# Patient Record
Sex: FEMALE | Race: WHITE | NOT HISPANIC OR LATINO | Employment: OTHER | URBAN - METROPOLITAN AREA
[De-identification: names, ages, dates, MRNs, and addresses within clinical notes are randomized per-mention and may not be internally consistent; named-entity substitution may affect disease eponyms.]

---

## 2017-03-08 ENCOUNTER — TRANSCRIBE ORDERS (OUTPATIENT)
Dept: ADMINISTRATIVE | Facility: HOSPITAL | Age: 62
End: 2017-03-08

## 2017-03-08 DIAGNOSIS — N95.0 POSTMENOPAUSAL BLEEDING: Primary | ICD-10-CM

## 2017-04-03 ENCOUNTER — HOSPITAL ENCOUNTER (OUTPATIENT)
Dept: RADIOLOGY | Age: 62
Discharge: HOME | End: 2017-04-03
Payer: OTHER GOVERNMENT

## 2017-04-03 DIAGNOSIS — N95.0 POSTMENOPAUSAL BLEEDING: ICD-10-CM

## 2017-04-03 PROCEDURE — 76856 US EXAM PELVIC COMPLETE: CPT

## 2017-04-03 PROCEDURE — 76830 TRANSVAGINAL US NON-OB: CPT

## 2017-04-07 ENCOUNTER — ALLSCRIPTS OFFICE VISIT (OUTPATIENT)
Dept: OTHER | Facility: OTHER | Age: 62
End: 2017-04-07

## 2017-06-06 ENCOUNTER — TRANSCRIBE ORDERS (OUTPATIENT)
Dept: ADMINISTRATIVE | Facility: HOSPITAL | Age: 62
End: 2017-06-06

## 2017-06-06 DIAGNOSIS — Z12.31 ENCOUNTER FOR SCREENING MAMMOGRAM FOR MALIGNANT NEOPLASM OF BREAST: Primary | ICD-10-CM

## 2017-06-29 ENCOUNTER — HOSPITAL ENCOUNTER (OUTPATIENT)
Dept: RADIOLOGY | Age: 62
Discharge: HOME | End: 2017-06-29
Payer: OTHER GOVERNMENT

## 2017-06-29 DIAGNOSIS — Z12.31 ENCOUNTER FOR SCREENING MAMMOGRAM FOR MALIGNANT NEOPLASM OF BREAST: ICD-10-CM

## 2017-06-29 PROCEDURE — G0202 SCR MAMMO BI INCL CAD: HCPCS

## 2017-08-07 ENCOUNTER — ALLSCRIPTS OFFICE VISIT (OUTPATIENT)
Dept: OTHER | Facility: OTHER | Age: 62
End: 2017-08-07

## 2017-08-07 DIAGNOSIS — Z11.59 ENCOUNTER FOR SCREENING FOR OTHER VIRAL DISEASES: ICD-10-CM

## 2017-08-07 DIAGNOSIS — E78.00 PURE HYPERCHOLESTEROLEMIA: ICD-10-CM

## 2017-08-07 DIAGNOSIS — R73.01 IMPAIRED FASTING GLUCOSE: ICD-10-CM

## 2017-08-31 ENCOUNTER — GENERIC CONVERSION - ENCOUNTER (OUTPATIENT)
Dept: INTERNAL MEDICINE CLINIC | Facility: CLINIC | Age: 62
End: 2017-08-31

## 2017-09-09 ENCOUNTER — LAB CONVERSION - ENCOUNTER (OUTPATIENT)
Dept: OTHER | Facility: OTHER | Age: 62
End: 2017-09-09

## 2017-09-09 LAB
A/G RATIO (HISTORICAL): 1.7 (CALC) (ref 1–2.5)
ALBUMIN SERPL BCP-MCNC: 4 G/DL (ref 3.6–5.1)
ALP SERPL-CCNC: 87 U/L (ref 33–130)
ALT SERPL W P-5'-P-CCNC: 13 U/L (ref 6–29)
AST SERPL W P-5'-P-CCNC: 15 U/L (ref 10–35)
BASOPHILS # BLD AUTO: 0.7 %
BASOPHILS # BLD AUTO: 30 CELLS/UL (ref 0–200)
BILIRUB SERPL-MCNC: 0.5 MG/DL (ref 0.2–1.2)
BUN SERPL-MCNC: 16 MG/DL (ref 7–25)
BUN/CREA RATIO (HISTORICAL): ABNORMAL (CALC) (ref 6–22)
CALCIUM SERPL-MCNC: 9.4 MG/DL (ref 8.6–10.4)
CHLORIDE SERPL-SCNC: 107 MMOL/L (ref 98–110)
CHOLEST SERPL-MCNC: 234 MG/DL
CHOLEST/HDLC SERPL: 5.2 (CALC)
CO2 SERPL-SCNC: 27 MMOL/L (ref 20–31)
CREAT SERPL-MCNC: 0.95 MG/DL (ref 0.5–0.99)
DEPRECATED RDW RBC AUTO: 13.9 % (ref 11–15)
EGFR AFRICAN AMERICAN (HISTORICAL): 73 ML/MIN/1.73M2
EGFR-AMERICAN CALC (HISTORICAL): 63 ML/MIN/1.73M2
EOSINOPHIL # BLD AUTO: 151 CELLS/UL (ref 15–500)
EOSINOPHIL # BLD AUTO: 3.5 %
GAMMA GLOBULIN (HISTORICAL): 2.4 G/DL (CALC) (ref 1.9–3.7)
GLUCOSE (HISTORICAL): 102 MG/DL (ref 65–99)
HBA1C MFR BLD HPLC: 5.5 % OF TOTAL HGB
HCT VFR BLD AUTO: 38.5 % (ref 35–45)
HDLC SERPL-MCNC: 45 MG/DL
HEPATITIS C ANTIBODY (HISTORICAL): NORMAL
HGB BLD-MCNC: 12.4 G/DL (ref 11.7–15.5)
LDL CHOLESTEROL (HISTORICAL): 164 MG/DL (CALC)
LYMPHOCYTES # BLD AUTO: 1140 CELLS/UL (ref 850–3900)
LYMPHOCYTES # BLD AUTO: 26.5 %
MCH RBC QN AUTO: 27.7 PG (ref 27–33)
MCHC RBC AUTO-ENTMCNC: 32.2 G/DL (ref 32–36)
MCV RBC AUTO: 85.9 FL (ref 80–100)
MONOCYTES # BLD AUTO: 447 CELLS/UL (ref 200–950)
MONOCYTES (HISTORICAL): 10.4 %
NEUTROPHILS # BLD AUTO: 2533 CELLS/UL (ref 1500–7800)
NEUTROPHILS # BLD AUTO: 58.9 %
NON-HDL-CHOL (CHOL-HDL) (HISTORICAL): 189 MG/DL (CALC)
PLATELET # BLD AUTO: 299 THOUSAND/UL (ref 140–400)
PMV BLD AUTO: 10.5 FL (ref 7.5–12.5)
POTASSIUM SERPL-SCNC: 3.7 MMOL/L (ref 3.5–5.3)
RBC # BLD AUTO: 4.48 MILLION/UL (ref 3.8–5.1)
SIGNAL TO CUT-OFF (HISTORICAL): 0.01
SODIUM SERPL-SCNC: 142 MMOL/L (ref 135–146)
TOTAL PROTEIN (HISTORICAL): 6.4 G/DL (ref 6.1–8.1)
TRIGL SERPL-MCNC: 130 MG/DL
TSH SERPL DL<=0.05 MIU/L-ACNC: 3.62 MIU/L (ref 0.4–4.5)
WBC # BLD AUTO: 4.3 THOUSAND/UL (ref 3.8–10.8)

## 2017-09-11 ENCOUNTER — GENERIC CONVERSION - ENCOUNTER (OUTPATIENT)
Dept: OTHER | Facility: OTHER | Age: 62
End: 2017-09-11

## 2017-10-17 ENCOUNTER — TRANSCRIBE ORDERS (OUTPATIENT)
Dept: SLEEP CENTER | Facility: CLINIC | Age: 62
End: 2017-10-17

## 2017-10-17 ENCOUNTER — HOSPITAL ENCOUNTER (OUTPATIENT)
Dept: SLEEP CENTER | Facility: CLINIC | Age: 62
Discharge: HOME | End: 2017-10-17
Payer: OTHER GOVERNMENT

## 2017-10-17 DIAGNOSIS — G47.33 OSA (OBSTRUCTIVE SLEEP APNEA): ICD-10-CM

## 2017-10-17 DIAGNOSIS — G47.33 OSA (OBSTRUCTIVE SLEEP APNEA): Primary | ICD-10-CM

## 2017-10-17 NOTE — PROGRESS NOTES
Progress Note - Jagruti Hanley Genaro 1620 CQO:[birth year] MRN: [id number]      Reason for Visit:  57 y  o female here for annual follow-up    Assessment:  Doing well on current therapy of BiPAP 15/9 cm for severe obstructive sleep apnea  She does complain of sleepiness, which has gotten worse in association with a 10 lb weight gain  Plan:  I will have her placed in BPAP Auto mode to see if a higher pressure is required  Follow up: One year    History of Present Illness:  History of MIHAELA on PAP therapy  Fully compliant and deriving benefit  Historical Information    Past Medical History: No past medical history on file  Past Surgical History: No past surgical history on file  Social History - see chart  History   Alcohol use: Not on file     History   Drug Use Not on file     History   Smoking Status    Not on file   Smokeless Tobacco    Not on file     Family History: No family history on file  Medications/Allergies:    No current outpatient prescriptions on file  Objective    Vital Signs:   See Chart    [place] Sleepiness Scale:  5    Physical Exam:    General: Alert, appropriate, cooperative, overweight    Head: NC/AT, mild retrognathia    Skin: Warm, dry    Neuro: No motor abnormalities, cranial nerves appear intact    Extremity: No clubbing, cyanosis    PAP setting:  BiPAP 15/9 cm  DME Provider: [employer] [facility]    Counseling / Coordination of Care  Total clinic time spent today 15 minutes  Greater than 50% of total time was spent with the patient and / or family counseling and / or coordination of care  A description of the counseling / coordination of care: Discussed equipment and response to treatment  SABINO Quesada    Board Certified Sleep Specialist

## 2017-12-04 ENCOUNTER — ALLSCRIPTS OFFICE VISIT (OUTPATIENT)
Dept: OTHER | Facility: OTHER | Age: 62
End: 2017-12-04

## 2017-12-05 ENCOUNTER — TRANSCRIBE ORDERS (OUTPATIENT)
Dept: ADMINISTRATIVE | Facility: HOSPITAL | Age: 62
End: 2017-12-05

## 2017-12-05 DIAGNOSIS — R06.81 APNEA: Primary | ICD-10-CM

## 2017-12-05 NOTE — PROGRESS NOTES
Assessment    1  Benign essential hypertension (401 1) (I10)   2  Pure hypercholesterolemia (272 0) (E78 00)   3  Impaired fasting glucose (790 21) (R73 01)   4  Trigger finger of right hand, unspecified finger (727 03) (M65 30)    Plan  Benign essential hypertension    · From  Metoprolol Succinate ER 50 MG Oral Tablet Extended Release 24 Hour Take 1 tabletdaily To Metoprolol Succinate  MG Oral Tablet Extended Release 24 Hour TAKE 1 TABLETDAILY  Trigger finger of right hand, unspecified finger    · 1 - Arturo Khalil MD  (Orthopedic Surgery) Co-Management  *  Status: Active  Requested for:71Pte4895  Care Summary provided  : Yes    Discussion/Summary  Discussion Summary:   Hypertension: Still running high  I recommend a follow-up from 50 mg daily to 100 mg daily  Continue to watch diet, see HPI for details about this issue  finger of the right hand patient referred to Ortho for this to see if injection needed or surgery  Chief Complaint  Chief Complaint Chronic Condition [facility]: Patient is here today for follow up of chronic conditions described in HPI  History of Present Illness  HPI: Hypercholesterolemia: Reviewed patient's 10 year risk for cardiovascular disease estimation which calculates to about 10 9%  Patient currently off statin due to feeling achy on a statin, she watches her diet for cholesterol  Patient tolerating blood pressure meds  Patient not currently having problems with this      Review of Systems  Complete-Female:  Constitutional: no fever,-- no chills-- and-- not feeling tired  Cardiovascular: the heart rate was not slow,-- no chest pain,-- the heart rate was not fast,-- no palpitations-- and-- no lower extremity edema  Respiratory: no shortness of breath,-- no cough,-- no wheezing-- and-- no shortness of breath during exertion  Gastrointestinal: no constipation-- and-- no diarrhea  Genitourinary: no dysuria  Psychiatric: no anxiety-- and-- no depression        Active Problems  1  Benign essential hypertension (401 1) (I10)   2  Encounter for routine pelvic examination (V72 31) (Z01 419)   3  Impaired fasting glucose (790 21) (R73 01)   4  Need for hepatitis C screening test (V73 89) (Z11 59)   5  Need for prophylactic vaccination and inoculation against influenza (V04 81) (Z23)   6  Pure hypercholesterolemia (272 0) (E78 00)   7  Swelling of limb (729 81) (M79 89)   8  Vertigo (780 4) (R42)    Social History     · Former smoker (E93 10) (H00 268)   · Social alcohol use (Z78 9)  Social History Reviewed: The social history was reviewed and updated today  Current Meds   1  HydroCHLOROthiazide 25 MG Oral Tablet; Take 1 tablet daily; Therapy: 64DFI6036 to (Last Rx:90Ovb2667)  Requested for: 12PIT9205 Ordered   2  Meclizine HCl - 25 MG Oral Tablet; TAKE 1 TABLET 3 times daily PRN; Therapy: 01FLY3502 to (Evaluate:14Jun2017)  Requested for: 33Pgx7142; Last Rx:89Sxx7954 Ordered   3  Metoprolol Succinate ER 50 MG Oral Tablet Extended Release 24 Hour; Take 1 tablet daily; Therapy: 31KJI9506 to (Last Rx:25Xyn6578)  Requested for: 51CJN3377 Ordered  Medication List Reviewed: The medication list was reviewed and updated today  Allergies  1  No Known Drug Allergies    Vitals  Vital Signs    Recorded: 68PNB2250 01:28PM   Temperature 98 3 F, Oral   Heart Rate 72   Respiration 16   Systolic 259, Sitting   Diastolic 89, Sitting   Height 5 ft 6 in   Weight 250 lb    BMI Calculated 40 35   BSA Calculated 2 2   O2 Saturation 97       Physical Exam   Constitutional  General appearance: No acute distress, well appearing and well nourished  Head and Face  Head and face: Normal    Eyes  Conjunctiva and lids: No swelling, erythema or discharge  Ears, Nose, Mouth, and Throat  External inspection of ears and nose: Normal    Neck  Neck: Supple, symmetric, trachea midline, no masses  Thyroid: Normal, no thyromegaly     Pulmonary  Respiratory effort: No increased work of breathing or signs of respiratory distress  Auscultation of lungs: Clear to auscultation  Cardiovascular  Auscultation of heart: Normal rate and rhythm, normal S1 and S2, no murmurs  Carotid pulses: 2+ bilaterally  Examination of extremities for edema and/or varicosities: Normal    Lymphatic  Palpation of lymph nodes in neck: No lymphadenopathy  Neurologic  Cortical function: Normal mental status  Psychiatric  Judgment and insight: Normal    Orientation to person, place, and time: Normal    Recent and remote memory: Intact  Mood and affect: Normal        Results/Data  PHQ-2 Adult Depression Screening 87FHL7484 01:28PM User, [facility]     Test Name Result Flag Reference   PHQ-2 Adult Depression Score 0       Over the last two weeks, how often have you been bothered by any of the following problems? Little interest or pleasure in doing things: Not at all - 0 Feeling down, depressed, or hopeless: Not at all - 0   PHQ-2 Adult Depression Screening Negative           Health Management  Encounter for routine pelvic examination   PELVIC EXAM; every 2 years;  Deferred until 18Nuy0942: ; Temporary Deferral    Signatures   Electronically signed by : SABINO Henley ; Dec  4 2017  1:46PM EST                       (Author)

## 2017-12-06 ENCOUNTER — HOSPITAL ENCOUNTER (OUTPATIENT)
Dept: SLEEP CENTER | Facility: CLINIC | Age: 62
Discharge: HOME | End: 2017-12-06
Payer: OTHER GOVERNMENT

## 2017-12-06 ENCOUNTER — TRANSCRIBE ORDERS (OUTPATIENT)
Dept: SLEEP CENTER | Facility: CLINIC | Age: 62
End: 2017-12-06

## 2017-12-06 DIAGNOSIS — R06.81 APNEA: ICD-10-CM

## 2017-12-06 DIAGNOSIS — G47.33 OSA (OBSTRUCTIVE SLEEP APNEA): Primary | ICD-10-CM

## 2017-12-06 NOTE — PROGRESS NOTES
Progress Note - Jagruti Lam 1620 CLW:[birth year] MRN: [id number]      Reason for Visit:    57 y  o female who continues to feel that her the BPAP Auto pressure is insufficient    Assessment:  She is currently on a maximum IPAP of 25 cm and a maximum EPAP of 15 cm  I will increase the EPAP to 21 cm and reduce the pressure support to 4  I will obtain a repeat titration study to better determine what is inadequate pressure for her  Her compliance data suggests that her inspiratory pressure goes no higher than 20 cm  Plan:  Repeat titration study    Follow up: After testing    History of Present Illness:  History of obstructive sleep apnea positive airway pressure therapy  The patient has felt that the pressure is insufficient  After starting BPAP Auto, she had 1 night of excellent sleep with improved daytime hypersomnolence, which could not be matched on subsequent nights  Historical Information    Past Medical History: No past medical history on file  Past Surgical History: No past surgical history on file  Social History - see chart  History   Alcohol use: Not on file     History   Drug Use Not on file     History   Smoking Status    Not on file   Smokeless Tobacco    Not on file     Family History: No family history on file  Medications/Allergies:    No current outpatient prescriptions on file  Objective    Vital Signs:   See Chart    Physical Exam:    General: Alert, appropriate, cooperative, overweight    Head: NC/AT    Skin: Warm, dry    Neuro: No motor abnormalities, cranial nerves appear intact    Psych: Normal affect      Counseling / Coordination of Care  Total clinic time spent today 15 minutes  Greater than 50% of total time was spent with the patient and / or family counseling and / or coordination of care  A description of the counseling / coordination of care: treatment was discussed    SABINO Cohen    Board Certified Sleep Specialist

## 2017-12-28 ENCOUNTER — HOSPITAL ENCOUNTER (OUTPATIENT)
Dept: SLEEP CENTER | Facility: CLINIC | Age: 62
Discharge: HOME | End: 2017-12-29
Payer: OTHER GOVERNMENT

## 2017-12-28 DIAGNOSIS — G47.33 OSA (OBSTRUCTIVE SLEEP APNEA): ICD-10-CM

## 2017-12-28 PROCEDURE — 95811 POLYSOM 6/>YRS CPAP 4/> PARM: CPT

## 2018-01-10 NOTE — RESULT NOTES
Message   labs excellent     Verified Results  (1) COMPREHENSIVE METABOLIC PANEL 07RBR0489 32:83MB Calderón Filler     Test Name Result Flag Reference   GLUCOSE 104 mg/dL H 65-99   Fasting reference interval   UREA NITROGEN (BUN) 18 mg/dL  7-25   CREATININE 0 88 mg/dL  0 50-0 99   For patients >52years of age, the reference limit  for Creatinine is approximately 13% higher for people  identified as -American  eGFR NON-AFR  AMERICAN 70 mL/min/1 73m2  > OR = 60   eGFR AFRICAN AMERICAN 81 mL/min/1 73m2  > OR = 60   BUN/CREATININE RATIO   4-03   NOT APPLICABLE (calc)   ALT 15 U/L  6-29   ALBUMIN 4 0 g/dL  3 6-5 1   GLOBULIN 2 5 g/dL (calc)  1 9-3 7   ALBUMIN/GLOBULIN RATIO 1 6 (calc)  1 0-2 5   BILIRUBIN, TOTAL 0 4 mg/dL  0 2-1 2   ALKALINE PHOSPHATASE 101 U/L     AST 16 U/L  10-35   SODIUM 141 mmol/L  135-146   POTASSIUM 3 9 mmol/L  3 5-5 3   CHLORIDE 105 mmol/L     CARBON DIOXIDE 27 mmol/L  20-31   CALCIUM 9 4 mg/dL  8 6-10 4   PROTEIN, TOTAL 6 5 g/dL  6 1-8 1     (1) LIPID PANEL, FASTING [id number] 06:35AM Calderón Filler     Test Name Result Flag Reference   CHOLESTEROL, TOTAL 168 mg/dL  125-200   HDL CHOLESTEROL 48 mg/dL  > OR = 46   TRIGLICERIDES 392 mg/dL  <150   LDL-CHOLESTEROL 95 mg/dL (calc)  <130   Desirable range <100 mg/dL for patients with CHD or  diabetes and <70 mg/dL for diabetic patients with  known heart disease  CHOL/HDLC RATIO 3 5 (calc)  < OR = 5 0   NON HDL CHOLESTEROL 120 mg/dL (calc)     Target for non-HDL cholesterol is 30 mg/dL higher than   LDL cholesterol target  (1) CK (CPK) 59PCP9317 06:35AM Calderón Filler     Test Name Result Flag Reference   CREATINE KINASE, TOTAL 87 U/L       (1) CBC/PLT/DIFF [id number] 06:35AM [employer]   REPORT COMMENT:  FASTING:YES  COLLECTION KIT GIVEN TO PATIENT  PATIENT ADVISED TO RETURN       Test Name Result Flag Reference   WHITE BLOOD CELL COUNT 4 9 Thousand/uL  3 8-10 8   RED BLOOD CELL COUNT 4 72 Million/uL  3 80-5 10 HEMOGLOBIN 13 1 g/dL  11 7-15 5   HEMATOCRIT 40 0 %  35 0-45 0   MCV 84 6 fL  80 0-100 0   MCH 27 7 pg  27 0-33 0   EOSINOPHILS 4 2 %     BASOPHILS 0 5 %     ABSOLUTE MONOCYTES 421 cells/uL  200-950   ABSOLUTE EOSINOPHILS 206 cells/uL     ABSOLUTE BASOPHILS 25 cells/uL  0-200   NEUTROPHILS 66 9 %     LYMPHOCYTES 19 8 %     MONOCYTES 8 6 %     MCHC 32 7 g/dL  32 0-36 0   RDW 14 8 %  11 0-15 0   PLATELET COUNT 908 Thousand/uL  140-400   MPV 8 8 fL  7 5-11 5   ABSOLUTE NEUTROPHILS 3278 cells/uL  0043-9590   ABSOLUTE LYMPHOCYTES 970 cells/uL  850-3900

## 2018-01-11 NOTE — RESULT NOTES
Discussion/Summary   labs all good except for LDL chol went up to 164 from 95 since she is off the statin  Since pt had aches on the statin, ok to stay off, and watch diet for cholesterol     Verified Results  (1) COMPREHENSIVE METABOLIC PANEL [id number] 24:53HA Hanna Kahn     Test Name Result Flag Reference   GLUCOSE 102 mg/dL H 65-99   Fasting reference interval     For someone without known diabetes, a glucose value  between 100 and 125 mg/dL is consistent with  prediabetes and should be confirmed with a  follow-up test    UREA NITROGEN (BUN) 16 mg/dL  7-25   CREATININE 0 95 mg/dL  0 50-0 99   For patients >52years of age, the reference limit  for Creatinine is approximately 13% higher for people  identified as -American  eGFR NON-AFR   AMERICAN 63 mL/min/1 73m2  > OR = 60   eGFR AFRICAN AMERICAN 73 mL/min/1 73m2  > OR = 60   BUN/CREATININE RATIO   7-14   NOT APPLICABLE (calc)   SODIUM 142 mmol/L  135-146   POTASSIUM 3 7 mmol/L  3 5-5 3   CHLORIDE 107 mmol/L     CARBON DIOXIDE 27 mmol/L  20-31   CALCIUM 9 4 mg/dL  8 6-10 4   PROTEIN, TOTAL 6 4 g/dL  6 1-8 1   ALBUMIN 4 0 g/dL  3 6-5 1   GLOBULIN 2 4 g/dL (calc)  1 9-3 7   ALBUMIN/GLOBULIN RATIO 1 7 (calc)  1 0-2 5   BILIRUBIN, TOTAL 0 5 mg/dL  0 2-1 2   ALKALINE PHOSPHATASE 87 U/L     AST 15 U/L  10-35   ALT 13 U/L  6-29     (1) CBC/PLT/DIFF [id number] 08:26AM Hanna Kahn     Test Name Result Flag Reference   WHITE BLOOD CELL COUNT 4 3 Thousand/uL  3 8-10 8   RED BLOOD CELL COUNT 4 48 Million/uL  3 80-5 10   HEMOGLOBIN 12 4 g/dL  11 7-15 5   HEMATOCRIT 38 5 %  35 0-45 0   MCV 85 9 fL  80 0-100 0   MCH 27 7 pg  27 0-33 0   MCHC 32 2 g/dL  32 0-36 0   RDW 13 9 %  11 0-15 0   PLATELET COUNT 775 Thousand/uL  140-400   ABSOLUTE NEUTROPHILS 2533 cells/uL  5071-9347   ABSOLUTE LYMPHOCYTES 1140 cells/uL  850-3900   ABSOLUTE MONOCYTES 447 cells/uL  200-950   ABSOLUTE EOSINOPHILS 151 cells/uL     ABSOLUTE BASOPHILS 30 cells/uL  0-200 NEUTROPHILS 58 9 %     LYMPHOCYTES 26 5 %     MONOCYTES 10 4 %     EOSINOPHILS 3 5 %     BASOPHILS 0 7 %     MPV 10 5 fL  7 5-12 5     (1) LIPID PANEL, FASTING 08Sep2017 08:26AM Erik Casillas     Test Name Result Flag Reference   CHOLESTEROL, TOTAL 234 mg/dL H <200   HDL CHOLESTEROL 45 mg/dL L >10   TRIGLICERIDES 740 mg/dL  <150   LDL-CHOLESTEROL 164 mg/dL (calc) H    Reference range: <100     Desirable range <100 mg/dL for patients with CHD or  diabetes and <70 mg/dL for diabetic patients with  known heart disease  The [facility] calculation is a validated novel   method that provides better accuracy than the   Friedewald equation in the estimation of LDL-C,   particularly when TG levels are 150-400 mg/dL and   LDL-C levels are lower than 70 mg/dL  Reference:  Ana Hull et al  Comparison of a Novel   Method vs the Friedewald Equation for Estimating   Low-Density Lipoprotein Cholesterol Levels From the   Standard Lipid Profile  JULIA  6669;113(03): 5638-4773  For additional information, please refer to  http://[place]/faq/BZH141  (This link is being provided for informational/  educational purposes only )   CHOL/HDLC RATIO 5 2 (calc) H <5 0   NON HDL CHOLESTEROL 189 mg/dL (calc) H <130   For patients with diabetes plus 1 major ASCVD risk   factor, treating to a non-HDL-C goal of <100 mg/dL   (LDL-C of <70 mg/dL) is considered a therapeutic   option       (Q) HEPATITIS C ANTIBODY 08Sep2017 08:26AM Erik [employer]     Test Name Result Flag Reference   HEPATITIS C ANTIBODY NON-REACTIVE  NON-REACTIVE   SIGNAL TO CUT-OFF 0 01  <1 00     (Q) TSH, 3RD GENERATION 08Sep2017 08:26AM [email or web address]     Test Name Result Flag Reference   TSH 3 62 mIU/L  0 40-4 50     (Q) HEMOGLOBIN A1c 08Sep2017 08:26AM [email or web address]   REPORT COMMENT:  FASTING:YES     Test Name Result Flag Reference   HEMOGLOBIN A1c 5 5 % of total Hgb  <5 7   For the purpose of screening for the presence of  diabetes: <5 7%       Consistent with the absence of diabetes  5 7-6 4%    Consistent with increased risk for diabetes              (prediabetes)  > or =6 5%  Consistent with diabetes     This assay result is consistent with a decreased risk  of diabetes  Currently, no consensus exists regarding use of  hemoglobin A1c for diagnosis of diabetes in children  According to American Diabetes Association (ADA)  guidelines, hemoglobin A1c <7 0% represents optimal  control in non-pregnant diabetic patients  Different  metrics may apply to specific patient populations  Standards of Medical Care in Diabetes(ADA)

## 2018-01-13 VITALS
BODY MASS INDEX: 39.7 KG/M2 | WEIGHT: 247 LBS | HEIGHT: 66 IN | TEMPERATURE: 98.4 F | RESPIRATION RATE: 16 BRPM | DIASTOLIC BLOOD PRESSURE: 90 MMHG | HEART RATE: 85 BPM | SYSTOLIC BLOOD PRESSURE: 150 MMHG | OXYGEN SATURATION: 97 %

## 2018-01-13 VITALS
BODY MASS INDEX: 39.05 KG/M2 | HEART RATE: 76 BPM | WEIGHT: 243 LBS | SYSTOLIC BLOOD PRESSURE: 124 MMHG | DIASTOLIC BLOOD PRESSURE: 64 MMHG | OXYGEN SATURATION: 95 % | TEMPERATURE: 98.2 F | HEIGHT: 66 IN

## 2018-01-13 NOTE — RESULT NOTES
Message   stool test normal     Verified Results  (Q) FECAL GLOBIN BY IMMUNOCHEMISTRY [id number] 06:41AM Junior Bronson   REPORT COMMENT:  SPLIT 11/10/2016 FROM [id number]  2ND SLIDE COLLECTED 11/8/16  FASTING:YES     Test Name Result Flag Reference   FECAL GLOBIN BY$IMMUNOCHEMISTRY      FECAL GLOBIN BY IMMUNOCHEMISTRY         MICRO NUMBER:      [id number]    TEST STATUS:       FINAL    SPECIMEN SOURCE:   INSURE (TM) FOBT TEST CARD    SPECIMEN QUALITY:  ADEQUATE    RESULT:            Not Detected

## 2018-01-16 NOTE — RESULT NOTES
Message   Let pt know no blood clot in legs  Verified Results  VAS LOWER LIMB VENOUS DUPLEX STUDY, COMPLETE BILATERAL [id number] 02:49PM Aubreekenny Auner Order Number: [id number]    - Patient Instructions: To schedule this appointment, please contact Central Scheduling at [phone]   Order Number: [id number]    - Patient Instructions: To schedule this appointment, please contact Central Scheduling at [phone]  Test Name Result Flag Reference   VAS LOWER LIMB VENOUS DUPLEX STUDY, COMPLETE BILATERAL (Report)     [facility] REPORT   CLINICAL:   Indications: Other specified soft tissue disorders [M79 89]  Patient presents with   bilateral ankle swelling x 3 weeks  She reports a stretchy feeling in her left   ankle which gets worse when she first stands up  Denies history of DVT or PE  Risk Factors:   Patient denies any current cardiovascular risk factors  CONCLUSION:   Impression:   RIGHT LOWER LIMB:   No evidence of acute or chronic deep vein thrombosis  No evidence of superficial thrombophlebitis noted  Doppler evaluation shows a normal response to augmentation maneuvers  Popliteal, posterior tibial and anterior tibial arterial Doppler waveforms are   triphasic   LEFT LOWER LIMB:   No evidence of acute or chronic deep vein thrombosis  No evidence of superficial thrombophlebitis noted  Doppler evaluation shows a normal response to augmentation maneuvers     Popliteal, posterior tibial and anterior tibial arterial Doppler waveforms are   triphasic      SIGNATURE:   Electronically Signed by: John Bull on 2016-08-24 07:10:55 PM

## 2018-01-23 VITALS
RESPIRATION RATE: 16 BRPM | SYSTOLIC BLOOD PRESSURE: 162 MMHG | WEIGHT: 250 LBS | BODY MASS INDEX: 40.18 KG/M2 | TEMPERATURE: 98.3 F | HEIGHT: 66 IN | HEART RATE: 72 BPM | DIASTOLIC BLOOD PRESSURE: 89 MMHG | OXYGEN SATURATION: 97 %

## 2018-02-08 ENCOUNTER — TELEPHONE (OUTPATIENT)
Dept: OBGYN CLINIC | Facility: HOSPITAL | Age: 63
End: 2018-02-08

## 2018-02-19 RX ORDER — ATORVASTATIN CALCIUM 10 MG/1
1 TABLET, FILM COATED ORAL
COMMUNITY
End: 2018-04-02 | Stop reason: SINTOL

## 2018-02-19 RX ORDER — MECLIZINE HYDROCHLORIDE 25 MG/1
1 TABLET ORAL 3 TIMES DAILY
COMMUNITY
Start: 2017-04-07 | End: 2019-03-18

## 2018-02-19 RX ORDER — HYDROCHLOROTHIAZIDE 25 MG/1
1 TABLET ORAL DAILY
COMMUNITY
Start: 2016-10-10 | End: 2019-01-01 | Stop reason: SDUPTHER

## 2018-02-19 RX ORDER — METOPROLOL SUCCINATE 100 MG/1
1 TABLET, EXTENDED RELEASE ORAL DAILY
COMMUNITY
Start: 2016-10-19 | End: 2018-11-27 | Stop reason: SDUPTHER

## 2018-02-20 ENCOUNTER — OFFICE VISIT (OUTPATIENT)
Dept: OBGYN CLINIC | Facility: HOSPITAL | Age: 63
End: 2018-02-20
Payer: OTHER GOVERNMENT

## 2018-02-20 VITALS
SYSTOLIC BLOOD PRESSURE: 161 MMHG | DIASTOLIC BLOOD PRESSURE: 79 MMHG | BODY MASS INDEX: 39.37 KG/M2 | HEART RATE: 75 BPM | WEIGHT: 245 LBS | HEIGHT: 66 IN

## 2018-02-20 DIAGNOSIS — M79.641 RIGHT HAND PAIN: Primary | ICD-10-CM

## 2018-02-20 PROCEDURE — 99203 OFFICE O/P NEW LOW 30 MIN: CPT | Performed by: ORTHOPAEDIC SURGERY

## 2018-02-20 NOTE — PROGRESS NOTES
ASSESSMENT/PLAN:    Diagnoses and all orders for this visit:    Right hand pain  -     XR hand 3+ vw right; Future    Other orders  -     atorvastatin (LIPITOR) 10 mg tablet; Take 1 tablet by mouth  -     hydrochlorothiazide (HYDRODIURIL) 25 mg tablet; Take 1 tablet by mouth daily  -     meclizine (ANTIVERT) 25 mg tablet; Take 1 tablet by mouth 3 (three) times a day  -     metoprolol succinate (TOPROL-XL) 100 mg 24 hr tablet; Take 1 tablet by mouth daily        Assessment:   Trigger Finger  right  long finger    Plan:   NSAIDs, Tylenol and activity modification    Follow Up:  6 weeks    To Do Next Visit:       General Discussions:     Trigger FInger: The anatomy and physiology of trigger finger was discussed with the patient today in the office  Edema and increased contact pressure within the flexor tendons at the A1 pulley can cause pain, crepitation, and limitation of function  Treatment options include resting MP blocking splints to decrease edema, oral anti-inflammatory medications, home or formal therapy exercises, up to 2 steroid injections within the tendon sheath, or surgical release  While majority of patients do respond to conservative treatment, up to 20% may require surgical release  Operative Discussions:         _____________________________________________________  CHIEF COMPLAINT:  No chief complaint on file  SUBJECTIVE:  Jv Ku is a 57y o  year old female who presents with Catching and Locking to the right long finger  Radiation: None  Previous Treatments: None  Associated symptoms: Catching and Locking    PAST MEDICAL HISTORY:  No past medical history on file  PAST SURGICAL HISTORY:  No past surgical history on file  FAMILY HISTORY:  No family history on file      SOCIAL HISTORY:  Social History   Substance Use Topics    Smoking status: Not on file    Smokeless tobacco: Not on file    Alcohol use Not on file       MEDICATIONS:    Current Outpatient Prescriptions:   hydrochlorothiazide (HYDRODIURIL) 25 mg tablet, Take 1 tablet by mouth daily, Disp: , Rfl:     meclizine (ANTIVERT) 25 mg tablet, Take 1 tablet by mouth 3 (three) times a day, Disp: , Rfl:     metoprolol succinate (TOPROL-XL) 100 mg 24 hr tablet, Take 1 tablet by mouth daily, Disp: , Rfl:     atorvastatin (LIPITOR) 10 mg tablet, Take 1 tablet by mouth, Disp: , Rfl:     ALLERGIES:  Allergies not on file    REVIEW OF SYSTEMS:  Pertinent items are noted in HPI  A comprehensive review of systems was negative      LABS:  HgA1c:   Lab Results   Component Value Date    HGBA1C 5 5 09/08/2017     BMP:   Lab Results   Component Value Date    GLUCOSE 93 03/02/2015    CALCIUM 9 2 03/02/2015     03/02/2015    K 3 8 03/02/2015    CO2 33 03/02/2015     03/02/2015    BUN 19 03/02/2015    CREATININE 1 11 03/02/2015         _____________________________________________________  PHYSICAL EXAMINATION:  General: well developed and well nourished, alert, oriented times 3 and appears comfortable  Psychiatric: Normal  HEENT: Trachea Midline, No torticollis  Cardiovascular: No discernable arrhythmia  Pulmonary: No wheezing or stridor  Skin: No masses, erthema, lacerations, fluctation, ulcerations, No Masses  Neurovascular: Sensation Intact to the Median, Ulnar, Radial Nerve, Motor Intact to the Median, Ulnar, Radial Nerve and Pulses Intact    MUSCULOSKELETAL EXAMINATION:  RIGHT SIDE:  Finger:  No tenderness, No Triggering  long finger and No evidence of Nodules  long finger  Radial, median and ulnar nerve intact  No palpable masses in palm  Gliding normally in A1 pulley in long finger  _____________________________________________________  STUDIES REVIEWED:  No Studies to review      PROCEDURES PERFORMED:  Procedures  No Procedures performed today

## 2018-02-22 ENCOUNTER — OFFICE VISIT (OUTPATIENT)
Dept: SLEEP CENTER | Facility: CLINIC | Age: 63
End: 2018-02-22
Payer: OTHER GOVERNMENT

## 2018-02-22 VITALS
HEART RATE: 76 BPM | HEIGHT: 66 IN | WEIGHT: 244.2 LBS | DIASTOLIC BLOOD PRESSURE: 82 MMHG | BODY MASS INDEX: 39.25 KG/M2 | SYSTOLIC BLOOD PRESSURE: 142 MMHG

## 2018-02-22 DIAGNOSIS — G47.33 OSA (OBSTRUCTIVE SLEEP APNEA): ICD-10-CM

## 2018-02-22 PROCEDURE — 99214 OFFICE O/P EST MOD 30 MIN: CPT | Performed by: INTERNAL MEDICINE

## 2018-03-08 DIAGNOSIS — G47.33 OSA (OBSTRUCTIVE SLEEP APNEA): Primary | ICD-10-CM

## 2018-03-26 ENCOUNTER — TRANSCRIBE ORDERS (OUTPATIENT)
Dept: SLEEP CENTER | Facility: CLINIC | Age: 63
End: 2018-03-26

## 2018-03-26 DIAGNOSIS — Z11.59 ENCOUNTER FOR SCREENING FOR OTHER VIRAL DISEASES: ICD-10-CM

## 2018-04-02 ENCOUNTER — OFFICE VISIT (OUTPATIENT)
Dept: INTERNAL MEDICINE CLINIC | Facility: CLINIC | Age: 63
End: 2018-04-02
Payer: OTHER GOVERNMENT

## 2018-04-02 VITALS
TEMPERATURE: 98 F | WEIGHT: 230.8 LBS | SYSTOLIC BLOOD PRESSURE: 140 MMHG | HEART RATE: 58 BPM | DIASTOLIC BLOOD PRESSURE: 78 MMHG | HEIGHT: 65 IN | BODY MASS INDEX: 38.45 KG/M2 | OXYGEN SATURATION: 97 %

## 2018-04-02 DIAGNOSIS — E78.00 PURE HYPERCHOLESTEROLEMIA: ICD-10-CM

## 2018-04-02 DIAGNOSIS — R73.01 IMPAIRED FASTING GLUCOSE: Primary | ICD-10-CM

## 2018-04-02 DIAGNOSIS — I10 BENIGN ESSENTIAL HYPERTENSION: ICD-10-CM

## 2018-04-02 PROCEDURE — 99214 OFFICE O/P EST MOD 30 MIN: CPT | Performed by: INTERNAL MEDICINE

## 2018-04-02 NOTE — PROGRESS NOTES
Assessment/Plan:    Impaired fasting glucose   Continue to watch diet, and will continue to monitor this    Benign essential hypertension   Borderline, continue medications  Pure hypercholesterolemia    Stay off statin due to a aches from statin in the past, continue to watch diet, exercise  Diagnoses and all orders for this visit:    Impaired fasting glucose    Benign essential hypertension    Pure hypercholesterolemia          Subjective:      Patient ID: Chrissy Noriega is a 57 y o  female  Hypercholesterolemia:  Patient stop statin due to muscle aches  She is currently watching her diet and is pursuing a weight loss program and she exercises  Hypertension: Patient tolerating blood pressure meds, she reports compliance, no cardiopulmonary complaints  Obesity:  Patient is participating in a weight loss program              The following portions of the patient's history were reviewed and updated as appropriate: allergies, current medications, past family history, past medical history, past social history, past surgical history and problem list     Review of Systems   Constitutional: Negative for chills, fatigue and fever  HENT: Negative for congestion, nosebleeds, postnasal drip, sore throat and trouble swallowing  Eyes: Negative for pain  Respiratory: Negative for cough, chest tightness, shortness of breath and wheezing  Cardiovascular: Negative for chest pain, palpitations and leg swelling  Gastrointestinal: Negative for abdominal pain, constipation, diarrhea, nausea and vomiting  Endocrine: Negative for polydipsia and polyuria  Genitourinary: Negative for dysuria, flank pain and hematuria  Musculoskeletal: Negative for arthralgias  Skin: Negative for rash  Neurological: Negative for dizziness, tremors and headaches  Hematological: Does not bruise/bleed easily  Psychiatric/Behavioral: Negative for confusion and dysphoric mood  The patient is not nervous/anxious  Objective:      /78   Pulse 58   Temp 98 °F (36 7 °C) (Oral)   Ht 5' 5 11" (1 654 m)   Wt 105 kg (230 lb 12 8 oz)   SpO2 97%   BMI 38 28 kg/m²          Physical Exam   Constitutional: She is oriented to person, place, and time  She appears well-developed and well-nourished  No distress  HENT:   Head: Normocephalic and atraumatic  Right Ear: External ear normal    Left Ear: External ear normal    Eyes: Conjunctivae are normal  No scleral icterus  Neck: Normal range of motion  Neck supple  No tracheal deviation present  No thyromegaly present  Cardiovascular: Normal rate, regular rhythm and normal heart sounds  No murmur heard  Pulmonary/Chest: Effort normal and breath sounds normal  No respiratory distress  She has no wheezes  She has no rales  Abdominal: Soft  Bowel sounds are normal  There is no tenderness  There is no rebound and no guarding  Musculoskeletal: She exhibits no edema  Lymphadenopathy:     She has no cervical adenopathy  Neurological: She is alert and oriented to person, place, and time  Psychiatric: She has a normal mood and affect  Her behavior is normal  Judgment and thought content normal    Vitals reviewed

## 2018-08-02 ENCOUNTER — OFFICE VISIT (OUTPATIENT)
Dept: INTERNAL MEDICINE CLINIC | Facility: CLINIC | Age: 63
End: 2018-08-02
Payer: MEDICARE

## 2018-08-02 VITALS
SYSTOLIC BLOOD PRESSURE: 170 MMHG | OXYGEN SATURATION: 96 % | HEART RATE: 55 BPM | BODY MASS INDEX: 35.87 KG/M2 | WEIGHT: 223.2 LBS | TEMPERATURE: 98 F | HEIGHT: 66 IN | DIASTOLIC BLOOD PRESSURE: 90 MMHG

## 2018-08-02 DIAGNOSIS — I10 BENIGN ESSENTIAL HYPERTENSION: ICD-10-CM

## 2018-08-02 DIAGNOSIS — Z23 NEED FOR SHINGLES VACCINE: ICD-10-CM

## 2018-08-02 DIAGNOSIS — R29.890 HEIGHT LOSS: ICD-10-CM

## 2018-08-02 DIAGNOSIS — Z78.0 POSTMENOPAUSAL: ICD-10-CM

## 2018-08-02 DIAGNOSIS — R73.01 IMPAIRED FASTING GLUCOSE: ICD-10-CM

## 2018-08-02 DIAGNOSIS — Z23 NEED FOR VACCINATION WITH 13-POLYVALENT PNEUMOCOCCAL CONJUGATE VACCINE: ICD-10-CM

## 2018-08-02 DIAGNOSIS — Z13.820 SCREENING FOR OSTEOPOROSIS: ICD-10-CM

## 2018-08-02 DIAGNOSIS — E78.00 PURE HYPERCHOLESTEROLEMIA: ICD-10-CM

## 2018-08-02 DIAGNOSIS — Z00.00 WELCOME TO MEDICARE PREVENTIVE VISIT: Primary | ICD-10-CM

## 2018-08-02 PROCEDURE — G0403 EKG FOR INITIAL PREVENT EXAM: HCPCS | Performed by: INTERNAL MEDICINE

## 2018-08-02 PROCEDURE — G0402 INITIAL PREVENTIVE EXAM: HCPCS | Performed by: INTERNAL MEDICINE

## 2018-08-02 PROCEDURE — 99214 OFFICE O/P EST MOD 30 MIN: CPT | Performed by: INTERNAL MEDICINE

## 2018-08-02 PROCEDURE — G0009 ADMIN PNEUMOCOCCAL VACCINE: HCPCS

## 2018-08-02 PROCEDURE — 90670 PCV13 VACCINE IM: CPT

## 2018-08-02 RX ORDER — LOSARTAN POTASSIUM 50 MG/1
50 TABLET ORAL DAILY
Qty: 90 TABLET | Refills: 3 | Status: SHIPPED | OUTPATIENT
Start: 2018-08-02 | End: 2018-11-28 | Stop reason: SDUPTHER

## 2018-08-02 NOTE — ASSESSMENT & PLAN NOTE
Stay active, continue to watch diet    Patient off statin due to aches that she had when she was tried on a statin the past

## 2018-08-02 NOTE — PATIENT INSTRUCTIONS
Problem List Items Addressed This Visit     Benign essential hypertension       With blood pressure being elevated, I recommend adding medication, losartan 50 mg daily, and recheck in a few months along with labs         Relevant Medications    losartan (COZAAR) 50 mg tablet    Other Relevant Orders    CBC and differential    Comprehensive metabolic panel    Lipid Panel with Direct LDL reflex    TSH, 3rd generation with Free T4 reflex    Impaired fasting glucose     Continue to watch diet for this  Relevant Orders    Comprehensive metabolic panel    Hemoglobin A1C    Pure hypercholesterolemia       Stay active, continue to watch diet    Patient off statin due to aches that she had when she was tried on a statin the past         Relevant Orders    CBC and differential    Lipid Panel with Direct LDL reflex    TSH, 3rd generation with Free T4 reflex    Welcome to Medicare preventive visit - Primary     Discussed preventive health, cancer screening, immunizations, and safety issues         Relevant Orders    POCT ECG (Completed)      Other Visit Diagnoses     Need for shingles vaccine        Relevant Medications    Zoster Vac Recomb Adjuvanted ([address]) 50 MCG SUSR    Need for vaccination with 13-polyvalent pneumococcal conjugate vaccine        Relevant Orders    PNEUMOCOCCAL CONJUGATE VACCINE 13-VALENT GREATER THAN 6 MONTHS    Screening for osteoporosis        Relevant Orders    DXA bone density spine hip and pelvis    Height loss        Relevant Orders    DXA bone density spine hip and pelvis    Postmenopausal        Relevant Orders    DXA bone density spine hip and pelvis

## 2018-08-02 NOTE — ASSESSMENT & PLAN NOTE
With blood pressure being elevated, I recommend adding medication, losartan 50 mg daily, and recheck in a few months along with labs

## 2018-08-02 NOTE — PROGRESS NOTES
Assessment/Plan:    Impaired fasting glucose  Continue to watch diet for this  Pure hypercholesterolemia    Stay active, continue to watch diet  Patient off statin due to aches that she had when she was tried on a statin the past    Benign essential hypertension    With blood pressure being elevated, I recommend adding medication, losartan 50 mg daily, and recheck in a few months along with labs    Welcome to Medicare preventive visit  Discussed preventive health, cancer screening, immunizations, and safety issues       Diagnoses and all orders for this visit:    Welcome to Medicare preventive visit  -     POCT ECG    Pure hypercholesterolemia  -     CBC and differential; Future  -     Lipid Panel with Direct LDL reflex; Future  -     TSH, 3rd generation with Free T4 reflex; Future    Impaired fasting glucose  -     Comprehensive metabolic panel; Future  -     Hemoglobin A1C; Future    Benign essential hypertension  -     CBC and differential; Future  -     Comprehensive metabolic panel; Future  -     Lipid Panel with Direct LDL reflex; Future  -     TSH, 3rd generation with Free T4 reflex; Future  -     losartan (COZAAR) 50 mg tablet; Take 1 tablet (50 mg total) by mouth daily for 30 days    Need for shingles vaccine  -     Zoster Vac Recomb Adjuvanted (SHINGRIX) 50 MCG SUSR; Inject 50 mcg into a muscle once for 1 dose Repeat one dose in 2 to 6 months    Need for vaccination with 13-polyvalent pneumococcal conjugate vaccine  -     PNEUMOCOCCAL CONJUGATE VACCINE 13-VALENT GREATER THAN 6 MONTHS    Screening for osteoporosis  -     DXA bone density spine hip and pelvis; Future    Height loss  -     DXA bone density spine hip and pelvis; Future    Postmenopausal  -     DXA bone density spine hip and pelvis; Future          Subjective:      Patient ID: Mark Melgar is a 70 y o  female  Hypertension:  Patient reports compliance with blood pressure meds, no cardiopulmonary complaints          The following portions of the patient's history were reviewed and updated as appropriate: allergies, current medications, past family history, past medical history, past social history, past surgical history and problem list     Review of Systems   Constitutional: Negative for chills, fatigue and fever  HENT: Negative for congestion, nosebleeds, postnasal drip, sore throat and trouble swallowing  Eyes: Negative for pain  Respiratory: Negative for cough, chest tightness, shortness of breath and wheezing  Cardiovascular: Negative for chest pain, palpitations and leg swelling  Gastrointestinal: Negative for abdominal pain, constipation, diarrhea, nausea and vomiting  Endocrine: Negative for polydipsia and polyuria  Genitourinary: Negative for dysuria, flank pain and hematuria  Musculoskeletal: Negative for arthralgias  Skin: Negative for rash  Neurological: Negative for dizziness, tremors and headaches  Hematological: Does not bruise/bleed easily  Psychiatric/Behavioral: Negative for confusion and dysphoric mood  The patient is not nervous/anxious  Objective:      /90   Pulse 55   Temp 98 °F (36 7 °C)   Ht 5' 5 7" (1 669 m)   Wt 101 kg (223 lb 3 2 oz)   SpO2 96%   BMI 36 36 kg/m²          Physical Exam   Constitutional: She is oriented to person, place, and time  She appears well-developed and well-nourished  No distress  HENT:   Head: Normocephalic and atraumatic  Right Ear: External ear normal    Left Ear: External ear normal    Eyes: Conjunctivae are normal  No scleral icterus  Vision is 20/30 in the right eye and 20/30 in left eye, both uncorrected   Neck: Normal range of motion  Neck supple  No tracheal deviation present  No thyromegaly present  Cardiovascular: Normal rate, regular rhythm and normal heart sounds  No murmur heard  Pulmonary/Chest: Effort normal and breath sounds normal  No respiratory distress  She has no wheezes  She has no rales  Abdominal: Soft  Bowel sounds are normal  There is no tenderness  There is no rebound and no guarding  Musculoskeletal: She exhibits no edema  Lymphadenopathy:     She has no cervical adenopathy  Neurological: She is alert and oriented to person, place, and time  Psychiatric: She has a normal mood and affect  Her behavior is normal  Judgment and thought content normal    Vitals reviewed  Assessment and Plan:    Problem List Items Addressed This Visit     Benign essential hypertension       With blood pressure being elevated, I recommend adding medication, losartan 50 mg daily, and recheck in a few months along with labs         Relevant Medications    losartan (COZAAR) 50 mg tablet    Other Relevant Orders    CBC and differential    Comprehensive metabolic panel    Lipid Panel with Direct LDL reflex    TSH, 3rd generation with Free T4 reflex    Impaired fasting glucose     Continue to watch diet for this  Relevant Orders    Comprehensive metabolic panel    Hemoglobin A1C    Pure hypercholesterolemia       Stay active, continue to watch diet    Patient off statin due to aches that she had when she was tried on a statin the past         Relevant Orders    CBC and differential    Lipid Panel with Direct LDL reflex    TSH, 3rd generation with Free T4 reflex    Welcome to Medicare preventive visit - Primary     Discussed preventive health, cancer screening, immunizations, and safety issues         Relevant Orders    POCT ECG (Completed)      Other Visit Diagnoses     Need for shingles vaccine        Relevant Medications    Zoster Vac Recomb Adjuvanted ([address]) 50 MCG SUSR    Need for vaccination with 13-polyvalent pneumococcal conjugate vaccine        Relevant Orders    PNEUMOCOCCAL CONJUGATE VACCINE 13-VALENT GREATER THAN 6 MONTHS    Screening for osteoporosis        Relevant Orders    DXA bone density spine hip and pelvis    Height loss        Relevant Orders    DXA bone density spine hip and pelvis Postmenopausal        Relevant Orders    DXA bone density spine hip and pelvis        Health Maintenance Due   Topic Date Due    HIV SCREENING  1955    CRC Screening: Colonoscopy  1955    DTaP,Tdap,and Td Vaccines (1 - Tdap) 06/24/1974    PNEUMOCOCCAL POLYSACCHARIDE VACCINE AGE 72 AND OVER  06/24/2018         HPI:  Luz Marina Calles is a 70 y o  female here for her IPPE(Welcome to Medicare Visit )    Patient Active Problem List   Diagnosis    Benign essential hypertension    Impaired fasting glucose    Pure hypercholesterolemia    Welcome to Medicare preventive visit     No past medical history on file  No past surgical history on file  Family History   Problem Relation Age of Onset    Coronary artery disease Father      History   Smoking Status    Never Smoker   Smokeless Tobacco    Never Used     Comment: per allscripts - 'former smoker'     History   Alcohol Use No     Comment: per allscripts - 'social alcohol use'      History   Drug Use No       Current Outpatient Prescriptions   Medication Sig Dispense Refill    hydrochlorothiazide (HYDRODIURIL) 25 mg tablet Take 1 tablet by mouth daily      meclizine (ANTIVERT) 25 mg tablet Take 1 tablet by mouth 3 (three) times a day      metoprolol succinate (TOPROL-XL) 100 mg 24 hr tablet Take 1 tablet by mouth daily      losartan (COZAAR) 50 mg tablet Take 1 tablet (50 mg total) by mouth daily for 30 days 90 tablet 3    Zoster Vac Recomb Adjuvanted (SHINGRIX) 50 MCG SUSR Inject 50 mcg into a muscle once for 1 dose Repeat one dose in 2 to 6 months 1 each 0     No current facility-administered medications for this visit        Allergies   Allergen Reactions    Atorvastatin Other (See Comments)     Muscle ache    Diclofenac      Immunization History   Administered Date(s) Administered    Influenza 10/31/2017    Influenza Quadrivalent Preservative Free 3 years and older IM 12/12/2016    Influenza Quadrivalent, 6-35 Months IM 09/29/2015    Influenza TIV (IM) 09/30/2013       Patient Care Team:  Maritza Ceballos MD as PCP - General      Medicare Screening Tests and Risk Assessments:  AWV Clinical     ISAR:   Previous hospitalizations?:  No       Once in a Lifetime Medicare Screening:       Medicare Screening Tests and Risk Assessment:   AAA Risk Assessment    Osteoporosis Risk Assessment    HIV Risk Assessment        Drug and Alcohol Use:   Tobacco use    Cigarettes:  former smoker    Quit date:  8/2/03   Tobacco use duration    Tobacco Cessation Readiness    Alcohol use    Alcohol use:  occasional use    Concern about alcohol use:  No    Alcohol Treatment Readiness   Illicit Drug Use        Diet & Exercise:   Diet   What is your diet?:  Regular   How many servings a day of the following:   Exercise    Do you currently exercise?:  yes    Frequency:  daily    Type of exercise:  walking       Cognitive Impairment Screening:   Cognitive Impairment Screening    Do you have difficulty learning or retaining new information?:  No Do you have difficulty handling new tasks?:  No   Do you have difficulty with reasoning?:  No Do you have difficulty with spatial ability and orientation?:  No   Do you have difficulty with language?:  No Do you have difficulty with behavior?:  No       Functional Ability/Level of Safety:   Hearing     Bilateral:  slightly decreased   Hearing Impairment Assessment    Current Activities    Status:  unlimited ADL's, unlimited driving, unlimited IADL's, unlimited social activities   Help needed with the folllowing:    ADL    Fall Risk   Have you fallen in the last 12 months?:  No    Injury History       Home Safety:   Do you feel unsteady when walking?:  No    Home Safety Risk Factors    Poor household lighting:  No       Advanced Directives:   Advanced Directives    Living Will:  Yes Durable POA for healthcare:   Yes   Advanced directive:  Yes    Patient's End of Life Decisions        Urinary Incontinence:   Do you have urinary incontinence?:  No        Glaucoma:            Provider Screening     Preventative Screening/Counseling:   Cardiovascular Screening/Counseling:   (Labs Q5 years, EKG optional one-time)   General:  Risks and Benefits Discussed, Screening Current           Diabetes Screening/Counseling:   (2 tests/year if Pre-Diabetes or 1 test/year if no Diabetes)   General:  Risks and Benefits Discussed, Screening Current           Colorectal Cancer Screening/Counseling:   (FOBT Q1 yr; Flex Sig Q4 yrs or Q10 yrs after Screening Colonoscopy; Screening Colonoscpy Q2 yrs High Risk or Q10 yrs Low Risk; Barium Enema Q2 yrs High Risk or Q4 yrs Low Risk)   General:  Risks and Benefits Discussed, Screening Current           Prostate Cancer Screening/Counseling:   (Annual)          Breast Cancer Screening/Counseling:   (Baseline Age 28 - 43; Annual Age 36+)   General:  Risks and Benefits Discussed, Screening Current          Cervical Cancer Screening/Counseling:   (Annual for High Risk or Childbearing Age with Abnormal Pap in Last 3 yrs; Every 2 all others)         Osteoporosis Screening/Counseling:   (Every 2 Yrs if at risk or more if medically necessary)   General:  Risks and Benefits Discussed           AAA Screening/Counseling:   (Once per Lifetime with risk factors)          Glaucoma Screening/Counseling:   (Annual)         HIV Screening/Counseling:   (Voluntary; Once annually for high risk OR 3 times for Pregnancy at diagnosis of IUP; 3rd trimester; and at Labor         Hepatitis C Screening:             Immunizations:        Other Preventative Couseling (Non-Medicare Wellness Visit Required):       Referrals (Non-Medicare Wellness Visit Required):       Medical Equipment/Suppliers:

## 2018-08-13 ENCOUNTER — HOSPITAL ENCOUNTER (OUTPATIENT)
Dept: RADIOLOGY | Age: 63
Discharge: HOME | End: 2018-08-13
Payer: MEDICARE

## 2018-08-13 DIAGNOSIS — R29.890 HEIGHT LOSS: ICD-10-CM

## 2018-08-13 DIAGNOSIS — Z13.820 SCREENING FOR OSTEOPOROSIS: ICD-10-CM

## 2018-08-13 DIAGNOSIS — Z78.0 POSTMENOPAUSAL: ICD-10-CM

## 2018-08-13 PROCEDURE — 77080 DXA BONE DENSITY AXIAL: CPT

## 2018-11-09 LAB
ALBUMIN SERPL-MCNC: 4 G/DL (ref 3.6–5.1)
ALBUMIN/GLOB SERPL: 1.4 (CALC) (ref 1–2.5)
ALP SERPL-CCNC: 90 U/L (ref 33–130)
ALT SERPL-CCNC: 13 U/L (ref 6–29)
AST SERPL-CCNC: 15 U/L (ref 10–35)
BASOPHILS # BLD AUTO: 31 CELLS/UL (ref 0–200)
BASOPHILS NFR BLD AUTO: 0.7 %
BILIRUB SERPL-MCNC: 0.4 MG/DL (ref 0.2–1.2)
BUN SERPL-MCNC: 19 MG/DL (ref 7–25)
BUN/CREAT SERPL: NORMAL (CALC) (ref 6–22)
CALCIUM SERPL-MCNC: 9.6 MG/DL (ref 8.6–10.4)
CHLORIDE SERPL-SCNC: 103 MMOL/L (ref 98–110)
CHOLEST SERPL-MCNC: 237 MG/DL
CHOLEST/HDLC SERPL: 5.2 (CALC)
CO2 SERPL-SCNC: 30 MMOL/L (ref 20–32)
CREAT SERPL-MCNC: 0.91 MG/DL (ref 0.5–0.99)
EOSINOPHIL # BLD AUTO: 220 CELLS/UL (ref 15–500)
EOSINOPHIL NFR BLD AUTO: 5 %
ERYTHROCYTE [DISTWIDTH] IN BLOOD BY AUTOMATED COUNT: 13.3 % (ref 11–15)
GLOBULIN SER CALC-MCNC: 2.8 G/DL (CALC) (ref 1.9–3.7)
GLUCOSE SERPL-MCNC: 90 MG/DL (ref 65–99)
HBA1C MFR BLD: 5.6 % OF TOTAL HGB
HCT VFR BLD AUTO: 38.7 % (ref 35–45)
HDLC SERPL-MCNC: 46 MG/DL
HGB BLD-MCNC: 12.9 G/DL (ref 11.7–15.5)
LDLC SERPL CALC-MCNC: 158 MG/DL (CALC)
LYMPHOCYTES # BLD AUTO: 1395 CELLS/UL (ref 850–3900)
LYMPHOCYTES NFR BLD AUTO: 31.7 %
MCH RBC QN AUTO: 28 PG (ref 27–33)
MCHC RBC AUTO-ENTMCNC: 33.3 G/DL (ref 32–36)
MCV RBC AUTO: 83.9 FL (ref 80–100)
MONOCYTES # BLD AUTO: 383 CELLS/UL (ref 200–950)
MONOCYTES NFR BLD AUTO: 8.7 %
NEUTROPHILS # BLD AUTO: 2372 CELLS/UL (ref 1500–7800)
NEUTROPHILS NFR BLD AUTO: 53.9 %
NONHDLC SERPL-MCNC: 191 MG/DL (CALC)
PLATELET # BLD AUTO: 296 THOUSAND/UL (ref 140–400)
PMV BLD REES-ECKER: 10.3 FL (ref 7.5–12.5)
POTASSIUM SERPL-SCNC: 3.6 MMOL/L (ref 3.5–5.3)
PROT SERPL-MCNC: 6.8 G/DL (ref 6.1–8.1)
RBC # BLD AUTO: 4.61 MILLION/UL (ref 3.8–5.1)
SL AMB EGFR AFRICAN AMERICAN: 77 ML/MIN/1.73M2
SL AMB EGFR NON AFRICAN AMERICAN: 66 ML/MIN/1.73M2
SODIUM SERPL-SCNC: 140 MMOL/L (ref 135–146)
T4 FREE SERPL-MCNC: 1.2 NG/DL (ref 0.8–1.8)
TRIGL SERPL-MCNC: 179 MG/DL
TSH SERPL-ACNC: 4.64 MIU/L (ref 0.4–4.5)
WBC # BLD AUTO: 4.4 THOUSAND/UL (ref 3.8–10.8)

## 2018-11-16 ENCOUNTER — TELEPHONE (OUTPATIENT)
Dept: SLEEP CENTER | Facility: CLINIC | Age: 63
End: 2018-11-16

## 2018-11-16 DIAGNOSIS — G47.33 OSA (OBSTRUCTIVE SLEEP APNEA): Primary | ICD-10-CM

## 2018-11-26 RX ORDER — A/SINGAPORE/GP1908/2015 IVR-180 (H1N1) (AN A/MICHIGAN/45/2015 (H1N1)PDM09-LIKE VIRUS), A/HONG KONG/4801/2014, NYMC X-263B (H3N2) (AN A/HONG KONG/4801/2014-LIKE VIRUS), AND B/BRISBANE/60/2008, WILD TYPE (A B/BRISBANE/60/2008-LIKE VIRUS) 15; 15; 15 UG/.5ML; UG/.5ML; UG/.5ML
INJECTION, SUSPENSION INTRAMUSCULAR
Refills: 0 | COMMUNITY
Start: 2018-10-17 | End: 2019-03-18

## 2018-11-26 RX ORDER — LOSARTAN POTASSIUM 50 MG/1
TABLET ORAL
COMMUNITY
Start: 2018-10-13 | End: 2018-11-28 | Stop reason: SDUPTHER

## 2018-11-27 DIAGNOSIS — I10 ESSENTIAL HYPERTENSION, BENIGN: Primary | ICD-10-CM

## 2018-11-27 RX ORDER — METOPROLOL SUCCINATE 100 MG/1
100 TABLET, EXTENDED RELEASE ORAL DAILY
Qty: 90 TABLET | Refills: 3 | Status: SHIPPED | OUTPATIENT
Start: 2018-11-27 | End: 2019-11-22 | Stop reason: SDUPTHER

## 2018-11-28 ENCOUNTER — OFFICE VISIT (OUTPATIENT)
Dept: INTERNAL MEDICINE CLINIC | Facility: CLINIC | Age: 63
End: 2018-11-28
Payer: MEDICARE

## 2018-11-28 VITALS
HEIGHT: 66 IN | BODY MASS INDEX: 38.57 KG/M2 | HEART RATE: 65 BPM | WEIGHT: 240 LBS | DIASTOLIC BLOOD PRESSURE: 80 MMHG | OXYGEN SATURATION: 95 % | TEMPERATURE: 98.1 F | SYSTOLIC BLOOD PRESSURE: 149 MMHG

## 2018-11-28 DIAGNOSIS — R73.01 IMPAIRED FASTING GLUCOSE: ICD-10-CM

## 2018-11-28 DIAGNOSIS — E03.8 SUBCLINICAL HYPOTHYROIDISM: ICD-10-CM

## 2018-11-28 DIAGNOSIS — I10 BENIGN ESSENTIAL HYPERTENSION: Primary | ICD-10-CM

## 2018-11-28 PROCEDURE — 99214 OFFICE O/P EST MOD 30 MIN: CPT | Performed by: INTERNAL MEDICINE

## 2018-11-28 RX ORDER — DIPHENHYDRAMINE HCL 25 MG
25 CAPSULE ORAL AS NEEDED
COMMUNITY
Start: 2009-09-08

## 2018-11-28 RX ORDER — LOSARTAN POTASSIUM 100 MG/1
100 TABLET ORAL DAILY
Qty: 90 TABLET | Refills: 3 | Status: SHIPPED | OUTPATIENT
Start: 2018-11-28 | End: 2019-03-14

## 2018-11-28 NOTE — PROGRESS NOTES
Assessment/Plan:    Benign essential hypertension  I recommend increasing losartan from 50 mg daily to 100 mg daily  Impaired fasting glucose  Recent fasting glucose and hemoglobin A1c were both normal, continue with healthy diet and exercise    Subclinical hypothyroidism  TSH just slightly elevated, it was normal a year ago, I recommend continued monitoring of this  Diagnoses and all orders for this visit:    Benign essential hypertension  -     losartan (COZAAR) 100 MG tablet; Take 1 tablet (100 mg total) by mouth daily for 30 days    Impaired fasting glucose    Subclinical hypothyroidism    Other orders  -     Zoster Vac Recomb Adjuvanted (SHINGRIX) 50 MCG/0 5ML SUSR; inject 0 5 milliliter intramuscularly  -     Discontinue: losartan (COZAAR) 50 mg tablet;   -     FLUAD 0 5 ML AURELIANO; inject 0 5 milliliter intramuscularly  -     diphenhydrAMINE (BENADRYL) 25 mg capsule; Take 25 mg by mouth daily as needed          Subjective:      Patient ID: Mart Brasher is a 70 y o  female  Hypertension:  Patient reports compliance with blood pressure meds, on home checks her blood pressures been just slightly elevated  Subclinical hypothyroidism:  No fatigue, no constipation, no cold intolerance, no depression  Impaired fasting glucose:  Patient watches her diet and stays active        The following portions of the patient's history were reviewed and updated as appropriate: allergies, current medications, past family history, past medical history, past social history, past surgical history and problem list     Review of Systems   Constitutional: Negative for chills, fatigue and fever  HENT: Negative for congestion, nosebleeds, postnasal drip, sore throat and trouble swallowing  Eyes: Negative for pain  Respiratory: Negative for cough, chest tightness, shortness of breath and wheezing  Cardiovascular: Negative for chest pain, palpitations and leg swelling     Gastrointestinal: Negative for abdominal pain, constipation, diarrhea, nausea and vomiting  Endocrine: Negative for cold intolerance, polydipsia and polyuria  Genitourinary: Negative for dysuria, flank pain and hematuria  Musculoskeletal: Negative for arthralgias  Skin: Negative for rash  Neurological: Negative for dizziness, tremors and headaches  Hematological: Does not bruise/bleed easily  Psychiatric/Behavioral: Negative for confusion and dysphoric mood  The patient is not nervous/anxious  Objective:      /80   Pulse 65   Temp 98 1 °F (36 7 °C)   Ht 5' 5 7" (1 669 m)   Wt 109 kg (240 lb)   SpO2 95%   BMI 39 09 kg/m²          Physical Exam   Constitutional: She is oriented to person, place, and time  She appears well-developed and well-nourished  No distress  HENT:   Head: Normocephalic and atraumatic  Right Ear: External ear normal    Left Ear: External ear normal    Eyes: Conjunctivae are normal  No scleral icterus  Neck: Normal range of motion  Neck supple  No tracheal deviation present  No thyromegaly present  Cardiovascular: Normal rate, regular rhythm and normal heart sounds  No murmur heard  Pulmonary/Chest: Effort normal and breath sounds normal  No respiratory distress  She has no wheezes  She has no rales  Abdominal: Soft  Bowel sounds are normal  There is no tenderness  There is no rebound and no guarding  Musculoskeletal: She exhibits no edema  Lymphadenopathy:     She has no cervical adenopathy  Neurological: She is alert and oriented to person, place, and time  Psychiatric: She has a normal mood and affect  Her behavior is normal  Judgment and thought content normal    Vitals reviewed

## 2018-11-28 NOTE — PATIENT INSTRUCTIONS
Problem List Items Addressed This Visit        Endocrine    Impaired fasting glucose     Recent fasting glucose and hemoglobin A1c were both normal, continue with healthy diet and exercise         Subclinical hypothyroidism     TSH just slightly elevated, it was normal a year ago, I recommend continued monitoring of this  Cardiovascular and Mediastinum    Benign essential hypertension - Primary     I recommend increasing losartan from 50 mg daily to 100 mg daily           Relevant Medications    losartan (COZAAR) 100 MG tablet

## 2019-01-01 DIAGNOSIS — I10 BENIGN ESSENTIAL HYPERTENSION: Primary | ICD-10-CM

## 2019-01-02 RX ORDER — HYDROCHLOROTHIAZIDE 25 MG/1
TABLET ORAL
Qty: 90 TABLET | Refills: 3 | Status: SHIPPED | OUTPATIENT
Start: 2019-01-02 | End: 2019-12-27 | Stop reason: SDUPTHER

## 2019-02-19 ENCOUNTER — OFFICE VISIT (OUTPATIENT)
Dept: SLEEP CENTER | Facility: CLINIC | Age: 64
End: 2019-02-19
Payer: MEDICARE

## 2019-02-19 VITALS
DIASTOLIC BLOOD PRESSURE: 82 MMHG | HEIGHT: 65 IN | SYSTOLIC BLOOD PRESSURE: 120 MMHG | HEART RATE: 72 BPM | WEIGHT: 249 LBS | BODY MASS INDEX: 41.48 KG/M2

## 2019-02-19 DIAGNOSIS — G47.33 OSA (OBSTRUCTIVE SLEEP APNEA): Primary | ICD-10-CM

## 2019-02-19 PROCEDURE — 99213 OFFICE O/P EST LOW 20 MIN: CPT | Performed by: INTERNAL MEDICINE

## 2019-02-19 NOTE — PROGRESS NOTES
Progress Note - Jagruti Lam 1620 AEJ:[id number] MRN: [id number]      Reason for Visit:  70 y  o female here for annual follow-up    Assessment:  Doing well on current therapy of CPAP 16 cm for severe MIHAELA (AHI = 42)  Plan:  Continue same    Follow up: One year    History of Present Illness:  History of MIHAELA on PAP therapy  Fully compliant and deriving benefit  Review of Systems      Genitourinary hot flashes at night   Cardiology none   Gastrointestinal none   Neurology none   Constitutional none   Integumentary none   Psychiatry none   Musculoskeletal muscle aches   Pulmonary shortness of breath with activity and difficulty breathing when lying flat    ENT none   Endocrine none   Hematological none           I have reviewed and updated the review of systems as necessary      Historical Information    Past Medical History: History reviewed  No pertinent past medical history  Past Surgical History: History reviewed  No pertinent surgical history      Social History:   Social History     Socioeconomic History    Marital status: /Civil Union     Spouse name: None    Number of children: None    Years of education: None    Highest education level: None   Occupational History    None   Social Needs    Financial resource strain: None    Food insecurity:     Worry: None     Inability: None    Transportation needs:     Medical: None     Non-medical: None   Tobacco Use    Smoking status: Never Smoker    Smokeless tobacco: Never Used    Tobacco comment: per allscripts - 'former smoker'   Substance and Sexual Activity    Alcohol use: No     Comment: per allscripts - 'social alcohol use'    Drug use: No    Sexual activity: None   Lifestyle    Physical activity:     Days per week: None     Minutes per session: None    Stress: None   Relationships    Social connections:     Talks on phone: None     Gets together: None     Attends Bahai service: None     Active member of club or organization: None     Attends meetings of clubs or organizations: None     Relationship status: None    Intimate partner violence:     Fear of current or ex partner: None     Emotionally abused: None     Physically abused: None     Forced sexual activity: None   Other Topics Concern    None   Social History Narrative    None       Family History:   Family History   Problem Relation Age of Onset    Coronary artery disease Father        Medications/Allergies:      Current Outpatient Medications:     diphenhydrAMINE (BENADRYL) 25 mg capsule, Take 25 mg by mouth daily as needed, Disp: , Rfl:     FLUAD 0 5 ML AURELIANO, inject 0 5 milliliter intramuscularly, Disp: , Rfl: 0    hydrochlorothiazide (HYDRODIURIL) 25 mg tablet, TAKE 1 TABLET DAILY, Disp: 90 tablet, Rfl: 3    losartan (COZAAR) 100 MG tablet, Take 1 tablet (100 mg total) by mouth daily for 30 days, Disp: 90 tablet, Rfl: 3    meclizine (ANTIVERT) 25 mg tablet, Take 1 tablet by mouth 3 (three) times a day, Disp: , Rfl:     metoprolol succinate (TOPROL-XL) 100 mg 24 hr tablet, Take 1 tablet (100 mg total) by mouth daily, Disp: 90 tablet, Rfl: 3    Zoster Vac Recomb Adjuvanted (SHINGRIX) 50 MCG/0 5ML SUSR, inject 0 5 milliliter intramuscularly, Disp: , Rfl: 0          Objective      Vital Signs:   Vitals:    02/19/19 1300   BP: 120/82   Pulse: 72     [place] Sleepiness Scale: Total score: 5        Physical Exam:    General: Alert, appropriate, cooperative, overweight    Head: NC/AT    Skin: Warm, dry    Neuro: No motor abnormalities, cranial nerves appear intact    Extremity: No clubbing, cyanosis      DME Provider: [facility]        Counseling / Coordination of Care   I have spent 15 minutes with Patient  today in which greater than 50% of this time was spent in counseling/coordination of care regarding Risks and benefits of tx options                Board Certified Sleep Specialist

## 2019-03-13 ENCOUNTER — TELEPHONE (OUTPATIENT)
Dept: INTERNAL MEDICINE CLINIC | Facility: CLINIC | Age: 64
End: 2019-03-13

## 2019-03-13 NOTE — TELEPHONE ENCOUNTER
Patient got a letter that her losartan has been recalled  She is a pt of Dr Colunga Cargo  Can you send over something else? Pt uses [facility]

## 2019-03-14 DIAGNOSIS — I10 BENIGN ESSENTIAL HYPERTENSION: ICD-10-CM

## 2019-03-14 RX ORDER — TELMISARTAN 80 MG/1
80 TABLET ORAL DAILY
Qty: 90 TABLET | Refills: 0 | Status: SHIPPED | OUTPATIENT
Start: 2019-03-14 | End: 2019-03-18

## 2019-03-18 ENCOUNTER — OFFICE VISIT (OUTPATIENT)
Dept: OBGYN CLINIC | Facility: CLINIC | Age: 64
End: 2019-03-18
Payer: MEDICARE

## 2019-03-18 VITALS
SYSTOLIC BLOOD PRESSURE: 149 MMHG | DIASTOLIC BLOOD PRESSURE: 84 MMHG | HEIGHT: 65 IN | WEIGHT: 249 LBS | BODY MASS INDEX: 41.48 KG/M2

## 2019-03-18 DIAGNOSIS — Z12.31 ENCOUNTER FOR SCREENING MAMMOGRAM FOR MALIGNANT NEOPLASM OF BREAST: ICD-10-CM

## 2019-03-18 DIAGNOSIS — Z01.419 WELL WOMAN EXAM WITH ROUTINE GYNECOLOGICAL EXAM: Primary | ICD-10-CM

## 2019-03-18 DIAGNOSIS — Z12.4 SCREENING FOR MALIGNANT NEOPLASM OF CERVIX: ICD-10-CM

## 2019-03-18 PROCEDURE — G0101 CA SCREEN;PELVIC/BREAST EXAM: HCPCS | Performed by: OBSTETRICS & GYNECOLOGY

## 2019-03-18 PROCEDURE — 87624 HPV HI-RISK TYP POOLED RSLT: CPT | Performed by: OBSTETRICS & GYNECOLOGY

## 2019-03-18 PROCEDURE — G0145 SCR C/V CYTO,THINLAYER,RESCR: HCPCS | Performed by: OBSTETRICS & GYNECOLOGY

## 2019-03-18 NOTE — PROGRESS NOTES
ASSESSMENT & PLAN: Conner Loya is a 70 y o  [id number] with normal gynecologic exam     1   Routine well woman exam done today  2  Pap and HPV:  The patient's last pap was unknown  Pap and cotesting was done today  Current ASCCP Guidelines reviewed  If negative, no more necessary  3  Mammogram ordered  4  Colonoscopy performed in 2017  Repeat in   5  The following were reviewed in today's visit: breast self exam, mammography screening ordered, menopause, osteoporosis, adequate intake of calcium and vitamin D, exercise and healthy diet  6  Postmenopausal since age 58  8  TSH, lipids and hemoglobin A1C managed by PCP  8  Up to date on pneumococcal, influenza, and zoster vaccinations  Two children vaginally, both around 7lb8    CC: Annual Gynecologic Examination    HPI: Conner Loya is a 70 y o  [id number] who presents for annual gynecologic examination  She has the following concerns:  none    Health Maintenance:    She exercises 3 days per week  She wears her seatbelt routinely  She does not perform regular monthly self breast exams  She feels safe at home  Patients does not follow a particular diet  Her last pap was unkown    Last mammogram:   Last colonoscopy:     Past Medical History:   Diagnosis Date    Hyperlipidemia     Hypertension        Past Surgical History:   Procedure Laterality Date    KNEE SURGERY         Past OB/Gyn History:  OB History        2    Para   2    Term   2            AB        Living   2       SAB        TAB        Ectopic        Multiple        Live Births   2                No LMP recorded  History of sexually transmitted infection No  History of abnormal pap smears  No     Patient is not currently sexually active  heterosexual Birth control: none      Family History   Problem Relation Age of Onset    Coronary artery disease Father     Throat cancer Brother        Social History:  Social History     Socioeconomic History  Marital status: /Civil Union     Spouse name: Not on file    Number of children: Not on file    Years of education: Not on file    Highest education level: Not on file   Occupational History    Not on file   Social Needs    Financial resource strain: Not on file    Food insecurity:     Worry: Not on file     Inability: Not on file    Transportation needs:     Medical: Not on file     Non-medical: Not on file   Tobacco Use    Smoking status: Former Smoker    Smokeless tobacco: Never Used    Tobacco comment: quit 25 yrs ago   Substance and Sexual Activity    Alcohol use: Yes     Comment: social    Drug use: No    Sexual activity: Not Currently     Partners: Male   Lifestyle    Physical activity:     Days per week: Not on file     Minutes per session: Not on file    Stress: Not on file   Relationships    Social connections:     Talks on phone: Not on file     Gets together: Not on file     Attends Orthodox service: Not on file     Active member of club or organization: Not on file     Attends meetings of clubs or organizations: Not on file     Relationship status: Not on file    Intimate partner violence:     Fear of current or ex partner: Not on file     Emotionally abused: Not on file     Physically abused: Not on file     Forced sexual activity: Not on file   Other Topics Concern    Not on file   Social History Narrative    Not on file       Allergies   Allergen Reactions    Diclofenac Swelling    Atorvastatin Other (See Comments)     Muscle ache       Current Outpatient Medications:     diphenhydrAMINE (BENADRYL) 25 mg capsule, Take 25 mg by mouth daily as needed, Disp: , Rfl:     hydrochlorothiazide (HYDRODIURIL) 25 mg tablet, TAKE 1 TABLET DAILY, Disp: 90 tablet, Rfl: 3    metoprolol succinate (TOPROL-XL) 100 mg 24 hr tablet, Take 1 tablet (100 mg total) by mouth daily, Disp: 90 tablet, Rfl: 3    Zoster Vac Recomb Adjuvanted (SHINGRIX) 50 MCG/0 5ML SUSR, inject 0 5 milliliter intramuscularly, Disp: , Rfl: 0    Review of Systems:  A complete review of systems was performed and was negative, except as listed  none    Physical Exam:  /84   Ht 5' 5" (1 651 m)   Wt 113 kg (249 lb)   BMI 41 44 kg/m²    GEN: The patient was alert and oriented x3, pleasant well-appearing female in no acute distress  HEENT:  Unremarkable, no anterior or posterior lymphadenopathy, no thyromegaly  CV:  RRR, no murmurs  RESP:  Clear to auscultation bilaterally  BREAST:  Symmetric breasts with no palpable breast masses or obvious breast lesions  She has no retractions or nipple discharge  She has no axillary abnormalities or palpable masses  Self breast exam is taught  ABD:  Soft, nontender, nondistended, normoactive bowel sounds,   EXT:  WWP, nontender, no edema  BACK:  No CVA tenderness, no tenderness to palpation along spine  PELVIC:  Normal appearing external female genitalia, normal vaginal epithelium, No discharge  Cervix present   Bimanual: absent CMT,  normal uterus, non-tender  No palpable adnexal masses

## 2019-03-20 ENCOUNTER — HOSPITAL ENCOUNTER (OUTPATIENT)
Dept: RADIOLOGY | Age: 64
Discharge: HOME | End: 2019-03-20
Payer: MEDICARE

## 2019-03-20 VITALS — HEIGHT: 65 IN | WEIGHT: 248 LBS | BODY MASS INDEX: 41.32 KG/M2

## 2019-03-20 DIAGNOSIS — Z12.31 ENCOUNTER FOR SCREENING MAMMOGRAM FOR MALIGNANT NEOPLASM OF BREAST: ICD-10-CM

## 2019-03-20 PROCEDURE — 77067 SCR MAMMO BI INCL CAD: CPT

## 2019-03-21 LAB
HPV HR 12 DNA CVX QL NAA+PROBE: NEGATIVE
HPV16 DNA CVX QL NAA+PROBE: NEGATIVE
HPV18 DNA CVX QL NAA+PROBE: NEGATIVE

## 2019-03-25 LAB
LAB AP GYN PRIMARY INTERPRETATION: NORMAL
Lab: NORMAL

## 2019-04-03 ENCOUNTER — OFFICE VISIT (OUTPATIENT)
Dept: INTERNAL MEDICINE CLINIC | Facility: CLINIC | Age: 64
End: 2019-04-03
Payer: MEDICARE

## 2019-04-03 VITALS
BODY MASS INDEX: 41.92 KG/M2 | HEART RATE: 65 BPM | TEMPERATURE: 98.3 F | HEIGHT: 65 IN | DIASTOLIC BLOOD PRESSURE: 87 MMHG | OXYGEN SATURATION: 95 % | WEIGHT: 251.6 LBS | SYSTOLIC BLOOD PRESSURE: 142 MMHG

## 2019-04-03 DIAGNOSIS — E78.00 PURE HYPERCHOLESTEROLEMIA: ICD-10-CM

## 2019-04-03 DIAGNOSIS — I10 BENIGN ESSENTIAL HYPERTENSION: Primary | ICD-10-CM

## 2019-04-03 DIAGNOSIS — R73.01 IMPAIRED FASTING GLUCOSE: ICD-10-CM

## 2019-04-03 DIAGNOSIS — E03.8 SUBCLINICAL HYPOTHYROIDISM: ICD-10-CM

## 2019-04-03 PROCEDURE — 99214 OFFICE O/P EST MOD 30 MIN: CPT | Performed by: INTERNAL MEDICINE

## 2019-04-03 RX ORDER — LOSARTAN POTASSIUM 100 MG/1
100 TABLET ORAL DAILY
Qty: 903 TABLET | Refills: 3
Start: 2019-04-03 | End: 2019-08-05 | Stop reason: SDUPTHER

## 2019-08-05 ENCOUNTER — OFFICE VISIT (OUTPATIENT)
Dept: INTERNAL MEDICINE CLINIC | Facility: CLINIC | Age: 64
End: 2019-08-05
Payer: MEDICARE

## 2019-08-05 VITALS
OXYGEN SATURATION: 95 % | WEIGHT: 255.2 LBS | DIASTOLIC BLOOD PRESSURE: 94 MMHG | BODY MASS INDEX: 42.52 KG/M2 | SYSTOLIC BLOOD PRESSURE: 171 MMHG | HEIGHT: 65 IN | TEMPERATURE: 98.1 F | HEART RATE: 71 BPM

## 2019-08-05 DIAGNOSIS — Z23 NEED FOR 23-POLYVALENT PNEUMOCOCCAL POLYSACCHARIDE VACCINE: ICD-10-CM

## 2019-08-05 DIAGNOSIS — Z00.00 MEDICARE ANNUAL WELLNESS VISIT, SUBSEQUENT: ICD-10-CM

## 2019-08-05 DIAGNOSIS — R73.01 IMPAIRED FASTING GLUCOSE: ICD-10-CM

## 2019-08-05 DIAGNOSIS — E03.8 SUBCLINICAL HYPOTHYROIDISM: ICD-10-CM

## 2019-08-05 DIAGNOSIS — E78.00 PURE HYPERCHOLESTEROLEMIA: ICD-10-CM

## 2019-08-05 DIAGNOSIS — I10 BENIGN ESSENTIAL HYPERTENSION: Primary | ICD-10-CM

## 2019-08-05 PROCEDURE — G0009 ADMIN PNEUMOCOCCAL VACCINE: HCPCS

## 2019-08-05 PROCEDURE — 99214 OFFICE O/P EST MOD 30 MIN: CPT | Performed by: INTERNAL MEDICINE

## 2019-08-05 PROCEDURE — G0439 PPPS, SUBSEQ VISIT: HCPCS | Performed by: INTERNAL MEDICINE

## 2019-08-05 PROCEDURE — 90732 PPSV23 VACC 2 YRS+ SUBQ/IM: CPT

## 2019-08-05 RX ORDER — LOSARTAN POTASSIUM 100 MG/1
100 TABLET ORAL DAILY
Qty: 90 TABLET | Refills: 3 | Status: SHIPPED | OUTPATIENT
Start: 2019-08-05 | End: 2020-07-12

## 2019-08-05 RX ORDER — TELMISARTAN 80 MG/1
TABLET ORAL
COMMUNITY
Start: 2019-07-17 | End: 2019-08-05 | Stop reason: ALTCHOICE

## 2019-08-05 NOTE — ASSESSMENT & PLAN NOTE
Last hemoglobin A1c and fasting glucose were excellent, continue with healthy diet and exercise and will recheck before next visit

## 2019-08-05 NOTE — ASSESSMENT & PLAN NOTE
Discussed preventative health, cancer screening, immunizations, and safety issues    Patient is due for Pneumovax 23 today, I recommend getting Adacel at the pharmacy

## 2019-08-05 NOTE — PROGRESS NOTES
Assessment/Plan:    Medicare annual wellness visit, subsequent  Discussed preventative health, cancer screening, immunizations, and safety issues  Patient is due for Pneumovax 23 today, I recommend getting Adacel at the pharmacy    Benign essential hypertension  Elevated, will restart losartan 100 mg daily, continue metoprolol and hydrochlorothiazide along with healthy diet and exercise    Impaired fasting glucose  Last hemoglobin A1c and fasting glucose were excellent, continue with healthy diet and exercise and will recheck before next visit    Pure hypercholesterolemia  Continue with healthy diet and exercise, patient to start Co Q10    Subclinical hypothyroidism  Patient not on medications, will recheck before next visit       Diagnoses and all orders for this visit:    Benign essential hypertension  -     losartan (COZAAR) 100 MG tablet; Take 1 tablet (100 mg total) by mouth daily    Medicare annual wellness visit, subsequent    Need for 23-polyvalent pneumococcal polysaccharide vaccine  -     PNEUMOCOCCAL POLYSACCHARIDE VACCINE 23-VALENT =>3YO SQ IM    Impaired fasting glucose    Pure hypercholesterolemia    Other orders  -     Cancel: Ambulatory referral to Colorectal Surgery; Future  -     Discontinue: telmisartan (MICARDIS) 80 MG tablet  -     Calcium Carb-Cholecalciferol (CALCIUM + D3 PO); Take by mouth          Subjective:      Patient ID: Brisa Cancer is a 75 y o  female  Hypertension:  Patient was switched from losartan to Micardis, reports her blood pressures been labile since that time  Subclinical hypothyroidism:  No fatigue, no constipation, no cold intolerance, no depression  Impaired fasting glucose:  Patient watches her diet for this      Hypercholesterolemia:  Patient watches her diet for this, and is going to start Co Q10          The following portions of the patient's history were reviewed and updated as appropriate: allergies, current medications, past family history, past medical history, past social history, past surgical history and problem list     Review of Systems   Constitutional: Negative for chills, fatigue and fever  HENT: Negative for congestion, nosebleeds, postnasal drip, sore throat and trouble swallowing  Eyes: Negative for pain  Respiratory: Negative for cough, chest tightness, shortness of breath and wheezing  Cardiovascular: Negative for chest pain, palpitations and leg swelling  Gastrointestinal: Negative for abdominal pain, constipation, diarrhea, nausea and vomiting  Endocrine: Negative for polydipsia and polyuria  Genitourinary: Negative for dysuria, flank pain and hematuria  Musculoskeletal: Negative for arthralgias  Skin: Negative for rash  Neurological: Negative for dizziness, tremors and headaches  Hematological: Does not bruise/bleed easily  Psychiatric/Behavioral: Negative for confusion and dysphoric mood  The patient is not nervous/anxious  Objective:      BP (!) 171/94   Pulse 71   Temp 98 1 °F (36 7 °C)   Ht 5' 5" (1 651 m)   Wt 116 kg (255 lb 3 2 oz)   SpO2 95%   BMI 42 47 kg/m²          Physical Exam   Constitutional: She is oriented to person, place, and time  She appears well-developed and well-nourished  No distress  HENT:   Head: Normocephalic and atraumatic  Right Ear: External ear normal    Left Ear: External ear normal    Eyes: Conjunctivae are normal  No scleral icterus  Neck: Normal range of motion  Neck supple  No tracheal deviation present  No thyromegaly present  Cardiovascular: Normal rate, regular rhythm and normal heart sounds  No murmur heard  Pulmonary/Chest: Effort normal and breath sounds normal  No respiratory distress  She has no wheezes  She has no rales  Abdominal: Soft  Bowel sounds are normal  There is no tenderness  There is no rebound and no guarding  Musculoskeletal: She exhibits no edema  Lymphadenopathy:     She has no cervical adenopathy     Neurological: She is alert and oriented to person, place, and time  Psychiatric: She has a normal mood and affect  Her behavior is normal  Judgment and thought content normal    Vitals reviewed  BMI Counseling: Body mass index is 42 47 kg/m²  Discussed the patient's BMI with her  The BMI is above average  BMI counseling and education was provided to the patient  Nutrition recommendations include reducing portion sizes, decreasing overall calorie intake, 3-5 servings of fruits/vegetables daily, reducing fast food intake, consuming healthier snacks, decreasing soda and/or juice intake, moderation in carbohydrate intake and increasing intake of lean protein  Exercise recommendations include exercising 3-5 times per week

## 2019-08-05 NOTE — ASSESSMENT & PLAN NOTE
Elevated, will restart losartan 100 mg daily, continue metoprolol and hydrochlorothiazide along with healthy diet and exercise

## 2019-08-05 NOTE — PROGRESS NOTES
Assessment and Plan:     Problem List Items Addressed This Visit        Other    Medicare annual wellness visit, subsequent - Primary     Discussed preventative health, cancer screening, immunizations, and safety issues    Patient is due for Pneumovax 23 today, I recommend getting Adacel at the pharmacy           Other Visit Diagnoses     Need for 23-polyvalent pneumococcal polysaccharide vaccine        Relevant Orders    PNEUMOCOCCAL POLYSACCHARIDE VACCINE 23-VALENT =>3YO SQ IM         History of Present Illness:     Patient presents for Medicare Annual Wellness visit    Patient Care Team:  Maritza Ceballos MD as PCP - General     Problem List:     Patient Active Problem List   Diagnosis    Benign essential hypertension    Impaired fasting glucose    Pure hypercholesterolemia    Medicare annual wellness visit, subsequent    Subclinical hypothyroidism      Past Medical and Surgical History:     Past Medical History:   Diagnosis Date    Hyperlipidemia     Hypertension      Past Surgical History:   Procedure Laterality Date    KNEE SURGERY        Family History:     Family History   Problem Relation Age of Onset    Coronary artery disease Father     Throat cancer Brother     Endometrial cancer Maternal Aunt 48      Social History:     Social History     Tobacco Use   Smoking Status Former Smoker   Smokeless Tobacco Never Used   Tobacco Comment    quit 25 yrs ago     Social History     Substance and Sexual Activity   Alcohol Use Yes    Comment: social     Social History     Substance and Sexual Activity   Drug Use No      Medications and Allergies:     Current Outpatient Medications   Medication Sig Dispense Refill    Calcium Carb-Cholecalciferol (CALCIUM + D3 PO) Take by mouth      diphenhydrAMINE (BENADRYL) 25 mg capsule Take 25 mg by mouth daily as needed      hydrochlorothiazide (HYDRODIURIL) 25 mg tablet TAKE 1 TABLET DAILY 90 tablet 3    losartan (COZAAR) 100 MG tablet Take 1 tablet (100 mg total) by mouth daily 903 tablet 3    metoprolol succinate (TOPROL-XL) 100 mg 24 hr tablet Take 1 tablet (100 mg total) by mouth daily 90 tablet 3    telmisartan (MICARDIS) 80 MG tablet        No current facility-administered medications for this visit  Allergies   Allergen Reactions    Diclofenac Swelling    Atorvastatin Other (See Comments)     Muscle ache      Immunizations:     Immunization History   Administered Date(s) Administered    INFLUENZA 09/30/2013, 09/29/2015, 12/12/2016, 12/14/2016, 12/14/2016, 10/31/2017, 10/17/2018    Influenza Quadrivalent Preservative Free 3 years and older IM 12/12/2016    Influenza Quadrivalent, 6-35 Months IM 09/29/2015    Influenza TIV (IM) 09/30/2013    Pneumococcal Conjugate 13-Valent 08/02/2018    Zoster 07/22/2015    Zoster Vaccine Recombinant 08/14/2018, 10/17/2018    influenza, trivalent, adjuvanted 10/17/2018      Medicare Screening Tests and Risk Assessments:     Demetrius Sarabia is here for her Subsequent Wellness visit  Health Risk Assessment:  Patient rates overall health as good  Patient feels that their physical health rating is Same  Eyesight was rated as Same  Hearing was rated as Same  Patient feels that their emotional and mental health rating is Same  Pain experienced by patient in the last 7 days has been None  Patient states that she has experienced no weight loss or gain in last 6 months  Emotional/Mental Health:  Patient has not been feeling nervous/anxious  PHQ-9 Depression Screening:    Frequency of the following problems over the past two weeks:      1  Little interest or pleasure in doing things: 0 - not at all      2  Feeling down, depressed, or hopeless: 0 - not at all  PHQ-2 Score: 0          Broken Bones/Falls: Fall Risk Assessment:    In the past year, patient has experienced: No history of falling in past year          Bladder/Bowel:  Patient has not leaked urine accidently in the last six months    Patient reports no loss of bowel control  Immunizations:  Patient has had a flu vaccination within the last year  Patient has received a pneumonia shot  Patient has received a shingles shot  Patient has not received tetanus/diphtheria shot  Home Safety:  Patient has trouble with stairs inside or outside of their home  Patient currently reports that there are no safety hazards present in home, working smoke alarms, working carbon monoxide detectors  Preventative Screenings:   Breast cancer screening performed, 3/20/2019  colon cancer screen completed, cholesterol screen completed, 11/8/2018  glaucoma eye exam completed, (Additional Comments: Last colonoscopy if Dr Dusty Guerrero around 2017)    Nutrition:  Current diet: Regular with servings of the following:    Medications:  Patient is currently taking over-the-counter supplements  List of OTC medications includes: vitamin  Patient is able to manage medications  Lifestyle Choices:  Patient reports no tobacco use  Patient has smoked or used tobacco in the past   Patient has stopped her tobacco use  Tobacco use quit date: 2003  Patient reports alcohol use  Alcohol use per week: 1 per month  Patient drives a vehicle  Patient wears seat belt  Current level of exercise of physical activity described by patient as: cardio and weights  Activities of Daily Living:  Can get out of bed by his or her self, able to dress self, able to make own meals, able to do own shopping, able to bathe self, can do own laundry/housekeeping, can manage own money, pay bills and track expenses    Previous Hospitalizations:  No hospitalization or ED visit in past 12 months        Advanced Directives:  Patient has decided on a power of   Patient has spoken to designated power of   Patient has completed advanced directive          Preventative Screening/Counseling:      Cardiovascular:      General: Risks and Benefits Discussed and Screening Current Diabetes:      General: Risks and Benefits Discussed and Screening Current          Colorectal Cancer:      General: Risks and Benefits Discussed and Screening Current          Breast Cancer:      General: Risks and Benefits Discussed and Screening Current          Cervical Cancer:      General: Risks and Benefits Discussed          Osteoporosis:      General: Risks and Benefits Discussed and Screening Current      Comments: Last DEXA August 13, 2008        AAA:      General: Risks and Benefits Discussed and Screening Current          Glaucoma:      General: Risks and Benefits Discussed          HIV:      General: Risks and Benefits Discussed          Hepatitis C:      General: Risks and Benefits Discussed and Screening Current      Additional Comments: Negative 2017    Advanced Directives:   Patient has living will for healthcare, has durable POA for healthcare, patient has an advanced directive       Immunizations:      Influenza: Risks & Benefits Discussed, Influenza UTD This Year and Influenza Recommended Annually      Pneumococcal: Risks & Benefits Discussed and Pneumococcal Due Today      Shingrix: Risks & Benefits Discussed and Shingrix Vaccine UTD      Zostavax: Risks & Benefits Discussed and Zostavax Vaccine UTD      TDAP: Risks & Benefits Discussed and Vaccine Status Unknown      Other Preventative Counseling (Non-Medicare):  Car/seat belt/driving safety reviewed, Skin self-exam and Sunscreen use

## 2019-08-05 NOTE — PATIENT INSTRUCTIONS
Problem List Items Addressed This Visit        Endocrine    Impaired fasting glucose     Last hemoglobin A1c and fasting glucose were excellent, continue with healthy diet and exercise and will recheck before next visit            Cardiovascular and Mediastinum    Benign essential hypertension - Primary     Elevated, will restart losartan 100 mg daily, continue metoprolol and hydrochlorothiazide along with healthy diet and exercise         Relevant Medications    losartan (COZAAR) 100 MG tablet       Other    Pure hypercholesterolemia     Continue with healthy diet and exercise, patient to start Co Q10         Medicare annual wellness visit, subsequent     Discussed preventative health, cancer screening, immunizations, and safety issues    Patient is due for Pneumovax 23 today, I recommend getting Adacel at the pharmacy           Other Visit Diagnoses     Need for 23-polyvalent pneumococcal polysaccharide vaccine        Relevant Orders    PNEUMOCOCCAL POLYSACCHARIDE VACCINE 23-VALENT =>1YO SQ IM

## 2019-10-23 LAB — HBA1C MFR BLD HPLC: 6 %

## 2019-11-06 ENCOUNTER — OFFICE VISIT (OUTPATIENT)
Dept: INTERNAL MEDICINE CLINIC | Facility: CLINIC | Age: 64
End: 2019-11-06
Payer: MEDICARE

## 2019-11-06 VITALS
DIASTOLIC BLOOD PRESSURE: 84 MMHG | HEIGHT: 65 IN | HEART RATE: 64 BPM | BODY MASS INDEX: 42.88 KG/M2 | OXYGEN SATURATION: 94 % | SYSTOLIC BLOOD PRESSURE: 162 MMHG | TEMPERATURE: 97.8 F | WEIGHT: 257.4 LBS

## 2019-11-06 DIAGNOSIS — E03.8 SUBCLINICAL HYPOTHYROIDISM: ICD-10-CM

## 2019-11-06 DIAGNOSIS — E78.00 PURE HYPERCHOLESTEROLEMIA: ICD-10-CM

## 2019-11-06 DIAGNOSIS — I10 BENIGN ESSENTIAL HYPERTENSION: Primary | ICD-10-CM

## 2019-11-06 DIAGNOSIS — R73.01 IMPAIRED FASTING GLUCOSE: ICD-10-CM

## 2019-11-06 PROCEDURE — 99214 OFFICE O/P EST MOD 30 MIN: CPT | Performed by: INTERNAL MEDICINE

## 2019-11-06 RX ORDER — AMLODIPINE BESYLATE 2.5 MG/1
2.5 TABLET ORAL DAILY
Qty: 90 TABLET | Refills: 3 | Status: SHIPPED | OUTPATIENT
Start: 2019-11-06 | End: 2020-02-06 | Stop reason: SDUPTHER

## 2019-11-06 NOTE — PROGRESS NOTES
Assessment/Plan:    Benign essential hypertension  With elevated blood pressures, I recommend adding amlodipine low-dose 2 5 mg daily, patient agreed to this plan, will recheck in a few months    Subclinical hypothyroidism  Will recheck thyroid function test, patient not on medication    Pure hypercholesterolemia  Continue with healthy diet and exercise, will recheck lipid panel    Impaired fasting glucose  Continue with healthy diet and exercise       Diagnoses and all orders for this visit:    Benign essential hypertension  -     amLODIPine (NORVASC) 2 5 mg tablet; Take 1 tablet (2 5 mg total) by mouth daily    Subclinical hypothyroidism    Pure hypercholesterolemia    Impaired fasting glucose    Other orders  -     Cancel: influenza vaccine, [id number], high-dose, PF 0 5 mL (FLUZONE HIGH-DOSE)  -     co-enzyme Q-10 30 MG capsule; Take 100 mg by mouth 3 (three) times a day          Subjective:      Patient ID: Tammy Thacker is a 75 y o  female  HTN:  Patient started losartan 100 mg daily at last visit, she checks her blood pressure at home and get some in the good range, and occasionally elevated readings  Subclinical hypothyroidism:  Patient does admit to some fatigue, no constipation, no cold intolerance, no depression  Impaired fasting glucose:  Patient watches her diet for this, last A1c was good at 6 0          The following portions of the patient's history were reviewed and updated as appropriate: allergies, current medications, past family history, past medical history, past social history, past surgical history and problem list     Review of Systems   Constitutional: Negative for chills, fatigue and fever  HENT: Negative for congestion, nosebleeds, postnasal drip, sore throat and trouble swallowing  Eyes: Negative for pain  Respiratory: Negative for cough, chest tightness, shortness of breath and wheezing  Cardiovascular: Negative for chest pain, palpitations and leg swelling  Gastrointestinal: Negative for abdominal pain, constipation, diarrhea, nausea and vomiting  Endocrine: Negative for polydipsia and polyuria  Genitourinary: Negative for dysuria, flank pain and hematuria  Musculoskeletal: Negative for arthralgias  Skin: Negative for rash  Neurological: Negative for dizziness, tremors and headaches  Hematological: Does not bruise/bleed easily  Psychiatric/Behavioral: Negative for confusion and dysphoric mood  The patient is not nervous/anxious  Objective:      /84   Pulse 64   Temp 97 8 °F (36 6 °C)   Ht 5' 5" (1 651 m)   Wt 117 kg (257 lb 6 4 oz)   SpO2 94%   BMI 42 83 kg/m²          Physical Exam   Constitutional: She is oriented to person, place, and time  She appears well-developed and well-nourished  No distress  HENT:   Head: Normocephalic and atraumatic  Right Ear: External ear normal    Left Ear: External ear normal    Eyes: Conjunctivae are normal  No scleral icterus  Neck: Normal range of motion  Neck supple  No tracheal deviation present  No thyromegaly present  Cardiovascular: Normal rate, regular rhythm and normal heart sounds  No murmur heard  Pulmonary/Chest: Effort normal and breath sounds normal  No respiratory distress  She has no wheezes  She has no rales  Abdominal: Soft  Bowel sounds are normal  There is no tenderness  There is no rebound, no guarding and no CVA tenderness  Musculoskeletal: She exhibits no edema  Lymphadenopathy:     She has no cervical adenopathy  Neurological: She is alert and oriented to person, place, and time  Psychiatric: She has a normal mood and affect  Her behavior is normal  Judgment and thought content normal    Vitals reviewed

## 2019-11-06 NOTE — ASSESSMENT & PLAN NOTE
With elevated blood pressures, I recommend adding amlodipine low-dose 2 5 mg daily, patient agreed to this plan, will recheck in a few months

## 2019-11-06 NOTE — PATIENT INSTRUCTIONS
Problem List Items Addressed This Visit        Endocrine    Impaired fasting glucose     Continue with healthy diet and exercise         Subclinical hypothyroidism     Will recheck thyroid function test, patient not on medication            Cardiovascular and Mediastinum    Benign essential hypertension - Primary     With elevated blood pressures, I recommend adding amlodipine low-dose 2 5 mg daily, patient agreed to this plan, will recheck in a few months         Relevant Medications    amLODIPine (NORVASC) 2 5 mg tablet       Other    Pure hypercholesterolemia     Continue with healthy diet and exercise, will recheck lipid panel

## 2019-11-22 DIAGNOSIS — I10 ESSENTIAL HYPERTENSION, BENIGN: ICD-10-CM

## 2019-11-22 RX ORDER — METOPROLOL SUCCINATE 100 MG
TABLET, EXTENDED RELEASE 24 HR ORAL
Qty: 90 TABLET | Refills: 4 | Status: SHIPPED | OUTPATIENT
Start: 2019-11-22 | End: 2021-03-15

## 2019-12-27 DIAGNOSIS — I10 BENIGN ESSENTIAL HYPERTENSION: ICD-10-CM

## 2019-12-27 RX ORDER — HYDROCHLOROTHIAZIDE 25 MG/1
TABLET ORAL
Qty: 90 TABLET | Refills: 4 | Status: SHIPPED | OUTPATIENT
Start: 2019-12-27 | End: 2021-03-22

## 2020-02-06 ENCOUNTER — OFFICE VISIT (OUTPATIENT)
Dept: INTERNAL MEDICINE CLINIC | Facility: CLINIC | Age: 65
End: 2020-02-06
Payer: MEDICARE

## 2020-02-06 VITALS
BODY MASS INDEX: 42.72 KG/M2 | TEMPERATURE: 98.1 F | OXYGEN SATURATION: 94 % | WEIGHT: 256.4 LBS | HEIGHT: 65 IN | SYSTOLIC BLOOD PRESSURE: 154 MMHG | HEART RATE: 65 BPM | DIASTOLIC BLOOD PRESSURE: 90 MMHG

## 2020-02-06 DIAGNOSIS — M72.2 PLANTAR FASCIITIS: ICD-10-CM

## 2020-02-06 DIAGNOSIS — E03.9 ACQUIRED HYPOTHYROIDISM: ICD-10-CM

## 2020-02-06 DIAGNOSIS — R73.01 IMPAIRED FASTING GLUCOSE: ICD-10-CM

## 2020-02-06 DIAGNOSIS — I10 BENIGN ESSENTIAL HYPERTENSION: Primary | ICD-10-CM

## 2020-02-06 PROCEDURE — 99214 OFFICE O/P EST MOD 30 MIN: CPT | Performed by: INTERNAL MEDICINE

## 2020-02-06 PROCEDURE — 1160F RVW MEDS BY RX/DR IN RCRD: CPT | Performed by: INTERNAL MEDICINE

## 2020-02-06 PROCEDURE — 4040F PNEUMOC VAC/ADMIN/RCVD: CPT | Performed by: INTERNAL MEDICINE

## 2020-02-06 PROCEDURE — 3077F SYST BP >= 140 MM HG: CPT | Performed by: INTERNAL MEDICINE

## 2020-02-06 PROCEDURE — 1036F TOBACCO NON-USER: CPT | Performed by: INTERNAL MEDICINE

## 2020-02-06 PROCEDURE — 3080F DIAST BP >= 90 MM HG: CPT | Performed by: INTERNAL MEDICINE

## 2020-02-06 PROCEDURE — 3008F BODY MASS INDEX DOCD: CPT | Performed by: INTERNAL MEDICINE

## 2020-02-06 RX ORDER — AMLODIPINE BESYLATE 5 MG/1
5 TABLET ORAL DAILY
Qty: 90 TABLET | Refills: 3 | Status: SHIPPED | OUTPATIENT
Start: 2020-02-06 | End: 2020-02-10 | Stop reason: SDUPTHER

## 2020-02-06 RX ORDER — LEVOTHYROXINE SODIUM 0.03 MG/1
25 TABLET ORAL DAILY
Qty: 30 TABLET | Refills: 5 | Status: SHIPPED | OUTPATIENT
Start: 2020-02-06 | End: 2020-02-10 | Stop reason: SDUPTHER

## 2020-02-06 NOTE — PROGRESS NOTES
Assessment/Plan:    Plantar fasciitis  Reviewed specific stretching exercises with patient, and discussed using a splint the puts her in a dorsiflexed position at night as tolerated    Benign essential hypertension  Little elevated, better at home, since she is on such a tiny dose of amlodipine, I recommend increasing to 5 mg daily to see how she tolerates it  If she were to develop lightheadedness, she could obviously cut back to 2 5 mg    Impaired fasting glucose  Continue with healthy diet and exercise, last hemoglobin A1c good at 6 0    Acquired hypothyroidism  Since patient does admit to some fatigue and cold intolerance, will try low-dose of levothyroxine 25 mcg daily, titrate up based on symptoms and lab monitoring       Diagnoses and all orders for this visit:    Benign essential hypertension  -     amLODIPine (NORVASC) 5 mg tablet; Take 1 tablet (5 mg total) by mouth daily    Plantar fasciitis    Impaired fasting glucose    Acquired hypothyroidism  -     levothyroxine 25 mcg tablet; Take 1 tablet (25 mcg total) by mouth daily  -     TSH, 3rd generation with Free T4 reflex; Future        BMI Counseling: Body mass index is 42 67 kg/m²  The BMI is above normal  Nutrition recommendations include encouraging healthy choices of fruits and vegetables and moderation in carbohydrate intake  Exercise recommendations include exercising 3-5 times per week  Subjective:      Patient ID: Adrienne Hill is a 75 y o  female  HTN:  Patient reports compliance with amlodipine 2 5 mg daily, no lightheadedness, no ankle swelling, no constipation  Patient reports her blood pressure at home ranges 112 to 130 over 60s  Subclinical hypothyroidism:  Patient admits to some mild fatigue, some cold intolerance, no constipation or depression  Impaired fasting glucose:  Patient's last hemoglobin A1c was good at 6 0      Arthritis:  Patient using hemp oil on her knee which is helping    Patient has been having pain under anterior heel pad of left foot for about 3 months      The following portions of the patient's history were reviewed and updated as appropriate: allergies, current medications, past family history, past medical history, past social history, past surgical history and problem list     Review of Systems   Constitutional: Positive for fatigue (mild)  Negative for chills and fever  HENT: Negative for congestion, nosebleeds, postnasal drip, sore throat and trouble swallowing  Eyes: Negative for pain  Respiratory: Negative for cough, chest tightness, shortness of breath and wheezing  Cardiovascular: Negative for chest pain, palpitations and leg swelling  Gastrointestinal: Negative for abdominal pain, constipation, diarrhea, nausea and vomiting  Endocrine: Positive for cold intolerance  Negative for polydipsia and polyuria  Genitourinary: Negative for dysuria, flank pain and hematuria  Musculoskeletal: Positive for arthralgias  Skin: Negative for rash  Neurological: Negative for dizziness, tremors and headaches  Hematological: Does not bruise/bleed easily  Psychiatric/Behavioral: Negative for confusion and dysphoric mood  The patient is not nervous/anxious  Objective:      /90   Pulse 65   Temp 98 1 °F (36 7 °C)   Ht 5' 5" (1 651 m)   Wt 116 kg (256 lb 6 4 oz)   SpO2 94%   BMI 42 67 kg/m²          Physical Exam   Constitutional: She is oriented to person, place, and time  She appears well-developed and well-nourished  No distress  HENT:   Head: Normocephalic and atraumatic  Right Ear: External ear normal    Left Ear: External ear normal    Eyes: Conjunctivae are normal  No scleral icterus  Neck: Normal range of motion  Neck supple  No tracheal deviation present  No thyromegaly present  Cardiovascular: Normal rate, regular rhythm and normal heart sounds  No murmur heard  Pulmonary/Chest: Effort normal and breath sounds normal  No respiratory distress   She has no wheezes  She has no rales  Abdominal: Soft  Bowel sounds are normal  There is no tenderness  There is no rebound and no guarding  Musculoskeletal: She exhibits no edema  Lymphadenopathy:     She has no cervical adenopathy  Neurological: She is alert and oriented to person, place, and time  Psychiatric: She has a normal mood and affect  Her behavior is normal  Judgment and thought content normal    Vitals reviewed

## 2020-02-06 NOTE — PATIENT INSTRUCTIONS
Problem List Items Addressed This Visit        Endocrine    Impaired fasting glucose     Continue with healthy diet and exercise, last hemoglobin A1c good at 6 0         Acquired hypothyroidism     Since patient does admit to some fatigue and cold intolerance, will try low-dose of levothyroxine 25 mcg daily, titrate up based on symptoms and lab monitoring         Relevant Medications    levothyroxine 25 mcg tablet    Other Relevant Orders    TSH, 3rd generation with Free T4 reflex       Cardiovascular and Mediastinum    Benign essential hypertension - Primary     Little elevated, better at home, since she is on such a tiny dose of amlodipine, I recommend increasing to 5 mg daily to see how she tolerates it    If she were to develop lightheadedness, she could obviously cut back to 2 5 mg         Relevant Medications    amLODIPine (NORVASC) 5 mg tablet       Musculoskeletal and Integument    Plantar fasciitis     Reviewed specific stretching exercises with patient, and discussed using a splint the puts her in a dorsiflexed position at night as tolerated

## 2020-02-06 NOTE — ASSESSMENT & PLAN NOTE
Reviewed specific stretching exercises with patient, and discussed using a splint the puts her in a dorsiflexed position at night as tolerated

## 2020-02-06 NOTE — ASSESSMENT & PLAN NOTE
Since patient does admit to some fatigue and cold intolerance, will try low-dose of levothyroxine 25 mcg daily, titrate up based on symptoms and lab monitoring

## 2020-02-06 NOTE — ASSESSMENT & PLAN NOTE
Little elevated, better at home, since she is on such a tiny dose of amlodipine, I recommend increasing to 5 mg daily to see how she tolerates it    If she were to develop lightheadedness, she could obviously cut back to 2 5 mg

## 2020-02-10 DIAGNOSIS — E03.9 ACQUIRED HYPOTHYROIDISM: ICD-10-CM

## 2020-02-10 DIAGNOSIS — I10 BENIGN ESSENTIAL HYPERTENSION: ICD-10-CM

## 2020-02-10 RX ORDER — LEVOTHYROXINE SODIUM 0.03 MG/1
25 TABLET ORAL DAILY
Qty: 90 TABLET | Refills: 3 | Status: SHIPPED | OUTPATIENT
Start: 2020-02-10 | End: 2020-05-06 | Stop reason: SDUPTHER

## 2020-02-10 RX ORDER — AMLODIPINE BESYLATE 5 MG/1
5 TABLET ORAL DAILY
Qty: 90 TABLET | Refills: 3 | Status: SHIPPED | OUTPATIENT
Start: 2020-02-10 | End: 2021-03-22

## 2020-02-18 ENCOUNTER — OFFICE VISIT (OUTPATIENT)
Dept: SLEEP CENTER | Facility: CLINIC | Age: 65
End: 2020-02-18
Payer: MEDICARE

## 2020-02-18 VITALS
BODY MASS INDEX: 43.45 KG/M2 | HEIGHT: 65 IN | SYSTOLIC BLOOD PRESSURE: 126 MMHG | DIASTOLIC BLOOD PRESSURE: 64 MMHG | HEART RATE: 64 BPM | WEIGHT: 260.8 LBS

## 2020-02-18 DIAGNOSIS — G47.33 OSA (OBSTRUCTIVE SLEEP APNEA): Primary | ICD-10-CM

## 2020-02-18 PROCEDURE — 1036F TOBACCO NON-USER: CPT | Performed by: INTERNAL MEDICINE

## 2020-02-18 PROCEDURE — 3008F BODY MASS INDEX DOCD: CPT | Performed by: INTERNAL MEDICINE

## 2020-02-18 PROCEDURE — 99213 OFFICE O/P EST LOW 20 MIN: CPT | Performed by: INTERNAL MEDICINE

## 2020-02-18 PROCEDURE — 3074F SYST BP LT 130 MM HG: CPT | Performed by: INTERNAL MEDICINE

## 2020-02-18 PROCEDURE — 1160F RVW MEDS BY RX/DR IN RCRD: CPT | Performed by: INTERNAL MEDICINE

## 2020-02-18 PROCEDURE — 3078F DIAST BP <80 MM HG: CPT | Performed by: INTERNAL MEDICINE

## 2020-02-18 PROCEDURE — 4040F PNEUMOC VAC/ADMIN/RCVD: CPT | Performed by: INTERNAL MEDICINE

## 2020-02-18 NOTE — PROGRESS NOTES
Progress Note - Jagruti Lam 1620 [id number] MRN: [id number]      Reason for Visit:  75 y  o female here for annual follow-up    Assessment:  Doing well on current therapy of CPAP 16 cm for previously diagnosed obstructive sleep apnea  Plan:  Continue same  The patient needs a different mask, one that will not slip    Follow up: One year    History of Present Illness:  History of MIHAELA on PAP therapy  Fully compliant and deriving benefit      Review of Systems      Genitourinary none   Cardiology ankle/leg swelling   Gastrointestinal none   Neurology none   Constitutional weight change   Integumentary none   Psychiatry none   Musculoskeletal joint pain and muscle aches   Pulmonary shortness of breath with activity and difficulty breathing when lying flat    ENT none   Endocrine none   Hematological none         I have reviewed and updated the review of systems as necessary      Historical Information    Past Medical History:   Past Medical History:   Diagnosis Date    Hyperlipidemia     Hypertension          Past Surgical History:   Past Surgical History:   Procedure Laterality Date    KNEE SURGERY         Social History:   Social History     Socioeconomic History    Marital status: /Civil Union     Spouse name: None    Number of children: None    Years of education: None    Highest education level: None   Occupational History    None   Social Needs    Financial resource strain: None    Food insecurity:     Worry: None     Inability: None    Transportation needs:     Medical: None     Non-medical: None   Tobacco Use    Smoking status: Former Smoker    Smokeless tobacco: Never Used    Tobacco comment: quit 25 yrs ago   Substance and Sexual Activity    Alcohol use: Yes     Comment: social    Drug use: No    Sexual activity: Not Currently     Partners: Male   Lifestyle    Physical activity:     Days per week: None     Minutes per session: None    Stress: None   Relationships  Social connections:     Talks on phone: None     Gets together: None     Attends Restorationist service: None     Active member of club or organization: None     Attends meetings of clubs or organizations: None     Relationship status: None    Intimate partner violence:     Fear of current or ex partner: None     Emotionally abused: None     Physically abused: None     Forced sexual activity: None   Other Topics Concern    None   Social History Narrative    None       Family History:   Family History   Problem Relation Age of Onset    Coronary artery disease Father     Throat cancer Brother     Endometrial cancer Maternal Aunt 50       Medications/Allergies:      Current Outpatient Medications:     amLODIPine (NORVASC) 5 mg tablet, Take 1 tablet (5 mg total) by mouth daily, Disp: 90 tablet, Rfl: 3    Calcium Carb-Cholecalciferol (CALCIUM + D3 PO), Take by mouth, Disp: , Rfl:     co-enzyme Q-10 30 MG capsule, Take 100 mg by mouth 3 (three) times a day, Disp: , Rfl:     diphenhydrAMINE (BENADRYL) 25 mg capsule, Take 25 mg by mouth daily as needed, Disp: , Rfl:     hydrochlorothiazide (HYDRODIURIL) 25 mg tablet, TAKE 1 TABLET DAILY, Disp: 90 tablet, Rfl: 4    levothyroxine 25 mcg tablet, Take 1 tablet (25 mcg total) by mouth daily, Disp: 90 tablet, Rfl: 3    losartan (COZAAR) 100 MG tablet, Take 1 tablet (100 mg total) by mouth daily, Disp: 90 tablet, Rfl: 3    TOPROL  MG 24 hr tablet, TAKE 1 TABLET DAILY, Disp: 90 tablet, Rfl: 4          Objective      Vital Signs:   Vitals:    02/18/20 1300   BP: 126/64   Pulse: 64     Parachute Sleepiness Scale: Total score: 3        Physical Exam:    General: Alert, appropriate, cooperative, overweight    Head: NC/AT    Skin: Warm, dry    Neuro: No motor abnormalities, cranial nerves appear intact    Extremity: No clubbing, cyanosis      DME Provider:   [employer]        Counseling / Coordination of Care   I have spent 10 minutes with the patient today in which greater than 50% of this time was spent in counseling/coordination of care regarding: equipment and compliance  Board Certified Sleep Specialist    Portions of the record may have been created with voice recognition software  Occasional wrong word or "sound a like" substitutions may have occurred due to the inherent limitations of voice recognition software  Read the chart carefully and recognize, using context, where substitutions have occurred

## 2020-04-30 LAB
T4 FREE SERPL-MCNC: 1.2 NG/DL (ref 0.8–1.8)
TSH SERPL-ACNC: 4.72 MIU/L (ref 0.4–4.5)

## 2020-05-06 ENCOUNTER — OFFICE VISIT (OUTPATIENT)
Dept: INTERNAL MEDICINE CLINIC | Facility: CLINIC | Age: 65
End: 2020-05-06
Payer: MEDICARE

## 2020-05-06 VITALS
DIASTOLIC BLOOD PRESSURE: 80 MMHG | SYSTOLIC BLOOD PRESSURE: 140 MMHG | HEART RATE: 67 BPM | TEMPERATURE: 98.3 F | OXYGEN SATURATION: 98 % | HEIGHT: 65 IN | BODY MASS INDEX: 42.47 KG/M2 | WEIGHT: 254.9 LBS

## 2020-05-06 DIAGNOSIS — Z23 NEED FOR TUBERCULOSIS VACCINATION: ICD-10-CM

## 2020-05-06 DIAGNOSIS — E78.00 PURE HYPERCHOLESTEROLEMIA: ICD-10-CM

## 2020-05-06 DIAGNOSIS — R73.01 IMPAIRED FASTING GLUCOSE: ICD-10-CM

## 2020-05-06 DIAGNOSIS — E03.9 ACQUIRED HYPOTHYROIDISM: Primary | ICD-10-CM

## 2020-05-06 DIAGNOSIS — I10 BENIGN ESSENTIAL HYPERTENSION: ICD-10-CM

## 2020-05-06 DIAGNOSIS — Z12.31 SCREENING MAMMOGRAM, ENCOUNTER FOR: ICD-10-CM

## 2020-05-06 DIAGNOSIS — M72.2 PLANTAR FASCIITIS: ICD-10-CM

## 2020-05-06 PROCEDURE — 1036F TOBACCO NON-USER: CPT | Performed by: INTERNAL MEDICINE

## 2020-05-06 PROCEDURE — 3008F BODY MASS INDEX DOCD: CPT | Performed by: INTERNAL MEDICINE

## 2020-05-06 PROCEDURE — 3077F SYST BP >= 140 MM HG: CPT | Performed by: INTERNAL MEDICINE

## 2020-05-06 PROCEDURE — 4040F PNEUMOC VAC/ADMIN/RCVD: CPT | Performed by: INTERNAL MEDICINE

## 2020-05-06 PROCEDURE — 3079F DIAST BP 80-89 MM HG: CPT | Performed by: INTERNAL MEDICINE

## 2020-05-06 PROCEDURE — 99214 OFFICE O/P EST MOD 30 MIN: CPT | Performed by: INTERNAL MEDICINE

## 2020-05-06 PROCEDURE — 1160F RVW MEDS BY RX/DR IN RCRD: CPT | Performed by: INTERNAL MEDICINE

## 2020-05-06 RX ORDER — LEVOTHYROXINE SODIUM 0.05 MG/1
50 TABLET ORAL DAILY
Qty: 90 TABLET | Refills: 3 | Status: SHIPPED | OUTPATIENT
Start: 2020-05-06 | End: 2021-04-13

## 2020-05-12 ENCOUNTER — HOSPITAL ENCOUNTER (OUTPATIENT)
Dept: RADIOLOGY | Age: 65
Discharge: HOME | End: 2020-05-12
Payer: MEDICARE

## 2020-05-12 VITALS — WEIGHT: 254 LBS | BODY MASS INDEX: 42.32 KG/M2 | HEIGHT: 65 IN

## 2020-05-12 DIAGNOSIS — Z12.31 SCREENING MAMMOGRAM, ENCOUNTER FOR: ICD-10-CM

## 2020-05-12 PROCEDURE — 77063 BREAST TOMOSYNTHESIS BI: CPT

## 2020-05-12 PROCEDURE — 77067 SCR MAMMO BI INCL CAD: CPT

## 2020-05-13 ENCOUNTER — HOSPITAL ENCOUNTER (OUTPATIENT)
Dept: ULTRASOUND IMAGING | Facility: CLINIC | Age: 65
Discharge: HOME | End: 2020-05-13
Payer: MEDICARE

## 2020-05-13 ENCOUNTER — HOSPITAL ENCOUNTER (OUTPATIENT)
Dept: MAMMOGRAPHY | Facility: CLINIC | Age: 65
Discharge: HOME | End: 2020-05-13
Payer: MEDICARE

## 2020-05-13 VITALS — HEIGHT: 65 IN | WEIGHT: 254 LBS | BODY MASS INDEX: 42.32 KG/M2

## 2020-05-13 DIAGNOSIS — R92.8 ABNORMAL MAMMOGRAM: ICD-10-CM

## 2020-05-13 PROCEDURE — 77065 DX MAMMO INCL CAD UNI: CPT

## 2020-05-13 PROCEDURE — G0279 TOMOSYNTHESIS, MAMMO: HCPCS

## 2020-05-13 PROCEDURE — 76642 ULTRASOUND BREAST LIMITED: CPT

## 2020-07-12 DIAGNOSIS — I10 BENIGN ESSENTIAL HYPERTENSION: ICD-10-CM

## 2020-07-12 RX ORDER — LOSARTAN POTASSIUM 100 MG/1
TABLET ORAL
Qty: 90 TABLET | Refills: 3 | Status: SHIPPED | OUTPATIENT
Start: 2020-07-12 | End: 2020-07-17 | Stop reason: SDUPTHER

## 2020-07-17 ENCOUNTER — TELEPHONE (OUTPATIENT)
Dept: INTERNAL MEDICINE CLINIC | Facility: CLINIC | Age: 65
End: 2020-07-17

## 2020-07-17 DIAGNOSIS — I10 BENIGN ESSENTIAL HYPERTENSION: ICD-10-CM

## 2020-07-17 RX ORDER — LOSARTAN POTASSIUM 100 MG/1
100 TABLET ORAL DAILY
Qty: 90 TABLET | Refills: 3 | Status: SHIPPED | OUTPATIENT
Start: 2020-07-17 | End: 2020-08-14 | Stop reason: SDUPTHER

## 2020-07-17 NOTE — TELEPHONE ENCOUNTER
Patient said she got a letter from [phone] that her Losartan is out of stock  She said she would like a 30 day supply of Losartan 100mg sent to [facility] on [address]  Thank you

## 2020-07-31 LAB
ALBUMIN SERPL-MCNC: 3.7 G/DL (ref 3.6–5.1)
ALBUMIN/GLOB SERPL: 1.5 (CALC) (ref 1–2.5)
ALP SERPL-CCNC: 85 U/L (ref 37–153)
ALT SERPL-CCNC: 13 U/L (ref 6–29)
AST SERPL-CCNC: 14 U/L (ref 10–35)
BASOPHILS # BLD AUTO: 28 CELLS/UL (ref 0–200)
BASOPHILS NFR BLD AUTO: 0.6 %
BILIRUB SERPL-MCNC: 0.4 MG/DL (ref 0.2–1.2)
BUN SERPL-MCNC: 17 MG/DL (ref 7–25)
BUN/CREAT SERPL: ABNORMAL (CALC) (ref 6–22)
CALCIUM SERPL-MCNC: 8.9 MG/DL (ref 8.6–10.4)
CHLORIDE SERPL-SCNC: 105 MMOL/L (ref 98–110)
CHOLEST SERPL-MCNC: 230 MG/DL
CHOLEST/HDLC SERPL: 5.9 (CALC)
CO2 SERPL-SCNC: 26 MMOL/L (ref 20–32)
CREAT SERPL-MCNC: 0.83 MG/DL (ref 0.5–0.99)
EOSINOPHIL # BLD AUTO: 179 CELLS/UL (ref 15–500)
EOSINOPHIL NFR BLD AUTO: 3.9 %
ERYTHROCYTE [DISTWIDTH] IN BLOOD BY AUTOMATED COUNT: 14.1 % (ref 11–15)
GLOBULIN SER CALC-MCNC: 2.4 G/DL (CALC) (ref 1.9–3.7)
GLUCOSE SERPL-MCNC: 106 MG/DL (ref 65–99)
HBA1C MFR BLD: 5.8 % OF TOTAL HGB
HCT VFR BLD AUTO: 37.5 % (ref 35–45)
HDLC SERPL-MCNC: 39 MG/DL
HGB BLD-MCNC: 12.1 G/DL (ref 11.7–15.5)
LDLC SERPL CALC-MCNC: 161 MG/DL (CALC)
LYMPHOCYTES # BLD AUTO: 1122 CELLS/UL (ref 850–3900)
LYMPHOCYTES NFR BLD AUTO: 24.4 %
MCH RBC QN AUTO: 27.6 PG (ref 27–33)
MCHC RBC AUTO-ENTMCNC: 32.3 G/DL (ref 32–36)
MCV RBC AUTO: 85.6 FL (ref 80–100)
MONOCYTES # BLD AUTO: 455 CELLS/UL (ref 200–950)
MONOCYTES NFR BLD AUTO: 9.9 %
NEUTROPHILS # BLD AUTO: 2815 CELLS/UL (ref 1500–7800)
NEUTROPHILS NFR BLD AUTO: 61.2 %
NONHDLC SERPL-MCNC: 191 MG/DL (CALC)
PLATELET # BLD AUTO: 273 THOUSAND/UL (ref 140–400)
PMV BLD REES-ECKER: 10.5 FL (ref 7.5–12.5)
POTASSIUM SERPL-SCNC: 3.4 MMOL/L (ref 3.5–5.3)
PROT SERPL-MCNC: 6.1 G/DL (ref 6.1–8.1)
RBC # BLD AUTO: 4.38 MILLION/UL (ref 3.8–5.1)
SL AMB EGFR AFRICAN AMERICAN: 85 ML/MIN/1.73M2
SL AMB EGFR NON AFRICAN AMERICAN: 73 ML/MIN/1.73M2
SODIUM SERPL-SCNC: 140 MMOL/L (ref 135–146)
TRIGL SERPL-MCNC: 152 MG/DL
TSH SERPL-ACNC: 1.77 MIU/L (ref 0.4–4.5)
WBC # BLD AUTO: 4.6 THOUSAND/UL (ref 3.8–10.8)

## 2020-08-06 ENCOUNTER — TELEMEDICINE (OUTPATIENT)
Dept: INTERNAL MEDICINE CLINIC | Facility: CLINIC | Age: 65
End: 2020-08-06
Payer: MEDICARE

## 2020-08-06 VITALS — SYSTOLIC BLOOD PRESSURE: 112 MMHG | DIASTOLIC BLOOD PRESSURE: 67 MMHG | HEART RATE: 62 BPM

## 2020-08-06 DIAGNOSIS — I10 BENIGN ESSENTIAL HYPERTENSION: Primary | ICD-10-CM

## 2020-08-06 DIAGNOSIS — E78.00 PURE HYPERCHOLESTEROLEMIA: ICD-10-CM

## 2020-08-06 DIAGNOSIS — M72.2 PLANTAR FASCIITIS: ICD-10-CM

## 2020-08-06 DIAGNOSIS — Z00.00 MEDICARE ANNUAL WELLNESS VISIT, SUBSEQUENT: ICD-10-CM

## 2020-08-06 DIAGNOSIS — E03.9 ACQUIRED HYPOTHYROIDISM: ICD-10-CM

## 2020-08-06 DIAGNOSIS — R73.01 IMPAIRED FASTING GLUCOSE: ICD-10-CM

## 2020-08-06 PROCEDURE — 3074F SYST BP LT 130 MM HG: CPT | Performed by: INTERNAL MEDICINE

## 2020-08-06 PROCEDURE — 1125F AMNT PAIN NOTED PAIN PRSNT: CPT | Performed by: INTERNAL MEDICINE

## 2020-08-06 PROCEDURE — 3078F DIAST BP <80 MM HG: CPT | Performed by: INTERNAL MEDICINE

## 2020-08-06 PROCEDURE — 4040F PNEUMOC VAC/ADMIN/RCVD: CPT | Performed by: INTERNAL MEDICINE

## 2020-08-06 PROCEDURE — G0438 PPPS, INITIAL VISIT: HCPCS | Performed by: INTERNAL MEDICINE

## 2020-08-06 PROCEDURE — 1160F RVW MEDS BY RX/DR IN RCRD: CPT | Performed by: INTERNAL MEDICINE

## 2020-08-06 PROCEDURE — 1170F FXNL STATUS ASSESSED: CPT | Performed by: INTERNAL MEDICINE

## 2020-08-06 PROCEDURE — 99214 OFFICE O/P EST MOD 30 MIN: CPT | Performed by: INTERNAL MEDICINE

## 2020-08-06 PROCEDURE — 1036F TOBACCO NON-USER: CPT | Performed by: INTERNAL MEDICINE

## 2020-08-06 PROCEDURE — 1123F ACP DISCUSS/DSCN MKR DOCD: CPT | Performed by: INTERNAL MEDICINE

## 2020-08-06 NOTE — PATIENT INSTRUCTIONS
Problem List Items Addressed This Visit        Endocrine    Impaired fasting glucose     Fasting glucose slightly elevated, but A1c is good, continue with healthy diet and exercise         Acquired hypothyroidism     Thyroid function tests normal, continue current dose of levothyroxine            Cardiovascular and Mediastinum    Benign essential hypertension - Primary     Well controlled, continue meds along with healthy diet and exercise            Musculoskeletal and Integument    Plantar fasciitis     This has improved, continue with stretching exercises            Other    Pure hypercholesterolemia     Continue Co Q10 along with healthy diet and exercise         Medicare annual wellness visit, subsequent     Discussed preventative health, cancer screening, immunizations, safety issues  Medicare Preventive Visit Patient Instructions  Thank you for completing your Welcome to Medicare Visit or Medicare Annual Wellness Visit today  Your next wellness visit will be due in one year (8/6/2021)  The screening/preventive services that you may require over the next 5-10 years are detailed below  Some tests may not apply to you based off risk factors and/or age  Screening tests ordered at today's visit but not completed yet may show as past due  Also, please note that scanned in results may not display below  Preventive Screenings:  Service Recommendations Previous Testing/Comments   Colorectal Cancer Screening  * Colonoscopy    * Fecal Occult Blood Test (FOBT)/Fecal Immunochemical Test (FIT)  * Fecal DNA/Cologuard Test  * Flexible Sigmoidoscopy Age: 54-65 years old   Colonoscopy: every 10 years (may be performed more frequently if at higher risk)  OR  FOBT/FIT: every 1 year  OR  Cologuard: every 3 years  OR  Sigmoidoscopy: every 5 years  Screening may be recommended earlier than age 48 if at higher risk for colorectal cancer   Also, an individualized decision between you and your healthcare provider will decide whether screening between the ages of 74-80 would be appropriate  Colonoscopy: 11/03/2015  FOBT/FIT: Not on file  Cologuard: Not on file  Sigmoidoscopy: Not on file         Breast Cancer Screening Age: 36 years old  Frequency: every 1-2 years  Not required if history of left and right mastectomy Mammogram: 05/13/2020       Cervical Cancer Screening Between the ages of 21-29, pap smear recommended once every 3 years  Between the ages of 33-67, can perform pap smear with HPV co-testing every 5 years  Recommendations may differ for women with a history of total hysterectomy, cervical cancer, or abnormal pap smears in past  Pap Smear: 03/18/2019       Hepatitis C Screening Once for adults born between 1945 and 1965  More frequently in patients at high risk for Hepatitis C Hep C Antibody: 09/08/2017       Diabetes Screening 1-2 times per year if you're at risk for diabetes or have pre-diabetes Fasting glucose: No results in last 5 years   A1C: 5 8 % of total Hgb       Cholesterol Screening Once every 5 years if you don't have a lipid disorder  May order more often based on risk factors  Lipid panel: 07/30/2020         Other Preventive Screenings Covered by Medicare:  1  Abdominal Aortic Aneurysm (AAA) Screening: covered once if your at risk  You're considered to be at risk if you have a family history of AAA  2  Lung Cancer Screening: covers low dose CT scan once per year if you meet all of the following conditions: (1) Age 50-69; (2) No signs or symptoms of lung cancer; (3) Current smoker or have quit smoking within the last 15 years; (4) You have a tobacco smoking history of at least 30 pack years (packs per day multiplied by number of years you smoked); (5) You get a written order from a healthcare provider    3  Glaucoma Screening: covered annually if you're considered high risk: (1) You have diabetes OR (2) Family history of glaucoma OR (3)  aged 48 and older OR (3)  American aged 72 and older  4  Osteoporosis Screening: covered every 2 years if you meet one of the following conditions: (1) You're estrogen deficient and at risk for osteoporosis based off medical history and other findings; (2) Have a vertebral abnormality; (3) On glucocorticoid therapy for more than 3 months; (4) Have primary hyperparathyroidism; (5) On osteoporosis medications and need to assess response to drug therapy  · Last bone density test (DXA Scan): 08/13/2018  5  HIV Screening: covered annually if you're between the age of 12-76  Also covered annually if you are younger than 13 and older than 72 with risk factors for HIV infection  For pregnant patients, it is covered up to 3 times per pregnancy  Immunizations:  Immunization Recommendations   Influenza Vaccine Annual influenza vaccination during flu season is recommended for all persons aged >= 6 months who do not have contraindications   Pneumococcal Vaccine (Prevnar and Pneumovax)  * Prevnar = PCV13  * Pneumovax = PPSV23   Adults 25-60 years old: 1-3 doses may be recommended based on certain risk factors  Adults 72 years old: Prevnar (PCV13) vaccine recommended followed by Pneumovax (PPSV23) vaccine  If already received PPSV23 since turning 65, then PCV13 recommended at least one year after PPSV23 dose  Hepatitis B Vaccine 3 dose series if at intermediate or high risk (ex: diabetes, end stage renal disease, liver disease)   Tetanus (Td) Vaccine - COST NOT COVERED BY MEDICARE PART B Following completion of primary series, a booster dose should be given every 10 years to maintain immunity against tetanus  Td may also be given as tetanus wound prophylaxis  Tdap Vaccine - COST NOT COVERED BY MEDICARE PART B Recommended at least once for all adults  For pregnant patients, recommended with each pregnancy     Shingles Vaccine (Shingrix) - COST NOT COVERED BY MEDICARE PART B  2 shot series recommended in those aged 48 and above     Health Maintenance Due: Topic Date Due    MAMMOGRAM  05/13/2021    Hepatitis C Screening  Completed     Immunizations Due:      Topic Date Due    Influenza Vaccine  07/01/2020     Advance Directives   What are advance directives? Advance directives are legal documents that state your wishes and plans for medical care  These plans are made ahead of time in case you lose your ability to make decisions for yourself  Advance directives can apply to any medical decision, such as the treatments you want, and if you want to donate organs  What are the types of advance directives? There are many types of advance directives, and each state has rules about how to use them  You may choose a combination of any of the following:  · Living will: This is a written record of the treatment you want  You can also choose which treatments you do not want, which to limit, and which to stop at a certain time  This includes surgery, medicine, IV fluid, and tube feedings  · Durable power of  for healthcare [facility]): This is a written record that states who you want to make healthcare choices for you when you are unable to make them for yourself  This person, called a proxy, is usually a family member or a friend  You may choose more than 1 proxy  · Do not resuscitate (DNR) order:  A DNR order is used in case your heart stops beating or you stop breathing  It is a request not to have certain forms of treatment, such as CPR  A DNR order may be included in other types of advance directives  · Medical directive: This covers the care that you want if you are in a coma, near death, or unable to make decisions for yourself  You can list the treatments you want for each condition  Treatment may include pain medicine, surgery, blood transfusions, dialysis, IV or tube feedings, and a ventilator (breathing machine)  · Values history: This document has questions about your views, beliefs, and how you feel and think about life   This information can help others choose the care that you would choose  Why are advance directives important? An advance directive helps you control your care  Although spoken wishes may be used, it is better to have your wishes written down  Spoken wishes can be misunderstood, or not followed  Treatments may be given even if you do not want them  An advance directive may make it easier for your family to make difficult choices about your care  Weight Management   Why it is important to manage your weight:  Being overweight increases your risk of health conditions such as heart disease, high blood pressure, type 2 diabetes, and certain types of cancer  It can also increase your risk for osteoarthritis, sleep apnea, and other respiratory problems  Aim for a slow, steady weight loss  Even a small amount of weight loss can lower your risk of health problems  How to lose weight safely:  A safe and healthy way to lose weight is to eat fewer calories and get regular exercise  You can lose up about 1 pound a week by decreasing the number of calories you eat by 500 calories each day  Healthy meal plan for weight management:  A healthy meal plan includes a variety of foods, contains fewer calories, and helps you stay healthy  A healthy meal plan includes the following:  · Eat whole-grain foods more often  A healthy meal plan should contain fiber  Fiber is the part of grains, fruits, and vegetables that is not broken down by your body  Whole-grain foods are healthy and provide extra fiber in your diet  Some examples of whole-grain foods are whole-wheat breads and pastas, oatmeal, brown rice, and bulgur  · Eat a variety of vegetables every day  Include dark, leafy greens such as spinach, kale, jazlyn greens, and mustard greens  Eat yellow and orange vegetables such as carrots, sweet potatoes, and winter squash  · Eat a variety of fruits every day  Choose fresh or canned fruit (canned in its own juice or light syrup) instead of juice  Fruit juice has very little or no fiber  · Eat low-fat dairy foods  Drink fat-free (skim) milk or 1% milk  Eat fat-free yogurt and low-fat cottage cheese  Try low-fat cheeses such as mozzarella and other reduced-fat cheeses  · Choose meat and other protein foods that are low in fat  Choose beans or other legumes such as split peas or lentils  Choose fish, skinless poultry (chicken or turkey), or lean cuts of red meat (beef or pork)  Before you cook meat or poultry, cut off any visible fat  · Use less fat and oil  Try baking foods instead of frying them  Add less fat, such as margarine, sour cream, regular salad dressing and mayonnaise to foods  Eat fewer high-fat foods  Some examples of high-fat foods include french fries, doughnuts, ice cream, and cakes  · Eat fewer sweets  Limit foods and drinks that are high in sugar  This includes candy, cookies, regular soda, and sweetened drinks  Exercise:  Exercise at least 30 minutes per day on most days of the week  Some examples of exercise include walking, biking, dancing, and swimming  You can also fit in more physical activity by taking the stairs instead of the elevator or parking farther away from stores  Ask your healthcare provider about the best exercise plan for you  © Copyright [employer] 2018 Information is for End User's use only and may not be sold, redistributed or otherwise used for commercial purposes   All illustrations and images included in CareNotes® are the copyrighted property of [employer]  or [address] [place] [address]

## 2020-08-06 NOTE — PROGRESS NOTES
Assessment/Plan:    Medicare annual wellness visit, subsequent  Discussed preventative health, cancer screening, immunizations, safety issues  Plantar fasciitis  This has improved, continue with stretching exercises    Impaired fasting glucose  Fasting glucose slightly elevated, but A1c is good, continue with healthy diet and exercise    Acquired hypothyroidism  Thyroid function tests normal, continue current dose of levothyroxine    Benign essential hypertension  Well controlled, continue meds along with healthy diet and exercise    Pure hypercholesterolemia  Continue Co Q10 along with healthy diet and exercise       Diagnoses and all orders for this visit:    Benign essential hypertension    Medicare annual wellness visit, subsequent    Plantar fasciitis    Impaired fasting glucose    Acquired hypothyroidism    Pure hypercholesterolemia          Subjective:      Patient ID: Chantelle Bateman is a 77 y o  female  Hypertension:  Patient reports compliance with med, no lightheadedness  Hypothyroidism:  Patient reports compliance with levothyroxine, no fatigue, no constipation, no cold intolerance  Impaired fasting glucose:  Patient watches her diet for this  The following portions of the patient's history were reviewed and updated as appropriate: allergies, current medications, past family history, past medical history, past social history, past surgical history and problem list     Review of Systems   Constitutional: Negative for chills, fatigue and fever  HENT: Negative for congestion, nosebleeds, postnasal drip, sore throat and trouble swallowing  Eyes: Negative for pain  Respiratory: Negative for cough, chest tightness, shortness of breath and wheezing  Cardiovascular: Negative for chest pain, palpitations and leg swelling  Gastrointestinal: Negative for abdominal pain, constipation, diarrhea, nausea and vomiting  Endocrine: Negative for cold intolerance, polydipsia and polyuria  Genitourinary: Negative for dysuria, flank pain and hematuria  Musculoskeletal: Negative for arthralgias  Skin: Negative for rash  Neurological: Negative for dizziness, tremors and headaches  Hematological: Does not bruise/bleed easily  Psychiatric/Behavioral: Negative for confusion and dysphoric mood  The patient is not nervous/anxious  Objective:      /67   Pulse 62          Physical Exam   Constitutional: She is oriented to person, place, and time  She appears well-developed  No distress  HENT:   Head: Normocephalic and atraumatic  Right Ear: External ear normal    Left Ear: External ear normal    Nose: Nose normal    Eyes: Pupils are equal, round, and reactive to light  Conjunctivae are normal  Right eye exhibits no discharge  Left eye exhibits no discharge  No scleral icterus  Neck: Normal range of motion  Neck supple  No tracheal deviation present  Pulmonary/Chest: Effort normal  No respiratory distress  Abdominal: Soft  There is no abdominal tenderness  Musculoskeletal: Normal range of motion  Right lower leg: No edema  Left lower leg: No edema  Neurological: She is alert and oriented to person, place, and time  No cranial nerve deficit  Coordination normal    Skin: She is not diaphoretic  No erythema  No pallor     Psychiatric: Her behavior is normal  Judgment and thought content normal

## 2020-08-06 NOTE — PROGRESS NOTES
Virtual AWV Consent    Reason for visit is     Encounter provider Hieu Watkins MD    Provider located at [address] [place] [id number]      Recent Visits  No visits were found meeting these conditions  Showing recent visits within past 7 days and meeting all other requirements     Today's Visits  Date Type Provider Dept   08/06/20 Telemedicine Roland Edward Juan today's visits and meeting all other requirements     Future Appointments  No visits were found meeting these conditions  Showing future appointments within next 150 days and meeting all other requirements        After connecting through [employer], the patient was identified by name and date of birth  Geraldine Blas was informed that this is a telemedicine visit and that the visit is being conducted through [employer] and patient was informed that this is a secure, HIPAA-compliant platform  She agrees to proceed  My office door was closed  No one else was in the room  She acknowledged consent and understanding of privacy and security of the video platform  The patient has agreed to participate and understands they can discontinue the visit at any time    Patient is aware this is a billable service  Assessment and Plan:     Problem List Items Addressed This Visit        Other    Medicare annual wellness visit, subsequent - Primary     Discussed preventative health, cancer screening, immunizations, safety issues  Preventive health issues were discussed with patient, and age appropriate screening tests were ordered as noted in patient's After Visit Summary  Personalized health advice and appropriate referrals for health education or preventive services given if needed, as noted in patient's After Visit Summary       History of Present Illness:     Patient presents for Medicare Annual Wellness visit    Patient Care Team:  Hieu Watkins MD as PCP - General     Problem List:     Patient Active Problem List   Diagnosis    Benign essential hypertension    Impaired fasting glucose    Pure hypercholesterolemia    Medicare annual wellness visit, subsequent    Acquired hypothyroidism    Plantar fasciitis      Past Medical and Surgical History:     Past Medical History:   Diagnosis Date    Hyperlipidemia     Hypertension      Past Surgical History:   Procedure Laterality Date    KNEE SURGERY        Family History:     Family History   Problem Relation Age of Onset    Coronary artery disease Father     Throat cancer Brother 61    Endometrial cancer Maternal Aunt 48    No Known Problems Mother     No Known Problems Daughter     No Known Problems Maternal Grandmother     No Known Problems Maternal Grandfather     No Known Problems Paternal Grandmother     No Known Problems Paternal Grandfather     No Known Problems Son     Lung cancer Maternal Aunt 61    No Known Problems Maternal Aunt     No Known Problems Paternal Aunt     Bone cancer Maternal Uncle 71    Coronary artery disease Maternal Uncle       Social History:        Social History     Socioeconomic History    Marital status: /Civil Union     Spouse name: Not on file    Number of children: Not on file    Years of education: Not on file    Highest education level: Not on file   Occupational History    Not on file   Social Needs    Financial resource strain: Not on file    Food insecurity     Worry: Not on file     Inability: Not on file    Transportation needs     Medical: Not on file     Non-medical: Not on file   Tobacco Use    Smoking status: Former Smoker    Smokeless tobacco: Never Used    Tobacco comment: quit 25 yrs ago   Substance and Sexual Activity    Alcohol use: Yes     Comment: social    Drug use: No    Sexual activity: Not Currently     Partners: Male   Lifestyle    Physical activity     Days per week: Not on file     Minutes per session: Not on file    Stress: Not on file   Relationships    Social connections     Talks on phone: Not on file     Gets together: Not on file     Attends Islam service: Not on file     Active member of club or organization: Not on file     Attends meetings of clubs or organizations: Not on file     Relationship status: Not on file    Intimate partner violence     Fear of current or ex partner: Not on file     Emotionally abused: Not on file     Physically abused: Not on file     Forced sexual activity: Not on file   Other Topics Concern    Not on file   Social History Narrative    Not on file      Medications and Allergies:     Current Outpatient Medications   Medication Sig Dispense Refill    amLODIPine (NORVASC) 5 mg tablet Take 1 tablet (5 mg total) by mouth daily 90 tablet 3    Calcium Carb-Cholecalciferol (CALCIUM + D3 PO) Take by mouth      co-enzyme Q-10 30 MG capsule Take 100 mg by mouth 3 (three) times a day      diphenhydrAMINE (BENADRYL) 25 mg capsule Take 25 mg by mouth daily as needed      hydrochlorothiazide (HYDRODIURIL) 25 mg tablet TAKE 1 TABLET DAILY 90 tablet 4    levothyroxine 50 mcg tablet Take 1 tablet (50 mcg total) by mouth daily 90 tablet 3    losartan (COZAAR) 100 MG tablet Take 1 tablet (100 mg total) by mouth daily 90 tablet 3    TOPROL  MG 24 hr tablet TAKE 1 TABLET DAILY 90 tablet 4     No current facility-administered medications for this visit        Allergies   Allergen Reactions    Diclofenac Swelling    Atorvastatin Other (See Comments)     Muscle ache      Immunizations:     Immunization History   Administered Date(s) Administered    INFLUENZA 09/30/2013, 09/29/2015, 12/12/2016, 12/14/2016, 12/14/2016, 10/31/2017, 10/17/2018, 10/21/2019    Influenza Quadrivalent Preservative Free 3 years and older IM 12/12/2016    Influenza Quadrivalent, 6-35 Months IM 09/29/2015    Influenza Split High Dose Preservative Free IM 10/21/2019    Influenza TIV (IM) 09/30/2013    Pneumococcal Conjugate 13-Valent 08/02/2018    Pneumococcal Polysaccharide PPV23 08/05/2019    Tdap 10/21/2019    Zoster 07/22/2015    Zoster Vaccine Recombinant 08/14/2018, 10/17/2018    influenza, trivalent, adjuvanted 10/17/2018      Health Maintenance:         Topic Date Due    MAMMOGRAM  05/13/2021    Hepatitis C Screening  Completed         Topic Date Due    Influenza Vaccine  07/01/2020      Medicare Health Risk Assessment:     There were no vitals taken for this visit  Adelene Hammans is here for her Subsequent Wellness visit  Health Risk Assessment:   Patient rates overall health as good  Patient feels that their physical health rating is same  Eyesight was rated as same  Hearing was rated as same  Patient feels that their emotional and mental health rating is same  Pain experienced in the last 7 days has been none  Patient states that she has experienced no weight loss or gain in last 6 months  Depression Screening:   PHQ-2 Score: 0      Fall Risk Screening: In the past year, patient has experienced: no history of falling in past year      Urinary Incontinence Screening:   Patient has not leaked urine accidently in the last six months  Home Safety:  Patient does not have trouble with stairs inside or outside of their home  Patient has working smoke alarms Home safety hazards include: none  Nutrition:   Current diet is Regular  Medications:   Patient is currently taking over-the-counter supplements  OTC medications include: see medication list  Patient is able to manage medications  Activities of Daily Living (ADLs)/Instrumental Activities of Daily Living (IADLs):   Walk and transfer into and out of bed and chair?: Yes  Dress and groom yourself?: Yes    Bathe or shower yourself?: Yes    Feed yourself?  Yes  Do your laundry/housekeeping?: Yes  Manage your money, pay your bills and track your expenses?: Yes  Make your own meals?: Yes    Do your own shopping?: Yes    Previous Hospitalizations:   Any hospitalizations or ED visits within the last 12 months?: No      Advance Care Planning:   Living will: Yes    Durable POA for healthcare: Yes    Advanced directive: Yes      Cognitive Screening:   Provider or family/friend/caregiver concerned regarding cognition?: No    PREVENTIVE SCREENINGS      Cardiovascular Screening:    General: Screening Not Indicated, History Lipid Disorder and Screening Current      Diabetes Screening:     General: Screening Current      Colorectal Cancer Screening:     General: Screening Current      Breast Cancer Screening:     General: Screening Current      Cervical Cancer Screening:    General: Screening Not Indicated      Osteoporosis Screening:    General: Screening Current      Abdominal Aortic Aneurysm (AAA) Screening:        General: Screening Not Indicated      Lung Cancer Screening:     General: Screening Not Indicated      Hepatitis C Screening:    General: Screening Current    Other Counseling Topics:   Car/seat belt/driving safety, skin self-exam and sunscreen         Tracie Vasques MD

## 2020-08-14 DIAGNOSIS — I10 BENIGN ESSENTIAL HYPERTENSION: ICD-10-CM

## 2020-08-14 RX ORDER — LOSARTAN POTASSIUM 100 MG/1
100 TABLET ORAL DAILY
Qty: 90 TABLET | Refills: 3 | Status: SHIPPED | OUTPATIENT
Start: 2020-08-14 | End: 2021-08-26

## 2020-10-29 DIAGNOSIS — G47.33 OSA (OBSTRUCTIVE SLEEP APNEA): Primary | ICD-10-CM

## 2020-11-12 ENCOUNTER — OFFICE VISIT (OUTPATIENT)
Dept: INTERNAL MEDICINE CLINIC | Facility: CLINIC | Age: 65
End: 2020-11-12
Payer: MEDICARE

## 2020-11-12 VITALS
TEMPERATURE: 95.6 F | DIASTOLIC BLOOD PRESSURE: 68 MMHG | SYSTOLIC BLOOD PRESSURE: 136 MMHG | HEIGHT: 65 IN | OXYGEN SATURATION: 98 % | WEIGHT: 234.6 LBS | BODY MASS INDEX: 39.09 KG/M2

## 2020-11-12 DIAGNOSIS — I10 BENIGN ESSENTIAL HYPERTENSION: Primary | ICD-10-CM

## 2020-11-12 DIAGNOSIS — E03.9 ACQUIRED HYPOTHYROIDISM: ICD-10-CM

## 2020-11-12 DIAGNOSIS — R73.01 IMPAIRED FASTING GLUCOSE: ICD-10-CM

## 2020-11-12 DIAGNOSIS — E78.00 PURE HYPERCHOLESTEROLEMIA: ICD-10-CM

## 2020-11-12 DIAGNOSIS — K63.5 POLYP OF COLON, UNSPECIFIED PART OF COLON, UNSPECIFIED TYPE: ICD-10-CM

## 2020-11-12 PROCEDURE — 99214 OFFICE O/P EST MOD 30 MIN: CPT | Performed by: INTERNAL MEDICINE

## 2020-12-21 ENCOUNTER — TELEPHONE (OUTPATIENT)
Dept: INTERNAL MEDICINE CLINIC | Facility: CLINIC | Age: 65
End: 2020-12-21

## 2020-12-21 DIAGNOSIS — Z20.822 EXPOSURE TO COVID-19 VIRUS: ICD-10-CM

## 2020-12-21 DIAGNOSIS — Z20.822 EXPOSURE TO COVID-19 VIRUS: Primary | ICD-10-CM

## 2020-12-21 PROCEDURE — U0003 INFECTIOUS AGENT DETECTION BY NUCLEIC ACID (DNA OR RNA); SEVERE ACUTE RESPIRATORY SYNDROME CORONAVIRUS 2 (SARS-COV-2) (CORONAVIRUS DISEASE [COVID-19]), AMPLIFIED PROBE TECHNIQUE, MAKING USE OF HIGH THROUGHPUT TECHNOLOGIES AS DESCRIBED BY CMS-2020-01-R: HCPCS | Performed by: INTERNAL MEDICINE

## 2020-12-22 LAB — SARS-COV-2 RNA SPEC QL NAA+PROBE: NOT DETECTED

## 2021-02-17 ENCOUNTER — OFFICE VISIT (OUTPATIENT)
Dept: INTERNAL MEDICINE CLINIC | Facility: CLINIC | Age: 66
End: 2021-02-17
Payer: MEDICARE

## 2021-02-17 VITALS
SYSTOLIC BLOOD PRESSURE: 142 MMHG | HEART RATE: 78 BPM | BODY MASS INDEX: 39.49 KG/M2 | DIASTOLIC BLOOD PRESSURE: 72 MMHG | OXYGEN SATURATION: 98 % | HEIGHT: 65 IN | WEIGHT: 237 LBS | TEMPERATURE: 97 F

## 2021-02-17 DIAGNOSIS — I10 BENIGN ESSENTIAL HYPERTENSION: Primary | ICD-10-CM

## 2021-02-17 DIAGNOSIS — E78.00 PURE HYPERCHOLESTEROLEMIA: ICD-10-CM

## 2021-02-17 DIAGNOSIS — E03.9 ACQUIRED HYPOTHYROIDISM: ICD-10-CM

## 2021-02-17 DIAGNOSIS — R73.01 IMPAIRED FASTING GLUCOSE: ICD-10-CM

## 2021-02-17 PROCEDURE — 99214 OFFICE O/P EST MOD 30 MIN: CPT | Performed by: INTERNAL MEDICINE

## 2021-02-17 NOTE — PATIENT INSTRUCTIONS
Problem List Items Addressed This Visit        Endocrine    Impaired fasting glucose     Continue with healthy diet and exercise         Acquired hypothyroidism     Last thyroid function tests normal, continue levothyroxine along monitoring            Cardiovascular and Mediastinum    Benign essential hypertension - Primary     A little today, but better at home, continue current meds with healthy diet and exercise            Other    Pure hypercholesterolemia     Continue with exercise and healthy diet

## 2021-02-17 NOTE — PROGRESS NOTES
Assessment/Plan:    Impaired fasting glucose  Continue with healthy diet and exercise    Acquired hypothyroidism  Last thyroid function tests normal, continue levothyroxine along monitoring    Benign essential hypertension  A little today, but better at home, continue current meds with healthy diet and exercise    Pure hypercholesterolemia  Continue with exercise and healthy diet  Diagnoses and all orders for this visit:    Benign essential hypertension    Impaired fasting glucose    Acquired hypothyroidism    Pure hypercholesterolemia    Other orders  -     Cancel: Ambulatory referral to Gastroenterology; Future        BMI Counseling: Body mass index is 39 44 kg/m²  The BMI is above normal  Nutrition recommendations include encouraging healthy choices of fruits and vegetables and moderation in carbohydrate intake  Exercise recommendations include exercising 3-5 times per week  Subjective:      Patient ID: Khushi Moreno is a 77 y o  female  Hypertension:  Blood pressure at home has been ranging 130/70 range, patient reports compliance with meds, no lightheadedness    Impaired fasting glucose:  Patient watches her diet for this  Hypothyroidism:  Patient reports compliance with medication, no fatigue, no constipation, no cold intolerance    Hypercholesterolemia:  Patient watches her      The following portions of the patient's history were reviewed and updated as appropriate: allergies, current medications, past family history, past medical history, past social history, past surgical history and problem list     Review of Systems   Constitutional: Negative for chills, fatigue and fever  HENT: Negative for congestion, nosebleeds, postnasal drip, sore throat and trouble swallowing  Eyes: Negative for pain  Respiratory: Negative for cough, chest tightness, shortness of breath and wheezing  Cardiovascular: Negative for chest pain, palpitations and leg swelling     Gastrointestinal: Negative for abdominal pain, constipation, diarrhea, nausea and vomiting  Endocrine: Negative for polydipsia and polyuria  Genitourinary: Negative for dysuria, flank pain and hematuria  Musculoskeletal: Negative for arthralgias and myalgias  Skin: Negative for rash  Neurological: Negative for dizziness, tremors, light-headedness and headaches  Hematological: Does not bruise/bleed easily  Psychiatric/Behavioral: Negative for confusion and dysphoric mood  The patient is not nervous/anxious  Objective:      /72   Pulse 78   Temp (!) 97 °F (36 1 °C) (Temporal)   Ht 5' 5" (1 651 m)   Wt 108 kg (237 lb)   SpO2 98%   BMI 39 44 kg/m²          Physical Exam  Vitals signs reviewed  Constitutional:       General: She is not in acute distress  Appearance: Normal appearance  She is well-developed  HENT:      Head: Normocephalic and atraumatic  Right Ear: External ear normal       Left Ear: External ear normal    Eyes:      General: No scleral icterus  Conjunctiva/sclera: Conjunctivae normal    Neck:      Musculoskeletal: Normal range of motion and neck supple  Thyroid: No thyromegaly  Trachea: No tracheal deviation  Cardiovascular:      Rate and Rhythm: Normal rate and regular rhythm  Heart sounds: Normal heart sounds  No murmur  Pulmonary:      Effort: Pulmonary effort is normal  No respiratory distress  Breath sounds: Normal breath sounds  No wheezing or rales  Abdominal:      General: Bowel sounds are normal       Palpations: Abdomen is soft  Tenderness: There is no abdominal tenderness  There is no guarding or rebound  Musculoskeletal:      Right lower leg: No edema  Left lower leg: No edema  Lymphadenopathy:      Cervical: No cervical adenopathy  Neurological:      General: No focal deficit present  Mental Status: She is alert and oriented to person, place, and time     Psychiatric:         Mood and Affect: Mood normal  Behavior: Behavior normal          Thought Content:  Thought content normal          Judgment: Judgment normal

## 2021-03-02 PROCEDURE — 88305 TISSUE EXAM BY PATHOLOGIST: CPT | Performed by: PATHOLOGY

## 2021-03-03 ENCOUNTER — LAB REQUISITION (OUTPATIENT)
Dept: LAB | Facility: HOSPITAL | Age: 66
End: 2021-03-03
Payer: MEDICARE

## 2021-03-03 DIAGNOSIS — K57.30 DIVERTICULOSIS OF LARGE INTESTINE WITHOUT PERFORATION OR ABSCESS WITHOUT BLEEDING: ICD-10-CM

## 2021-03-03 DIAGNOSIS — K63.5 POLYP OF COLON: ICD-10-CM

## 2021-03-03 DIAGNOSIS — Z86.010 PERSONAL HISTORY OF COLONIC POLYPS: ICD-10-CM

## 2021-03-10 DIAGNOSIS — Z23 ENCOUNTER FOR IMMUNIZATION: ICD-10-CM

## 2021-03-15 DIAGNOSIS — I10 ESSENTIAL HYPERTENSION, BENIGN: ICD-10-CM

## 2021-03-15 RX ORDER — METOPROLOL SUCCINATE 100 MG/1
TABLET, EXTENDED RELEASE ORAL
Qty: 90 TABLET | Refills: 3 | Status: SHIPPED | OUTPATIENT
Start: 2021-03-15 | End: 2022-02-01

## 2021-03-21 DIAGNOSIS — I10 BENIGN ESSENTIAL HYPERTENSION: ICD-10-CM

## 2021-03-22 ENCOUNTER — IMMUNIZATIONS (OUTPATIENT)
Dept: FAMILY MEDICINE CLINIC | Facility: HOSPITAL | Age: 66
End: 2021-03-22

## 2021-03-22 DIAGNOSIS — Z23 ENCOUNTER FOR IMMUNIZATION: Primary | ICD-10-CM

## 2021-03-22 PROCEDURE — 0001A SARS-COV-2 / COVID-19 MRNA VACCINE (PFIZER-BIONTECH) 30 MCG: CPT

## 2021-03-22 PROCEDURE — 91300 SARS-COV-2 / COVID-19 MRNA VACCINE (PFIZER-BIONTECH) 30 MCG: CPT

## 2021-03-22 RX ORDER — HYDROCHLOROTHIAZIDE 25 MG/1
TABLET ORAL
Qty: 90 TABLET | Refills: 3 | Status: SHIPPED | OUTPATIENT
Start: 2021-03-22 | End: 2022-05-04

## 2021-03-22 RX ORDER — AMLODIPINE BESYLATE 5 MG/1
TABLET ORAL
Qty: 90 TABLET | Refills: 3 | Status: SHIPPED | OUTPATIENT
Start: 2021-03-22 | End: 2022-02-24

## 2021-04-01 ENCOUNTER — OFFICE VISIT (OUTPATIENT)
Dept: SLEEP CENTER | Facility: CLINIC | Age: 66
End: 2021-04-01
Payer: MEDICARE

## 2021-04-01 VITALS
BODY MASS INDEX: 40.59 KG/M2 | WEIGHT: 243.6 LBS | HEIGHT: 65 IN | SYSTOLIC BLOOD PRESSURE: 126 MMHG | DIASTOLIC BLOOD PRESSURE: 74 MMHG

## 2021-04-01 DIAGNOSIS — G47.33 OSA (OBSTRUCTIVE SLEEP APNEA): Primary | ICD-10-CM

## 2021-04-01 PROCEDURE — 99213 OFFICE O/P EST LOW 20 MIN: CPT | Performed by: INTERNAL MEDICINE

## 2021-04-01 NOTE — PROGRESS NOTES
Progress Note - Jagruti Lam [address] [id number] MRN: [id number]      Reason for Visit:  77 y  o female here for PAP compliance check    Assessment:  Doing well with new PAP device  Sleep quality is improved and the patient feels less drowsy  Compliance data show utilization for greater than or equal to 70% of nights, for greater than or equal to 4 hours per night  Plan:  Adequate compliance and successful treatment    Follow up: One year    History of Present Illness:  History of MIHAELA on PAP therapy  Meets adequate compliance        Review of Systems      Genitourinary hot flashes at night   Cardiology ankle/leg swelling   Gastrointestinal none   Neurology none   Constitutional none   Integumentary none   Psychiatry none   Musculoskeletal muscle aches   Pulmonary difficulty breathing when lying flat    ENT throat clearing   Endocrine none   Hematological none           I have reviewed and updated the review of systems as necessary    Historical Information    Past Medical History:   Diagnosis Date    Hyperlipidemia     Hypertension          Past Surgical History:   Procedure Laterality Date    KNEE SURGERY           Social History     Socioeconomic History    Marital status: /Civil Union     Spouse name: None    Number of children: None    Years of education: None    Highest education level: None   Occupational History    None   Social Needs    Financial resource strain: None    Food insecurity     Worry: None     Inability: None    Transportation needs     Medical: None     Non-medical: None   Tobacco Use    Smoking status: Former Smoker    Smokeless tobacco: Never Used    Tobacco comment: quit 25 yrs ago   Substance and Sexual Activity    Alcohol use: Yes     Comment: social    Drug use: No    Sexual activity: Not Currently     Partners: Male   Lifestyle    Physical activity     Days per week: None     Minutes per session: None    Stress: None   Relationships    Social connections     Talks on phone: None     Gets together: None     Attends Zoroastrian service: None     Active member of club or organization: None     Attends meetings of clubs or organizations: None     Relationship status: None    Intimate partner violence     Fear of current or ex partner: None     Emotionally abused: None     Physically abused: None     Forced sexual activity: None   Other Topics Concern    None   Social History Narrative    None         Family History   Problem Relation Age of Onset    Coronary artery disease Father     Throat cancer Brother 61    Endometrial cancer Maternal Aunt 48    No Known Problems Mother     No Known Problems Daughter     No Known Problems Maternal Grandmother     No Known Problems Maternal Grandfather     No Known Problems Paternal Grandmother     No Known Problems Paternal Grandfather     No Known Problems Son     Lung cancer Maternal Aunt 61    No Known Problems Maternal Aunt     No Known Problems Paternal Aunt     Bone cancer Maternal Uncle 71    Coronary artery disease Maternal Uncle        Medications/Allergies:      Current Outpatient Medications:     amLODIPine (NORVASC) 5 mg tablet, TAKE 1 TABLET DAILY, Disp: 90 tablet, Rfl: 3    Calcium Carb-Cholecalciferol (CALCIUM + D3 PO), Take by mouth, Disp: , Rfl:     co-enzyme Q-10 30 MG capsule, Take 100 mg by mouth 2 (two) times a day , Disp: , Rfl:     diphenhydrAMINE (BENADRYL) 25 mg capsule, Take 25 mg by mouth as needed , Disp: , Rfl:     hydrochlorothiazide (HYDRODIURIL) 25 mg tablet, TAKE 1 TABLET DAILY, Disp: 90 tablet, Rfl: 3    levothyroxine 50 mcg tablet, Take 1 tablet (50 mcg total) by mouth daily, Disp: 90 tablet, Rfl: 3    losartan (COZAAR) 100 MG tablet, Take 1 tablet (100 mg total) by mouth daily, Disp: 90 tablet, Rfl: 3    metoprolol succinate (TOPROL-XL) 100 mg 24 hr tablet, TAKE 1 TABLET DAILY, Disp: 90 tablet, Rfl: 3    Multiple Vitamins-Minerals (PRESERVISION AREDS 2+MULTI VIT PO), , Disp: , Rfl:       Objective    Vital Signs:   Vitals:    21 1143   BP: 126/74     [place] Sleepiness Scale: Total score: 3        Physical Exam:    General: Alert, appropriate, cooperative, overweight    Head: NC/AT    Skin: Warm, dry    Neuro: No motor abnormalities, cranial nerves appear intact    Extremity: No clubbing, cyanosis    PAP settin cm  DME Provider: [facility]  Test results:  AHI = 42 0 in     Counseling / Coordination of Care  Total clinic time spent today 10 minutes  A description of the counseling / coordination of care: Maintain compliance  Discussed equipment  SABINO Gamez    Board Certified Sleep Specialist

## 2021-04-08 NOTE — TELEPHONE ENCOUNTER
Did not sign correctly  Could you give me the necessary information and I will write the order?
Per [place] task complete
Please sign Encounter 
PCP- follow up in 1 week. Bring these papers with you to that appointment so your PCP can request records from your hospital stay.

## 2021-04-13 DIAGNOSIS — E03.9 ACQUIRED HYPOTHYROIDISM: ICD-10-CM

## 2021-04-13 RX ORDER — LEVOTHYROXINE SODIUM 0.05 MG/1
TABLET ORAL
Qty: 90 TABLET | Refills: 3 | Status: SHIPPED | OUTPATIENT
Start: 2021-04-13 | End: 2022-06-03

## 2021-04-15 ENCOUNTER — IMMUNIZATIONS (OUTPATIENT)
Dept: FAMILY MEDICINE CLINIC | Facility: HOSPITAL | Age: 66
End: 2021-04-15

## 2021-04-15 DIAGNOSIS — Z23 ENCOUNTER FOR IMMUNIZATION: Primary | ICD-10-CM

## 2021-04-15 PROCEDURE — 91300 SARS-COV-2 / COVID-19 MRNA VACCINE (PFIZER-BIONTECH) 30 MCG: CPT

## 2021-04-15 PROCEDURE — 0002A SARS-COV-2 / COVID-19 MRNA VACCINE (PFIZER-BIONTECH) 30 MCG: CPT

## 2021-05-10 ENCOUNTER — OFFICE VISIT (OUTPATIENT)
Dept: INTERNAL MEDICINE CLINIC | Facility: CLINIC | Age: 66
End: 2021-05-10
Payer: MEDICARE

## 2021-05-10 VITALS
WEIGHT: 244 LBS | DIASTOLIC BLOOD PRESSURE: 70 MMHG | BODY MASS INDEX: 39.21 KG/M2 | SYSTOLIC BLOOD PRESSURE: 160 MMHG | TEMPERATURE: 97.3 F | OXYGEN SATURATION: 98 % | HEART RATE: 71 BPM | HEIGHT: 66 IN

## 2021-05-10 DIAGNOSIS — I10 BENIGN ESSENTIAL HYPERTENSION: ICD-10-CM

## 2021-05-10 DIAGNOSIS — E55.9 VITAMIN D DEFICIENCY: ICD-10-CM

## 2021-05-10 DIAGNOSIS — Z12.31 SCREENING MAMMOGRAM, ENCOUNTER FOR: ICD-10-CM

## 2021-05-10 DIAGNOSIS — E78.00 PURE HYPERCHOLESTEROLEMIA: ICD-10-CM

## 2021-05-10 DIAGNOSIS — E03.9 ACQUIRED HYPOTHYROIDISM: Primary | ICD-10-CM

## 2021-05-10 DIAGNOSIS — R73.01 IMPAIRED FASTING GLUCOSE: ICD-10-CM

## 2021-05-10 PROCEDURE — 99214 OFFICE O/P EST MOD 30 MIN: CPT | Performed by: INTERNAL MEDICINE

## 2021-05-10 NOTE — ASSESSMENT & PLAN NOTE
Patient getting better blood pressures at home than in the office, continue current meds along with healthy diet and exercise

## 2021-05-10 NOTE — PATIENT INSTRUCTIONS
Problem List Items Addressed This Visit        Endocrine    Impaired fasting glucose      Continue with healthy diet and exercise, will check A1c with next labs         Relevant Orders    Hemoglobin A1C    Acquired hypothyroidism - Primary      Continue levothyroxine 50 mcg daily, and will recheck over the summer         Relevant Orders    TSH, 3rd generation with Free T4 reflex       Cardiovascular and Mediastinum    Benign essential hypertension      Patient getting better blood pressures at home than in the office, continue current meds along with healthy diet and exercise            Other    Pure hypercholesterolemia      Continue exercise, healthy diet, and Co Q10         Relevant Orders    CBC and differential    Comprehensive metabolic panel    Lipid Panel with Direct LDL reflex      Other Visit Diagnoses     Screening mammogram, encounter for        Relevant Orders    Mammo screening bilateral w 3d & cad    Vitamin D deficiency        Relevant Orders    Vitamin D 25 hydroxy

## 2021-05-10 NOTE — PROGRESS NOTES
Assessment/Plan:    Pure hypercholesterolemia   Continue exercise, healthy diet, and Co Q10    Acquired hypothyroidism   Continue levothyroxine 50 mcg daily, and will recheck over the summer    Impaired fasting glucose   Continue with healthy diet and exercise, will check A1c with next labs    Benign essential hypertension   Patient getting better blood pressures at home than in the office, continue current meds along with healthy diet and exercise       Diagnoses and all orders for this visit:    Acquired hypothyroidism  -     TSH, 3rd generation with Free T4 reflex; Future    Pure hypercholesterolemia  -     CBC and differential; Future  -     Comprehensive metabolic panel; Future  -     Lipid Panel with Direct LDL reflex; Future    Impaired fasting glucose  -     Hemoglobin A1C; Future    Benign essential hypertension    Screening mammogram, encounter for  -     Mammo screening bilateral w 3d & cad; Future    Vitamin D deficiency  -     Vitamin D 25 hydroxy; Future          Subjective:      Patient ID: Josie Jerez is a 77 y o  female  Hypertension: Patient reports compliance with meds, no lightheadedness, patient reports getting blood pressures that are okay at home     hypothyroidism:  Patient reports compliance with levothyroxine, no fatigue, no constipation, no cold intolerance     hypercholesterolemia:  Patient on Co Q10 and watches her diet      The following portions of the patient's history were reviewed and updated as appropriate: allergies, current medications, past family history, past medical history, past social history, past surgical history and problem list     Review of Systems   Constitutional: Negative for chills, fatigue and fever  HENT: Negative for congestion, nosebleeds, postnasal drip, sore throat and trouble swallowing  Eyes: Negative for pain  Respiratory: Negative for cough, chest tightness, shortness of breath and wheezing      Cardiovascular: Negative for chest pain, palpitations and leg swelling  Gastrointestinal: Negative for abdominal pain, constipation, diarrhea, nausea and vomiting  Endocrine: Negative for polydipsia and polyuria  Genitourinary: Negative for dysuria, flank pain and hematuria  Musculoskeletal: Negative for arthralgias  Skin: Negative for rash  Neurological: Negative for dizziness, tremors, light-headedness and headaches  Hematological: Does not bruise/bleed easily  Psychiatric/Behavioral: Negative for confusion and dysphoric mood  The patient is not nervous/anxious  Objective:      /70   Pulse 71   Temp (!) 97 3 °F (36 3 °C) (Temporal)   Ht 5' 6" (1 676 m)   Wt 111 kg (244 lb)   SpO2 98%   BMI 39 38 kg/m²          Physical Exam  Vitals signs reviewed  Constitutional:       General: She is not in acute distress  Appearance: Normal appearance  She is well-developed  HENT:      Head: Normocephalic and atraumatic  Right Ear: External ear normal       Left Ear: External ear normal    Eyes:      General: No scleral icterus  Conjunctiva/sclera: Conjunctivae normal    Neck:      Musculoskeletal: Normal range of motion and neck supple  Thyroid: No thyromegaly  Trachea: No tracheal deviation  Cardiovascular:      Rate and Rhythm: Normal rate and regular rhythm  Heart sounds: Normal heart sounds  No murmur  Pulmonary:      Effort: Pulmonary effort is normal  No respiratory distress  Breath sounds: Normal breath sounds  No wheezing or rales  Abdominal:      General: Bowel sounds are normal       Palpations: Abdomen is soft  Tenderness: There is no abdominal tenderness  There is no guarding or rebound  Musculoskeletal:      Right lower leg: No edema  Left lower leg: No edema  Lymphadenopathy:      Cervical: No cervical adenopathy  Neurological:      General: No focal deficit present  Mental Status: She is alert and oriented to person, place, and time     Psychiatric: Mood and Affect: Mood normal          Behavior: Behavior normal          Thought Content:  Thought content normal          Judgment: Judgment normal

## 2021-08-02 ENCOUNTER — HOSPITAL ENCOUNTER (OUTPATIENT)
Dept: RADIOLOGY | Age: 66
Discharge: HOME | End: 2021-08-02
Payer: MEDICARE

## 2021-08-02 VITALS — HEIGHT: 66 IN | WEIGHT: 252 LBS | BODY MASS INDEX: 40.5 KG/M2

## 2021-08-02 DIAGNOSIS — Z12.31 SCREENING MAMMOGRAM, ENCOUNTER FOR: ICD-10-CM

## 2021-08-02 PROCEDURE — 77063 BREAST TOMOSYNTHESIS BI: CPT

## 2021-08-02 PROCEDURE — 77067 SCR MAMMO BI INCL CAD: CPT

## 2021-08-04 LAB
25(OH)D3 SERPL-MCNC: 42 NG/ML (ref 30–100)
ALBUMIN SERPL-MCNC: 4 G/DL (ref 3.6–5.1)
ALBUMIN/GLOB SERPL: 1.7 (CALC) (ref 1–2.5)
ALP SERPL-CCNC: 97 U/L (ref 37–153)
ALT SERPL-CCNC: 13 U/L (ref 6–29)
AST SERPL-CCNC: 15 U/L (ref 10–35)
BASOPHILS # BLD AUTO: 51 CELLS/UL (ref 0–200)
BASOPHILS NFR BLD AUTO: 0.9 %
BILIRUB SERPL-MCNC: 0.5 MG/DL (ref 0.2–1.2)
BUN SERPL-MCNC: 21 MG/DL (ref 7–25)
BUN/CREAT SERPL: ABNORMAL (CALC) (ref 6–22)
CALCIUM SERPL-MCNC: 9.4 MG/DL (ref 8.6–10.4)
CHLORIDE SERPL-SCNC: 101 MMOL/L (ref 98–110)
CHOLEST SERPL-MCNC: 261 MG/DL
CHOLEST/HDLC SERPL: 5.7 (CALC)
CO2 SERPL-SCNC: 31 MMOL/L (ref 20–32)
CREAT SERPL-MCNC: 0.95 MG/DL (ref 0.5–0.99)
EOSINOPHIL # BLD AUTO: 200 CELLS/UL (ref 15–500)
EOSINOPHIL NFR BLD AUTO: 3.5 %
ERYTHROCYTE [DISTWIDTH] IN BLOOD BY AUTOMATED COUNT: 13.5 % (ref 11–15)
GLOBULIN SER CALC-MCNC: 2.4 G/DL (CALC) (ref 1.9–3.7)
GLUCOSE SERPL-MCNC: 102 MG/DL (ref 65–99)
HBA1C MFR BLD: 5.5 % OF TOTAL HGB
HCT VFR BLD AUTO: 37.6 % (ref 35–45)
HDLC SERPL-MCNC: 46 MG/DL
HGB BLD-MCNC: 12.6 G/DL (ref 11.7–15.5)
LDLC SERPL CALC-MCNC: 179 MG/DL (CALC)
LYMPHOCYTES # BLD AUTO: 1368 CELLS/UL (ref 850–3900)
LYMPHOCYTES NFR BLD AUTO: 24 %
MCH RBC QN AUTO: 28.2 PG (ref 27–33)
MCHC RBC AUTO-ENTMCNC: 33.5 G/DL (ref 32–36)
MCV RBC AUTO: 84.1 FL (ref 80–100)
MONOCYTES # BLD AUTO: 542 CELLS/UL (ref 200–950)
MONOCYTES NFR BLD AUTO: 9.5 %
NEUTROPHILS # BLD AUTO: 3540 CELLS/UL (ref 1500–7800)
NEUTROPHILS NFR BLD AUTO: 62.1 %
NONHDLC SERPL-MCNC: 215 MG/DL (CALC)
PLATELET # BLD AUTO: 275 THOUSAND/UL (ref 140–400)
PMV BLD REES-ECKER: 10.4 FL (ref 7.5–12.5)
POTASSIUM SERPL-SCNC: 3.7 MMOL/L (ref 3.5–5.3)
PROT SERPL-MCNC: 6.4 G/DL (ref 6.1–8.1)
RBC # BLD AUTO: 4.47 MILLION/UL (ref 3.8–5.1)
SL AMB EGFR AFRICAN AMERICAN: 71 ML/MIN/1.73M2
SL AMB EGFR NON AFRICAN AMERICAN: 62 ML/MIN/1.73M2
SODIUM SERPL-SCNC: 139 MMOL/L (ref 135–146)
TRIGL SERPL-MCNC: 204 MG/DL
TSH SERPL-ACNC: 2.5 MIU/L (ref 0.4–4.5)
WBC # BLD AUTO: 5.7 THOUSAND/UL (ref 3.8–10.8)

## 2021-08-20 PROBLEM — E66.01 MORBID OBESITY (HCC): Status: ACTIVE | Noted: 2021-08-20

## 2021-08-25 ENCOUNTER — OFFICE VISIT (OUTPATIENT)
Dept: INTERNAL MEDICINE CLINIC | Facility: CLINIC | Age: 66
End: 2021-08-25
Payer: MEDICARE

## 2021-08-25 VITALS
HEART RATE: 66 BPM | WEIGHT: 260.8 LBS | TEMPERATURE: 97.9 F | HEIGHT: 66 IN | OXYGEN SATURATION: 99 % | DIASTOLIC BLOOD PRESSURE: 86 MMHG | SYSTOLIC BLOOD PRESSURE: 146 MMHG | BODY MASS INDEX: 41.91 KG/M2

## 2021-08-25 DIAGNOSIS — E78.00 PURE HYPERCHOLESTEROLEMIA: ICD-10-CM

## 2021-08-25 DIAGNOSIS — E03.9 ACQUIRED HYPOTHYROIDISM: ICD-10-CM

## 2021-08-25 DIAGNOSIS — I10 BENIGN ESSENTIAL HYPERTENSION: Primary | ICD-10-CM

## 2021-08-25 DIAGNOSIS — R73.01 IMPAIRED FASTING GLUCOSE: ICD-10-CM

## 2021-08-25 DIAGNOSIS — E66.01 MORBID OBESITY (HCC): ICD-10-CM

## 2021-08-25 DIAGNOSIS — Z00.00 MEDICARE ANNUAL WELLNESS VISIT, SUBSEQUENT: ICD-10-CM

## 2021-08-25 PROCEDURE — 1123F ACP DISCUSS/DSCN MKR DOCD: CPT | Performed by: INTERNAL MEDICINE

## 2021-08-25 PROCEDURE — 99214 OFFICE O/P EST MOD 30 MIN: CPT | Performed by: INTERNAL MEDICINE

## 2021-08-25 PROCEDURE — G0439 PPPS, SUBSEQ VISIT: HCPCS | Performed by: INTERNAL MEDICINE

## 2021-08-25 NOTE — PROGRESS NOTES
Assessment/Plan:    Medicare annual wellness visit, subsequent  Discussed preventative health, cancer screening, immunizations, and safety issues  Patient is up-to-date with everything    Benign essential hypertension  Continue meds along with healthy diet and exercise  Blood pressure is better at home    Acquired hypothyroidism   Recent thyroid function tests normal, continue current dose of levothyroxine along with monitoring    Impaired fasting glucose   Recent glucose and A1c good, continue with healthy diet and exercise    Pure hypercholesterolemia   Continue with healthy diet and exercise, patient also on coenzyme Q10, and she is going to start over-the-counter citrus bergamot    Morbid obesity ([facility] [address] )  Continue with healthy diet and exercise       Diagnoses and all orders for this visit:    Benign essential hypertension    Medicare annual wellness visit, subsequent    Acquired hypothyroidism    Pure hypercholesterolemia    Morbid obesity ([facility] [address] )    Impaired fasting glucose          Subjective:      Patient ID: Crissy Varela is a 74 y o  female  Hypertension: Patient reports compliance with medications, no lightheadedness, blood pressure at home is generally 130 over 60s     impaired fasting glucose:  Patient watches her diet for this, recent blood work was great  Hypercholesterolemia:  Patient is going to trying over-the-counter medication citrus bergamot     hypothyroidism: Patient reports compliance with medication, no fatigue, no constipation, no cold intolerance      The following portions of the patient's history were reviewed and updated as appropriate: allergies, current medications, past family history, past medical history, past social history, past surgical history and problem list     Review of Systems   Constitutional: Negative for chills, fatigue and fever  HENT: Negative for congestion, nosebleeds, postnasal drip, sore throat and trouble swallowing  Eyes: Negative for pain  Respiratory: Negative for cough, chest tightness, shortness of breath and wheezing  Cardiovascular: Negative for chest pain, palpitations and leg swelling  Gastrointestinal: Negative for abdominal pain, constipation, diarrhea, nausea and vomiting  Endocrine: Negative for cold intolerance, polydipsia and polyuria  Genitourinary: Negative for dysuria, flank pain and hematuria  Musculoskeletal: Positive for back pain  Negative for arthralgias  Skin: Negative for rash  Neurological: Negative for dizziness, tremors, light-headedness and headaches  Hematological: Does not bruise/bleed easily  Psychiatric/Behavioral: Negative for confusion and dysphoric mood  The patient is not nervous/anxious  Objective:      /86   Pulse 66   Temp 97 9 °F (36 6 °C) (Temporal)   Ht 5' 6" (1 676 m)   Wt 118 kg (260 lb 12 8 oz)   SpO2 99%   BMI 42 09 kg/m²          Physical Exam  Vitals reviewed  Constitutional:       General: She is not in acute distress  Appearance: Normal appearance  She is well-developed  HENT:      Head: Normocephalic and atraumatic  Right Ear: External ear normal       Left Ear: External ear normal       Nose: Nose normal    Eyes:      General: No scleral icterus  Conjunctiva/sclera: Conjunctivae normal    Neck:      Thyroid: No thyromegaly  Trachea: No tracheal deviation  Cardiovascular:      Rate and Rhythm: Normal rate and regular rhythm  Heart sounds: Normal heart sounds  No murmur heard  Pulmonary:      Effort: Pulmonary effort is normal  No respiratory distress  Breath sounds: Normal breath sounds  No wheezing or rales  Abdominal:      General: Bowel sounds are normal       Palpations: Abdomen is soft  Tenderness: There is no abdominal tenderness  There is no guarding or rebound  Musculoskeletal:      Cervical back: Normal range of motion and neck supple  Right lower leg: Edema (trace) present        Left lower leg: Edema (trace) present  Lymphadenopathy:      Cervical: No cervical adenopathy  Skin:     Coloration: Skin is not jaundiced or pale  Neurological:      General: No focal deficit present  Mental Status: She is alert and oriented to person, place, and time  Psychiatric:         Mood and Affect: Mood normal          Behavior: Behavior normal          Thought Content:  Thought content normal          Judgment: Judgment normal

## 2021-08-25 NOTE — ASSESSMENT & PLAN NOTE
Continue with healthy diet and exercise, patient also on coenzyme Q10, and she is going to start over-the-counter citrus bergamot

## 2021-08-25 NOTE — PROGRESS NOTES
Assessment and Plan:     Problem List Items Addressed This Visit        Other    Medicare annual wellness visit, subsequent - Primary     Discussed preventative health, cancer screening, immunizations, and safety issues  Patient is up-to-date with everything                Preventive health issues were discussed with patient, and age appropriate screening tests were ordered as noted in patient's After Visit Summary  Personalized health advice and appropriate referrals for health education or preventive services given if needed, as noted in patient's After Visit Summary       History of Present Illness:     Patient presents for Medicare Annual Wellness visit    Patient Care Team:  Seema Mitchell MD as PCP - General     Problem List:     Patient Active Problem List   Diagnosis    Benign essential hypertension    Impaired fasting glucose    Pure hypercholesterolemia    Medicare annual wellness visit, subsequent    Acquired hypothyroidism    Plantar fasciitis    Morbid obesity ([address] )      Past Medical and Surgical History:     Past Medical History:   Diagnosis Date    Hyperlipidemia     Hypertension      Past Surgical History:   Procedure Laterality Date    KNEE SURGERY        Family History:     Family History   Problem Relation Age of Onset    Coronary artery disease Father     Throat cancer Brother 61    Endometrial cancer Maternal Aunt 48    No Known Problems Mother     No Known Problems Daughter     No Known Problems Maternal Grandmother     No Known Problems Maternal Grandfather     No Known Problems Paternal Grandmother     No Known Problems Paternal Grandfather     No Known Problems Son     Lung cancer Maternal Aunt 61    No Known Problems Maternal Aunt     No Known Problems Paternal Aunt     Bone cancer Maternal Uncle 71    Coronary artery disease Maternal Uncle       Social History:     Social History     Socioeconomic History    Marital status: /Civil Union     Spouse name: None    Number of children: None    Years of education: None    Highest education level: None   Occupational History    None   Tobacco Use    Smoking status: Former Smoker    Smokeless tobacco: Never Used    Tobacco comment: quit 25 yrs ago   Substance and Sexual Activity    Alcohol use: Yes     Comment: social    Drug use: No    Sexual activity: Not Currently     Partners: Male   Other Topics Concern    None   Social History Narrative    None     Social Determinants of Health     Financial Resource Strain:     Difficulty of Paying Living Expenses:    Food Insecurity:     Worried About Running Out of Food in the Last Year:     Ran Out of Food in the Last Year:    Transportation Needs:     Lack of Transportation (Medical):      Lack of Transportation (Non-Medical):    Physical Activity:     Days of Exercise per Week:     Minutes of Exercise per Session:    Stress:     Feeling of Stress :    Social Connections:     Frequency of Communication with Friends and Family:     Frequency of Social Gatherings with Friends and Family:     Attends [employer] Services:     Active Member of Clubs or Organizations:     Attends Club or Organization Meetings:     Marital Status:    Intimate Partner Violence:     Fear of Current or Ex-Partner:     Emotionally Abused:     Physically Abused:     Sexually Abused:       Medications and Allergies:     Current Outpatient Medications   Medication Sig Dispense Refill    amLODIPine (NORVASC) 5 mg tablet TAKE 1 TABLET DAILY 90 tablet 3    Calcium Carb-Cholecalciferol (CALCIUM + D3 PO) Take by mouth      co-enzyme Q-10 30 MG capsule Take 100 mg by mouth 2 (two) times a day       diphenhydrAMINE (BENADRYL) 25 mg capsule Take 25 mg by mouth as needed       hydrochlorothiazide (HYDRODIURIL) 25 mg tablet TAKE 1 TABLET DAILY 90 tablet 3    levothyroxine 50 mcg tablet TAKE 1 TABLET DAILY 90 tablet 3    losartan (COZAAR) 100 MG tablet Take 1 tablet (100 mg total) by mouth daily 90 tablet 3    metoprolol succinate (TOPROL-XL) 100 mg 24 hr tablet TAKE 1 TABLET DAILY 90 tablet 3    Multiple Vitamins-Minerals (PRESERVISION AREDS 2+MULTI VIT PO)        No current facility-administered medications for this visit  Allergies   Allergen Reactions    Diclofenac Swelling    Atorvastatin Other (See Comments)     Muscle ache      Immunizations:     Immunization History   Administered Date(s) Administered    INFLUENZA 09/30/2013, 09/29/2015, 12/12/2016, 12/14/2016, 12/14/2016, 10/31/2017, 10/17/2018, 10/21/2019, 09/30/2020    Influenza Quadrivalent Preservative Free 3 years and older IM 12/12/2016    Influenza Quadrivalent, 6-35 Months IM 09/29/2015    Influenza Split High Dose Preservative Free IM 10/21/2019    Influenza, seasonal, injectable 09/30/2013    Pneumococcal Conjugate 13-Valent 08/02/2018    Pneumococcal Polysaccharide PPV23 08/05/2019    SARS-CoV-2 / COVID-19 mRNA IM (Pfizer-BioNTech) 03/22/2021, 04/15/2021    Tdap 10/21/2019    Zoster 07/22/2015    Zoster Vaccine Recombinant 08/14/2018, 10/17/2018    influenza, trivalent, adjuvanted 10/17/2018, 09/30/2020      Health Maintenance:         Topic Date Due    Breast Cancer Screening: Mammogram  08/02/2022    Colorectal Cancer Screening  03/02/2026    Hepatitis C Screening  Completed         Topic Date Due    Influenza Vaccine (1) 09/01/2021      Medicare Health Risk Assessment:     /90   Pulse 66   Temp 97 9 °F (36 6 °C) (Temporal)   Ht 5' 6" (1 676 m)   Wt 118 kg (260 lb 12 8 oz)   SpO2 99%   BMI 42 09 kg/m²      Seble Trinh is here for her Subsequent Wellness visit  Health Risk Assessment:   Patient rates overall health as good  Patient feels that their physical health rating is same  Patient is satisfied with their life  Eyesight was rated as same  Hearing was rated as same  Patient feels that their emotional and mental health rating is same  Patients states they are never, rarely angry  Patient states they are sometimes unusually tired/fatigued  Pain experienced in the last 7 days has been some  Patient's pain rating has been 3/10  Patient states that she has experienced no weight loss or gain in last 6 months  Fall Risk Screening: In the past year, patient has experienced: no history of falling in past year      Urinary Incontinence Screening:   Patient has not leaked urine accidently in the last six months  Home Safety:  Patient has trouble with stairs inside or outside of their home  Patient has working smoke alarms and has working carbon monoxide detector  Home safety hazards include: not having non-slip bath and/or shower mats and loose rugs on the floor  Nutrition:   Current diet is Regular  Medications:   Patient is currently taking over-the-counter supplements  OTC medications include: see medication list  Patient is able to manage medications  Activities of Daily Living (ADLs)/Instrumental Activities of Daily Living (IADLs):   Walk and transfer into and out of bed and chair?: Yes  Dress and groom yourself?: Yes    Bathe or shower yourself?: Yes    Feed yourself? Yes  Do your laundry/housekeeping?: Yes  Manage your money, pay your bills and track your expenses?: Yes  Make your own meals?: Yes    Do your own shopping?: Yes    Previous Hospitalizations:   Any hospitalizations or ED visits within the last 12 months?: No      Advance Care Planning:   Living will: Yes    Durable POA for healthcare:  Yes    Advanced directive: Yes      Cognitive Screening:   Provider or family/friend/caregiver concerned regarding cognition?: No    PREVENTIVE SCREENINGS      Cardiovascular Screening:    General: Screening Not Indicated, History Lipid Disorder, Risks and Benefits Discussed and Screening Current      Diabetes Screening:     General: Screening Current and Risks and Benefits Discussed      Colorectal Cancer Screening:     General: Screening Current      Breast Cancer Screening: General: Screening Current and Risks and Benefits Discussed      Cervical Cancer Screening:    General: Screening Not Indicated      Abdominal Aortic Aneurysm (AAA) Screening:        General: Screening Not Indicated      Lung Cancer Screening:     General: Screening Not Indicated      Hepatitis C Screening:    General: Screening Current    Screening, Brief Intervention, and Referral to Treatment (SBIRT)    Screening    Typical number of drinks in a week: 0    AUDIT-C Screenin) How often did you have a drink containing alcohol in the past year? monthly or less  2) How many drinks did you have on a typical day when you were drinking in the past year? 1 to 2  3) How often did you have 6 or more drinks on one occasion in the past year? never    AUDIT-C Score: 1  Interpretation: Score 0-2 (female): Negative screen for alcohol misuse    Single Item Drug Screening:  How often have you used an illegal drug (including marijuana) or a prescription medication for non-medical reasons in the past year? never    Single Item Drug Screen Score: 0  Interpretation: Negative screen for possible drug use disorder    Brief Intervention  Alcohol & drug use screenings were reviewed  No concerns regarding substance use disorder identified  Other Counseling Topics:   Car/seat belt/driving safety, skin self-exam and sunscreen         Sunil Lala MD

## 2021-08-25 NOTE — PATIENT INSTRUCTIONS
Problem List Items Addressed This Visit        Endocrine    Impaired fasting glucose      Recent glucose and A1c good, continue with healthy diet and exercise         Acquired hypothyroidism      Recent thyroid function tests normal, continue current dose of levothyroxine along with monitoring            Cardiovascular and Mediastinum    Benign essential hypertension - Primary     Continue meds along with healthy diet and exercise  Blood pressure is better at home            Other    Pure hypercholesterolemia      Continue with healthy diet and exercise, patient also on coenzyme Q10, and she is going to start over-the-counter citrus bergamot         Medicare annual wellness visit, subsequent     Discussed preventative health, cancer screening, immunizations, and safety issues  Patient is up-to-date with everything         Morbid obesity ([address] )     Continue with healthy diet and exercise               Medicare Preventive Visit Patient Instructions  Thank you for completing your Welcome to Medicare Visit or Medicare Annual Wellness Visit today  Your next wellness visit will be due in one year (8/26/2022)  The screening/preventive services that you may require over the next 5-10 years are detailed below  Some tests may not apply to you based off risk factors and/or age  Screening tests ordered at today's visit but not completed yet may show as past due  Also, please note that scanned in results may not display below    Preventive Screenings:  Service Recommendations Previous Testing/Comments   Colorectal Cancer Screening  * Colonoscopy    * Fecal Occult Blood Test (FOBT)/Fecal Immunochemical Test (FIT)  * Fecal DNA/Cologuard Test  * Flexible Sigmoidoscopy Age: 54-65 years old   Colonoscopy: every 10 years (may be performed more frequently if at higher risk)  OR  FOBT/FIT: every 1 year  OR  Cologuard: every 3 years  OR  Sigmoidoscopy: every 5 years  Screening may be recommended earlier than age 48 if at higher risk for colorectal cancer  Also, an individualized decision between you and your healthcare provider will decide whether screening between the ages of 74-80 would be appropriate  Colonoscopy: 03/02/2021  FOBT/FIT: Not on file  Cologuard: Not on file  Sigmoidoscopy: Not on file    Screening Current     Breast Cancer Screening Age: 36 years old  Frequency: every 1-2 years  Not required if history of left and right mastectomy Mammogram: 08/02/2021    Screening Current   Cervical Cancer Screening Between the ages of 21-29, pap smear recommended once every 3 years  Between the ages of 33-67, can perform pap smear with HPV co-testing every 5 years  Recommendations may differ for women with a history of total hysterectomy, cervical cancer, or abnormal pap smears in past  Pap Smear: 03/18/2019    Screening Not Indicated   Hepatitis C Screening Once for adults born between 1945 and 1965  More frequently in patients at high risk for Hepatitis C Hep C Antibody: 09/08/2017    Screening Current   Diabetes Screening 1-2 times per year if you're at risk for diabetes or have pre-diabetes Fasting glucose: No results in last 5 years   A1C: 5 5 % of total Hgb    Screening Current   Cholesterol Screening Once every 5 years if you don't have a lipid disorder  May order more often based on risk factors  Lipid panel: 08/04/2021    Screening Not Indicated  History Lipid Disorder     Other Preventive Screenings Covered by Medicare:  1  Abdominal Aortic Aneurysm (AAA) Screening: covered once if your at risk  You're considered to be at risk if you have a family history of AAA    2  Lung Cancer Screening: covers low dose CT scan once per year if you meet all of the following conditions: (1) Age 50-69; (2) No signs or symptoms of lung cancer; (3) Current smoker or have quit smoking within the last 15 years; (4) You have a tobacco smoking history of at least 30 pack years (packs per day multiplied by number of years you smoked); (5) You get a written order from a healthcare provider  3  Glaucoma Screening: covered annually if you're considered high risk: (1) You have diabetes OR (2) Family history of glaucoma OR (3)  aged 48 and older OR (3)  American aged 72 and older  3  Osteoporosis Screening: covered every 2 years if you meet one of the following conditions: (1) You're estrogen deficient and at risk for osteoporosis based off medical history and other findings; (2) Have a vertebral abnormality; (3) On glucocorticoid therapy for more than 3 months; (4) Have primary hyperparathyroidism; (5) On osteoporosis medications and need to assess response to drug therapy  · Last bone density test (DXA Scan): 08/13/2018  5  HIV Screening: covered annually if you're between the age of 12-76  Also covered annually if you are younger than 13 and older than 72 with risk factors for HIV infection  For pregnant patients, it is covered up to 3 times per pregnancy  Immunizations:  Immunization Recommendations   Influenza Vaccine Annual influenza vaccination during flu season is recommended for all persons aged >= 6 months who do not have contraindications   Pneumococcal Vaccine (Prevnar and Pneumovax)  * Prevnar = PCV13  * Pneumovax = PPSV23   Adults 25-60 years old: 1-3 doses may be recommended based on certain risk factors  Adults 72 years old: Prevnar (PCV13) vaccine recommended followed by Pneumovax (PPSV23) vaccine  If already received PPSV23 since turning 65, then PCV13 recommended at least one year after PPSV23 dose  Hepatitis B Vaccine 3 dose series if at intermediate or high risk (ex: diabetes, end stage renal disease, liver disease)   Tetanus (Td) Vaccine - COST NOT COVERED BY MEDICARE PART B Following completion of primary series, a booster dose should be given every 10 years to maintain immunity against tetanus  Td may also be given as tetanus wound prophylaxis     Tdap Vaccine - COST NOT COVERED BY MEDICARE PART B Recommended at least once for all adults  For pregnant patients, recommended with each pregnancy  Shingles Vaccine (Shingrix) - COST NOT COVERED BY MEDICARE PART B  2 shot series recommended in those aged 48 and above     Health Maintenance Due:      Topic Date Due    Breast Cancer Screening: Mammogram  08/02/2022    Colorectal Cancer Screening  03/02/2026    Hepatitis C Screening  Completed     Immunizations Due:      Topic Date Due    Influenza Vaccine (1) 09/01/2021     Advance Directives   What are advance directives? Advance directives are legal documents that state your wishes and plans for medical care  These plans are made ahead of time in case you lose your ability to make decisions for yourself  Advance directives can apply to any medical decision, such as the treatments you want, and if you want to donate organs  What are the types of advance directives? There are many types of advance directives, and each state has rules about how to use them  You may choose a combination of any of the following:  · Living will: This is a written record of the treatment you want  You can also choose which treatments you do not want, which to limit, and which to stop at a certain time  This includes surgery, medicine, IV fluid, and tube feedings  · Durable power of  for healthcare [facility]): This is a written record that states who you want to make healthcare choices for you when you are unable to make them for yourself  This person, called a proxy, is usually a family member or a friend  You may choose more than 1 proxy  · Do not resuscitate (DNR) order:  A DNR order is used in case your heart stops beating or you stop breathing  It is a request not to have certain forms of treatment, such as CPR  A DNR order may be included in other types of advance directives  · Medical directive: This covers the care that you want if you are in a coma, near death, or unable to make decisions for yourself   You can list the treatments you want for each condition  Treatment may include pain medicine, surgery, blood transfusions, dialysis, IV or tube feedings, and a ventilator (breathing machine)  · Values history: This document has questions about your views, beliefs, and how you feel and think about life  This information can help others choose the care that you would choose  Why are advance directives important? An advance directive helps you control your care  Although spoken wishes may be used, it is better to have your wishes written down  Spoken wishes can be misunderstood, or not followed  Treatments may be given even if you do not want them  An advance directive may make it easier for your family to make difficult choices about your care  Weight Management   Why it is important to manage your weight:  Being overweight increases your risk of health conditions such as heart disease, high blood pressure, type 2 diabetes, and certain types of cancer  It can also increase your risk for osteoarthritis, sleep apnea, and other respiratory problems  Aim for a slow, steady weight loss  Even a small amount of weight loss can lower your risk of health problems  How to lose weight safely:  A safe and healthy way to lose weight is to eat fewer calories and get regular exercise  You can lose up about 1 pound a week by decreasing the number of calories you eat by 500 calories each day  Healthy meal plan for weight management:  A healthy meal plan includes a variety of foods, contains fewer calories, and helps you stay healthy  A healthy meal plan includes the following:  · Eat whole-grain foods more often  A healthy meal plan should contain fiber  Fiber is the part of grains, fruits, and vegetables that is not broken down by your body  Whole-grain foods are healthy and provide extra fiber in your diet  Some examples of whole-grain foods are whole-wheat breads and pastas, oatmeal, brown rice, and bulgur    · Eat a variety of vegetables every day  Include dark, leafy greens such as spinach, kale, jazlyn greens, and mustard greens  Eat yellow and orange vegetables such as carrots, sweet potatoes, and winter squash  · Eat a variety of fruits every day  Choose fresh or canned fruit (canned in its own juice or light syrup) instead of juice  Fruit juice has very little or no fiber  · Eat low-fat dairy foods  Drink fat-free (skim) milk or 1% milk  Eat fat-free yogurt and low-fat cottage cheese  Try low-fat cheeses such as mozzarella and other reduced-fat cheeses  · Choose meat and other protein foods that are low in fat  Choose beans or other legumes such as split peas or lentils  Choose fish, skinless poultry (chicken or turkey), or lean cuts of red meat (beef or pork)  Before you cook meat or poultry, cut off any visible fat  · Use less fat and oil  Try baking foods instead of frying them  Add less fat, such as margarine, sour cream, regular salad dressing and mayonnaise to foods  Eat fewer high-fat foods  Some examples of high-fat foods include french fries, doughnuts, ice cream, and cakes  · Eat fewer sweets  Limit foods and drinks that are high in sugar  This includes candy, cookies, regular soda, and sweetened drinks  Exercise:  Exercise at least 30 minutes per day on most days of the week  Some examples of exercise include walking, biking, dancing, and swimming  You can also fit in more physical activity by taking the stairs instead of the elevator or parking farther away from stores  Ask your healthcare provider about the best exercise plan for you  © Copyright [employer] 2018 Information is for End User's use only and may not be sold, redistributed or otherwise used for commercial purposes   All illustrations and images included in CareNotes® are the copyrighted property of A D A M , [employer]  or [address] [place] [address] [employer]

## 2021-08-25 NOTE — ASSESSMENT & PLAN NOTE
Discussed preventative health, cancer screening, immunizations, and safety issues    Patient is up-to-date with everything

## 2021-08-26 DIAGNOSIS — I10 BENIGN ESSENTIAL HYPERTENSION: ICD-10-CM

## 2021-08-26 RX ORDER — LOSARTAN POTASSIUM 100 MG/1
TABLET ORAL
Qty: 90 TABLET | Refills: 3 | Status: SHIPPED | OUTPATIENT
Start: 2021-08-26

## 2021-12-02 ENCOUNTER — OFFICE VISIT (OUTPATIENT)
Dept: INTERNAL MEDICINE CLINIC | Facility: CLINIC | Age: 66
End: 2021-12-02
Payer: MEDICARE

## 2021-12-02 VITALS
HEIGHT: 67 IN | DIASTOLIC BLOOD PRESSURE: 72 MMHG | BODY MASS INDEX: 41.75 KG/M2 | HEART RATE: 62 BPM | OXYGEN SATURATION: 95 % | RESPIRATION RATE: 14 BRPM | WEIGHT: 266 LBS | SYSTOLIC BLOOD PRESSURE: 136 MMHG

## 2021-12-02 DIAGNOSIS — R73.01 IMPAIRED FASTING GLUCOSE: ICD-10-CM

## 2021-12-02 DIAGNOSIS — I10 BENIGN ESSENTIAL HYPERTENSION: Primary | ICD-10-CM

## 2021-12-02 DIAGNOSIS — E78.00 PURE HYPERCHOLESTEROLEMIA: ICD-10-CM

## 2021-12-02 DIAGNOSIS — E66.01 MORBID OBESITY (HCC): ICD-10-CM

## 2021-12-02 DIAGNOSIS — E03.9 ACQUIRED HYPOTHYROIDISM: ICD-10-CM

## 2021-12-02 PROCEDURE — 99214 OFFICE O/P EST MOD 30 MIN: CPT | Performed by: INTERNAL MEDICINE

## 2022-01-28 ENCOUNTER — OFFICE VISIT (OUTPATIENT)
Dept: SLEEP CENTER | Facility: CLINIC | Age: 67
End: 2022-01-28
Payer: MEDICARE

## 2022-01-28 VITALS
OXYGEN SATURATION: 93 % | SYSTOLIC BLOOD PRESSURE: 130 MMHG | HEART RATE: 63 BPM | BODY MASS INDEX: 42.75 KG/M2 | WEIGHT: 272.4 LBS | DIASTOLIC BLOOD PRESSURE: 78 MMHG | HEIGHT: 67 IN

## 2022-01-28 DIAGNOSIS — G47.33 OSA (OBSTRUCTIVE SLEEP APNEA): Primary | ICD-10-CM

## 2022-01-28 PROCEDURE — 99213 OFFICE O/P EST LOW 20 MIN: CPT | Performed by: INTERNAL MEDICINE

## 2022-01-28 NOTE — PROGRESS NOTES
Progress Note - Jagruti Lam [address] ANDRESSA:0/[id number] MRN: [id number]        Reason for Visit:  74 y  o female here for annual follow-up    Assessment:  Doing well on current therapy of CPAP 16 cm for previously diagnosed severe obstructive sleep apnea (AHI = 42 0 in 2005)  The patient is interested in obtaining a mandibular advancement device  She discuss this with her dentist, Dr Lynsey Carter  Plan:  I have written a prescription for a mandibular advancement device    Follow up: One year    History of Present Illness:  History of MIHAELA on PAP therapy  Fully compliant and deriving benefit      Review of Systems      Genitourinary hot flashes at night   Cardiology ankle/leg swelling   Gastrointestinal none   Neurology awaken with headache and need to move extremities   Constitutional fatigue   Integumentary none   Psychiatry none   Musculoskeletal muscle aches and leg cramps   Pulmonary shortness of breath with activity, snoring and difficulty breathing when lying flat    ENT none   Endocrine none   Hematological none         I have reviewed and updated the review of systems as necessary      Historical Information    Past Medical History:   Past Medical History:   Diagnosis Date    Hyperlipidemia     Hypertension     Plantar fasciitis          Past Surgical History:   Past Surgical History:   Procedure Laterality Date    KNEE SURGERY         Social History:   Social History     Socioeconomic History    Marital status: /Civil Union     Spouse name: None    Number of children: None    Years of education: None    Highest education level: None   Occupational History    None   Tobacco Use    Smoking status: Former Smoker    Smokeless tobacco: Never Used    Tobacco comment: quit 25 yrs ago   Substance and Sexual Activity    Alcohol use: Yes     Comment: social    Drug use: No    Sexual activity: Not Currently     Partners: Male   Other Topics Concern    None   Social History Narrative    None Social Determinants of Health     Financial Resource Strain: Not on file   Food Insecurity: Not on file   Transportation Needs: Not on file   Physical Activity: Not on file   Stress: Not on file   Social Connections: Not on file   Intimate Partner Violence: Not on file   Housing Stability: Not on file       Family History:   Family History   Problem Relation Age of Onset    Coronary artery disease Father     Throat cancer Brother 61    Endometrial cancer Maternal Aunt 48    No Known Problems Mother     No Known Problems Daughter     No Known Problems Maternal Grandmother     No Known Problems Maternal Grandfather     No Known Problems Paternal Grandmother     No Known Problems Paternal Grandfather     No Known Problems Son     Lung cancer Maternal Aunt 61    No Known Problems Maternal Aunt     No Known Problems Paternal Aunt     Bone cancer Maternal Uncle 71    Coronary artery disease Maternal Uncle        Medications/Allergies:      Current Outpatient Medications:     amLODIPine (NORVASC) 5 mg tablet, TAKE 1 TABLET DAILY, Disp: 90 tablet, Rfl: 3    Calcium Carb-Cholecalciferol (CALCIUM + D3 PO), Take by mouth, Disp: , Rfl:     co-enzyme Q-10 30 MG capsule, Take 100 mg by mouth 2 (two) times a day , Disp: , Rfl:     COVID-19 mRNA vaccine 30 mcg/0 3 mL SUSP, , Disp: , Rfl:     diphenhydrAMINE (BENADRYL) 25 mg capsule, Take 25 mg by mouth as needed , Disp: , Rfl:     hydrochlorothiazide (HYDRODIURIL) 25 mg tablet, TAKE 1 TABLET DAILY, Disp: 90 tablet, Rfl: 3    levothyroxine 50 mcg tablet, TAKE 1 TABLET DAILY, Disp: 90 tablet, Rfl: 3    losartan (COZAAR) 100 MG tablet, TAKE 1 TABLET DAILY, Disp: 90 tablet, Rfl: 3    metoprolol succinate (TOPROL-XL) 100 mg 24 hr tablet, TAKE 1 TABLET DAILY, Disp: 90 tablet, Rfl: 3    Multiple Vitamins-Minerals (PRESERVISION AREDS 2+MULTI VIT PO), , Disp: , Rfl:     meloxicam (MOBIC) 15 mg tablet, Take 15 mg by mouth daily (Patient not taking: Reported on 12/2/2021 ), Disp: , Rfl:           Objective      Vital Signs:   Vitals:    01/28/22 0800   BP: 130/78   Pulse: 63   SpO2: 93%     [place] Sleepiness Scale: Total score: 4        Physical Exam:    General: Alert, appropriate, cooperative, overweight    Head: NC/AT    Skin: Warm, dry    Neuro: No motor abnormalities, cranial nerves appear intact    Extremity: No clubbing, cyanosis      DME Provider: [employer] [employer]        Counseling / Coordination of Care   I have spent 15 minutes with the patient today in which greater than 50% of this time was spent in counseling/coordination of care regarding: equipment and compliance  Board Certified Sleep Specialist    Portions of the record may have been created with voice recognition software  Occasional wrong word or "sound a like" substitutions may have occurred due to the inherent limitations of voice recognition software  Read the chart carefully and recognize, using context, where substitutions have occurred

## 2022-01-30 ENCOUNTER — HOSPITAL ENCOUNTER (OUTPATIENT)
Dept: SLEEP CENTER | Facility: CLINIC | Age: 67
Discharge: HOME | End: 2022-01-30
Payer: MEDICARE

## 2022-01-30 DIAGNOSIS — G47.33 OSA (OBSTRUCTIVE SLEEP APNEA): ICD-10-CM

## 2022-01-30 PROCEDURE — G0399 HOME SLEEP TEST/TYPE 3 PORTA: HCPCS

## 2022-01-31 DIAGNOSIS — I10 ESSENTIAL HYPERTENSION, BENIGN: ICD-10-CM

## 2022-01-31 NOTE — PROGRESS NOTES
Home Sleep Study Documentation    Pre-Sleep Home Study:    Set-up and instructions performed by: Yenni Huggins    Technician performed demonstration for Patient: yes    Return demonstration performed by Patient: yes    Written instructions provided to Patient: yes    Patient signed consent form: yes        Post-Sleep Home Study:    Additional comments by Patient:     Home Sleep Study Failed:no:    Failure reason: N/A    Reported or Detected: N/A    Scored by: Hailey Price RRT, RPSGT

## 2022-02-01 ENCOUNTER — TELEPHONE (OUTPATIENT)
Dept: SLEEP CENTER | Facility: CLINIC | Age: 67
End: 2022-02-01

## 2022-02-01 RX ORDER — METOPROLOL SUCCINATE 100 MG/1
TABLET, EXTENDED RELEASE ORAL
Qty: 90 TABLET | Refills: 3 | Status: SHIPPED | OUTPATIENT
Start: 2022-02-01

## 2022-02-02 ENCOUNTER — TELEPHONE (OUTPATIENT)
Dept: SLEEP CENTER | Facility: CLINIC | Age: 67
End: 2022-02-02

## 2022-02-02 NOTE — TELEPHONE ENCOUNTER
----- Message from Fani Edwards MD sent at 2/1/2022  8:24 PM EST -----   Follow-up with the patient's dentist, Dr Monserrat Nash

## 2022-02-02 NOTE — TELEPHONE ENCOUNTER
Called patient and advised sleep study resulted and shows moderate MIHAELA  Per Dr Samra Enriquez she is to follow up with dentist Dr Va Chanel  Patient verbalized understanding

## 2022-02-24 ENCOUNTER — RA CDI HCC (OUTPATIENT)
Dept: OTHER | Facility: HOSPITAL | Age: 67
End: 2022-02-24

## 2022-02-24 DIAGNOSIS — I10 BENIGN ESSENTIAL HYPERTENSION: ICD-10-CM

## 2022-02-24 RX ORDER — AMLODIPINE BESYLATE 5 MG/1
TABLET ORAL
Qty: 90 TABLET | Refills: 3 | Status: SHIPPED | OUTPATIENT
Start: 2022-02-24 | End: 2022-03-03 | Stop reason: SDUPTHER

## 2022-02-24 NOTE — PROGRESS NOTES
[facility]  coding opportunities       Chart reviewed, no opportunity found: CHART REVIEWED, NO OPPORTUNITY FOUND                        Patients insurance company: [employer]

## 2022-03-03 ENCOUNTER — OFFICE VISIT (OUTPATIENT)
Dept: INTERNAL MEDICINE CLINIC | Facility: CLINIC | Age: 67
End: 2022-03-03
Payer: MEDICARE

## 2022-03-03 VITALS
WEIGHT: 271.2 LBS | HEIGHT: 66 IN | HEART RATE: 63 BPM | SYSTOLIC BLOOD PRESSURE: 146 MMHG | BODY MASS INDEX: 43.58 KG/M2 | DIASTOLIC BLOOD PRESSURE: 84 MMHG | OXYGEN SATURATION: 98 %

## 2022-03-03 DIAGNOSIS — E78.00 PURE HYPERCHOLESTEROLEMIA: ICD-10-CM

## 2022-03-03 DIAGNOSIS — R73.01 IMPAIRED FASTING GLUCOSE: ICD-10-CM

## 2022-03-03 DIAGNOSIS — I10 BENIGN ESSENTIAL HYPERTENSION: Primary | ICD-10-CM

## 2022-03-03 DIAGNOSIS — E66.01 MORBID OBESITY (HCC): ICD-10-CM

## 2022-03-03 DIAGNOSIS — E03.9 ACQUIRED HYPOTHYROIDISM: ICD-10-CM

## 2022-03-03 PROCEDURE — 99214 OFFICE O/P EST MOD 30 MIN: CPT | Performed by: INTERNAL MEDICINE

## 2022-03-03 RX ORDER — AMLODIPINE BESYLATE 10 MG/1
10 TABLET ORAL DAILY
Qty: 90 TABLET | Refills: 3 | Status: SHIPPED | OUTPATIENT
Start: 2022-03-03

## 2022-03-03 NOTE — PATIENT INSTRUCTIONS
Problem List Items Addressed This Visit        Endocrine    Impaired fasting glucose      Continue with healthy diet and exercise         Acquired hypothyroidism     Last thyroid function tests were normal, continue levothyroxine 50 mcg daily            Cardiovascular and Mediastinum    Benign essential hypertension - Primary      Blood pressure slightly elevated, patient reports compliance with medications, I recommend increasing amlodipine from 5 mg daily to 10 mg daily and rechecking in a couple months         Relevant Medications    amLODIPine (NORVASC) 10 mg tablet       Other    Pure hypercholesterolemia      Continue healthy diet and exercise and Co Q10         Morbid obesity (HCC)      Continue with healthy diet and exercise         Relevant Orders    Ambulatory Referral to Weight Management

## 2022-03-03 NOTE — ASSESSMENT & PLAN NOTE
Blood pressure slightly elevated, patient reports compliance with medications, I recommend increasing amlodipine from 5 mg daily to 10 mg daily and rechecking in a couple months

## 2022-03-03 NOTE — PROGRESS NOTES
Assessment/Plan:    Benign essential hypertension   Blood pressure slightly elevated, patient reports compliance with medications, I recommend increasing amlodipine from 5 mg daily to 10 mg daily and rechecking in a couple months    Acquired hypothyroidism  Last thyroid function tests were normal, continue levothyroxine 50 mcg daily    Impaired fasting glucose   Continue with healthy diet and exercise    Pure hypercholesterolemia   Continue healthy diet and exercise and Co Q10    Morbid obesity (HCC)   Continue with healthy diet and exercise       Diagnoses and all orders for this visit:    Benign essential hypertension  -     amLODIPine (NORVASC) 10 mg tablet; Take 1 tablet (10 mg total) by mouth daily    Acquired hypothyroidism    Impaired fasting glucose    Pure hypercholesterolemia    Morbid obesity ([address] )  -     Ambulatory Referral to Weight Management; Future        BMI Counseling: Body mass index is 43 77 kg/m²  The BMI is above normal  Nutrition recommendations include encouraging healthy choices of fruits and vegetables and moderation in carbohydrate intake  Exercise recommendations include exercising 3-5 times per week  Rationale for BMI follow-up plan is due to patient being overweight or obese  Depression Screening and Follow-up Plan: Patient was screened for depression during today's encounter  They screened negative with a PHQ-2 score of 0  Subjective:      Patient ID: Raphael Baker is a 74 y o  female  Patient here for follow-up of chronic conditions      The following portions of the patient's history were reviewed and updated as appropriate: allergies, current medications, past family history, past medical history, past social history, past surgical history and problem list     Review of Systems   Constitutional: Negative for chills, fatigue and fever  HENT: Negative for congestion, nosebleeds, postnasal drip, sore throat and trouble swallowing  Eyes: Negative for pain  Respiratory: Positive for shortness of breath (with exertion)  Negative for cough, chest tightness and wheezing  Cardiovascular: Negative for chest pain, palpitations and leg swelling  Gastrointestinal: Negative for abdominal pain, constipation, diarrhea, nausea and vomiting  Endocrine: Negative for cold intolerance, polydipsia and polyuria  Genitourinary: Negative for dysuria, flank pain and hematuria  Musculoskeletal: Positive for arthralgias  Skin: Negative for rash  Neurological: Negative for dizziness, tremors, light-headedness and headaches  Hematological: Does not bruise/bleed easily  Psychiatric/Behavioral: Negative for confusion and dysphoric mood  The patient is not nervous/anxious  Objective:      /84 (BP Location: Left arm, Patient Position: Sitting, Cuff Size: Large)   Pulse 63   Ht 5' 6" (1 676 m)   Wt 123 kg (271 lb 3 2 oz)   SpO2 98%   BMI 43 77 kg/m²          Physical Exam  Vitals reviewed  Constitutional:       General: She is not in acute distress  Appearance: She is well-developed  HENT:      Head: Normocephalic and atraumatic  Right Ear: External ear normal       Left Ear: External ear normal    Eyes:      General: No scleral icterus  Conjunctiva/sclera: Conjunctivae normal    Neck:      Thyroid: No thyromegaly  Trachea: No tracheal deviation  Cardiovascular:      Rate and Rhythm: Normal rate and regular rhythm  Heart sounds: Normal heart sounds  Pulmonary:      Effort: Pulmonary effort is normal  No respiratory distress  Breath sounds: Normal breath sounds  No wheezing or rales  Abdominal:      General: Bowel sounds are normal       Palpations: Abdomen is soft  Tenderness: There is no abdominal tenderness  There is no guarding or rebound  Musculoskeletal:      Cervical back: Normal range of motion and neck supple  Lymphadenopathy:      Cervical: No cervical adenopathy     Neurological:      Mental Status: She is alert and oriented to person, place, and time  Psychiatric:         Behavior: Behavior normal          Thought Content:  Thought content normal          Judgment: Judgment normal

## 2022-04-12 ENCOUNTER — OFFICE VISIT (OUTPATIENT)
Dept: OBGYN CLINIC | Facility: CLINIC | Age: 67
End: 2022-04-12
Payer: MEDICARE

## 2022-04-12 VITALS
SYSTOLIC BLOOD PRESSURE: 126 MMHG | DIASTOLIC BLOOD PRESSURE: 74 MMHG | HEIGHT: 66 IN | WEIGHT: 271.4 LBS | BODY MASS INDEX: 43.62 KG/M2

## 2022-04-12 DIAGNOSIS — Z01.419 ENCOUNTER FOR WELL WOMAN EXAM WITH ROUTINE GYNECOLOGICAL EXAM: Primary | ICD-10-CM

## 2022-04-12 DIAGNOSIS — E66.01 MORBID OBESITY (HCC): ICD-10-CM

## 2022-04-12 DIAGNOSIS — Z13.820 ENCOUNTER FOR OSTEOPOROSIS SCREENING IN ASYMPTOMATIC POSTMENOPAUSAL PATIENT: ICD-10-CM

## 2022-04-12 DIAGNOSIS — Z12.31 ENCOUNTER FOR SCREENING MAMMOGRAM FOR MALIGNANT NEOPLASM OF BREAST: ICD-10-CM

## 2022-04-12 DIAGNOSIS — Z78.0 ENCOUNTER FOR OSTEOPOROSIS SCREENING IN ASYMPTOMATIC POSTMENOPAUSAL PATIENT: ICD-10-CM

## 2022-04-12 PROCEDURE — G0101 CA SCREEN;PELVIC/BREAST EXAM: HCPCS | Performed by: OBSTETRICS & GYNECOLOGY

## 2022-04-12 NOTE — PROGRESS NOTES
ASSESSMENT & PLAN:   Diagnoses and all orders for this visit:    Encounter for well woman exam with routine gynecological exam  -     DXA bone density spine hip and pelvis; Future    Encounter for screening mammogram for malignant neoplasm of breast  -     Mammo screening bilateral w 3d & cad; Future    Encounter for osteoporosis screening in asymptomatic postmenopausal patient  -     DXA bone density spine hip and pelvis; Future    Morbid obesity ([address] )    Other orders  -     BEE POLLEN PO; Take by mouth      Weight management  Discussed diet and exercise  Patient has appointment with weight management  Reviewed different exercise regimens that may be beneficial as she is walking less due to her plantar fasciitis  Swimming also discussed as it's less jarring to her joints  Discussed night sweats and menopause  Reviewed HRT risks and benefits  The following were reviewed in today's visit: ASCCP guidelines (Pap screen not indicated after age 72), STD testing mammography screening ordered, menopause, osteoporosis, adequate intake of calcium and vitamin D, exercise, healthy diet, DEXA ordered and colonoscopy discussed  Patient to return to office in yearly for annual exam      All questions have been answered to her satisfaction  CC:  Annual Gynecologic Examination  Chief Complaint   Patient presents with    Gynecologic Exam     Pap no longer indicated  Mammo 8/02/2021  Colonoscopy done 3/02/2021  Dexa done 8/13/2018  Would like her hormone levels checked   Establish Care       HPI: Melvin Carrero is a 74 y o  [id number] who presents for annual gynecologic examination  She has the following concerns:  Still having night sweats each night  Concerned regarding weight gain and concern for hormone changes causing continued weight gain  No vaginal dryness but not sexually active due to pain with intercourse         Health Maintenance:    Exercise: frequently  Breast exams/breast awareness: yes  Diet: low calorie diet  Last mammogram:  - [facility] 1  Colorectal cancer screenin, repeat 5 years  DEXA: 2018 - normal    Past Medical History:   Diagnosis Date    Fibroid     Hyperlipidemia     Hypertension     Hypothyroidism     Plantar fasciitis     Varicella        Past Surgical History:   Procedure Laterality Date    KNEE SURGERY         Past OB/Gyn History:   No LMP recorded  Patient is postmenopausal     Patient is menopausal    Menopausal symptoms: night sweats  Last Pap: 2019 : no abnormalities; further pap screening not indicated  History of abnormal Pap smear: no    Patient is not currently sexually active  STD testing: no  Current contraception: post menopausal      Family History  Family History   Problem Relation Age of Onset    Coronary artery disease Father     Throat cancer Brother 61    Endometrial cancer Maternal Aunt 48    No Known Problems Mother     No Known Problems Daughter     No Known Problems Maternal Grandmother     No Known Problems Maternal Grandfather     No Known Problems Paternal Grandmother     No Known Problems Paternal Grandfather     No Known Problems Son     Lung cancer Maternal Aunt 61    No Known Problems Maternal Aunt     No Known Problems Paternal Aunt     Bone cancer Maternal Uncle 71    Coronary artery disease Maternal Uncle        Family history of uterine or ovarian cancer: no  Family history of breast cancer: no  Family history of colon cancer: no    Social History:  Social History     Socioeconomic History    Marital status: /Civil Union     Spouse name: Not on file    Number of children: Not on file    Years of education: Not on file    Highest education level: Not on file   Occupational History    Not on file   Tobacco Use    Smoking status: Former Smoker    Smokeless tobacco: Never Used    Tobacco comment: quit 25 yrs ago   Vaping Use    Vaping Use: Never used   Substance and Sexual Activity    Alcohol use:  Yes Comment: social    Drug use: No    Sexual activity: Not Currently     Partners: Male   Other Topics Concern    Not on file   Social History Narrative    Not on file     Social Determinants of Health     Financial Resource Strain: Not on file   Food Insecurity: Not on file   Transportation Needs: Not on file   Physical Activity: Not on file   Stress: Not on file   Social Connections: Not on file   Intimate Partner Violence: Not on file   Housing Stability: Not on file     Domestic violence screen: negative    Allergies: Allergies   Allergen Reactions    Diclofenac Swelling    Atorvastatin Other (See Comments)     Muscle ache       Medications:    Current Outpatient Medications:     amLODIPine (NORVASC) 10 mg tablet, Take 1 tablet (10 mg total) by mouth daily, Disp: 90 tablet, Rfl: 3    BEE POLLEN PO, Take by mouth, Disp: , Rfl:     Calcium Carb-Cholecalciferol (CALCIUM + D3 PO), Take by mouth, Disp: , Rfl:     co-enzyme Q-10 30 MG capsule, Take 100 mg by mouth 2 (two) times a day , Disp: , Rfl:     diphenhydrAMINE (BENADRYL) 25 mg capsule, Take 25 mg by mouth as needed , Disp: , Rfl:     hydrochlorothiazide (HYDRODIURIL) 25 mg tablet, TAKE 1 TABLET DAILY, Disp: 90 tablet, Rfl: 3    levothyroxine 50 mcg tablet, TAKE 1 TABLET DAILY, Disp: 90 tablet, Rfl: 3    losartan (COZAAR) 100 MG tablet, TAKE 1 TABLET DAILY, Disp: 90 tablet, Rfl: 3    metoprolol succinate (TOPROL-XL) 100 mg 24 hr tablet, TAKE 1 TABLET DAILY, Disp: 90 tablet, Rfl: 3    Multiple Vitamins-Minerals (PRESERVISION AREDS 2+MULTI VIT PO), , Disp: , Rfl:     COVID-19 mRNA vaccine 30 mcg/0 3 mL SUSP, , Disp: , Rfl:     meloxicam (MOBIC) 15 mg tablet, Take 15 mg by mouth daily (Patient not taking: Reported on 4/12/2022 ), Disp: , Rfl:     Review of Systems:  Review of Systems   Constitutional: Positive for unexpected weight change (continued weight gain)  Negative for activity change and appetite change     Respiratory: Negative for cough and shortness of breath  Cardiovascular: Negative for chest pain  Gastrointestinal: Negative for abdominal pain, constipation, diarrhea, nausea and vomiting  Endocrine: Positive for heat intolerance (night sweats)  Genitourinary: Negative for difficulty urinating, frequency, menstrual problem, pelvic pain, urgency, vaginal bleeding, vaginal discharge and vaginal pain  Musculoskeletal: Negative for back pain  Skin: Negative  Neurological: Negative for dizziness, weakness, light-headedness and headaches  Psychiatric/Behavioral: Negative  Physical Exam:  /74   Ht 5' 6" (1 676 m)   Wt 123 kg (271 lb 6 4 oz)   BMI 43 81 kg/m²    Physical Exam  Constitutional:       General: She is not in acute distress  Appearance: Normal appearance  She is well-developed  She is obese  She is not diaphoretic  Genitourinary:      Vulva and bladder normal       No lesions in the vagina  Genitourinary Comments: Perineum normal in appearance, no lacerations, no ulcerations, no lesions visualized  Right Labia: No rash, tenderness or lesions  Left Labia: No tenderness, lesions or rash  No inguinal adenopathy present in the right or left side  No vaginal discharge, erythema, tenderness or bleeding  No vaginal prolapse present  Mild vaginal atrophy present  Right Adnexa: not tender, not full and no mass present  Left Adnexa: not tender, not full and no mass present  Cervix is nulliparous  No cervical motion tenderness, discharge, friability, lesion or polyp  No parametrium nodularity or thickening present  Uterus is not enlarged or tender  No uterine mass detected  Uterus is midaxial       No urethral prolapse or mass present  Bladder is not tender  Pelvic exam was performed with patient in the lithotomy position  Rectum:      No tenderness or external hemorrhoid     Breasts: Breasts are symmetrical       Right: No swelling, bleeding, mass, skin change or tenderness  Left: No swelling, bleeding, mass, skin change or tenderness  HENT:      Head: Normocephalic and atraumatic  Neck:      Thyroid: No thyromegaly or thyroid tenderness  Cardiovascular:      Rate and Rhythm: Normal rate and regular rhythm  Heart sounds: Normal heart sounds  No murmur heard  No friction rub  Pulmonary:      Effort: Pulmonary effort is normal  No respiratory distress  Breath sounds: Normal breath sounds  No wheezing or rales  Abdominal:      Palpations: Abdomen is soft  There is no mass  Tenderness: There is no abdominal tenderness  There is no guarding  Musculoskeletal:         General: No tenderness  Normal range of motion  Right lower leg: No edema  Left lower leg: No edema  Lymphadenopathy:      Lower Body: No right inguinal adenopathy  No left inguinal adenopathy  Neurological:      Mental Status: She is alert and oriented to person, place, and time  Skin:     General: Skin is warm and dry  Coloration: Skin is not pale  Findings: No erythema  Psychiatric:         Mood and Affect: Mood normal          Behavior: Behavior normal          Thought Content: Thought content normal          Judgment: Judgment normal    Vitals and nursing note reviewed

## 2022-05-04 ENCOUNTER — CONSULT (OUTPATIENT)
Dept: BARIATRICS | Facility: CLINIC | Age: 67
End: 2022-05-04
Payer: MEDICARE

## 2022-05-04 ENCOUNTER — TELEPHONE (OUTPATIENT)
Dept: SLEEP CENTER | Facility: CLINIC | Age: 67
End: 2022-05-04

## 2022-05-04 VITALS
HEART RATE: 60 BPM | WEIGHT: 268.2 LBS | TEMPERATURE: 97.8 F | DIASTOLIC BLOOD PRESSURE: 72 MMHG | BODY MASS INDEX: 42.09 KG/M2 | SYSTOLIC BLOOD PRESSURE: 120 MMHG | HEIGHT: 67 IN

## 2022-05-04 DIAGNOSIS — E03.9 ACQUIRED HYPOTHYROIDISM: ICD-10-CM

## 2022-05-04 DIAGNOSIS — E66.01 MORBID OBESITY (HCC): Primary | ICD-10-CM

## 2022-05-04 DIAGNOSIS — G47.33 OSA (OBSTRUCTIVE SLEEP APNEA): ICD-10-CM

## 2022-05-04 DIAGNOSIS — I10 BENIGN ESSENTIAL HYPERTENSION: ICD-10-CM

## 2022-05-04 DIAGNOSIS — R73.01 IMPAIRED FASTING GLUCOSE: ICD-10-CM

## 2022-05-04 PROCEDURE — 99204 OFFICE O/P NEW MOD 45 MIN: CPT | Performed by: PHYSICIAN ASSISTANT

## 2022-05-04 RX ORDER — HYDROCHLOROTHIAZIDE 25 MG/1
TABLET ORAL
Qty: 90 TABLET | Refills: 3 | Status: SHIPPED | OUTPATIENT
Start: 2022-05-04 | End: 2022-06-08 | Stop reason: ALTCHOICE

## 2022-05-04 NOTE — PROGRESS NOTES
Assessment/Plan: Morbid obesity ([facility] )  -Discussed options of HealthyCORE-Intensive Lifestyle Intervention Program, Very Low Calorie Diet-VLCD, Conservative Program, Isaias-En-Y Gastric Bypass and Vertical Sleeve Gastrectomy and the role of weight loss medications   -Initial weight loss goal of 5-10% weight loss for improved health  - Labs reviewed from 8/4/21 all within acceptable limits  - STOP BANG-has sleep apnea     -Patient is interested in pursuing VLCD    VLCD Review:  Contraindications: no  Labs ordered: yes-CMP and magnesium  EKG ordered: yes  HTN meds addressed: yes-recommend stopping hctz when starting program  DM2 meds addressed: n/a  VLCD time restriction based on BMI: n/a  LMP/OCP: menopausal          MIHAELA (obstructive sleep apnea)  -encouraged continued use of CPAP machine/dental device  -may improve with 20-30% weight loss      Acquired hypothyroidism  On levothyroxine  Last TSH wnl    Benign essential hypertension  On amlodipine, losartan, metoprolol and hctz   -should improve with weight loss, dietary, and lifestyle changes        Follow up in approximately 1 month with Non-Surgical Dietician  Diagnoses and all orders for this visit:    Morbid obesity ([place] [address] )  -     Ambulatory Referral to Weight Management  -     Comprehensive metabolic panel; Future  -     Magnesium; Future  -     ECG 12 lead; Future    MIHAELA (obstructive sleep apnea)    Benign essential hypertension  -     Comprehensive metabolic panel; Future  -     Magnesium; Future  -     ECG 12 lead; Future    Acquired hypothyroidism    Impaired fasting glucose          Subjective:   Chief Complaint   Patient presents with    Consult     MWM goal wt 180  Patient ID: Enrique Leonard  is a 74 y o  female with excess weight/obesity here to pursue weight management      Past Medical History:   Diagnosis Date    Fibroid     Hyperlipidemia     Hypertension     Hypothyroidism     Plantar fasciitis     Varicella        HPI:  She lost weight with both ananda and colin davis but gained it back  She saw a weight loss center and was on very low calorie diet     She does have sleep apnea and is using CPAP less to transition over to mouth guard  She does not feel refreshed with sleep , gets 8 hours sleep but only 1 hour REM    Obesity/Excess Weight:  Severity: Very Severe  Onset:  Relates to menopause    Modifiers: Diet and Exercise, Physician Supervised Weight Loss Program and Commercial Weight Loss Programs-ie  Weight Watchers, Raphael [employer]are, Nutrisystem, etc   Contributing factors: Menopause  Associated symptoms: comorbid conditions and increased joint pain    Goals:180  Hydration:40 oz water, energy drink w/metamucil has banana  Alcohol: rare  Exercise:3 x wek gym - bikes 40 minuts and does leg exercise has foot, back problems that limit activity  Occupation:retired      250 calories meal replacement , metamucil    Colonoscopy-Completed    The following portions of the patient's history were reviewed and updated as appropriate: She  has a past medical history of Fibroid, Hyperlipidemia, Hypertension, Hypothyroidism, Plantar fasciitis, and Varicella  She   Patient Active Problem List    Diagnosis Date Noted    MIHAELA (obstructive sleep apnea)     Morbid obesity ([facility] ) 08/20/2021    Plantar fasciitis 02/06/2020    Acquired hypothyroidism 11/28/2018    Medicare annual wellness visit, subsequent 08/02/2018    Impaired fasting glucose 12/12/2016    Benign essential hypertension 08/09/2016    Pure hypercholesterolemia 08/09/2016     She  has a past surgical history that includes Knee surgery  Her family history includes Bone cancer (age of onset: 71) in her maternal uncle; Coronary artery disease in her father and maternal uncle; Endometrial cancer (age of onset: 48) in her maternal aunt; Lung cancer (age of onset: 61) in her maternal aunt;  No Known Problems in her daughter, maternal aunt, maternal grandfather, maternal grandmother, mother, paternal aunt, paternal grandfather, paternal grandmother, and son; Throat cancer (age of onset: 61) in her brother  She  reports that she has quit smoking  She has never used smokeless tobacco  She reports current alcohol use  She reports that she does not use drugs  Current Outpatient Medications   Medication Sig Dispense Refill    amLODIPine (NORVASC) 10 mg tablet Take 1 tablet (10 mg total) by mouth daily 90 tablet 3    BEE POLLEN PO Take by mouth      Calcium Carb-Cholecalciferol (CALCIUM + D3 PO) Take by mouth      co-enzyme Q-10 30 MG capsule Take 100 mg by mouth 2 (two) times a day       diphenhydrAMINE (BENADRYL) 25 mg capsule Take 25 mg by mouth as needed       hydrochlorothiazide (HYDRODIURIL) 25 mg tablet TAKE 1 TABLET DAILY 90 tablet 3    levothyroxine 50 mcg tablet TAKE 1 TABLET DAILY 90 tablet 3    losartan (COZAAR) 100 MG tablet TAKE 1 TABLET DAILY 90 tablet 3    metoprolol succinate (TOPROL-XL) 100 mg 24 hr tablet TAKE 1 TABLET DAILY 90 tablet 3    Multiple Vitamins-Minerals (PRESERVISION AREDS 2+MULTI VIT PO)       meloxicam (MOBIC) 15 mg tablet Take 15 mg by mouth daily (Patient not taking: Reported on 4/12/2022 )       No current facility-administered medications for this visit       Current Outpatient Medications on File Prior to Visit   Medication Sig    amLODIPine (NORVASC) 10 mg tablet Take 1 tablet (10 mg total) by mouth daily    BEE POLLEN PO Take by mouth    Calcium Carb-Cholecalciferol (CALCIUM + D3 PO) Take by mouth    co-enzyme Q-10 30 MG capsule Take 100 mg by mouth 2 (two) times a day     diphenhydrAMINE (BENADRYL) 25 mg capsule Take 25 mg by mouth as needed     levothyroxine 50 mcg tablet TAKE 1 TABLET DAILY    losartan (COZAAR) 100 MG tablet TAKE 1 TABLET DAILY    metoprolol succinate (TOPROL-XL) 100 mg 24 hr tablet TAKE 1 TABLET DAILY    Multiple Vitamins-Minerals (PRESERVISION AREDS 2+MULTI VIT PO)     meloxicam (MOBIC) 15 mg tablet Take 15 mg by mouth daily (Patient not taking: Reported on 4/12/2022 )    [DISCONTINUED] COVID-19 mRNA vaccine 30 mcg/0 3 mL SUSP  (Patient not taking: Reported on 4/12/2022 )    [DISCONTINUED] hydrochlorothiazide (HYDRODIURIL) 25 mg tablet TAKE 1 TABLET DAILY     No current facility-administered medications on file prior to visit  She is allergic to diclofenac and atorvastatin       Review of Systems   Constitutional: Negative for chills and fever  HENT: Negative for sore throat  Respiratory: Positive for shortness of breath (with walking due to weight)  Cardiovascular: Negative for chest pain and palpitations  Gastrointestinal: Negative for abdominal pain, constipation, diarrhea and vomiting  Genitourinary: Negative for difficulty urinating  Musculoskeletal: Positive for arthralgias and back pain  Skin: Negative for rash  Neurological: Negative for dizziness and headaches  Psychiatric/Behavioral: Negative for dysphoric mood  The patient is not nervous/anxious  Objective:    /72 (BP Location: Left arm, Patient Position: Sitting, Cuff Size: Standard)   Pulse 60   Temp 97 8 °F (36 6 °C) (Tympanic)   Ht 5' 6 8" (1 697 m)   Wt 122 kg (268 lb 3 2 oz)   BMI 42 26 kg/m²     Physical Exam  Vitals and nursing note reviewed  Constitutional:       General: She is not in acute distress  Appearance: She is well-developed  She is obese  HENT:      Head: Normocephalic and atraumatic  Eyes:      Conjunctiva/sclera: Conjunctivae normal    Neck:      Thyroid: No thyromegaly  Pulmonary:      Effort: Pulmonary effort is normal  No respiratory distress  Skin:     Findings: No rash (visible)  Neurological:      Mental Status: She is alert and oriented to person, place, and time     Psychiatric:         Behavior: Behavior normal

## 2022-05-04 NOTE — TELEPHONE ENCOUNTER
Received letter from Dr Nelson Hein DDS dated 4/25/22  Per letter, patient at the 4-week time frame and has been wearing the oral appliance comfortably  Dr Nelson Hein is referring back to sleep medicine for a new HST to determine if her AHI is normalized  Dr Chelly Reddy, did you want to order a repeat  home study?

## 2022-05-04 NOTE — ASSESSMENT & PLAN NOTE
On amlodipine, losartan, metoprolol and hctz   -should improve with weight loss, dietary, and lifestyle changes

## 2022-05-04 NOTE — PATIENT INSTRUCTIONS
We will contact you about starting the very low calorie diet program once all the studies have returned  Recommend monitoring fluid intake with a goal of 80 ounces of water daily  Try to eliminate other beverages

## 2022-05-04 NOTE — ASSESSMENT & PLAN NOTE
-Discussed options of HealthyCORE-Intensive Lifestyle Intervention Program, Very Low Calorie Diet-VLCD, Conservative Program, Isaias-En-Y Gastric Bypass and Vertical Sleeve Gastrectomy and the role of weight loss medications   -Initial weight loss goal of 5-10% weight loss for improved health  - Labs reviewed from 8/4/21 all within acceptable limits  - STOP BANG-has sleep apnea     -Patient is interested in pursuing VLCD    VLCD Review:  Contraindications: no  Labs ordered: yes-CMP and magnesium  EKG ordered: yes  HTN meds addressed: yes-recommend stopping hctz when starting program  DM2 meds addressed: n/a  VLCD time restriction based on BMI: n/a  LMP/OCP: menopausal

## 2022-05-05 ENCOUNTER — APPOINTMENT (OUTPATIENT)
Dept: LAB | Age: 67
End: 2022-05-05
Payer: MEDICARE

## 2022-05-05 DIAGNOSIS — E66.01 MORBID OBESITY (HCC): ICD-10-CM

## 2022-05-05 DIAGNOSIS — I10 BENIGN ESSENTIAL HYPERTENSION: ICD-10-CM

## 2022-05-05 LAB
ALBUMIN SERPL BCP-MCNC: 3.6 G/DL (ref 3.5–5)
ALP SERPL-CCNC: 97 U/L (ref 46–116)
ALT SERPL W P-5'-P-CCNC: 23 U/L (ref 12–78)
ANION GAP SERPL CALCULATED.3IONS-SCNC: 6 MMOL/L (ref 4–13)
AST SERPL W P-5'-P-CCNC: 18 U/L (ref 5–45)
ATRIAL RATE: 71 BPM
BILIRUB SERPL-MCNC: 0.44 MG/DL (ref 0.2–1)
BUN SERPL-MCNC: 24 MG/DL (ref 5–25)
CALCIUM SERPL-MCNC: 9.7 MG/DL (ref 8.3–10.1)
CHLORIDE SERPL-SCNC: 107 MMOL/L (ref 100–108)
CO2 SERPL-SCNC: 26 MMOL/L (ref 21–32)
CREAT SERPL-MCNC: 1.01 MG/DL (ref 0.6–1.3)
GFR SERPL CREATININE-BSD FRML MDRD: 57 ML/MIN/1.73SQ M
GLUCOSE P FAST SERPL-MCNC: 104 MG/DL (ref 65–99)
MAGNESIUM SERPL-MCNC: 2.4 MG/DL (ref 1.6–2.6)
P AXIS: 37 DEGREES
POTASSIUM SERPL-SCNC: 3.5 MMOL/L (ref 3.5–5.3)
PR INTERVAL: 160 MS
PROT SERPL-MCNC: 7 G/DL (ref 6.4–8.2)
QRS AXIS: 4 DEGREES
QRSD INTERVAL: 74 MS
QT INTERVAL: 408 MS
QTC INTERVAL: 443 MS
SODIUM SERPL-SCNC: 139 MMOL/L (ref 136–145)
T WAVE AXIS: 42 DEGREES
VENTRICULAR RATE: 71 BPM

## 2022-05-05 PROCEDURE — 93010 ELECTROCARDIOGRAM REPORT: CPT | Performed by: INTERNAL MEDICINE

## 2022-05-05 PROCEDURE — 36415 COLL VENOUS BLD VENIPUNCTURE: CPT

## 2022-05-05 PROCEDURE — 83735 ASSAY OF MAGNESIUM: CPT

## 2022-05-05 PROCEDURE — 93005 ELECTROCARDIOGRAM TRACING: CPT

## 2022-05-05 PROCEDURE — 80053 COMPREHEN METABOLIC PANEL: CPT

## 2022-05-09 NOTE — PROGRESS NOTES
Initial RD session for VLCD completed  Components of diet discussed including ketosis, hydration, possible side effects  2 weeks of product ordered and received  Will f/u 5/16 via email

## 2022-05-13 ENCOUNTER — OFFICE VISIT (OUTPATIENT)
Dept: BARIATRICS | Facility: CLINIC | Age: 67
End: 2022-05-13

## 2022-05-13 VITALS — HEIGHT: 67 IN | BODY MASS INDEX: 42.14 KG/M2 | WEIGHT: 268.5 LBS

## 2022-05-13 DIAGNOSIS — R63.5 ABNORMAL WEIGHT GAIN: ICD-10-CM

## 2022-05-13 PROCEDURE — VLCD

## 2022-05-13 PROCEDURE — RECHECK

## 2022-05-16 ENCOUNTER — PATIENT OUTREACH (OUTPATIENT)
Dept: BARIATRICS | Facility: CLINIC | Age: 67
End: 2022-05-16

## 2022-05-16 NOTE — PROGRESS NOTES
Email received from Zachery  No adverse effects on VLCD however she reports she has not yet checked her blood pressure  Is currently on day 3 of VLCD  Reminded that in order to do VLCD she must check blood pressure  Advised that she should start checking this evening

## 2022-05-17 ENCOUNTER — PATIENT OUTREACH (OUTPATIENT)
Dept: BARIATRICS | Facility: CLINIC | Age: 67
End: 2022-05-17

## 2022-05-17 NOTE — PROGRESS NOTES
Weight Management Medical Nutrition Assessment   Is here for VLCD f/u  Current wt: 261 4 lbs  Loss of 7 1 lbs x 2 weeks  BP 76-86/60 in the morning but then within about 15 minutes goes over 100/60  WNL in the office  Will notify PA of morning lows  Has had a snack on occasion  Moving bowels daily  Hydration adequate  Discussed physical activity guidelines  Will continue VLCD at this time  Patient seen by Medical Provider in past 6 months:  yes  Requested to schedule appointment with Medical Provider: No    Anthropometric Measurements  Start Weight (#): 268 5 lbs 5/13/2022 start VLCD  Current Weight (#):261 4 lbs   TBW % Change from start weight: 2 6%  Ideal Body Weight (#): 153 7 lbs BMI 25 (66 8")  Goal Weight (#):  lbs     Weight Loss History  Previous weight loss attempts: Commercial Programs (Weight Watchers, [employer], etc )  Counseling with  MD  Exercise  Meal Replacements (Medifast, Slim Fast, etc )  Self Created Diets (Portion Control, Healthy Food Choices, etc )    Food and Nutrition Related History  Wake up: 8-9   Bed Time: 11    Food Recall  Breakfast: 8:30-9:30 replacement   Snack:   Lunch: 12-1:00 replacement  Snack: bar time varies   Dinner: 4-6 replacement  Snack    Beverages: water  Volume of beverage intake: 80 oz    Weekends: Same  Cravings: no specific identified   Trouble area of day: not identified     Frequency of Eating out: none currently  Food restrictions: carbs <50 gm while on VLCD   Cooking: self   Food Shopping: self    Physical Activity Intake  Activity:none currently  Frequency:n/a  Physical limitations/barriers to exercise: no intense exercise while on VLCD    Estimated Needs  Energy  [employer] Needs:  BMR : 0823   1-2# loss weekly sedentary:  1094-154          1-2# loss weekly lightly active: 5567-6385  Maintenance calories for sedentary activity level: 2094  Protein: 72-90 gm      (1 2-1 5g/kg IBW)  Fluid: 70 oz    (35mL/kg IBW)    Nutrition Diagnosis  Yes; Overweight/obesity  related to Excess energy intake as evidenced by  BMI more than normative standard for age and sex (obesity-grade III 36+)       Nutrition Intervention    Nutrition Prescription  Calories: 760  Protein: 96 gm  Fluid: 80 oz    Meal Plan (Adrien/Pro/Carb)  Breakfast: 200, 27, 10  Snack:  Lunch: 200, 27, 10  Snack: 160, 15 , 13 net  Dinner: 200, 27, 10  Snack:    Nutrition Education:     VLCD     Nutrition Counseling:  Strategies: as above      Monitoring and Evaluation:  Evaluation criteria:  Energy Intake  Meet protein needs  Maintain adequate hydration  Monitor weekly weight     Physical activity     Barriers to learning:none  Readiness to change: Action:  (Changing behavior)  Comprehension: very good  Expected Compliance: very good

## 2022-05-26 ENCOUNTER — OFFICE VISIT (OUTPATIENT)
Dept: BARIATRICS | Facility: CLINIC | Age: 67
End: 2022-05-26

## 2022-05-26 VITALS
BODY MASS INDEX: 41.03 KG/M2 | SYSTOLIC BLOOD PRESSURE: 128 MMHG | WEIGHT: 261.4 LBS | DIASTOLIC BLOOD PRESSURE: 76 MMHG | HEIGHT: 67 IN

## 2022-05-26 DIAGNOSIS — I10 BENIGN ESSENTIAL HYPERTENSION: ICD-10-CM

## 2022-05-26 DIAGNOSIS — R63.5 ABNORMAL WEIGHT GAIN: Primary | ICD-10-CM

## 2022-05-26 DIAGNOSIS — R73.01 IMPAIRED FASTING GLUCOSE: ICD-10-CM

## 2022-05-26 PROCEDURE — VLCD

## 2022-05-26 PROCEDURE — RECHECK

## 2022-06-01 ENCOUNTER — RA CDI HCC (OUTPATIENT)
Dept: OTHER | Facility: HOSPITAL | Age: 67
End: 2022-06-01

## 2022-06-02 NOTE — PROGRESS NOTES
Weight Management Medical Nutrition Assessment   Is here for VLCD f/u  Current wt: 253 3  lbs  Loss of 8 1  lbs x 2 weeks with overall loss of 15 2 lbs x 4 wks  Currently taking 1/2 tab of BP medication, BP well controlled  Moving bowels daily but feels she is straining then producing soft stools  Suggest add 2 fiber gummies and can increase to 4 if needed  She has incorporated 15 minutes on the bike ~3x/wk and eating a snack on these days  Noticing an increase in energy level  Will continue VLCD at this time  Patient seen by Medical Provider in past 6 months:  yes  Requested to schedule appointment with Medical Provider: No    Anthropometric Measurements  Start Weight (#): 268 5 lbs 5/13/2022 start VLCD  Current Weight (#):253 3 lbs   TBW % Change from start weight: 5 6%  Ideal Body Weight (#): 153 7 lbs BMI 25 (66 8")  Goal Weight (#):  lbs     Weight Loss History  Previous weight loss attempts: Commercial Programs ([employer], [employer], etc )  Counseling with  MD  Exercise  Meal Replacements (Medifast, Slim Fast, etc )  Self Created Diets (Portion Control, Healthy Food Choices, etc )    Food and Nutrition Related History  Wake up: 8-9   Bed Time: 11    Food Recall  Breakfast: 8:30-9:30 replacement   Snack:   Lunch: 12-1:00 replacement  Snack: bar time varies   Dinner: 4-6 replacement  Snack    Beverages: water  Volume of beverage intake: 80 oz    Weekends: Same  Cravings: no specific identified   Trouble area of day: not identified     Frequency of Eating out: none currently  Food restrictions: carbs <50 gm while on VLCD   Cooking: self   Food Shopping: self    Physical Activity Intake  Activity: bike  Frequency:15 minutes 3x/wk  Physical limitations/barriers to exercise: no intense exercise while on VLCD    Estimated Needs  Energy  [employer] Needs:  BMR : 8904   1-2# loss weekly sedentary:  6426-0585         1-2# loss weekly lightly active: 7235-5847  Maintenance calories for sedentary activity level: 2050  Protein: 72-90 gm      (1 2-1 5g/kg IBW)  Fluid: 70 oz    (35mL/kg IBW)    Nutrition Diagnosis  Yes;     Overweight/obesity  related to Excess energy intake as evidenced by  BMI more than normative standard for age and sex (obesity-grade III 36+)       Nutrition Intervention    Nutrition Prescription  Calories: 760  Protein: 96 gm  Fluid: 80 oz    Meal Plan (Adrien/Pro/Carb)  Breakfast: 200, 27, 10  Snack:  Lunch: 200, 27, 10  Snack: 160, 15 , 13 net  Dinner: 200, 27, 10  Snack:    Nutrition Education:     VLCD     Nutrition Counseling:  Strategies: as above      Monitoring and Evaluation:  Evaluation criteria:  Energy Intake  Meet protein needs  Maintain adequate hydration  Monitor weekly weight     Physical activity     Barriers to learning:none  Readiness to change: Action:  (Changing behavior)  Comprehension: very good  Expected Compliance: very good

## 2022-06-03 DIAGNOSIS — E03.9 ACQUIRED HYPOTHYROIDISM: ICD-10-CM

## 2022-06-03 RX ORDER — LEVOTHYROXINE SODIUM 50 MCG
TABLET ORAL
Qty: 90 TABLET | Refills: 3 | Status: SHIPPED | OUTPATIENT
Start: 2022-06-03

## 2022-06-06 RX ORDER — SAW/PYGEUM/BETA/HERB/D3/B6/ZN 30 MG-25MG
10 CAPSULE ORAL
COMMUNITY

## 2022-06-06 RX ORDER — IBUPROFEN 800 MG/1
TABLET ORAL
COMMUNITY
Start: 2022-05-04

## 2022-06-07 ENCOUNTER — APPOINTMENT (OUTPATIENT)
Dept: LAB | Age: 67
End: 2022-06-07
Payer: MEDICARE

## 2022-06-07 DIAGNOSIS — R73.01 IMPAIRED FASTING GLUCOSE: ICD-10-CM

## 2022-06-07 DIAGNOSIS — I10 BENIGN ESSENTIAL HYPERTENSION: ICD-10-CM

## 2022-06-07 DIAGNOSIS — R63.5 ABNORMAL WEIGHT GAIN: ICD-10-CM

## 2022-06-07 LAB
ALBUMIN SERPL BCP-MCNC: 3.6 G/DL (ref 3.5–5)
ALP SERPL-CCNC: 91 U/L (ref 46–116)
ALT SERPL W P-5'-P-CCNC: 27 U/L (ref 12–78)
ANION GAP SERPL CALCULATED.3IONS-SCNC: 5 MMOL/L (ref 4–13)
AST SERPL W P-5'-P-CCNC: 18 U/L (ref 5–45)
BILIRUB SERPL-MCNC: 0.4 MG/DL (ref 0.2–1)
BUN SERPL-MCNC: 26 MG/DL (ref 5–25)
CALCIUM SERPL-MCNC: 10.1 MG/DL (ref 8.3–10.1)
CHLORIDE SERPL-SCNC: 110 MMOL/L (ref 100–108)
CO2 SERPL-SCNC: 28 MMOL/L (ref 21–32)
CREAT SERPL-MCNC: 1.04 MG/DL (ref 0.6–1.3)
GFR SERPL CREATININE-BSD FRML MDRD: 55 ML/MIN/1.73SQ M
GLUCOSE P FAST SERPL-MCNC: 100 MG/DL (ref 65–99)
MAGNESIUM SERPL-MCNC: 2.3 MG/DL (ref 1.6–2.6)
POTASSIUM SERPL-SCNC: 4.5 MMOL/L (ref 3.5–5.3)
PROT SERPL-MCNC: 7.2 G/DL (ref 6.4–8.2)
SODIUM SERPL-SCNC: 143 MMOL/L (ref 136–145)

## 2022-06-07 PROCEDURE — 80053 COMPREHEN METABOLIC PANEL: CPT

## 2022-06-07 PROCEDURE — 83735 ASSAY OF MAGNESIUM: CPT

## 2022-06-07 PROCEDURE — 36415 COLL VENOUS BLD VENIPUNCTURE: CPT

## 2022-06-08 ENCOUNTER — OFFICE VISIT (OUTPATIENT)
Dept: INTERNAL MEDICINE CLINIC | Facility: CLINIC | Age: 67
End: 2022-06-08
Payer: MEDICARE

## 2022-06-08 VITALS
BODY MASS INDEX: 40.69 KG/M2 | SYSTOLIC BLOOD PRESSURE: 128 MMHG | DIASTOLIC BLOOD PRESSURE: 86 MMHG | HEIGHT: 66 IN | WEIGHT: 253.2 LBS | OXYGEN SATURATION: 97 % | HEART RATE: 71 BPM

## 2022-06-08 DIAGNOSIS — E66.01 MORBID OBESITY (HCC): ICD-10-CM

## 2022-06-08 DIAGNOSIS — I10 BENIGN ESSENTIAL HYPERTENSION: Primary | ICD-10-CM

## 2022-06-08 DIAGNOSIS — R73.01 IMPAIRED FASTING GLUCOSE: ICD-10-CM

## 2022-06-08 DIAGNOSIS — E03.9 ACQUIRED HYPOTHYROIDISM: ICD-10-CM

## 2022-06-08 DIAGNOSIS — E78.00 PURE HYPERCHOLESTEROLEMIA: ICD-10-CM

## 2022-06-08 DIAGNOSIS — E55.9 VITAMIN D DEFICIENCY: ICD-10-CM

## 2022-06-08 PROCEDURE — 99214 OFFICE O/P EST MOD 30 MIN: CPT | Performed by: INTERNAL MEDICINE

## 2022-06-08 NOTE — ASSESSMENT & PLAN NOTE
Patient doing well with her weight loss program, she has lost about 15 lb, continue with current plan

## 2022-06-08 NOTE — ASSESSMENT & PLAN NOTE
Blood pressure is coming down, so weight management discontinue the hydrochlorothiazide and decreased her amlodipine from 10 mg daily to 5 mg daily, patient doing well, continue current regimen

## 2022-06-08 NOTE — PATIENT INSTRUCTIONS
Problem List Items Addressed This Visit          Endocrine    Impaired fasting glucose    Relevant Orders    Hemoglobin A1C    Acquired hypothyroidism      Continue levothyroxine 50 mcg daily              Cardiovascular and Mediastinum    Benign essential hypertension - Primary      Blood pressure is coming down, so weight management discontinue the hydrochlorothiazide and decreased her amlodipine from 10 mg daily to 5 mg daily, patient doing well, continue current regimen              Other    Pure hypercholesterolemia    Relevant Orders    CBC and differential    Comprehensive metabolic panel    Lipid Panel with Direct LDL reflex    TSH, 3rd generation with Free T4 reflex    Morbid obesity ([address] )     Patient doing well with her weight loss program, she has lost about 15 lb, continue with current plan                 Other Visit Diagnoses       Vitamin D deficiency        Relevant Orders    Vitamin D 25 hydroxy

## 2022-06-08 NOTE — PROGRESS NOTES
Assessment/Plan: Morbid obesity ([facility] )  Patient doing well with her weight loss program, she has lost about 15 lb, continue with current plan    Benign essential hypertension   Blood pressure is coming down, so weight management discontinue the hydrochlorothiazide and decreased her amlodipine from 10 mg daily to 5 mg daily, patient doing well, continue current regimen    Acquired hypothyroidism   Continue levothyroxine 50 mcg daily       Diagnoses and all orders for this visit:    Benign essential hypertension    Morbid obesity ([facility] )    Pure hypercholesterolemia  -     CBC and differential; Future  -     Comprehensive metabolic panel; Future  -     Lipid Panel with Direct LDL reflex; Future  -     TSH, 3rd generation with Free T4 reflex; Future    Impaired fasting glucose  -     Hemoglobin A1C; Future    Vitamin D deficiency  -     Vitamin D 25 hydroxy; Future    Acquired hypothyroidism    Other orders  -     ibuprofen (MOTRIN) 800 mg tablet  -     Melatonin ER 10 MG TBCR; Take 10 mg by mouth        Subjective:      Patient ID: Khushi Moreno is a 74 y o  female  Pt here for follow up chronic conditions      The following portions of the patient's history were reviewed and updated as appropriate: allergies, current medications, past family history, past medical history, past social history, past surgical history and problem list     Review of Systems   Constitutional: Negative for chills, fatigue and fever  HENT: Negative for congestion, nosebleeds, postnasal drip, sore throat and trouble swallowing  Eyes: Negative for pain  Respiratory: Negative for cough, chest tightness, shortness of breath and wheezing  Cardiovascular: Negative for chest pain, palpitations and leg swelling  Gastrointestinal: Negative for abdominal pain, constipation, diarrhea, nausea and vomiting  Endocrine: Negative for polydipsia and polyuria  Genitourinary: Negative for dysuria, flank pain and hematuria  Musculoskeletal: Positive for back pain  Negative for arthralgias  Skin: Negative for rash  Neurological: Negative for dizziness, tremors, light-headedness and headaches  Hematological: Does not bruise/bleed easily  Psychiatric/Behavioral: Negative for confusion and dysphoric mood  The patient is not nervous/anxious  Objective:      /86 (BP Location: Left arm, Patient Position: Sitting, Cuff Size: Large)   Pulse 71   Ht 5' 6" (1 676 m)   Wt 115 kg (253 lb 3 2 oz)   SpO2 97%   BMI 40 87 kg/m²          Physical Exam  Vitals reviewed  Constitutional:       General: She is not in acute distress  Appearance: Normal appearance  She is well-developed  HENT:      Head: Normocephalic and atraumatic  Right Ear: External ear normal       Left Ear: External ear normal       Nose: Nose normal    Eyes:      General: No scleral icterus  Conjunctiva/sclera: Conjunctivae normal    Neck:      Thyroid: No thyromegaly  Trachea: No tracheal deviation  Cardiovascular:      Rate and Rhythm: Normal rate and regular rhythm  Heart sounds: Normal heart sounds  No murmur heard  Pulmonary:      Effort: Pulmonary effort is normal  No respiratory distress  Breath sounds: Normal breath sounds  No wheezing or rales  Abdominal:      General: Bowel sounds are normal       Palpations: Abdomen is soft  Tenderness: There is no abdominal tenderness  There is no guarding or rebound  Musculoskeletal:      Cervical back: Normal range of motion and neck supple  Right lower leg: No edema  Left lower leg: No edema  Lymphadenopathy:      Cervical: No cervical adenopathy  Skin:     Coloration: Skin is not jaundiced or pale  Neurological:      General: No focal deficit present  Mental Status: She is alert and oriented to person, place, and time  Psychiatric:         Mood and Affect: Mood normal          Behavior: Behavior normal          Thought Content:  Thought content normal          Judgment: Judgment normal

## 2022-06-09 ENCOUNTER — OFFICE VISIT (OUTPATIENT)
Dept: BARIATRICS | Facility: CLINIC | Age: 67
End: 2022-06-09

## 2022-06-09 DIAGNOSIS — R63.5 ABNORMAL WEIGHT GAIN: ICD-10-CM

## 2022-06-09 PROCEDURE — WMBVRGE

## 2022-06-09 PROCEDURE — WMBAR

## 2022-06-09 PROCEDURE — WMPUDDG

## 2022-06-09 PROCEDURE — RECHECK

## 2022-06-09 PROCEDURE — WMSOUP

## 2022-06-13 PROBLEM — H61.812: Status: ACTIVE | Noted: 2022-06-13

## 2022-06-13 PROBLEM — H90.3 SENSORINEURAL HEARING LOSS (SNHL) OF BOTH EARS: Status: ACTIVE | Noted: 2022-06-13

## 2022-06-13 PROBLEM — H93.A9 PULSATILE TINNITUS: Status: ACTIVE | Noted: 2022-06-13

## 2022-06-28 ENCOUNTER — OFFICE VISIT (OUTPATIENT)
Dept: BARIATRICS | Facility: CLINIC | Age: 67
End: 2022-06-28
Payer: MEDICARE

## 2022-06-28 VITALS
HEIGHT: 66 IN | WEIGHT: 247 LBS | BODY MASS INDEX: 39.7 KG/M2 | HEART RATE: 60 BPM | RESPIRATION RATE: 16 BRPM | DIASTOLIC BLOOD PRESSURE: 70 MMHG | SYSTOLIC BLOOD PRESSURE: 136 MMHG

## 2022-06-28 DIAGNOSIS — R53.83 FATIGUE: ICD-10-CM

## 2022-06-28 DIAGNOSIS — I10 BENIGN ESSENTIAL HYPERTENSION: ICD-10-CM

## 2022-06-28 DIAGNOSIS — E66.01 MORBID OBESITY (HCC): Primary | ICD-10-CM

## 2022-06-28 PROCEDURE — 99214 OFFICE O/P EST MOD 30 MIN: CPT | Performed by: PHYSICIAN ASSISTANT

## 2022-06-28 NOTE — ASSESSMENT & PLAN NOTE
-Patient is pursuing very low calorie diet  -Initial weight loss goal of 5-10% weight loss for improved health  -Screening labs 6/7/22 4 week labs  Glucose was abnormal at tthat time 100  Labs ordered for 2 weeks prior to RD follow up    Initial:268 5 lbs 5/13/2022 start VLCD  Current:247  Change:-21 5 (-3 58lb/week average)  Goal:180    Continue with VLCD  Recommend taking 2 gummy fibers daily   Contact the office if still having constipation   Continue to drink 80 oz of water daily  Blood pressure has remained stable

## 2022-06-28 NOTE — PROGRESS NOTES
Assessment/Plan: Morbid obesity ([facility] )  -Patient is pursuing very low calorie diet  -Initial weight loss goal of 5-10% weight loss for improved health  -Screening labs 6/7/22 4 week labs  Glucose was abnormal at tthat time 100  Labs ordered for 2 weeks prior to RD follow up    Initial:268 5 lbs 5/13/2022 start VLCD  Current:247  Change:-21 5 (-3 58lb/week average)  Goal:180    Continue with VLCD  Recommend taking 2 gummy fibers daily   Contact the office if still having constipation  Continue to drink 80 oz of water daily  Blood pressure has remained stable    Benign essential hypertension  Continue to hold HCTZ while on VLCD and continue norvasc 5mg  Continue to check BP daily and contact the office with any signs of orthostatic hypotension        Follow up in approximately 3 weeks  with Non-Surgical Dietician  Diagnoses and all orders for this visit:    Morbid obesity ([facility] )  -     Comprehensive metabolic panel; Future  -     Magnesium; Future    Benign essential hypertension  -     Comprehensive metabolic panel; Future  -     Magnesium; Future    Fatigue  -     Comprehensive metabolic panel; Future  -     Magnesium; Future          Subjective:   Chief Complaint   Patient presents with    Follow-up     MWM 6wk f/u        Patient ID: Keenan Montero  is a 69 y o  female with excess weight/obesity here to pursue weight managment  Patient is pursuing Very Low Calorie Diet-VLCD  HPI  She is taking gummy fiber but was camping and afraid to rush  She had BM yesterday  She had gone 3 days without BM and then had hemorrhoidal bleeding after which stopped      Wt Readings from Last 10 Encounters:   06/28/22 112 kg (247 lb)   06/13/22 115 kg (253 lb)   06/08/22 115 kg (253 lb 3 2 oz)   05/26/22 119 kg (261 lb 6 4 oz)   05/13/22 122 kg (268 lb 8 oz)   05/04/22 122 kg (268 lb 3 2 oz)   04/12/22 123 kg (271 lb 6 4 oz)   03/03/22 123 kg (271 lb 3 2 oz)   01/28/22 124 kg (272 lb 6 4 oz)   12/02/21 121 kg (266 lb)       Food logging:no-following VLCD plansometiems will have a snack if having increased activity  Increased appetite/cravings:occasional  Fruit/Vegetable servings:n/a  Exercise:was doing walking, mini golf, biking   Hydration:80 oz of water    Colonoscopy-Completed    The following portions of the patient's history were reviewed and updated as appropriate:   She  has a past medical history of Arthritis, Environmental allergies, Fibroid, Hyperlipidemia, Hypertension, Hypothyroidism, Plantar fasciitis, and Varicella  She   Patient Active Problem List    Diagnosis Date Noted    Sensorineural hearing loss (SNHL) of both ears 06/13/2022    Pulsatile tinnitus 06/13/2022    Exostosis of left external auditory canal 06/13/2022    MIHAELA (obstructive sleep apnea)     Morbid obesity ([address] ) 08/20/2021    Plantar fasciitis 02/06/2020    Acquired hypothyroidism 11/28/2018    Medicare annual wellness visit, subsequent 08/02/2018    Impaired fasting glucose 12/12/2016    Benign essential hypertension 08/09/2016    Pure hypercholesterolemia 08/09/2016     She  has a past surgical history that includes Knee surgery  Her family history includes Arthritis in her mother; Bone cancer (age of onset: 71) in her maternal uncle; Coronary artery disease in her father and maternal uncle; Dementia in her mother; Endometrial cancer (age of onset: 48) in her maternal aunt; Hypertension in her mother; Lung cancer (age of onset: 61) in her maternal aunt; No Known Problems in her daughter, maternal aunt, maternal grandfather, maternal grandmother, paternal aunt, paternal grandfather, paternal grandmother, and son; Throat cancer (age of onset: 61) in her brother  She  reports that she has quit smoking  She has never used smokeless tobacco  She reports current alcohol use  She reports that she does not use drugs    Current Outpatient Medications   Medication Sig Dispense Refill    amLODIPine (NORVASC) 10 mg tablet Take 1 tablet (10 mg total) by mouth daily (Patient taking differently: Take 5 mg by mouth daily) 90 tablet 3    BEE POLLEN PO Take by mouth      Calcium Carb-Cholecalciferol (CALCIUM + D3 PO) Take by mouth      co-enzyme Q-10 30 MG capsule Take 100 mg by mouth 2 (two) times a day       diphenhydrAMINE (BENADRYL) 25 mg capsule Take 25 mg by mouth as needed      hydrochlorothiazide (HYDRODIURIL) 50 mg tablet Take 50 mg by mouth daily      ibuprofen (MOTRIN) 800 mg tablet       losartan (COZAAR) 100 MG tablet TAKE 1 TABLET DAILY 90 tablet 3    Melatonin ER 10 MG TBCR Take 10 mg by mouth      meloxicam (MOBIC) 15 mg tablet Take 15 mg by mouth daily      metoprolol succinate (TOPROL-XL) 100 mg 24 hr tablet TAKE 1 TABLET DAILY 90 tablet 3    Multiple Vitamins-Minerals (PRESERVISION AREDS 2+MULTI VIT PO)       Synthroid 50 MCG tablet TAKE 1 TABLET DAILY 90 tablet 3     No current facility-administered medications for this visit       Review of Systems   Constitutional: Positive for fatigue  Negative for chills and fever  Eyes: Negative for pain and visual disturbance  Respiratory: Negative for cough and shortness of breath  Cardiovascular: Negative for chest pain and palpitations  Gastrointestinal: Positive for anal bleeding and constipation  Negative for abdominal pain and vomiting  Genitourinary: Negative for dysuria and hematuria  Musculoskeletal: Positive for back pain  Negative for arthralgias  Skin: Negative for color change and rash  Neurological: Negative for seizures and syncope  All other systems reviewed and are negative  Objective:    /70   Pulse 60   Resp 16   Ht 5' 6" (1 676 m)   Wt 112 kg (247 lb)   Breastfeeding No   BMI 39 87 kg/m²      Physical Exam  Vitals and nursing note reviewed  Constitutional:       General: She is not in acute distress  Appearance: She is well-developed  She is obese  HENT:      Head: Normocephalic and atraumatic     Eyes: Conjunctiva/sclera: Conjunctivae normal    Neck:      Thyroid: No thyromegaly  Pulmonary:      Effort: Pulmonary effort is normal  No respiratory distress  Skin:     Findings: No rash (visible)  Neurological:      Mental Status: She is alert and oriented to person, place, and time     Psychiatric:         Behavior: Behavior normal

## 2022-06-28 NOTE — ASSESSMENT & PLAN NOTE
Continue to hold HCTZ while on VLCD and continue norvasc 5mg   Continue to check BP daily and contact the office with any signs of orthostatic hypotension

## 2022-07-15 NOTE — PROGRESS NOTES
Weight Management Medical Nutrition Assessment   Is here for VLCD f/u  Current wt: 241 5   lbs  Loss of 5 5  lbs x 2 weeks with overall loss of 27  lbs x 8 wks (avg 3 4 lb/wk)  Using 2 fiber gummies/day which has resolved constipation  Remains on 1/2 tab of BP medication but does report 2 elevated blood pressures: 158/72 and 160/70  PA notified  Will continue VLCD at this time  Patient seen by Medical Provider in past 6 months:  yes  Requested to schedule appointment with Medical Provider: No    Anthropometric Measurements  Start Weight (#): 268 5 lbs 5/13/2022 start VLCD  Current Weight (#):241 5 lbs   TBW % Change from start weight:  10%  Ideal Body Weight (#): 153 7 lbs BMI 25 (66 8")  Goal Weight (#):  lbs     Weight Loss History  Previous weight loss attempts: Commercial Programs ([employer], etc )  Counseling with  MD  Exercise  Meal Replacements (Medifast, Slim Fast, etc )  Self Created Diets (Portion Control, Healthy Food Choices, etc )    Food and Nutrition Related History  Wake up: 8-9   Bed Time: 11    Food Recall  Breakfast: 8:30-9:30 replacement   Snack:   Lunch: 12-1:00 replacement  Snack: bar time varies   Dinner: 4-6 replacement  Snack    Beverages: water  Volume of beverage intake: 80 oz    Weekends: Same  Cravings: no specific identified   Trouble area of day: not identified     Frequency of Eating out: none currently  Food restrictions: carbs <50 gm while on VLCD   Cooking: self   Food Shopping: self    Physical Activity Intake  Activity: bike, walking   Frequency:15 minutes 3x/wk  Physical limitations/barriers to exercise: no intense exercise while on VLCD    Estimated Needs  Energy  [employer] [place] Needs:  BMR : 6255   1-2# loss weekly sedentary:  986-1486         1-2# loss weekly lightly active: 2532-5754  Maintenance calories for sedentary activity level: 1986  Protein: 72-90 gm      (1 2-1 5g/kg IBW)  Fluid: 70 oz    (35mL/kg IBW)    Nutrition Diagnosis  Yes; Overweight/obesity  related to Excess energy intake as evidenced by  BMI more than normative standard for age and sex (obesity-grade II 35-39  9)       Nutrition Intervention    Nutrition Prescription  Calories: 760  Protein: 96 gm  Fluid: 80 oz    Meal Plan (Adrien/Pro/Carb)  Breakfast: 200, 27, 10  Snack:  Lunch: 200, 27, 10  Snack: 160, 15 , 13 net  Dinner: 200, 27, 10  Snack:    Nutrition Education:     VLCD     Nutrition Counseling:  Strategies: as above      Monitoring and Evaluation:  Evaluation criteria:  Energy Intake  Meet protein needs  Maintain adequate hydration  Monitor weekly weight     Physical activity     Barriers to learning:none  Readiness to change: Action:  (Changing behavior)  Comprehension: very good  Expected Compliance: very good

## 2022-07-19 ENCOUNTER — OFFICE VISIT (OUTPATIENT)
Dept: BARIATRICS | Facility: CLINIC | Age: 67
End: 2022-07-19

## 2022-07-19 ENCOUNTER — APPOINTMENT (OUTPATIENT)
Dept: LAB | Age: 67
End: 2022-07-19
Payer: MEDICARE

## 2022-07-19 VITALS — HEIGHT: 67 IN | BODY MASS INDEX: 37.9 KG/M2 | WEIGHT: 241.5 LBS

## 2022-07-19 DIAGNOSIS — R63.5 ABNORMAL WEIGHT GAIN: ICD-10-CM

## 2022-07-19 DIAGNOSIS — I10 BENIGN ESSENTIAL HYPERTENSION: ICD-10-CM

## 2022-07-19 DIAGNOSIS — E66.01 MORBID OBESITY (HCC): ICD-10-CM

## 2022-07-19 DIAGNOSIS — R53.83 FATIGUE: ICD-10-CM

## 2022-07-19 LAB
ALBUMIN SERPL BCP-MCNC: 3.4 G/DL (ref 3.5–5)
ALP SERPL-CCNC: 94 U/L (ref 46–116)
ALT SERPL W P-5'-P-CCNC: 21 U/L (ref 12–78)
ANION GAP SERPL CALCULATED.3IONS-SCNC: 7 MMOL/L (ref 4–13)
AST SERPL W P-5'-P-CCNC: 16 U/L (ref 5–45)
BILIRUB SERPL-MCNC: 0.44 MG/DL (ref 0.2–1)
BUN SERPL-MCNC: 19 MG/DL (ref 5–25)
CALCIUM ALBUM COR SERPL-MCNC: 9.7 MG/DL (ref 8.3–10.1)
CALCIUM SERPL-MCNC: 9.2 MG/DL (ref 8.3–10.1)
CHLORIDE SERPL-SCNC: 111 MMOL/L (ref 96–108)
CO2 SERPL-SCNC: 24 MMOL/L (ref 21–32)
CREAT SERPL-MCNC: 0.82 MG/DL (ref 0.6–1.3)
GFR SERPL CREATININE-BSD FRML MDRD: 73 ML/MIN/1.73SQ M
GLUCOSE P FAST SERPL-MCNC: 97 MG/DL (ref 65–99)
MAGNESIUM SERPL-MCNC: 2.5 MG/DL (ref 1.6–2.6)
POTASSIUM SERPL-SCNC: 3.9 MMOL/L (ref 3.5–5.3)
PROT SERPL-MCNC: 6.8 G/DL (ref 6.4–8.4)
SODIUM SERPL-SCNC: 142 MMOL/L (ref 135–147)

## 2022-07-19 PROCEDURE — 80053 COMPREHEN METABOLIC PANEL: CPT

## 2022-07-19 PROCEDURE — RECHECK

## 2022-07-19 PROCEDURE — 83735 ASSAY OF MAGNESIUM: CPT

## 2022-07-19 PROCEDURE — 36415 COLL VENOUS BLD VENIPUNCTURE: CPT

## 2022-07-19 PROCEDURE — VLCD

## 2022-07-31 NOTE — PROGRESS NOTES
Weight Management Medical Nutrition Assessment   Is here for VLCD f/u  Current wt:  233 7  lbs  Loss of 7 8  lbs x 2 weeks with overall loss of 34 8 lbs x 10 wks (avg 3 5 lb/wk)  Using 2 fiber gummies/day but still having some issues moving bowels  Over the last week has started using an emergency meal here and there so that she is ready when it is time to transition at next visit  She will transition to a partial replacement keto plan at that time  Will continue VLCD for final 2 wks  Patient seen by Medical Provider in past 6 months:  yes  Requested to schedule appointment with Medical Provider: No    Anthropometric Measurements  Start Weight (#): 268 5 lbs 5/13/2022 start VLCD  Current Weight (#):233 7 lbs   TBW % Change from start weight:  12 9%  Ideal Body Weight (#): 153 7 lbs BMI 25 (66 8")  Goal Weight (#):  lbs     Weight Loss History  Previous weight loss attempts: Commercial Programs ([employer], [employer], etc )  Counseling with  MD  Exercise  Meal Replacements (Medifast, Slim Fast, etc )  Self Created Diets (Portion Control, Healthy Food Choices, etc )    Food and Nutrition Related History  Wake up: 8-9   Bed Time: 11    Food Recall  Breakfast: 8:30-9:30 replacement   Snack:   Lunch: 12-1:00 replacement  Snack: bar time varies   Dinner: 4-6 replacement  Snack    Beverages: water  Volume of beverage intake: 80 oz    Weekends: Same  Cravings: no specific identified   Trouble area of day: not identified     Frequency of Eating out: none currently  Food restrictions: carbs <50 gm while on VLCD   Cooking: self   Food Shopping: self    Physical Activity Intake  Activity: bike, walking   Frequency:15 minutes 3x/wk  Physical limitations/barriers to exercise: no intense exercise while on VLCD    Estimated Needs  Energy  [employer] Needs:  BMR : 7653   1-2# loss weekly sedentary:  928-0328         1-2# loss weekly lightly active: 8961-4621  Maintenance calories for sedentary activity level: 1928  Protein: 72-90 gm      (1 2-1 5g/kg IBW)  Fluid: 70 oz    (35mL/kg IBW)    Nutrition Diagnosis  Yes; Overweight/obesity  related to Excess energy intake as evidenced by  BMI more than normative standard for age and sex (obesity-grade II 35-39  9)       Nutrition Intervention    Nutrition Prescription  Calories: 760  Protein: 96 gm  Fluid: 80 oz    Meal Plan (Adrien/Pro/Carb)  Breakfast: 200, 27, 10  Snack:  Lunch: 200, 27, 10  Snack: 160, 15 , 13 net  Dinner: 200, 27, 10  Snack:    Nutrition Education:     VLCD     Nutrition Counseling:  Strategies: as above      Monitoring and Evaluation:  Evaluation criteria:  Energy Intake  Meet protein needs  Maintain adequate hydration  Monitor weekly weight     Physical activity     Barriers to learning:none  Readiness to change: Action:  (Changing behavior)  Comprehension: very good  Expected Compliance: very good

## 2022-08-02 ENCOUNTER — OFFICE VISIT (OUTPATIENT)
Dept: BARIATRICS | Facility: CLINIC | Age: 67
End: 2022-08-02

## 2022-08-02 VITALS
SYSTOLIC BLOOD PRESSURE: 136 MMHG | DIASTOLIC BLOOD PRESSURE: 82 MMHG | BODY MASS INDEX: 37.56 KG/M2 | WEIGHT: 233.7 LBS | HEIGHT: 66 IN

## 2022-08-02 DIAGNOSIS — R63.5 ABNORMAL WEIGHT GAIN: ICD-10-CM

## 2022-08-02 PROCEDURE — RECHECK

## 2022-08-02 PROCEDURE — VLCD

## 2022-08-08 NOTE — PROGRESS NOTES
Weight Management Medical Nutrition Assessment   Is here with her  for VLCD f/u  Current wt: 229 4    lbs  Loss of  4 3  lbs x 10 days with overall loss of 39 1 lbs x 12 wks  At this time she will transition to a partial replacement keto plan  Education provided re: carb counting  She & her  intend on resuming the gym after their next camping trip  Meal plan and other resources provided  Questions answered re: factor meals, non-starchy vegetables  Options for f/u reviewed  She will f/u 1 month for bundle  Patient seen by Medical Provider in past 6 months:  yes  Requested to schedule appointment with Medical Provider: No    Anthropometric Measurements  Start Weight (#): 268 5 lbs 5/13/2022 start VLCD  Current Weight (#):229 4 lbs   TBW % Change from start weight:  14 6%  Ideal Body Weight (#): 153 7 lbs BMI 25 (66 8")  Goal Weight (#):  lbs     Weight Loss History  Previous weight loss attempts: Commercial Programs ([employer], [employer], etc )  Counseling with  MD  Exercise  Meal Replacements (Medifast, Slim Fast, etc )  Self Created Diets (Portion Control, Healthy Food Choices, etc )    Food and Nutrition Related History  Wake up: 8-9   Bed Time: 11    Food Recall  Breakfast: 8:30-9:30 replacement   Snack:   Lunch: 12-1:00 replacement  Snack: bar time varies   Dinner: 4-6 replacement  Snack    Beverages: water  Volume of beverage intake: 80 oz    Weekends: Same  Cravings: no specific identified   Trouble area of day: not identified     Frequency of Eating out: none currently  Food restrictions: carbs <50 gm while  In ketosis  Cooking: self   Food Shopping: self    Physical Activity Intake  Activity: bike, walking   Frequency:15 minutes 3x/wk  Physical limitations/barriers to exercise: n/a    Estimated Needs  Energy  [employer] [place] Energy Needs:  BMR : 9782   1-2# loss weekly sedentary:  921-2572         1-2# loss weekly lightly active: 7119-3745  Maintenance calories for sedentary activity level: 1899  Protein: 72-90 gm      (1 2-1 5g/kg IBW)  Fluid: 70 oz    (35mL/kg IBW)    Nutrition Diagnosis  Yes; Overweight/obesity  related to Excess energy intake as evidenced by  BMI more than normative standard for age and sex (obesity-grade II 35-39  9)       Nutrition Intervention    Nutrition Prescription  Calories: 6705-1053  Protein: 105-110 gm  Carb: 43-46 gm    Meal Plan (Adrien/Pro/Carb)  Breakfast: 200, 27, 10  Snack:  Lunch: 350-400, 30, 10  Snack: 160, 15 , 13 net  Dinner: 200, 27, 10  Snack:100-150, 5-10, 0-3    Nutrition Education:     keto based meal plan  Low carb snacks  Carb counting  Food journaling  Physical activity      Nutrition Counseling:  Strategies: as above      Monitoring and Evaluation:  Evaluation criteria:  Energy Intake  Meet protein needs  Maintain adequate hydration  Monitor weekly weight  Food logging/measuring  Physical activity   Meal planning    Barriers to learning:none  Readiness to change: Action:  (Changing behavior)  Comprehension: very good  Expected Compliance: very good

## 2022-08-12 ENCOUNTER — OFFICE VISIT (OUTPATIENT)
Dept: BARIATRICS | Facility: CLINIC | Age: 67
End: 2022-08-12

## 2022-08-12 VITALS — HEIGHT: 66 IN | WEIGHT: 229.4 LBS | BODY MASS INDEX: 36.87 KG/M2

## 2022-08-12 DIAGNOSIS — R63.5 ABNORMAL WEIGHT GAIN: ICD-10-CM

## 2022-08-12 LAB
25(OH)D3 SERPL-MCNC: 89 NG/ML (ref 30–100)
ALBUMIN SERPL-MCNC: 4 G/DL (ref 3.6–5.1)
ALBUMIN/GLOB SERPL: 1.7 (CALC) (ref 1–2.5)
ALP SERPL-CCNC: 98 U/L (ref 37–153)
ALT SERPL-CCNC: 18 U/L (ref 6–29)
AST SERPL-CCNC: 16 U/L (ref 10–35)
BASOPHILS # BLD AUTO: 32 CELLS/UL (ref 0–200)
BASOPHILS NFR BLD AUTO: 0.6 %
BILIRUB SERPL-MCNC: 0.4 MG/DL (ref 0.2–1.2)
BUN SERPL-MCNC: 26 MG/DL (ref 7–25)
BUN/CREAT SERPL: 34 (CALC) (ref 6–22)
CALCIUM SERPL-MCNC: 9.2 MG/DL (ref 8.6–10.4)
CHLORIDE SERPL-SCNC: 106 MMOL/L (ref 98–110)
CHOLEST SERPL-MCNC: 175 MG/DL
CHOLEST/HDLC SERPL: 5.5 (CALC)
CO2 SERPL-SCNC: 26 MMOL/L (ref 20–32)
CREAT SERPL-MCNC: 0.76 MG/DL (ref 0.5–1.05)
EOSINOPHIL # BLD AUTO: 239 CELLS/UL (ref 15–500)
EOSINOPHIL NFR BLD AUTO: 4.5 %
ERYTHROCYTE [DISTWIDTH] IN BLOOD BY AUTOMATED COUNT: 14.5 % (ref 11–15)
GFR/BSA.PRED SERPLBLD CYS-BASED-ARV: 85 ML/MIN/1.73M2
GLOBULIN SER CALC-MCNC: 2.3 G/DL (CALC) (ref 1.9–3.7)
GLUCOSE SERPL-MCNC: 93 MG/DL (ref 65–99)
HBA1C MFR BLD: 5.4 % OF TOTAL HGB
HCT VFR BLD AUTO: 36.9 % (ref 35–45)
HDLC SERPL-MCNC: 32 MG/DL
HGB BLD-MCNC: 12.4 G/DL (ref 11.7–15.5)
LDLC SERPL CALC-MCNC: 121 MG/DL (CALC)
LYMPHOCYTES # BLD AUTO: 1277 CELLS/UL (ref 850–3900)
LYMPHOCYTES NFR BLD AUTO: 24.1 %
MCH RBC QN AUTO: 27.7 PG (ref 27–33)
MCHC RBC AUTO-ENTMCNC: 33.6 G/DL (ref 32–36)
MCV RBC AUTO: 82.4 FL (ref 80–100)
MONOCYTES # BLD AUTO: 541 CELLS/UL (ref 200–950)
MONOCYTES NFR BLD AUTO: 10.2 %
NEUTROPHILS # BLD AUTO: 3212 CELLS/UL (ref 1500–7800)
NEUTROPHILS NFR BLD AUTO: 60.6 %
NONHDLC SERPL-MCNC: 143 MG/DL (CALC)
PLATELET # BLD AUTO: 251 THOUSAND/UL (ref 140–400)
PMV BLD REES-ECKER: 11.4 FL (ref 7.5–12.5)
POTASSIUM SERPL-SCNC: 3.9 MMOL/L (ref 3.5–5.3)
PROT SERPL-MCNC: 6.3 G/DL (ref 6.1–8.1)
RBC # BLD AUTO: 4.48 MILLION/UL (ref 3.8–5.1)
SODIUM SERPL-SCNC: 141 MMOL/L (ref 135–146)
TRIGL SERPL-MCNC: 113 MG/DL
TSH SERPL-ACNC: 3.2 MIU/L (ref 0.4–4.5)
WBC # BLD AUTO: 5.3 THOUSAND/UL (ref 3.8–10.8)

## 2022-08-12 PROCEDURE — VLCD

## 2022-08-12 PROCEDURE — RECHECK

## 2022-08-22 DIAGNOSIS — I10 BENIGN ESSENTIAL HYPERTENSION: ICD-10-CM

## 2022-08-22 RX ORDER — LOSARTAN POTASSIUM 100 MG/1
TABLET ORAL
Qty: 90 TABLET | Refills: 3 | Status: SHIPPED | OUTPATIENT
Start: 2022-08-22

## 2022-08-24 ENCOUNTER — HOSPITAL ENCOUNTER (OUTPATIENT)
Dept: SLEEP CENTER | Facility: CLINIC | Age: 67
Discharge: HOME | End: 2022-08-24
Payer: MEDICARE

## 2022-08-24 DIAGNOSIS — G47.33 OSA (OBSTRUCTIVE SLEEP APNEA): ICD-10-CM

## 2022-08-24 PROCEDURE — G0399 HOME SLEEP TEST/TYPE 3 PORTA: HCPCS

## 2022-08-24 PROCEDURE — G0399 HOME SLEEP TEST/TYPE 3 PORTA: HCPCS | Performed by: INTERNAL MEDICINE

## 2022-08-24 NOTE — PROGRESS NOTES
Home Sleep Study Documentation    HOME STUDY DEVICE: Noxturnal yes                                           Juli G3 no      Pre-Sleep Home Study:    Set-up and instructions performed by: [facility]    Technician performed demonstration for Patient: yes    Return demonstration performed by Patient: yes    Written instructions provided to Patient: yes    Patient signed consent form: yes        Post-Sleep Home Study:    Additional comments by Patient: Bria Franks in [place]    Home Sleep Study Failed:no:    Failure reason: N/A    Reported or Detected: N/A    Scored by: DOV Mclaughlin

## 2022-08-25 ENCOUNTER — HOSPITAL ENCOUNTER (OUTPATIENT)
Dept: RADIOLOGY | Age: 67
Discharge: HOME | End: 2022-08-25
Payer: MEDICARE

## 2022-08-25 VITALS — WEIGHT: 225 LBS | HEIGHT: 66 IN | BODY MASS INDEX: 36.16 KG/M2

## 2022-08-25 DIAGNOSIS — Z12.31 ENCOUNTER FOR SCREENING MAMMOGRAM FOR MALIGNANT NEOPLASM OF BREAST: ICD-10-CM

## 2022-08-25 DIAGNOSIS — Z01.419 ENCOUNTER FOR WELL WOMAN EXAM WITH ROUTINE GYNECOLOGICAL EXAM: ICD-10-CM

## 2022-08-25 DIAGNOSIS — Z13.820 ENCOUNTER FOR OSTEOPOROSIS SCREENING IN ASYMPTOMATIC POSTMENOPAUSAL PATIENT: ICD-10-CM

## 2022-08-25 DIAGNOSIS — Z78.0 ENCOUNTER FOR OSTEOPOROSIS SCREENING IN ASYMPTOMATIC POSTMENOPAUSAL PATIENT: ICD-10-CM

## 2022-08-25 PROCEDURE — 77080 DXA BONE DENSITY AXIAL: CPT

## 2022-08-25 PROCEDURE — 77063 BREAST TOMOSYNTHESIS BI: CPT

## 2022-08-25 PROCEDURE — 77067 SCR MAMMO BI INCL CAD: CPT

## 2022-09-06 NOTE — PROGRESS NOTES
Weight Management Medical Nutrition Assessment   Is here with her  for 1/6 bundle after finishing VLCD 1 month ago  Current wt:  225 4 lbs  Loss of  4  lbs x 1 month with overall loss of 43 1  lbs x just under 4 months  Using Freshly meals instead of Factor meals due to the caloric content  Hasn't been cooking due to multiple camping trips but may start this after the next trip  Has not started gym due to  having an injury with leg  She will f/u 1 month  Patient seen by Medical Provider in past 6 months:  yes  Requested to schedule appointment with Medical Provider: No    Anthropometric Measurements  Start Weight (#): 268 5 lbs 5/13/2022 start VLCD  Current Weight (#):225 4 lbs   TBW % Change from start weight:  16 1%  Ideal Body Weight (#): 153 7 lbs BMI 25 (66 8")  Goal Weight (#):  lbs     Weight Loss History  Previous weight loss attempts: Commercial Programs (Weight Watchers, Sary Obey, etc )  Counseling with  MD  Exercise  Meal Replacements (Medifast, Slim Fast, etc )  Self Created Diets (Portion Control, Healthy Food Choices, etc )    Food and Nutrition Related History  Wake up: 8-9   Bed Time: 11    Food Recall  Breakfast: 8:30-9:30 replacement   Snack: skip    Lunch: 12-1:00 Freshly or Factor meal ~400 parminder  Snack: bar time  OR egg OR string cheese    Dinner: 4-6 replacement  Snack: string cheese, pickle    Beverages: water  Volume of beverage intake: 80 oz    Weekends: Same  Cravings: no specific identified   Trouble area of day: not identified     Frequency of Eating out: none currently  Food restrictions: carbs <50 gm while  In ketosis  Cooking: self   Food Shopping: self    Physical Activity Intake  Activity:  walking   Frequency:daily  Physical limitations/barriers to exercise: n/a    Estimated Needs  Energy  [employer] [place] Energy Needs:  BMR : 6274   1-2# loss weekly sedentary:  348-0769         1-2# loss weekly lightly active: 3707-4470  Maintenance calories for sedentary activity level: 1892  Protein: 72-90 gm      (1 2-1 5g/kg IBW)  Fluid: 70 oz    (35mL/kg IBW)    Nutrition Diagnosis  Yes; Overweight/obesity  related to Excess energy intake as evidenced by  BMI more than normative standard for age and sex (obesity-grade II 35-39  9)       Nutrition Intervention    Nutrition Prescription  Calories: 0586-9502  Protein: 105-110 gm  Carb: 43-46 gm    Meal Plan (Adrien/Pro/Carb)  Breakfast: 200, 27, 10  Snack:  Lunch: 350-400, 30, 10  Snack: 160, 15 , 13 net  Dinner: 200, 27, 10  Snack:100-150, 5-10, 0-3    Nutrition Education:     keto based meal plan  Low carb snacks  Carb counting  Food journaling  Physical activity      Nutrition Counseling:  Strategies: as above      Monitoring and Evaluation:  Evaluation criteria:  Energy Intake  Meet protein needs  Maintain adequate hydration  Monitor weekly weight  Food logging/measuring  Physical activity   Meal planning    Barriers to learning:none  Readiness to change: Action:  (Changing behavior)  Comprehension: very good  Expected Compliance: very good

## 2022-09-07 ENCOUNTER — TELEPHONE (OUTPATIENT)
Dept: SLEEP CENTER | Facility: CLINIC | Age: 67
End: 2022-09-07

## 2022-09-07 NOTE — TELEPHONE ENCOUNTER
----- Message from Berenice Morris MD sent at 9/1/2022  2:43 PM EDT -----   Please schedule for sleep clinic   Thanks

## 2022-09-07 NOTE — TELEPHONE ENCOUNTER
Contacted patient  Left message that home sleep study is resulted and to call nursing staff back to review the results  Study shows moderate MIHAELA ([id number] [id number])  Patient needs follow-up appointment with Dr Debby Jamil scheduled

## 2022-09-09 ENCOUNTER — OFFICE VISIT (OUTPATIENT)
Dept: BARIATRICS | Facility: CLINIC | Age: 67
End: 2022-09-09

## 2022-09-09 VITALS — WEIGHT: 225.4 LBS | BODY MASS INDEX: 35.38 KG/M2 | HEIGHT: 67 IN

## 2022-09-09 DIAGNOSIS — R63.5 ABNORMAL WEIGHT GAIN: ICD-10-CM

## 2022-09-09 PROCEDURE — DB6PK

## 2022-09-09 PROCEDURE — RECHECK

## 2022-09-09 NOTE — TELEPHONE ENCOUNTER
Returned patient's call  Advised that home sleep study shows moderate MIHAELA  Patient scheduled for compliance follow-up with Dr Chaneta Collet 2/15/2023  Patient added to wait list for sooner appointment date

## 2022-09-20 ENCOUNTER — OFFICE VISIT (OUTPATIENT)
Dept: INTERNAL MEDICINE CLINIC | Facility: CLINIC | Age: 67
End: 2022-09-20
Payer: MEDICARE

## 2022-09-20 VITALS
TEMPERATURE: 98.5 F | WEIGHT: 226.6 LBS | SYSTOLIC BLOOD PRESSURE: 120 MMHG | HEART RATE: 70 BPM | BODY MASS INDEX: 35.56 KG/M2 | HEIGHT: 67 IN | OXYGEN SATURATION: 96 % | DIASTOLIC BLOOD PRESSURE: 80 MMHG | RESPIRATION RATE: 16 BRPM

## 2022-09-20 DIAGNOSIS — E78.00 PURE HYPERCHOLESTEROLEMIA: ICD-10-CM

## 2022-09-20 DIAGNOSIS — E03.9 ACQUIRED HYPOTHYROIDISM: ICD-10-CM

## 2022-09-20 DIAGNOSIS — I10 BENIGN ESSENTIAL HYPERTENSION: Primary | ICD-10-CM

## 2022-09-20 DIAGNOSIS — E66.01 MORBID OBESITY (HCC): ICD-10-CM

## 2022-09-20 DIAGNOSIS — Z00.00 MEDICARE ANNUAL WELLNESS VISIT, SUBSEQUENT: ICD-10-CM

## 2022-09-20 DIAGNOSIS — Z23 NEEDS FLU SHOT: ICD-10-CM

## 2022-09-20 DIAGNOSIS — R73.01 IMPAIRED FASTING GLUCOSE: ICD-10-CM

## 2022-09-20 PROCEDURE — G0439 PPPS, SUBSEQ VISIT: HCPCS | Performed by: INTERNAL MEDICINE

## 2022-09-20 PROCEDURE — G0008 ADMIN INFLUENZA VIRUS VAC: HCPCS

## 2022-09-20 PROCEDURE — 90662 IIV NO PRSV INCREASED AG IM: CPT

## 2022-09-20 PROCEDURE — 99214 OFFICE O/P EST MOD 30 MIN: CPT | Performed by: INTERNAL MEDICINE

## 2022-09-20 NOTE — PATIENT INSTRUCTIONS
Problem List Items Addressed This Visit          Endocrine    Impaired fasting glucose     Continue with healthy diet and exercise, recent hemoglobin A1c and fasting glucose were excellent           Acquired hypothyroidism     No excessive fatigue, constipation or cold intolerance, continue levothyroxine 50 micrograms daily along with monitoring  Cardiovascular and Mediastinum    Benign essential hypertension - Primary     A little elevated here, better home, continue current meds along with healthy diet and exercise              Other    Pure hypercholesterolemia     Cholesterol looking a lot better, patient is taking over-the-counter supplement for this           Medicare annual wellness visit, subsequent     Discussed preventative health, cancer screening, immunizations, and safety issues  Patient up-to-date with colorectal cancer screening done March 2021 with recommendations to recheck again in 5 years  Patient had mammogram 08/25/2022  Patient had DEXA scan 08/25/2022  Patient up-to-date with all immunizations, I recommend the flu shot today           Morbid obesity ([address] )     Continue with healthy diet and exercise                 Other Visit Diagnoses       Needs flu shot        Relevant Orders    influenza vaccine, high-dose, PF 0 7 mL (FLUZONE HIGH-DOSE)            Medicare Preventive Visit Patient Instructions  Thank you for completing your Welcome to Medicare Visit or Medicare Annual Wellness Visit today  Your next wellness visit will be due in one year (9/21/2023)  The screening/preventive services that you may require over the next 5-10 years are detailed below  Some tests may not apply to you based off risk factors and/or age  Screening tests ordered at today's visit but not completed yet may show as past due  Also, please note that scanned in results may not display below    Preventive Screenings:  Service Recommendations Previous Testing/Comments   Colorectal Cancer Screening  * Colonoscopy    * Fecal Occult Blood Test (FOBT)/Fecal Immunochemical Test (FIT)  * Fecal DNA/Cologuard Test  * Flexible Sigmoidoscopy Age: 39-70 years old   Colonoscopy: every 10 years (may be performed more frequently if at higher risk)  OR  FOBT/FIT: every 1 year  OR  Cologuard: every 3 years  OR  Sigmoidoscopy: every 5 years  Screening may be recommended earlier than age 39 if at higher risk for colorectal cancer  Also, an individualized decision between you and your healthcare provider will decide whether screening between the ages of 74-80 would be appropriate  Colonoscopy: 03/02/2021  FOBT/FIT: Not on file  Cologuard: Not on file  Sigmoidoscopy: Not on file          Breast Cancer Screening Age: 36 years old  Frequency: every 1-2 years  Not required if history of left and right mastectomy Mammogram: 08/25/2022        Cervical Cancer Screening Between the ages of 21-29, pap smear recommended once every 3 years  Between the ages of 33-67, can perform pap smear with HPV co-testing every 5 years  Recommendations may differ for women with a history of total hysterectomy, cervical cancer, or abnormal pap smears in past  Pap Smear: 04/12/2022        Hepatitis C Screening Once for adults born between 1945 and 1965  More frequently in patients at high risk for Hepatitis C Hep C Antibody: 09/08/2017        Diabetes Screening 1-2 times per year if you're at risk for diabetes or have pre-diabetes Fasting glucose: 97 mg/dL (7/19/2022)  A1C: 5 4 % of total Hgb (8/12/2022)      Cholesterol Screening Once every 5 years if you don't have a lipid disorder  May order more often based on risk factors  Lipid panel: 08/12/2022          Other Preventive Screenings Covered by Medicare:  Abdominal Aortic Aneurysm (AAA) Screening: covered once if your at risk  You're considered to be at risk if you have a family history of AAA    Lung Cancer Screening: covers low dose CT scan once per year if you meet all of the following conditions: (1) Age 50-69; (2) No signs or symptoms of lung cancer; (3) Current smoker or have quit smoking within the last 15 years; (4) You have a tobacco smoking history of at least 20 pack years (packs per day multiplied by number of years you smoked); (5) You get a written order from a healthcare provider  Glaucoma Screening: covered annually if you're considered high risk: (1) You have diabetes OR (2) Family history of glaucoma OR (3)  aged 48 and older OR (3)  American aged 72 and older  Osteoporosis Screening: covered every 2 years if you meet one of the following conditions: (1) You're estrogen deficient and at risk for osteoporosis based off medical history and other findings; (2) Have a vertebral abnormality; (3) On glucocorticoid therapy for more than 3 months; (4) Have primary hyperparathyroidism; (5) On osteoporosis medications and need to assess response to drug therapy  Last bone density test (DXA Scan): 08/25/2022  HIV Screening: covered annually if you're between the age of 12-76  Also covered annually if you are younger than 13 and older than 72 with risk factors for HIV infection  For pregnant patients, it is covered up to 3 times per pregnancy      Immunizations:  Immunization Recommendations   Influenza Vaccine Annual influenza vaccination during flu season is recommended for all persons aged >= 6 months who do not have contraindications   Pneumococcal Vaccine   * Pneumococcal conjugate vaccine = PCV13 (Prevnar 13), PCV15 (Vaxneuvance), PCV20 (Prevnar 20)  * Pneumococcal polysaccharide vaccine = PPSV23 (Pneumovax) Adults 25-60 years old: 1-3 doses may be recommended based on certain risk factors  Adults 72 years old: 1-2 doses may be recommended based off what pneumonia vaccine you previously received   Hepatitis B Vaccine 3 dose series if at intermediate or high risk (ex: diabetes, end stage renal disease, liver disease)   Tetanus (Td) Vaccine - COST NOT COVERED BY MEDICARE PART B Following completion of primary series, a booster dose should be given every 10 years to maintain immunity against tetanus  Td may also be given as tetanus wound prophylaxis  Tdap Vaccine - COST NOT COVERED BY MEDICARE PART B Recommended at least once for all adults  For pregnant patients, recommended with each pregnancy  Shingles Vaccine (Shingrix) - COST NOT COVERED BY MEDICARE PART B  2 shot series recommended in those aged 48 and above     Health Maintenance Due:      Topic Date Due    Breast Cancer Screening: Mammogram  08/25/2023    Colorectal Cancer Screening  03/02/2026    Hepatitis C Screening  Completed     Immunizations Due:      Topic Date Due    Influenza Vaccine (1) 09/01/2022     Advance Directives   What are advance directives? Advance directives are legal documents that state your wishes and plans for medical care  These plans are made ahead of time in case you lose your ability to make decisions for yourself  Advance directives can apply to any medical decision, such as the treatments you want, and if you want to donate organs  What are the types of advance directives? There are many types of advance directives, and each state has rules about how to use them  You may choose a combination of any of the following:  Living will: This is a written record of the treatment you want  You can also choose which treatments you do not want, which to limit, and which to stop at a certain time  This includes surgery, medicine, IV fluid, and tube feedings  Durable power of  for healthcare [facility]): This is a written record that states who you want to make healthcare choices for you when you are unable to make them for yourself  This person, called a proxy, is usually a family member or a friend  You may choose more than 1 proxy  Do not resuscitate (DNR) order:  A DNR order is used in case your heart stops beating or you stop breathing   It is a request not to have certain forms of treatment, such as CPR  A DNR order may be included in other types of advance directives  Medical directive: This covers the care that you want if you are in a coma, near death, or unable to make decisions for yourself  You can list the treatments you want for each condition  Treatment may include pain medicine, surgery, blood transfusions, dialysis, IV or tube feedings, and a ventilator (breathing machine)  Values history: This document has questions about your views, beliefs, and how you feel and think about life  This information can help others choose the care that you would choose  Why are advance directives important? An advance directive helps you control your care  Although spoken wishes may be used, it is better to have your wishes written down  Spoken wishes can be misunderstood, or not followed  Treatments may be given even if you do not want them  An advance directive may make it easier for your family to make difficult choices about your care  Weight Management   Why it is important to manage your weight:  Being overweight increases your risk of health conditions such as heart disease, high blood pressure, type 2 diabetes, and certain types of cancer  It can also increase your risk for osteoarthritis, sleep apnea, and other respiratory problems  Aim for a slow, steady weight loss  Even a small amount of weight loss can lower your risk of health problems  How to lose weight safely:  A safe and healthy way to lose weight is to eat fewer calories and get regular exercise  You can lose up about 1 pound a week by decreasing the number of calories you eat by 500 calories each day  Healthy meal plan for weight management:  A healthy meal plan includes a variety of foods, contains fewer calories, and helps you stay healthy  A healthy meal plan includes the following:  Eat whole-grain foods more often  A healthy meal plan should contain fiber   Fiber is the part of grains, fruits, and vegetables that is not broken down by your body  Whole-grain foods are healthy and provide extra fiber in your diet  Some examples of whole-grain foods are whole-wheat breads and pastas, oatmeal, brown rice, and bulgur  Eat a variety of vegetables every day  Include dark, leafy greens such as spinach, kale, jazlyn greens, and mustard greens  Eat yellow and orange vegetables such as carrots, sweet potatoes, and winter squash  Eat a variety of fruits every day  Choose fresh or canned fruit (canned in its own juice or light syrup) instead of juice  Fruit juice has very little or no fiber  Eat low-fat dairy foods  Drink fat-free (skim) milk or 1% milk  Eat fat-free yogurt and low-fat cottage cheese  Try low-fat cheeses such as mozzarella and other reduced-fat cheeses  Choose meat and other protein foods that are low in fat  Choose beans or other legumes such as split peas or lentils  Choose fish, skinless poultry (chicken or turkey), or lean cuts of red meat (beef or pork)  Before you cook meat or poultry, cut off any visible fat  Use less fat and oil  Try baking foods instead of frying them  Add less fat, such as margarine, sour cream, regular salad dressing and mayonnaise to foods  Eat fewer high-fat foods  Some examples of high-fat foods include french fries, doughnuts, ice cream, and cakes  Eat fewer sweets  Limit foods and drinks that are high in sugar  This includes candy, cookies, regular soda, and sweetened drinks  Exercise:  Exercise at least 30 minutes per day on most days of the week  Some examples of exercise include walking, biking, dancing, and swimming  You can also fit in more physical activity by taking the stairs instead of the elevator or parking farther away from stores  Ask your healthcare provider about the best exercise plan for you  © Copyright [employer] 2018 Information is for End User's use only and may not be sold, redistributed or otherwise used for commercial purposes   All illustrations and images included in CareNotes® are the copyrighted property of [employer]  or Renard Ayala

## 2022-09-20 NOTE — PROGRESS NOTES
Assessment and Plan:     Problem List Items Addressed This Visit        Endocrine    Impaired fasting glucose     Continue with healthy diet and exercise, recent hemoglobin A1c and fasting glucose were excellent         Acquired hypothyroidism     No excessive fatigue, constipation or cold intolerance, continue levothyroxine 50 micrograms daily along with monitoring  Cardiovascular and Mediastinum    Benign essential hypertension - Primary     A little elevated here, better home, continue current meds along with healthy diet and exercise            Other    Pure hypercholesterolemia     Cholesterol looking a lot better, patient is taking over-the-counter supplement for this         Medicare annual wellness visit, subsequent     Discussed preventative health, cancer screening, immunizations, and safety issues  Patient up-to-date with colorectal cancer screening done March 2021 with recommendations to recheck again in 5 years  Patient had mammogram 08/25/2022  Patient had DEXA scan 08/25/2022  Patient up-to-date with all immunizations, I recommend the flu shot today         Morbid obesity ([address] )     Continue with healthy diet and exercise           Other Visit Diagnoses     Needs flu shot        Relevant Orders    influenza vaccine, high-dose, PF 0 7 mL (FLUZONE HIGH-DOSE)          Depression Screening and Follow-up Plan: Patient was screened for depression during today's encounter  They screened negative with a PHQ-2 score of 0  Preventive health issues were discussed with patient, and age appropriate screening tests were ordered as noted in patient's After Visit Summary  Personalized health advice and appropriate referrals for health education or preventive services given if needed, as noted in patient's After Visit Summary       History of Present Illness:     Patient presents for a Medicare Wellness Visit    Pt here for AWV and follow up of chronic conditions     Patient Care Team:  Ran Mcclain eHidi Walsh MD as PCP - General     Review of Systems:     Review of Systems   Constitutional: Negative for chills, fatigue and fever  HENT: Negative for congestion, nosebleeds, postnasal drip, sore throat and trouble swallowing  Eyes: Negative for pain  Respiratory: Negative for cough, chest tightness, shortness of breath and wheezing  Cardiovascular: Negative for chest pain, palpitations and leg swelling  Gastrointestinal: Negative for abdominal pain, constipation, diarrhea, nausea and vomiting  Endocrine: Negative for cold intolerance, polydipsia and polyuria  Genitourinary: Negative for dysuria, flank pain and hematuria  Musculoskeletal: Negative for arthralgias  Skin: Negative for rash  Neurological: Negative for dizziness, tremors and headaches  Hematological: Does not bruise/bleed easily  Psychiatric/Behavioral: Negative for confusion and dysphoric mood  The patient is not nervous/anxious           Problem List:     Patient Active Problem List   Diagnosis    Benign essential hypertension    Impaired fasting glucose    Pure hypercholesterolemia    Medicare annual wellness visit, subsequent    Acquired hypothyroidism    Plantar fasciitis    Morbid obesity ([address] )    MIHAELA (obstructive sleep apnea)    Sensorineural hearing loss (SNHL) of both ears    Pulsatile tinnitus    Exostosis of left external auditory canal      Past Medical and Surgical History:     Past Medical History:   Diagnosis Date    Arthritis     Environmental allergies     Fibroid     Hyperlipidemia     Hypertension     Hypothyroidism     Plantar fasciitis     Varicella      Past Surgical History:   Procedure Laterality Date    KNEE SURGERY        Family History:     Family History   Problem Relation Age of Onset    Hypertension Mother     Dementia Mother     Arthritis Mother     Coronary artery disease Father     Throat cancer Brother 61    Endometrial cancer Maternal Aunt 48    Lung cancer Maternal Aunt 61    No Known Problems Maternal Aunt     Bone cancer Maternal Uncle 71    Coronary artery disease Maternal Uncle     No Known Problems Paternal Aunt     No Known Problems Maternal Grandmother     No Known Problems Maternal Grandfather     No Known Problems Paternal Grandmother     No Known Problems Paternal Grandfather     No Known Problems Daughter     No Known Problems Son       Social History:     Social History     Socioeconomic History    Marital status: /Civil Union     Spouse name: None    Number of children: None    Years of education: None    Highest education level: None   Occupational History    None   Tobacco Use    Smoking status: Former Smoker    Smokeless tobacco: Never Used    Tobacco comment: quit 25 yrs ago   Vaping Use    Vaping Use: Never used   Substance and Sexual Activity    Alcohol use: Yes     Comment: social    Drug use: No    Sexual activity: Not Currently     Partners: Male   Other Topics Concern    None   Social History Narrative    None     Social Determinants of Health     Financial Resource Strain: Low Risk     Difficulty of Paying Living Expenses: Not very hard   Food Insecurity: Not on file   Transportation Needs: No Transportation Needs    Lack of Transportation (Medical): No    Lack of Transportation (Non-Medical):  No   Physical Activity: Not on file   Stress: Not on file   Social Connections: Not on file   Intimate Partner Violence: Not on file   Housing Stability: Not on file      Medications and Allergies:     Current Outpatient Medications   Medication Sig Dispense Refill    amLODIPine (NORVASC) 10 mg tablet Take 1 tablet (10 mg total) by mouth daily (Patient taking differently: Take 5 mg by mouth daily) 90 tablet 3    BEE POLLEN PO Take by mouth      Calcium Carb-Cholecalciferol (CALCIUM + D3 PO) Take by mouth      co-enzyme Q-10 30 MG capsule Take 100 mg by mouth 2 (two) times a day       diphenhydrAMINE (BENADRYL) 25 mg capsule Take 25 mg by mouth as needed      ibuprofen (MOTRIN) 800 mg tablet       losartan (COZAAR) 100 MG tablet TAKE 1 TABLET DAILY 90 tablet 3    Melatonin ER 10 MG TBCR Take 10 mg by mouth      meloxicam (MOBIC) 15 mg tablet Take 15 mg by mouth daily      metoprolol succinate (TOPROL-XL) 100 mg 24 hr tablet TAKE 1 TABLET DAILY 90 tablet 3    Multiple Vitamins-Minerals (PRESERVISION AREDS 2+MULTI VIT PO)       NON FORMULARY 2 (two) times a day orosine      Synthroid 50 MCG tablet TAKE 1 TABLET DAILY 90 tablet 3    hydrochlorothiazide (HYDRODIURIL) 50 mg tablet Take 50 mg by mouth daily (Patient not taking: Reported on 9/20/2022)       No current facility-administered medications for this visit       Allergies   Allergen Reactions    Diclofenac Swelling    Atorvastatin Other (See Comments)     Muscle ache      Immunizations:     Immunization History   Administered Date(s) Administered    COVID-19 PFIZER VACCINE 0 3 ML IM 03/22/2021, 04/15/2021, 10/18/2021    COVID-19 Pfizer vac (Jose Eduardo-sucrose, gray cap) 12 yr+ IM 04/28/2022    INFLUENZA 09/30/2013, 09/29/2015, 12/12/2016, 12/14/2016, 12/14/2016, 10/31/2017, 10/17/2018, 10/21/2019, 09/30/2020, 09/23/2021    Influenza Quadrivalent Preservative Free 3 years and older IM 12/12/2016    Influenza Quadrivalent, 6-35 Months IM 09/29/2015    Influenza Split High Dose Preservative Free IM 10/21/2019    Influenza, seasonal, injectable 09/30/2013    Pneumococcal Conjugate 13-Valent 08/02/2018    Pneumococcal Polysaccharide PPV23 08/05/2019    Tdap 10/21/2019    Zoster 07/22/2015    Zoster Vaccine Recombinant 08/14/2018, 10/17/2018    influenza, trivalent, adjuvanted 10/17/2018, 09/30/2020      Health Maintenance:         Topic Date Due    Breast Cancer Screening: Mammogram  08/25/2023    Colorectal Cancer Screening  03/02/2026    Hepatitis C Screening  Completed         Topic Date Due    Influenza Vaccine (1) 09/01/2022      Medicare Screening Tests and Risk Assessments:     Rhonda Castillo is here for her Subsequent Wellness visit  Health Risk Assessment:   Patient rates overall health as good  Patient feels that their physical health rating is slightly better  Patient is satisfied with their life  Eyesight was rated as slightly worse  Hearing was rated as slightly worse  Patient feels that their emotional and mental health rating is same  Patients states they are never, rarely angry  Patient states they are never, rarely unusually tired/fatigued  Pain experienced in the last 7 days has been some  Patient's pain rating has been 4/10  Patient states that she has experienced no weight loss or gain in last 6 months  Depression Screening:   PHQ-2 Score: 0      Fall Risk Screening: In the past year, patient has experienced: no history of falling in past year      Urinary Incontinence Screening:   Patient has not leaked urine accidently in the last six months  Home Safety:  Patient has trouble with stairs inside or outside of their home  Patient has working smoke alarms and has working carbon monoxide detector  Home safety hazards include: none  Nutrition:   Current diet is Low Saturated Fat and Low Carb  st [facility] weight loss program    Medications:   Patient is currently taking over-the-counter supplements  OTC medications include: melatonin,bee pollen,vit D,calcium,preservision,Q10 with red yeast  Patient is able to manage medications  Activities of Daily Living (ADLs)/Instrumental Activities of Daily Living (IADLs):   Walk and transfer into and out of bed and chair?: Yes  Dress and groom yourself?: Yes    Bathe or shower yourself?: Yes    Feed yourself? Yes  Do your laundry/housekeeping?: Yes  Manage your money, pay your bills and track your expenses?: Yes  Make your own meals?: Yes    Do your own shopping?: Yes    Previous Hospitalizations:   Any hospitalizations or ED visits within the last 12 months?: No      Advance Care Planning:   Living will:  Yes Durable POA for healthcare: Yes    Advanced directive: Yes      Cognitive Screening:   Provider or family/friend/caregiver concerned regarding cognition?: No    PREVENTIVE SCREENINGS      Cardiovascular Screening:    General: Screening Not Indicated, History Lipid Disorder, Risks and Benefits Discussed and Screening Current      Diabetes Screening:     General: Screening Current and Risks and Benefits Discussed      Colorectal Cancer Screening:     General: Screening Current      Breast Cancer Screening:     General: Screening Current and Risks and Benefits Discussed      Cervical Cancer Screening:    General: Screening Not Indicated      Osteoporosis Screening:    General: Risks and Benefits Discussed and Screening Current      Abdominal Aortic Aneurysm (AAA) Screening:        General: Screening Not Indicated      Lung Cancer Screening:     General: Screening Not Indicated      Hepatitis C Screening:    General: Screening Current    Screening, Brief Intervention, and Referral to Treatment (SBIRT)    Screening  Typical number of drinks in a day: 0  Typical number of drinks in a week: 0  Interpretation: Low risk drinking behavior  AUDIT-C Screenin) How often did you have a drink containing alcohol in the past year? monthly or less  2) How many drinks did you have on a typical day when you were drinking in the past year? 1 to 2  3) How often did you have 6 or more drinks on one occasion in the past year? never    AUDIT-C Score: 1  Interpretation: Score 0-2 (female): Negative screen for alcohol misuse    Single Item Drug Screening:  How often have you used an illegal drug (including marijuana) or a prescription medication for non-medical reasons in the past year? never    Single Item Drug Screen Score: 0  Interpretation: Negative screen for possible drug use disorder    Brief Intervention  Alcohol & drug use screenings were reviewed  No concerns regarding substance use disorder identified       Other Counseling Topics:   Car/seat belt/driving safety, skin self-exam and sunscreen  No exam data present     Physical Exam:     /80   Pulse 70   Temp 98 5 °F (36 9 °C)   Resp 16   Ht 5' 6 8" (1 697 m)   Wt 103 kg (226 lb 9 6 oz)   SpO2 96%   BMI 35 70 kg/m²     Physical Exam  Vitals reviewed  Constitutional:       Appearance: Normal appearance  She is well-developed  HENT:      Head: Normocephalic and atraumatic  Right Ear: External ear normal       Left Ear: External ear normal       Nose: Nose normal    Eyes:      General: No scleral icterus  Conjunctiva/sclera: Conjunctivae normal    Neck:      Thyroid: No thyromegaly  Trachea: No tracheal deviation  Cardiovascular:      Rate and Rhythm: Normal rate and regular rhythm  Heart sounds: No murmur heard  Pulmonary:      Effort: No respiratory distress  Breath sounds: Normal breath sounds  No wheezing or rales  Abdominal:      General: Bowel sounds are normal       Palpations: Abdomen is soft  There is no mass  Tenderness: There is no abdominal tenderness  There is no guarding  Musculoskeletal:      Cervical back: Normal range of motion and neck supple  Right lower leg: No edema  Left lower leg: No edema  Lymphadenopathy:      Cervical: No cervical adenopathy  Skin:     Coloration: Skin is not jaundiced or pale  Neurological:      General: No focal deficit present  Mental Status: She is alert and oriented to person, place, and time  Psychiatric:         Mood and Affect: Mood normal          Behavior: Behavior normal          Thought Content:  Thought content normal          Judgment: Judgment normal           Casey Colon MD

## 2022-09-20 NOTE — ASSESSMENT & PLAN NOTE
Discussed preventative health, cancer screening, immunizations, and safety issues  Patient up-to-date with colorectal cancer screening done March 2021 with recommendations to recheck again in 5 years  Patient had mammogram 08/25/2022  Patient had DEXA scan 08/25/2022     Patient up-to-date with all immunizations, I recommend the flu shot today

## 2022-09-20 NOTE — ASSESSMENT & PLAN NOTE
No excessive fatigue, constipation or cold intolerance, continue levothyroxine 50 micrograms daily along with monitoring

## 2022-10-07 ENCOUNTER — OFFICE VISIT (OUTPATIENT)
Dept: BARIATRICS | Facility: CLINIC | Age: 67
End: 2022-10-07

## 2022-10-07 VITALS — HEIGHT: 67 IN | BODY MASS INDEX: 35.48 KG/M2 | WEIGHT: 226.08 LBS

## 2022-10-07 DIAGNOSIS — R63.5 ABNORMAL WEIGHT GAIN: Primary | ICD-10-CM

## 2022-10-07 PROCEDURE — RECHECK

## 2022-10-28 NOTE — PROGRESS NOTES
Weight Management Medical Nutrition Assessment   Is here with her  for 2/6 bundle  Current wt: 225 3  lbs  She has maintained her weight x 1 month with overall loss of  43 2  lbs x  6 months  She feels she is hitting a plateau however does report having higher calorie items such as cake/cookies  Not tracking or measuring portions at this time although does use two meal replacements and one [employer]ly meal  Reviewed choices from [employer] & identified that some of these items are 50-60 grams of carbs  Discussed that she is not in ketosis in this point and would recommend that she stay out of ketosis with a moderate amount of carbs  Recommend she stay 30-35 gm carb at meal and assisted with finding options  She has started biking 2x/wk in addition to bowling and walking  Upcoming holiday discussed  She will f/u in ~1 month        Patient seen by Medical Provider in past 6 months:  yes  Requested to schedule appointment with Medical Provider: No    Anthropometric Measurements  Start Weight (#): 268 2 lbs 5/4/2022   Current Weight (#):225 3 lbs   TBW % Change from start weight:  16 1%  Ideal Body Weight (#): 153 7 lbs BMI 25 (66 8")  Goal Weight (#):  lbs     Weight Loss History  Previous weight loss attempts: Commercial Programs (Weight Watchers, [employer], etc )  Counseling with  MD  Exercise  Meal Replacements (Medifast, Slim Fast, etc )  Self Created Diets (Portion Control, Healthy Food Choices, etc )    Food and Nutrition Related History  Wake up: 8-9   Bed Time: 11    Food Recall  Breakfast: 8:30-9:30 replacement OR 2x/month veggie omelet, english muffin, sausage    Snack: skip    Lunch: 12-1:00 [employer]  350-430 parminder  Snack: bar OR sometimes lemon cake 3x/wk   Dinner: 4-6 replacement  Snack: string cheese, pickle    Beverages: water  Volume of beverage intake: 80 oz    Weekends: Same  Cravings: no specific identified   Trouble area of day: not identified     Frequency of Eating out: none currently  Food restrictions:  n/a  Cooking: self   Food Shopping: self    Physical Activity Intake  Activity: biking, bowling 1x/wk, walking dog  Frequency:2x/wk 30 minutes biking   Physical limitations/barriers to exercise: n/a    Estimated Needs  Energy  B[employer] Energy Needs: BMR : 1167   1-2# loss weekly sedentary:  530-4707         1-2# loss weekly lightly active: 4907-9850  Maintenance calories for sedentary activity level: 1892  Protein: 72-90 gm      (1 2-1 5g/kg IBW)  Fluid: 70 oz    (35mL/kg IBW)    Nutrition Diagnosis  Yes; Overweight/obesity  related to Excess energy intake as evidenced by  BMI more than normative standard for age and sex (obesity-grade II 35-39  9)       Nutrition Intervention    Nutrition Prescription  Calories: 5482-6212  Protein: 105-110 gm  Carb: 43-46 gm    Meal Plan (Adrien/Pro/Carb)  Breakfast: 200, 27, 10  Snack:  Lunch: 350-400, 30, 10  Snack: 160, 15 , 13 net  Dinner: 200, 27, 10  Snack:100-150, 5-10, 0-3    Nutrition Education:     keto based meal plan  Low carb snacks  Carb counting  Food journaling  Physical activity      Nutrition Counseling:  Strategies: as above      Monitoring and Evaluation:  Evaluation criteria:  Energy Intake  Meet protein needs  Maintain adequate hydration  Monitor weekly weight  Food logging/measuring  Physical activity   Meal planning    Barriers to learning:none  Readiness to change: Action:  (Changing behavior)  Comprehension: very good  Expected Compliance: very good

## 2022-11-04 ENCOUNTER — OFFICE VISIT (OUTPATIENT)
Dept: BARIATRICS | Facility: CLINIC | Age: 67
End: 2022-11-04

## 2022-11-04 VITALS — HEIGHT: 67 IN | BODY MASS INDEX: 35.36 KG/M2 | WEIGHT: 225.3 LBS

## 2022-11-04 DIAGNOSIS — R63.5 ABNORMAL WEIGHT GAIN: Primary | ICD-10-CM

## 2022-11-25 NOTE — PROGRESS NOTES
Weight Management Medical Nutrition Assessment   Luz Cohn is here for 3/6 bundle  Current wt: 225 8   lbs  She has maintained her weight x 1 month with overall loss of 42 7   lbs x  7 months  Her  has been picking up desserts which is a challenge at times  Continues with stress r/t 's health  Has been more aware of carbs in [employer] meals and reports keeping these <35 gm  Less biking that prior however she continues to bowl 1x/wk and is raking leaves 1x/wk  She feels that after the holidays she will be able to focus more  She will f/u in ~1 month        Patient seen by Medical Provider in past 6 months:  yes  Requested to schedule appointment with Medical Provider: No    Anthropometric Measurements  Start Weight (#): 268 2 lbs 5/4/2022   Current Weight (#):225 8 lbs   TBW % Change from start weight:  15 9%  Ideal Body Weight (#): 153 7 lbs BMI 25 (66 8")  Goal Weight (#):  lbs     Weight Loss History  Previous weight loss attempts: Commercial Programs (Weight Watchers, Latisha Distradha, etc )  Counseling with  MD  Exercise  Meal Replacements (Medifast, Slim Fast, etc )  Self Created Diets (Portion Control, Healthy Food Choices, etc )    Food and Nutrition Related History  Wake up: 8-9   Bed Time: 11    Food Recall  Breakfast: 8:30-9:30 replacement OR 2x/month veggie omelet, english muffin, sausage    Snack: skip    Lunch: 12-1:00 [employer]  </=400 parminder  Snack: bar OR 1/2 cup honey bunches + 1/2 cup milk  Dinner: 4-6 replacement  Snack: string cheese, pickle OR peanuts     Beverages: water  Volume of beverage intake: 80 oz    Weekends: Same  Cravings: no specific identified   Trouble area of day: not identified     Frequency of Eating out: none currently  Food restrictions:  n/a  Cooking: self   Food Shopping: self    Physical Activity Intake  Activity: biking, bowling 1x/wk, walking dog, leaf removal  Frequency:several times per week  Physical limitations/barriers to exercise: n/a    Estimated Needs  Energy  [employer] Energy Needs: BMR : 2272   1-2# loss weekly sedentary:  [phone]         1-2# loss weekly lightly active: 2248-5326  Maintenance calories for sedentary activity level: 1892  Protein: 72-90 gm      (1 2-1 5g/kg IBW)  Fluid: 70 oz    (35mL/kg IBW)    Nutrition Diagnosis  Yes; Overweight/obesity  related to Excess energy intake as evidenced by  BMI more than normative standard for age and sex (obesity-grade II 35-39  9)       Nutrition Intervention    Nutrition Prescription  Calories: 5614-8312  Protein: 100 gm    Meal Plan (Adrien/Pro)  Breakfast: 200, 27  Snack:  Lunch: 350-400, 30  Snack: 160, 15   Dinner: 200, 27  Snack:100-150, 5-10    Nutrition Education:     calorie controlled meal plan  healthy snacks  Food journaling  Physical activity      Nutrition Counseling:  Strategies: as above      Monitoring and Evaluation:  Evaluation criteria:  Energy Intake  Meet protein needs  Maintain adequate hydration  Monitor weekly weight  Food logging/measuring  Physical activity   Meal planning    Barriers to learning:none  Readiness to change: Action:  (Changing behavior)  Comprehension: very good  Expected Compliance: very good

## 2022-12-02 ENCOUNTER — OFFICE VISIT (OUTPATIENT)
Dept: BARIATRICS | Facility: CLINIC | Age: 67
End: 2022-12-02

## 2022-12-02 VITALS — BODY MASS INDEX: 35.44 KG/M2 | HEIGHT: 67 IN | WEIGHT: 225.8 LBS

## 2022-12-02 DIAGNOSIS — R63.5 ABNORMAL WEIGHT GAIN: Primary | ICD-10-CM

## 2022-12-28 ENCOUNTER — OFFICE VISIT (OUTPATIENT)
Dept: SLEEP CENTER | Facility: CLINIC | Age: 67
End: 2022-12-28

## 2022-12-28 VITALS
SYSTOLIC BLOOD PRESSURE: 138 MMHG | HEIGHT: 67 IN | OXYGEN SATURATION: 98 % | BODY MASS INDEX: 36.88 KG/M2 | DIASTOLIC BLOOD PRESSURE: 80 MMHG | HEART RATE: 65 BPM | WEIGHT: 235 LBS

## 2022-12-28 DIAGNOSIS — G47.33 OSA (OBSTRUCTIVE SLEEP APNEA): Primary | ICD-10-CM

## 2022-12-28 NOTE — PROGRESS NOTES
Progress Note - Jagruti Lam 1620 HCY:[birth year] MRN: [id number]      Reason for Visit:  69 y  o female here for annual follow-up    Assessment:  Doing well on current therapy of CPAP 16 cm for severe obstructive sleep apnea (AHI = 42 0)  The patient used a mandibular advancement device for a short period  She found it uncomfortable and changed back to CPAP  Plan:  Continue same  The patient wears a [employer] nasal interface  Follow up: One year    History of Present Illness:  History of MIHAELA on PAP therapy  Fully compliant and deriving benefit        Review of Systems      Genitourinary none   Cardiology none   Gastrointestinal none   Neurology need to move extremities   Constitutional none   Integumentary none   Psychiatry none   Musculoskeletal joint pain and back pain   Pulmonary difficulty breathing when lying flat    ENT ringing in ears   Endocrine none   Hematological none           I have reviewed and updated the review of systems as necessary      Historical Information    Past Medical History:   Past Medical History:   Diagnosis Date   • Arthritis    • Environmental allergies    • Fibroid    • Hyperlipidemia    • Hypertension    • Hypothyroidism    • Plantar fasciitis    • Varicella          Past Surgical History:   Past Surgical History:   Procedure Laterality Date   • KNEE SURGERY         Social History:   Social History     Socioeconomic History   • Marital status: /Civil Union     Spouse name: Not on file   • Number of children: Not on file   • Years of education: Not on file   • Highest education level: Not on file   Occupational History   • Not on file   Tobacco Use   • Smoking status: Former   • Smokeless tobacco: Never   • Tobacco comments:     quit 25 yrs ago   Vaping Use   • Vaping Use: Never used   Substance and Sexual Activity   • Alcohol use: Yes     Comment: social   • Drug use: No   • Sexual activity: Not Currently     Partners: Male   Other Topics Concern   • Not on file   Social History Narrative   • Not on file     Social Determinants of Health     Financial Resource Strain: Low Risk    • Difficulty of Paying Living Expenses: Not very hard   Food Insecurity: Not on file   Transportation Needs: No Transportation Needs   • Lack of Transportation (Medical): No   • Lack of Transportation (Non-Medical):  No   Physical Activity: Not on file   Stress: Not on file   Social Connections: Not on file   Intimate Partner Violence: Not on file   Housing Stability: Not on file       Family History:   Family History   Problem Relation Age of Onset   • Hypertension Mother    • Dementia Mother    • Arthritis Mother    • Coronary artery disease Father    • Throat cancer Brother 61   • Endometrial cancer Maternal Aunt 50   • Lung cancer Maternal Aunt 60   • No Known Problems Maternal Aunt    • Bone cancer Maternal Uncle 69   • Coronary artery disease Maternal Uncle    • No Known Problems Paternal Aunt    • No Known Problems Maternal Grandmother    • No Known Problems Maternal Grandfather    • No Known Problems Paternal Grandmother    • No Known Problems Paternal Grandfather    • No Known Problems Daughter    • No Known Problems Son        Medications/Allergies:      Current Outpatient Medications:   •  amLODIPine (NORVASC) 10 mg tablet, Take 1 tablet (10 mg total) by mouth daily (Patient taking differently: Take 5 mg by mouth daily), Disp: 90 tablet, Rfl: 3  •  BEE POLLEN PO, Take by mouth, Disp: , Rfl:   •  Calcium Carb-Cholecalciferol (CALCIUM + D3 PO), Take by mouth, Disp: , Rfl:   •  co-enzyme Q-10 30 MG capsule, Take 100 mg by mouth 2 (two) times a day , Disp: , Rfl:   •  losartan (COZAAR) 100 MG tablet, TAKE 1 TABLET DAILY, Disp: 90 tablet, Rfl: 3  •  metoprolol succinate (TOPROL-XL) 100 mg 24 hr tablet, TAKE 1 TABLET DAILY, Disp: 90 tablet, Rfl: 3  •  Multiple Vitamins-Minerals (PRESERVISION AREDS 2+MULTI VIT PO), , Disp: , Rfl:   •  NON FORMULARY, 2 (two) times a day orosine, Disp: , Rfl:   •  Synthroid 50 MCG tablet, TAKE 1 TABLET DAILY, Disp: 90 tablet, Rfl: 3  •  diphenhydrAMINE (BENADRYL) 25 mg capsule, Take 25 mg by mouth as needed (Patient not taking: Reported on 12/28/2022), Disp: , Rfl:   •  hydrochlorothiazide (HYDRODIURIL) 50 mg tablet, Take 50 mg by mouth daily (Patient not taking: Reported on 9/20/2022), Disp: , Rfl:   •  ibuprofen (MOTRIN) 800 mg tablet, , Disp: , Rfl:   •  Melatonin ER 10 MG TBCR, Take 10 mg by mouth (Patient not taking: Reported on 12/28/2022), Disp: , Rfl:   •  meloxicam (MOBIC) 15 mg tablet, Take 15 mg by mouth daily, Disp: , Rfl:           Objective      Vital Signs:   Vitals:    12/28/22 1142   BP: 138/80   Pulse: 65   SpO2: 98%     [place] Sleepiness Scale:          Physical Exam:    General: Alert, appropriate, cooperative, overweight    Head: NC/AT    Skin: Warm, dry    Neuro: No motor abnormalities, cranial nerves appear intact    Extremity: No clubbing, cyanosis      DME Provider: [employer] [facility]        Counseling / Coordination of Care   I have spent 15 minutes with the patient today in which greater than 50% of this time was spent in counseling/coordination of care regarding: equipment and compliance  Board Certified Sleep Specialist    Portions of the record may have been created with voice recognition software  Occasional wrong word or "sound a like" substitutions may have occurred due to the inherent limitations of voice recognition software  Read the chart carefully and recognize, using context, where substitutions have occurred

## 2022-12-29 ENCOUNTER — TELEPHONE (OUTPATIENT)
Dept: SLEEP CENTER | Facility: CLINIC | Age: 67
End: 2022-12-29

## 2022-12-29 NOTE — TELEPHONE ENCOUNTER
Rx for PAP resupply sent to [facility] via [facility]

## 2023-01-03 LAB

## 2023-01-11 ENCOUNTER — OFFICE VISIT (OUTPATIENT)
Dept: BARIATRICS | Facility: CLINIC | Age: 68
End: 2023-01-11

## 2023-01-11 VITALS — WEIGHT: 231.6 LBS | HEIGHT: 67 IN | BODY MASS INDEX: 36.35 KG/M2

## 2023-01-11 DIAGNOSIS — R63.5 ABNORMAL WEIGHT GAIN: Primary | ICD-10-CM

## 2023-01-13 ENCOUNTER — RA CDI HCC (OUTPATIENT)
Dept: OTHER | Facility: HOSPITAL | Age: 68
End: 2023-01-13

## 2023-01-20 ENCOUNTER — OFFICE VISIT (OUTPATIENT)
Dept: INTERNAL MEDICINE CLINIC | Facility: CLINIC | Age: 68
End: 2023-01-20

## 2023-01-20 VITALS
SYSTOLIC BLOOD PRESSURE: 136 MMHG | HEART RATE: 62 BPM | BODY MASS INDEX: 37.28 KG/M2 | WEIGHT: 232 LBS | HEIGHT: 66 IN | DIASTOLIC BLOOD PRESSURE: 76 MMHG | OXYGEN SATURATION: 97 %

## 2023-01-20 DIAGNOSIS — E03.9 ACQUIRED HYPOTHYROIDISM: ICD-10-CM

## 2023-01-20 DIAGNOSIS — G47.33 OSA (OBSTRUCTIVE SLEEP APNEA): ICD-10-CM

## 2023-01-20 DIAGNOSIS — I10 BENIGN ESSENTIAL HYPERTENSION: Primary | ICD-10-CM

## 2023-01-20 DIAGNOSIS — R73.01 IMPAIRED FASTING GLUCOSE: ICD-10-CM

## 2023-01-20 DIAGNOSIS — E66.01 MORBID OBESITY (HCC): ICD-10-CM

## 2023-01-20 DIAGNOSIS — I10 ESSENTIAL HYPERTENSION, BENIGN: ICD-10-CM

## 2023-01-20 DIAGNOSIS — E78.00 PURE HYPERCHOLESTEROLEMIA: ICD-10-CM

## 2023-01-20 RX ORDER — LOSARTAN POTASSIUM 100 MG/1
100 TABLET ORAL DAILY
Qty: 90 TABLET | Refills: 3 | Status: SHIPPED | OUTPATIENT
Start: 2023-01-20

## 2023-01-20 RX ORDER — LEVOTHYROXINE SODIUM 0.05 MG/1
50 TABLET ORAL DAILY
Qty: 90 TABLET | Refills: 3 | Status: SHIPPED | OUTPATIENT
Start: 2023-01-20

## 2023-01-20 RX ORDER — AMLODIPINE BESYLATE 5 MG/1
5 TABLET ORAL DAILY
Qty: 90 TABLET | Refills: 3 | Status: SHIPPED | OUTPATIENT
Start: 2023-01-20

## 2023-01-20 RX ORDER — METOPROLOL SUCCINATE 100 MG/1
100 TABLET, EXTENDED RELEASE ORAL DAILY
Qty: 90 TABLET | Refills: 3 | Status: SHIPPED | OUTPATIENT
Start: 2023-01-20

## 2023-01-20 NOTE — PATIENT INSTRUCTIONS
Problem List Items Addressed This Visit          Endocrine    Impaired fasting glucose     Continue with healthy diet and exercise         Acquired hypothyroidism     No fatigue, no constipation, no cold intolerance, continue levothyroxine 50 mcg daily along with monitoring         Relevant Medications    metoprolol succinate (TOPROL-XL) 100 mg 24 hr tablet    levothyroxine (Synthroid) 50 mcg tablet       Respiratory    MIHAELA (obstructive sleep apnea)     Continue CPAP            Cardiovascular and Mediastinum    Benign essential hypertension - Primary     Well-controlled, continue meds along with healthy diet and exercise         Relevant Medications    amLODIPine (NORVASC) 5 mg tablet    losartan (COZAAR) 100 MG tablet    metoprolol succinate (TOPROL-XL) 100 mg 24 hr tablet       Other    Pure hypercholesterolemia     Patient taking an over-the-counter supplement for this, continue         Morbid obesity ([address] )     Continue with healthy diet and exercise          Other Visit Diagnoses       Essential hypertension, benign        Relevant Medications    amLODIPine (NORVASC) 5 mg tablet    losartan (COZAAR) 100 MG tablet    metoprolol succinate (TOPROL-XL) 100 mg 24 hr tablet

## 2023-01-20 NOTE — ASSESSMENT & PLAN NOTE
No fatigue, no constipation, no cold intolerance, continue levothyroxine 50 mcg daily along with monitoring

## 2023-01-20 NOTE — PROGRESS NOTES
Name: Veena Olvera      : 1955      MRN: [id number]  Encounter Provider: Angeles Self MD  Encounter Date: 2023   [facility] [address] [place]     1  Benign essential hypertension  Assessment & Plan:  Well-controlled, continue meds along with healthy diet and exercise    Orders:  -     amLODIPine (NORVASC) 5 mg tablet; Take 1 tablet (5 mg total) by mouth daily  -     losartan (COZAAR) 100 MG tablet; Take 1 tablet (100 mg total) by mouth daily    2  Essential hypertension, benign  -     metoprolol succinate (TOPROL-XL) 100 mg 24 hr tablet; Take 1 tablet (100 mg total) by mouth daily    3  Acquired hypothyroidism  Assessment & Plan:  No fatigue, no constipation, no cold intolerance, continue levothyroxine 50 mcg daily along with monitoring    Orders:  -     levothyroxine (Synthroid) 50 mcg tablet; Take 1 tablet (50 mcg total) by mouth daily    4  Morbid obesity ([address] )  Assessment & Plan:  Continue with healthy diet and exercise      5  Pure hypercholesterolemia  Assessment & Plan:  Patient taking an over-the-counter supplement for this, continue      6  MIHAELA (obstructive sleep apnea)  Assessment & Plan:  Continue CPAP      7  Impaired fasting glucose  Assessment & Plan:  Continue with healthy diet and exercise        BMI Counseling: Body mass index is 37 45 kg/m²  The BMI is above normal  Nutrition recommendations include encouraging healthy choices of fruits and vegetables and moderation in carbohydrate intake  Exercise recommendations include exercising 3-5 times per week  Rationale for BMI follow-up plan is due to patient being overweight or obese  Depression Screening and Follow-up Plan: Patient was screened for depression during today's encounter  They screened negative with a PHQ-2 score of 0  Subjective     Patient here for regular follow-up    Review of Systems   Constitutional: Negative for chills, fatigue and fever     HENT: Negative for congestion, nosebleeds, postnasal drip, sore throat and trouble swallowing  Eyes: Negative for pain  Respiratory: Negative for cough, chest tightness, shortness of breath and wheezing  Cardiovascular: Negative for chest pain, palpitations and leg swelling  Gastrointestinal: Negative for abdominal pain, constipation, diarrhea, nausea and vomiting  Endocrine: Negative for cold intolerance, polydipsia and polyuria  Genitourinary: Negative for dysuria, flank pain and hematuria  Musculoskeletal: Negative for arthralgias  Skin: Negative for rash  Neurological: Negative for dizziness, tremors, light-headedness and headaches  Hematological: Does not bruise/bleed easily  Psychiatric/Behavioral: Negative for confusion and dysphoric mood  The patient is not nervous/anxious          Past Medical History:   Diagnosis Date   • Arthritis    • Environmental allergies    • Fibroid    • Hyperlipidemia    • Hypertension    • Hypothyroidism    • Plantar fasciitis    • Varicella      Past Surgical History:   Procedure Laterality Date   • KNEE SURGERY       Family History   Problem Relation Age of Onset   • Hypertension Mother    • Dementia Mother    • Arthritis Mother    • Coronary artery disease Father    • Throat cancer Brother 61   • Endometrial cancer Maternal Aunt 50   • Lung cancer Maternal Aunt 60   • No Known Problems Maternal Aunt    • Bone cancer Maternal Uncle 69   • Coronary artery disease Maternal Uncle    • No Known Problems Paternal Aunt    • No Known Problems Maternal Grandmother    • No Known Problems Maternal Grandfather    • No Known Problems Paternal Grandmother    • No Known Problems Paternal Grandfather    • No Known Problems Daughter    • No Known Problems Son      Social History     Socioeconomic History   • Marital status: /Civil Union     Spouse name: None   • Number of children: None   • Years of education: None   • Highest education level: None   Occupational History   • None Tobacco Use   • Smoking status: Former   • Smokeless tobacco: Never   • Tobacco comments:     quit 25 yrs ago   Vaping Use   • Vaping Use: Never used   Substance and Sexual Activity   • Alcohol use: Yes     Comment: social   • Drug use: No   • Sexual activity: Not Currently     Partners: Male   Other Topics Concern   • None   Social History Narrative   • None     Social Determinants of Health     Financial Resource Strain: Low Risk    • Difficulty of Paying Living Expenses: Not very hard   Food Insecurity: Not on file   Transportation Needs: No Transportation Needs   • Lack of Transportation (Medical): No   • Lack of Transportation (Non-Medical):  No   Physical Activity: Not on file   Stress: Not on file   Social Connections: Not on file   Intimate Partner Violence: Not on file   Housing Stability: Not on file     Current Outpatient Medications on File Prior to Visit   Medication Sig   • amLODIPine (NORVASC) 10 mg tablet Take 1 tablet (10 mg total) by mouth daily (Patient taking differently: Take 5 mg by mouth daily)   • BEE POLLEN PO Take by mouth   • Calcium Carb-Cholecalciferol (CALCIUM + D3 PO) Take by mouth   • co-enzyme Q-10 30 MG capsule Take 100 mg by mouth 2 (two) times a day    • NON FORMULARY 2 (two) times a day orosine   • [DISCONTINUED] losartan (COZAAR) 100 MG tablet TAKE 1 TABLET DAILY   • [DISCONTINUED] metoprolol succinate (TOPROL-XL) 100 mg 24 hr tablet TAKE 1 TABLET DAILY   • [DISCONTINUED] Synthroid 50 MCG tablet TAKE 1 TABLET DAILY   • diphenhydrAMINE (BENADRYL) 25 mg capsule Take 25 mg by mouth as needed (Patient not taking: Reported on 12/28/2022)   • hydrochlorothiazide (HYDRODIURIL) 50 mg tablet Take 50 mg by mouth daily (Patient not taking: Reported on 9/20/2022)   • ibuprofen (MOTRIN) 800 mg tablet  (Patient not taking: Reported on 12/28/2022)   • Melatonin ER 10 MG TBCR Take 10 mg by mouth (Patient not taking: Reported on 12/28/2022)   • meloxicam (MOBIC) 15 mg tablet Take 15 mg by mouth daily   • Multiple Vitamins-Minerals (PRESERVISION AREDS 2+MULTI VIT PO)  (Patient not taking: Reported on 1/20/2023)     Allergies   Allergen Reactions   • Diclofenac Swelling   • Atorvastatin Other (See Comments)     Muscle ache     Immunization History   Administered Date(s) Administered   • COVID-19 PFIZER VACCINE 0 3 ML IM 03/22/2021, 04/15/2021, 10/18/2021   • COVID-19 Pfizer vac (Jose Eduardo-sucrose, gray cap) 12 yr+ IM 04/28/2022   • INFLUENZA 09/30/2013, 09/29/2015, 12/12/2016, 12/14/2016, 12/14/2016, 10/31/2017, 10/17/2018, 10/21/2019, 09/30/2020, 09/23/2021   • Influenza Quadrivalent Preservative Free 3 years and older IM 12/12/2016   • Influenza Quadrivalent, 6-35 Months IM 09/29/2015   • Influenza Split High Dose Preservative Free IM 10/21/2019   • Influenza, high dose seasonal 0 7 mL 09/20/2022   • Influenza, seasonal, injectable 09/30/2013   • Pneumococcal Conjugate 13-Valent 08/02/2018   • Pneumococcal Polysaccharide PPV23 08/05/2019   • Tdap 10/21/2019   • Zoster 07/22/2015   • Zoster Vaccine Recombinant 08/14/2018, 10/17/2018   • influenza, trivalent, adjuvanted 10/17/2018, 09/30/2020       Objective     /76 (BP Location: Left arm, Patient Position: Sitting, Cuff Size: Large)   Pulse 62   Ht 5' 6" (1 676 m)   Wt 105 kg (232 lb)   SpO2 97%   BMI 37 45 kg/m²     Physical Exam  Vitals reviewed  Constitutional:       General: She is not in acute distress  Appearance: Normal appearance  She is well-developed  HENT:      Head: Normocephalic and atraumatic  Right Ear: External ear normal       Left Ear: External ear normal    Eyes:      General: No scleral icterus  Conjunctiva/sclera: Conjunctivae normal    Neck:      Thyroid: No thyromegaly  Trachea: No tracheal deviation  Cardiovascular:      Rate and Rhythm: Normal rate and regular rhythm  Heart sounds: Normal heart sounds  No murmur heard  Pulmonary:      Effort: Pulmonary effort is normal  No respiratory distress  Breath sounds: Normal breath sounds  No wheezing or rales  Abdominal:      General: Bowel sounds are normal       Palpations: Abdomen is soft  Tenderness: There is no abdominal tenderness  There is no guarding or rebound  Musculoskeletal:      Cervical back: Normal range of motion and neck supple  Right lower leg: No edema  Left lower leg: No edema  Lymphadenopathy:      Cervical: No cervical adenopathy  Skin:     Coloration: Skin is not jaundiced or pale  Neurological:      General: No focal deficit present  Mental Status: She is alert and oriented to person, place, and time  Psychiatric:         Mood and Affect: Mood normal          Behavior: Behavior normal          Thought Content:  Thought content normal          Judgment: Judgment normal        Kristopher Lucas MD

## 2023-02-10 NOTE — PROGRESS NOTES
Weight Management Medical Nutrition Assessment   Oleksandr Alberto is here for 5/6 bundle  Current wt: 235 5  lbs  She has gained 3 9   lbs  x ~1 month with overall loss of 32 7  lbs x  9 months  Had wanted to resume keto diet however having trouble finding some of the lower carb frozen meals (does not like to cook)  At this point I do not recommend continuing to attempt keto  It is very restrictive and not yielding positive results  Options for incorporating carbs discussed  Other contributors to weight gain identified to be: less water intake, not measuring energy dense foods such as nuts, less activity and stress regarding 's health  Recommend she start measuring her portions and incorporate a walk as able  A f/u appointment w/provider was also scheduled to discuss medication  She will f/u in 1 month         Patient seen by Medical Provider in past 6 months:  yes  Requested to schedule appointment with Medical Provider: No    Anthropometric Measurements  Start Weight (#): 268 2 lbs 5/4/2022   Current Weight (#):235 5 lbs   TBW % Change from start weight:  12 2%  Ideal Body Weight (#): 153 7 lbs BMI 25 (66 8")  Goal Weight (#):  lbs     Weight Loss History  Previous weight loss attempts: Commercial Programs ([employer], [employer], etc )  Counseling with  MD  Exercise  Meal Replacements (Medifast, Slim Fast, etc )  Self Created Diets (Portion Control, Healthy Food Choices, etc )    Food and Nutrition Related History  Wake up: 8-9   Bed Time: 11    Food Recall  Breakfast: 8:30-10:00 replacement OR occasional veggie omelet, sausage   Snack: skip    Lunch: 12-1:00 was using [employer]ly meals but now Healthy Choice  Snack: bar   Dinner: 4-6 replacement  Snack: unmeasured peanuts     Beverages: water  Volume of beverage intake: 80 oz    Weekends: Same  Cravings: no specific identified   Trouble area of day: not identified     Frequency of Eating out: none currently  Food restrictions:  n/a  Cooking: self   Food Shopping: self    Physical Activity Intake  Activity: bowling 2x/wk, walking dog  Frequency:2x/wk  Physical limitations/barriers to exercise: n/a    Estimated Needs  Energy  B[employer] Energy Needs: BMR : 0716   1-2# loss weekly sedentary: 946-1446         1-2# loss weekly lightly active: 1679-8084  Maintenance calories for sedentary activity level: 1946  Protein: 72-90 gm      (1 2-1 5g/kg IBW)  Fluid: 70 oz    (35mL/kg IBW)    Nutrition Diagnosis  Yes; Overweight/obesity  related to Excess energy intake as evidenced by  BMI more than normative standard for age and sex (obesity-grade II 35-39  9)       Nutrition Intervention    Nutrition Prescription  Calories: 0704-6881  Protein: 100 gm    Meal Plan (Adrien/Pro)  Breakfast: 200, 27  Snack:  Lunch: 350-400, 30  Snack: 160, 15   Dinner: 200, 27  Snack:100-150, 5-10    Nutrition Education:     calorie controlled meal plan  healthy snacks  Food journaling  Physical activity      Nutrition Counseling:  Strategies: as above      Monitoring and Evaluation:  Evaluation criteria:  Energy Intake  Meet protein needs  Maintain adequate hydration  Monitor weekly weight  Food logging/measuring  Physical activity   Meal planning    Barriers to learning:none  Readiness to change: Action:  (Changing behavior) & relapse  Comprehension: very good  Expected Compliance: very good

## 2023-02-15 ENCOUNTER — OFFICE VISIT (OUTPATIENT)
Dept: BARIATRICS | Facility: CLINIC | Age: 68
End: 2023-02-15

## 2023-02-15 VITALS — WEIGHT: 235.5 LBS | BODY MASS INDEX: 36.96 KG/M2 | HEIGHT: 67 IN

## 2023-02-15 DIAGNOSIS — R63.5 ABNORMAL WEIGHT GAIN: Primary | ICD-10-CM

## 2023-02-21 ENCOUNTER — OFFICE VISIT (OUTPATIENT)
Age: 68
End: 2023-02-21

## 2023-02-21 VITALS
DIASTOLIC BLOOD PRESSURE: 96 MMHG | HEART RATE: 71 BPM | BODY MASS INDEX: 37.67 KG/M2 | SYSTOLIC BLOOD PRESSURE: 166 MMHG | WEIGHT: 240 LBS | HEIGHT: 67 IN

## 2023-02-21 DIAGNOSIS — M99.04 SEGMENTAL DYSFUNCTION OF SACRAL REGION: ICD-10-CM

## 2023-02-21 DIAGNOSIS — M79.18 MYOFASCIAL PAIN: ICD-10-CM

## 2023-02-21 DIAGNOSIS — M99.01 SEGMENTAL DYSFUNCTION OF CERVICAL REGION: ICD-10-CM

## 2023-02-21 DIAGNOSIS — M54.6 ACUTE BILATERAL THORACIC BACK PAIN: ICD-10-CM

## 2023-02-21 DIAGNOSIS — M54.50 ACUTE BILATERAL LOW BACK PAIN WITHOUT SCIATICA: ICD-10-CM

## 2023-02-21 DIAGNOSIS — M99.02 SEGMENTAL DYSFUNCTION OF THORACIC REGION: ICD-10-CM

## 2023-02-21 DIAGNOSIS — M54.2 NECK PAIN: ICD-10-CM

## 2023-02-21 DIAGNOSIS — M99.05 SEGMENTAL DYSFUNCTION OF PELVIC REGION: Primary | ICD-10-CM

## 2023-02-21 DIAGNOSIS — M54.16 LUMBAR RADICULOPATHY: ICD-10-CM

## 2023-02-21 DIAGNOSIS — M99.03 SEGMENTAL DYSFUNCTION OF LUMBAR REGION: ICD-10-CM

## 2023-02-21 NOTE — PROGRESS NOTES
Date of first visit: 2/21/2023      HPI:  Juliet Rodriguez presents for treatment today localizes low back pain into the left hip no significant distal radiation denies any numbness or tingling denies any weakness  A good deal of stiffness tightness neck upper back left shoulder blade area  The following portions of the patient's history were reviewed and updated as appropriate: allergies, current medications, past family history, past medical history, past social history, past surgical history, and problem list     Review of Systems    Physical Exam:  Exam reveals pelvic obliquity elevated left versus right innominate the leg with inequality misalignment of the left innominate on sacrum sacral base rotation on the right joint dysfunction left SI L5-S1 motion unit active triggers in the left gluteus medius  Parathoracic hypertonicity noted with a T4-T5 restricted joint function cervical spine reveals decreased left lateral bending extension left rotation joint dysfunction C5-C6    Assessment:   Diagnosis ICD-10-CM Associated Orders   1  Segmental dysfunction of pelvic region  M99 05       2  Lumbar radiculopathy  M54 16       3  Segmental dysfunction of lumbar region  M99 03       4  Segmental dysfunction of sacral region  M99 04       5  Acute bilateral low back pain without sciatica  M54 50       6  Segmental dysfunction of thoracic region  M99 02       7  Acute bilateral thoracic back pain  M54 6       8  Neck pain  M54 2       9  Segmental dysfunction of cervical region  M99 01       10  Myofascial pain  M79 18             Treatment: 93411  Manipulation left innominate, sacrum, L5 via Walker drop maneuver well-tolerated  Ablation T8 T6 producing the joint release well-tolerated    Manipulation C5 C1 producing the joint release well-tolerated    Discussion:  Continue daily stretching see her back for a 4-week follow-up

## 2023-03-08 NOTE — PROGRESS NOTES
Weight Management Medical Nutrition Assessment   Roseline Francisco is here for 6/6 bundle  Current wt:  241 4  lbs  She has gained 5 9  lbs  x ~1 month with overall loss of  26 8  lbs x  10 months  Not tracking but reports consistent intake  Recall shows if anything she is consuming too few calories  Discussed that what she is reporting would not result in this amount of weight gain  She continues with much stress r/t husbands health  This is also affecting her sleep  Having difficulty falling asleep and feeling tired during the day  Discussed different ways to unwind prior to bed  Suggest she try the Calm tamika to see if this helps her to relax  In addition her thyroid has not been checked since August  ? If she might need an adjustment of synthroid  Recommend she f/u with PCP regarding this  She is scheduled to see CRNP in ~6 wks and will see me 2 weeks after  I did offer a cancellation for tomorrow with provider but she was not able to take this appointment          Patient seen by Medical Provider in past 6 months:  yes  Requested to schedule appointment with Medical Provider: No    Anthropometric Measurements  Start Weight (#): 268 2 lbs 5/4/2022   Current Weight (#):241 4 lbs   TBW % Change from start weight:  9 9%  Ideal Body Weight (#): 153 7 lbs BMI 25 (66 8")  Goal Weight (#):  lbs     Weight Loss History  Previous weight loss attempts: Commercial Programs (Weight Watchers, Melburn Harpin, etc )  Counseling with  MD  Exercise  Meal Replacements (Medifast, Slim Fast, etc )  Self Created Diets (Portion Control, Healthy Food Choices, etc )    Food and Nutrition Related History  Wake up: 8-9   Bed Time: 11    Food Recall  Breakfast: 8:30-10:00 replacement   Snack: skip    Lunch: 12-1:00 Healthy Choice, salad w/ranch 1 tbsp  Snack: skip 3x/wk OR bar   Dinner: 4-6 replacement OR unmeasured peanuts but smaller than prior  Snack: skip or small amount unmeasured peanuts     Beverages: water  Volume of beverage intake: 80 oz    Weekends: Same  Cravings: no specific identified   Trouble area of day: not identified     Frequency of Eating out: none currently  Food restrictions:  n/a  Cooking: self   Food Shopping: self    Physical Activity Intake  Activity: bowling 2x/wk, walking dog  Frequency:2x/wk  Physical limitations/barriers to exercise: n/a    Estimated Needs  Energy  Bear Denys Energy Needs: BMR : 7797   1-2# loss weekly sedentary: 878-2327         1-2# loss weekly lightly active: 7679-2794  Maintenance calories for sedentary activity level: 1978  Protein: 72-90 gm      (1 2-1 5g/kg IBW)  Fluid: 70 oz    (35mL/kg IBW)    Nutrition Diagnosis  Yes; Overweight/obesity  related to Excess energy intake as evidenced by  BMI more than normative standard for age and sex (obesity-grade II 35-39  9)       Nutrition Intervention    Nutrition Prescription  Calories: 8761-8513  Protein: 100 gm    Meal Plan (Adrien/Pro)  Breakfast: 200, 27  Snack:  Lunch: 350-400, 30  Snack: 160, 15   Dinner: 200, 27  Snack:100-150, 5-10    Nutrition Education:     calorie controlled meal plan  healthy snacks  Food journaling  Physical activity      Nutrition Counseling:  Strategies: as above      Monitoring and Evaluation:  Evaluation criteria:  Energy Intake  Meet protein needs  Maintain adequate hydration  Monitor weekly weight  Food logging/measuring  Physical activity   Meal planning    Barriers to learning:none  Readiness to change: Action:  (Changing behavior)    Comprehension: very good  Expected Compliance: very good

## 2023-03-15 ENCOUNTER — OFFICE VISIT (OUTPATIENT)
Dept: BARIATRICS | Facility: CLINIC | Age: 68
End: 2023-03-15

## 2023-03-15 VITALS — WEIGHT: 241.4 LBS | BODY MASS INDEX: 37.89 KG/M2 | HEIGHT: 67 IN

## 2023-03-15 DIAGNOSIS — R63.5 ABNORMAL WEIGHT GAIN: Primary | ICD-10-CM

## 2023-03-28 ENCOUNTER — PROCEDURE VISIT (OUTPATIENT)
Age: 68
End: 2023-03-28

## 2023-03-28 VITALS
SYSTOLIC BLOOD PRESSURE: 142 MMHG | DIASTOLIC BLOOD PRESSURE: 79 MMHG | BODY MASS INDEX: 38.77 KG/M2 | WEIGHT: 247 LBS | HEART RATE: 89 BPM | HEIGHT: 67 IN | OXYGEN SATURATION: 98 %

## 2023-03-28 DIAGNOSIS — M99.03 SEGMENTAL DYSFUNCTION OF LUMBAR REGION: ICD-10-CM

## 2023-03-28 DIAGNOSIS — M54.2 NECK PAIN: ICD-10-CM

## 2023-03-28 DIAGNOSIS — M99.01 SEGMENTAL DYSFUNCTION OF CERVICAL REGION: ICD-10-CM

## 2023-03-28 DIAGNOSIS — M54.16 LUMBAR RADICULOPATHY: ICD-10-CM

## 2023-03-28 DIAGNOSIS — M99.02 SEGMENTAL DYSFUNCTION OF THORACIC REGION: ICD-10-CM

## 2023-03-28 DIAGNOSIS — M54.50 ACUTE BILATERAL LOW BACK PAIN WITHOUT SCIATICA: ICD-10-CM

## 2023-03-28 DIAGNOSIS — M79.18 MYOFASCIAL PAIN: ICD-10-CM

## 2023-03-28 DIAGNOSIS — M99.04 SEGMENTAL DYSFUNCTION OF SACRAL REGION: ICD-10-CM

## 2023-03-28 DIAGNOSIS — M99.05 SEGMENTAL DYSFUNCTION OF PELVIC REGION: Primary | ICD-10-CM

## 2023-03-28 DIAGNOSIS — M54.6 ACUTE BILATERAL THORACIC BACK PAIN: ICD-10-CM

## 2023-03-28 NOTE — PROGRESS NOTES
Date of first visit: 2/21/2023      HPI:  Ezra Galaviz returns to the office for treatment today of neck pain upper back pain lower back and left hip pain  Unfortunately little while ago she had a fall while bowling missed a step landed onto her knees and had some slight increases in left-sided hip pain but is getting better  No other medical history changes  The following portions of the patient's history were reviewed and updated as appropriate: allergies, current medications, past family history, past medical history, past social history, past surgical history, and problem list     Review of Systems    Physical Exam:  Pelvic obliquity noted elevated left versus right innominate misalignment of the left innominate on sacrum biomechanically joint dysfunction present left SI and L5-S1 motion unit active triggers in the left gluteus medius and TFL musculature  Parathoracic hypertonicity noted with a T4-T5 segmental dysfunction  Cervical range of motion reduced in left lateral bending right rotation joint dysfunction C5-C6 C1-C2    Assessment:   Diagnosis ICD-10-CM Associated Orders   1  Segmental dysfunction of pelvic region  M99 05       2  Lumbar radiculopathy  M54 16       3  Segmental dysfunction of lumbar region  M99 03       4  Segmental dysfunction of sacral region  M99 04       5  Acute bilateral low back pain without sciatica  M54 50       6  Segmental dysfunction of thoracic region  M99 02       7  Acute bilateral thoracic back pain  M54 6       8  Neck pain  M54 2       9  Segmental dysfunction of cervical region  M99 01       10  Myofascial pain  M79 18             Treatment: 35646  Manipulation to the left innominate, sacrum, L5 via Walker drop maneuver  Manipulation T4 producing good joint release well-tolerated  Manipulation C5 C1 well-tolerated      Discussion:  Continue her home stretching program icing to the left low back and hip we will see her back for follow-up

## 2023-04-26 ENCOUNTER — OFFICE VISIT (OUTPATIENT)
Dept: BARIATRICS | Facility: CLINIC | Age: 68
End: 2023-04-26

## 2023-04-26 VITALS
HEIGHT: 66 IN | WEIGHT: 246.8 LBS | BODY MASS INDEX: 39.66 KG/M2 | DIASTOLIC BLOOD PRESSURE: 80 MMHG | SYSTOLIC BLOOD PRESSURE: 130 MMHG | HEART RATE: 89 BPM

## 2023-04-26 DIAGNOSIS — E03.9 ACQUIRED HYPOTHYROIDISM: ICD-10-CM

## 2023-04-26 DIAGNOSIS — G47.33 OSA (OBSTRUCTIVE SLEEP APNEA): ICD-10-CM

## 2023-04-26 DIAGNOSIS — I10 BENIGN ESSENTIAL HYPERTENSION: ICD-10-CM

## 2023-04-26 DIAGNOSIS — E66.9 OBESITY, CLASS II, BMI 35-39.9: Primary | ICD-10-CM

## 2023-04-26 PROBLEM — E66.812 OBESITY, CLASS II, BMI 35-39.9: Status: ACTIVE | Noted: 2023-04-26

## 2023-04-26 RX ORDER — BUPROPION HYDROCHLORIDE 150 MG/1
150 TABLET ORAL DAILY
Qty: 90 TABLET | Refills: 0 | Status: SHIPPED | OUTPATIENT
Start: 2023-04-26

## 2023-04-26 RX ORDER — BUPROPION HYDROCHLORIDE 150 MG/1
150 TABLET ORAL DAILY
Qty: 30 TABLET | Refills: 0 | Status: SHIPPED | OUTPATIENT
Start: 2023-04-26

## 2023-04-26 NOTE — PATIENT INSTRUCTIONS
Reviewed the potential side effects of Wellbutrin, which include: abdominal upset, headache, dizziness, trouble sleeping, increased blood pressure, depression/anxiety, and fatigue  Patient should call/return if he/she develops symptoms of depression/aniety  Patient should have ER evaluation if thoughts of harming self/others occur  Wellbutrin should be weaned rather than stopped abruptly

## 2023-04-26 NOTE — ASSESSMENT & PLAN NOTE
- Patient is pursuing Conservative Program with bundles with dietician  Previously completed VLCD  - Not interested in weight loss surgery  - Initial weight loss goal of 5-10% weight loss for improved health  - Postmenopausal    - Has been regaining some weight since finishing VLCD and is interested in weight loss medication to help with appetite and cravings  - Does not have coverage for FDA approved weight loss medications  - Discussed off label use of medications for weight loss  - Denies history of seizures, glaucoma, or kidney stones  - Avoid phentermine due to history of hypertension    - Reviewed starting Wellbutrin versus Topamax off label for weight loss and she made an informed decision to start Wellbutrin  - Wellbutrin  mg daily started 4/26/2023 at weight of 246 8 lbs BMI 39 83  - Reviewed the potential side effects of Wellbutrin, which include: abdominal upset, headache, dizziness, trouble sleeping, increased blood pressure, depression/anxiety, and fatigue  Patient should call/return if he/she develops symptoms of depression/aniety  Patient should have ER evaluation if thoughts of harming self/others occur  Wellbutrin should be weaned rather than stopped abruptly  - Medication agreement contract signed 4/26/2023   - Labs done August 2022  Initial: 268 5 lbs  Last visit: 247 lbs  Current: 246 8 lbs BMI 39 83  Change: -21 7 lbs (-0 2 lbs since the last office visit)  Goal: 180 lbs    Goals:  Do not skip meals  Restart food logging  Continue nutrition recommendations per the dietician  5815-3383 calories per day  Keep up the great work with water intake, at least 64 oz daily  Gradually increase walking to goal of 5 days per week for 30 minutes  Start Wellbutrin

## 2023-04-26 NOTE — PROGRESS NOTES
Assessment/Plan:     Obesity, Class II, BMI 35-39 9  - Patient is pursuing Conservative Program with bundles with dietician  Previously completed VLCD  - Not interested in weight loss surgery  - Initial weight loss goal of 5-10% weight loss for improved health  - Postmenopausal    - Has been regaining some weight since finishing VLCD and is interested in weight loss medication to help with appetite and cravings  - Does not have coverage for FDA approved weight loss medications  - Discussed off label use of medications for weight loss  - Denies history of seizures, glaucoma, or kidney stones  - Avoid phentermine due to history of hypertension    - Reviewed starting Wellbutrin versus Topamax off label for weight loss and she made an informed decision to start Wellbutrin  - Wellbutrin  mg daily started 4/26/2023 at weight of 246 8 lbs BMI 39 83  - Reviewed the potential side effects of Wellbutrin, which include: abdominal upset, headache, dizziness, trouble sleeping, increased blood pressure, depression/anxiety, and fatigue  Patient should call/return if he/she develops symptoms of depression/aniety  Patient should have ER evaluation if thoughts of harming self/others occur  Wellbutrin should be weaned rather than stopped abruptly  - Medication agreement contract signed 4/26/2023   - Labs done August 2022  Initial: 268 5 lbs  Last visit: 247 lbs  Current: 246 8 lbs BMI 39 83  Change: -21 7 lbs (-0 2 lbs since the last office visit)  Goal: 180 lbs    Goals:  Do not skip meals  Restart food logging  Continue nutrition recommendations per the dietician  1852-7660 calories per day  Keep up the great work with water intake, at least 64 oz daily  Gradually increase walking to goal of 5 days per week for 30 minutes  Start Wellbutrin  MIHAELA (obstructive sleep apnea)  - Continue regular use of CPAP  Acquired hypothyroidism  - Taking levothyroxine   Continue management with prescribing provider  Benign essential hypertension  - Taking norvasc, losartan, and metoprolol  May improve with weight loss and lifestyle modification  Continue management with prescribing provider  Nelson Bravo was seen today for follow-up  Diagnoses and all orders for this visit:    Obesity, Class II, BMI 35-39 9  -     buPROPion (Wellbutrin XL) 150 mg 24 hr tablet; Take 1 tablet (150 mg total) by mouth daily  -     buPROPion (Wellbutrin XL) 150 mg 24 hr tablet; Take 1 tablet (150 mg total) by mouth daily    Benign essential hypertension  -     buPROPion (Wellbutrin XL) 150 mg 24 hr tablet; Take 1 tablet (150 mg total) by mouth daily  -     buPROPion (Wellbutrin XL) 150 mg 24 hr tablet; Take 1 tablet (150 mg total) by mouth daily    MIHAELA (obstructive sleep apnea)  -     buPROPion (Wellbutrin XL) 150 mg 24 hr tablet; Take 1 tablet (150 mg total) by mouth daily  -     buPROPion (Wellbutrin XL) 150 mg 24 hr tablet; Take 1 tablet (150 mg total) by mouth daily    Acquired hypothyroidism             Follow up in approximately 3 months with Non-Surgical Physician/Advanced Practitioner  Subjective:   Chief Complaint   Patient presents with   • Follow-up     MWM OD f/u; Waist-46 5in       Patient ID: Marian Dee  is a 69 y o  female with excess weight/obesity here to pursue weight management  Patient is pursuing Conservative Program with bundles with the dietician  Most recent notes and records were reviewed  HPI    Wt Readings from Last 10 Encounters:   04/26/23 112 kg (246 lb 12 8 oz)   03/28/23 112 kg (247 lb)   03/15/23 109 kg (241 lb 6 4 oz)   02/21/23 109 kg (240 lb)   02/15/23 107 kg (235 lb 8 oz)   01/20/23 105 kg (232 lb)   01/11/23 105 kg (231 lb 9 6 oz)   12/28/22 107 kg (235 lb)   12/02/22 102 kg (225 lb 12 8 oz)   11/04/22 102 kg (225 lb 4 8 oz)     Previously completed 12 weeks of VLCD finishing in August 2022  Lost 39 1 lbs with VLCD, was down to 229 4 lbs   Following with dietician for bundles  Has been regaining some weight  She is interested in weight loss medications to help with that  Was food logging in the past, but not currently  Tends to feel hungry in the afternoon  Craves sweets  B- protein shake  S- none  L- Healthy choice 380-400 calories  S- protein bar   D- protein shake or cracker with nutella and peanuts   S- none    Hydration- at least 64 oz water, rare soda once a month   Alcohol- sporadic beer   Tobacco- denies  Exercise- working on walking more was recently camping and walked 4 days per week for one mile  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   Sleep- 7-8 hours  Has MIHAELA, wears CPAP nightly    Colonoscopy: UTD, due 2026  Mammogram: UTD, due Aug 2023, will be getting order through primary care  The following portions of the patient's history were reviewed and updated as appropriate: allergies, current medications, past family history, past medical history, past social history, past surgical history, and problem list     Family History   Problem Relation Age of Onset   • Hypertension Mother    • Dementia Mother    • Arthritis Mother    • Coronary artery disease Father    • Throat cancer Brother 61   • Endometrial cancer Maternal Aunt 50   • Lung cancer Maternal Aunt 60   • No Known Problems Maternal Aunt    • Bone cancer Maternal Uncle 69   • Coronary artery disease Maternal Uncle    • No Known Problems Paternal Aunt    • No Known Problems Maternal Grandmother    • No Known Problems Maternal Grandfather    • No Known Problems Paternal Grandmother    • No Known Problems Paternal Grandfather    • No Known Problems Daughter    • No Known Problems Son         Review of Systems   HENT: Negative for sore throat  Respiratory: Positive for cough (slight due to allergies, PND)  Negative for shortness of breath  Cardiovascular: Negative for chest pain and palpitations     Gastrointestinal: Negative for abdominal pain, constipation, diarrhea, nausea and "vomiting  Denies GERD   Musculoskeletal: Positive for arthralgias (knees and hips, and hand) and back pain (will be seeing PCP)  Skin: Negative for rash  Psychiatric/Behavioral: Negative for suicidal ideas (or HI)  Denies depression and anxiety       Objective:  /80   Pulse 89   Ht 5' 6\" (1 676 m)   Wt 112 kg (246 lb 12 8 oz)   BMI 39 83 kg/m²     Physical Exam  Vitals and nursing note reviewed  Constitutional   General appearance: Abnormal   well developed and obese  Eyes No conjunctival injection  Ears, Nose, Mouth, and Throat Oral mucosa moist    Pulmonary   Respiratory effort: No increased work of breathing or signs of respiratory distress  Cardiovascular     Examination of extremities for edema and/or varicosities: Normal   no edema  Abdomen   Abdomen: Abnormal   The abdomen was obese      Musculoskeletal   Normal range of motion  Neurological   Gait and station: Normal     Psychiatric   Orientation to person, place and time: Normal     Affect: appropriate      "

## 2023-04-26 NOTE — ASSESSMENT & PLAN NOTE
- Taking norvasc, losartan, and metoprolol  May improve with weight loss and lifestyle modification  Continue management with prescribing provider  [facility], [place]

## 2023-04-27 ENCOUNTER — PROCEDURE VISIT (OUTPATIENT)
Age: 68
End: 2023-04-27

## 2023-04-27 VITALS
HEART RATE: 67 BPM | BODY MASS INDEX: 39.83 KG/M2 | DIASTOLIC BLOOD PRESSURE: 88 MMHG | SYSTOLIC BLOOD PRESSURE: 147 MMHG | OXYGEN SATURATION: 98 % | HEIGHT: 66 IN

## 2023-04-27 DIAGNOSIS — M54.2 NECK PAIN: ICD-10-CM

## 2023-04-27 DIAGNOSIS — M79.18 MYOFASCIAL PAIN: ICD-10-CM

## 2023-04-27 DIAGNOSIS — M54.6 ACUTE BILATERAL THORACIC BACK PAIN: ICD-10-CM

## 2023-04-27 DIAGNOSIS — M99.05 SEGMENTAL DYSFUNCTION OF PELVIC REGION: Primary | ICD-10-CM

## 2023-04-27 DIAGNOSIS — M54.16 LUMBAR RADICULOPATHY: ICD-10-CM

## 2023-04-27 DIAGNOSIS — M54.50 ACUTE BILATERAL LOW BACK PAIN WITHOUT SCIATICA: ICD-10-CM

## 2023-04-27 DIAGNOSIS — M99.01 SEGMENTAL DYSFUNCTION OF CERVICAL REGION: ICD-10-CM

## 2023-04-27 DIAGNOSIS — M99.04 SEGMENTAL DYSFUNCTION OF SACRAL REGION: ICD-10-CM

## 2023-04-27 DIAGNOSIS — M99.02 SEGMENTAL DYSFUNCTION OF THORACIC REGION: ICD-10-CM

## 2023-04-27 DIAGNOSIS — M99.03 SEGMENTAL DYSFUNCTION OF LUMBAR REGION: ICD-10-CM

## 2023-04-27 NOTE — PROGRESS NOTES
Date of first visit: 2/21/2023      HPI:  Acosta Calles returns to the office for treatment today of neck pain upper back pain lower back and left hip pain  No other medical history changes  The following portions of the patient's history were reviewed and updated as appropriate: allergies, current medications, past family history, past medical history, past social history, past surgical history, and problem list     Review of Systems    Physical Exam:  Pelvic obliquity noted elevated left versus right innominate misalignment of the left innominate on sacrum biomechanically joint dysfunction present left SI and L5-S1 motion unit active triggers in the left gluteus medius and TFL musculature  Parathoracic hypertonicity noted with a T4-T5 segmental dysfunction  Cervical range of motion reduced in left lateral bending right rotation joint dysfunction C5-C6 C1-C2    Assessment:   Diagnosis ICD-10-CM Associated Orders   1  Segmental dysfunction of pelvic region  M99 05       2  Lumbar radiculopathy  M54 16       3  Segmental dysfunction of lumbar region  M99 03       4  Segmental dysfunction of sacral region  M99 04       5  Acute bilateral low back pain without sciatica  M54 50       6  Segmental dysfunction of thoracic region  M99 02       7  Acute bilateral thoracic back pain  M54 6       8  Neck pain  M54 2       9  Segmental dysfunction of cervical region  M99 01       10  Myofascial pain  M79 18             Treatment: 97807  Manipulation to the left innominate, sacrum, L5 via Walker drop maneuver  Manipulation T4 producing good joint release well-tolerated  Manipulation C5 C1 well-tolerated      Discussion:  Continue her home stretching program icing to the left low back and hip we will see her back for follow-up

## 2023-05-02 NOTE — PROGRESS NOTES
"Weight Management Medical Nutrition Assessment   Marlon Bolton is here for 7/6 bundle (not having body comp)  Current wt: 248 4    lbs  She has gained  7  lbs  x 2 months with overall loss of  19 8   lbs x  1 year  Unfortunately has gained 23 lbs from low of 225 4 lbs in September 2022  She was seen by RADHA 2 wks ago and at that time wellbutrin was ordered  Does feel like cravings have been better  Has been able to do more during the day  Still some trouble sleeping but feels she is falling asleep a little easier  Did not use Calm tamika but has tried to change her routine somewhat to unwind  Did not speak with PCP regarding thyroid but does have an appointment in 2 wks  Would like to rule this out as cause of weight gain as again food recall does not show excess calories  She is not interested in food logging and does use primarily prepackaged food items  She would like to f/u in 3 months for accountability          Patient seen by Medical Provider in past 6 months:  yes  Requested to schedule appointment with Medical Provider: No    Anthropometric Measurements  Start Weight (#): 268 2 lbs 5/4/2022   Current Weight (#):248 4 lbs   TBW % Change from start weight:  7 4%  Ideal Body Weight (#): 153 7 lbs BMI 25 (66 8\")  Goal Weight (#):  lbs     Weight Loss History  Previous weight loss attempts: Commercial Programs (Weight Watchers, Lauryn Current, etc )  Counseling with  MD  Exercise  Meal Replacements (Medifast, Slim Fast, etc )  Self Created Diets (Portion Control, Healthy Food Choices, etc )    Food and Nutrition Related History  Wake up: 8-9   Bed Time: 11    Food Recall  Breakfast: 8:30-10:00 replacement   Snack: skip    Lunch: 12-1:00: Healthy Choice (280-350), salad w/ranch 1 tbsp OR if out 1/2 portion   Snack: bar  Dinner: 4-6:00 replacement OR unmeasured peanuts but smaller than prior  Snack: skip     Beverages: water  Volume of beverage intake: 80 oz    Weekends: Same  Cravings: no specific identified   Trouble " area of day: not identified     Frequency of Eating out: none currently  Food restrictions:  n/a  Cooking: self   Food Shopping: self    Physical Activity Intake  Activity: bowling 2x/wk, gardening/weeding/mowing  Frequency:2x/wk bowling   Physical limitations/barriers to exercise: n/a    Estimated Needs  Energy  Bear Denys Energy Needs: BMR : 1680   1-2# loss weekly sedentary: 7875-3638         1-2# loss weekly lightly active: 3093-0696  Maintenance calories for sedentary activity level: 2016  Protein: 72-90 gm      (1 2-1 5g/kg IBW)  Fluid: 70 oz    (35mL/kg IBW)    Nutrition Diagnosis  Yes; Overweight/obesity  related to Excess energy intake as evidenced by  BMI more than normative standard for age and sex (obesity-grade II 35-39  9)       Nutrition Intervention    Nutrition Prescription  Calories: 2345-6339  Protein: 100 gm    Meal Plan (Adrien/Pro)  Breakfast: 200, 27  Snack:  Lunch: 350-400, 30  Snack: 160, 15   Dinner: 200, 27  Snack:100-150, 5-10    Nutrition Education:     calorie controlled meal plan  healthy snacks  Food journaling  Physical activity      Nutrition Counseling:  Strategies: as above      Monitoring and Evaluation:  Evaluation criteria:  Energy Intake  Meet protein needs  Maintain adequate hydration  Monitor weekly weight  Food logging/measuring  Physical activity   Meal planning    Barriers to learning:none  Readiness to change: Action:  (Changing behavior)    Comprehension: very good  Expected Compliance: very good

## 2023-05-10 ENCOUNTER — OFFICE VISIT (OUTPATIENT)
Dept: BARIATRICS | Facility: CLINIC | Age: 68
End: 2023-05-10

## 2023-05-10 VITALS — BODY MASS INDEX: 38.99 KG/M2 | HEIGHT: 67 IN | WEIGHT: 248.4 LBS

## 2023-05-10 DIAGNOSIS — R63.5 ABNORMAL WEIGHT GAIN: Primary | ICD-10-CM

## 2023-05-17 ENCOUNTER — RA CDI HCC (OUTPATIENT)
Dept: OTHER | Facility: HOSPITAL | Age: 68
End: 2023-05-17

## 2023-05-24 ENCOUNTER — OFFICE VISIT (OUTPATIENT)
Dept: INTERNAL MEDICINE CLINIC | Facility: CLINIC | Age: 68
End: 2023-05-24

## 2023-05-24 VITALS
OXYGEN SATURATION: 98 % | DIASTOLIC BLOOD PRESSURE: 84 MMHG | HEART RATE: 61 BPM | WEIGHT: 250.4 LBS | RESPIRATION RATE: 16 BRPM | BODY MASS INDEX: 39.3 KG/M2 | SYSTOLIC BLOOD PRESSURE: 128 MMHG | HEIGHT: 67 IN

## 2023-05-24 DIAGNOSIS — I10 BENIGN ESSENTIAL HYPERTENSION: ICD-10-CM

## 2023-05-24 DIAGNOSIS — R73.01 IMPAIRED FASTING GLUCOSE: ICD-10-CM

## 2023-05-24 DIAGNOSIS — R10.9 FLANK PAIN: ICD-10-CM

## 2023-05-24 DIAGNOSIS — E03.9 ACQUIRED HYPOTHYROIDISM: Primary | ICD-10-CM

## 2023-05-24 DIAGNOSIS — E55.9 VITAMIN D DEFICIENCY: ICD-10-CM

## 2023-05-24 NOTE — PROGRESS NOTES
Name: Vaibhav Garcia      : 1955      MRN: [id number]  Encounter Provider: Elroy Cobian MD  Encounter Date: 2023   Encounter department: [facility]    Assessment & Plan     1  Acquired hypothyroidism  Assessment & Plan:  Last thyroid function test 2022 were normal, will recheck, and will adjust levothyroxine as needed    Orders:  -     TSH, 3rd generation with Free T4 reflex; Future    2  Impaired fasting glucose  Assessment & Plan:  Continue with healthy diet and exercise, will recheck with next labs    Orders:  -     Hemoglobin A1C; Future    3  Benign essential hypertension  Assessment & Plan:  Well-controlled, continue meds along with healthy diet and exercise    Orders:  -     CBC and differential; Future  -     Comprehensive metabolic panel; Future  -     Lipid Panel with Direct LDL reflex; Future    4  Vitamin D deficiency  -     Vitamin D 25 hydroxy; Future    5  Flank pain  -     UA w Reflex to Microscopic w Reflex to Culture           Subjective     Patient here for regular follow-up  Patient had some left flank pain a few days ago, no rash in area, no blood in the urine, no pain with urination  She noticed it after bowling  She does not remember straining it during bowling  No fevers or chills or cough  No pleuritic pain, no nausea or vomiting    Review of Systems   Constitutional: Positive for fatigue (mild)  Negative for chills and fever  HENT: Negative for congestion, nosebleeds, postnasal drip, sore throat and trouble swallowing  Eyes: Negative for pain  Respiratory: Negative for cough, chest tightness, shortness of breath and wheezing  Cardiovascular: Negative for chest pain, palpitations and leg swelling  Gastrointestinal: Negative for abdominal pain, constipation, diarrhea, nausea and vomiting  Endocrine: Negative for polydipsia and polyuria  Genitourinary: Positive for flank pain (a few days ago)   Negative for dysuria and hematuria  Musculoskeletal: Negative for arthralgias  Skin: Negative for rash  Neurological: Negative for dizziness, tremors, light-headedness and headaches  Hematological: Does not bruise/bleed easily  Psychiatric/Behavioral: Negative for confusion and dysphoric mood  The patient is not nervous/anxious          Past Medical History:   Diagnosis Date   • Arthritis    • Environmental allergies    • Fibroid    • Hyperlipidemia    • Hypertension    • Hypothyroidism    • Plantar fasciitis    • Varicella      Past Surgical History:   Procedure Laterality Date   • KNEE SURGERY       Family History   Problem Relation Age of Onset   • Hypertension Mother    • Dementia Mother    • Arthritis Mother    • Coronary artery disease Father    • Throat cancer Brother 61   • Endometrial cancer Maternal Aunt 50   • Lung cancer Maternal Aunt 60   • No Known Problems Maternal Aunt    • Bone cancer Maternal Uncle 69   • Coronary artery disease Maternal Uncle    • No Known Problems Paternal Aunt    • No Known Problems Maternal Grandmother    • No Known Problems Maternal Grandfather    • No Known Problems Paternal Grandmother    • No Known Problems Paternal Grandfather    • No Known Problems Daughter    • No Known Problems Son      Social History     Socioeconomic History   • Marital status: /Civil Union     Spouse name: None   • Number of children: None   • Years of education: None   • Highest education level: None   Occupational History   • None   Tobacco Use   • Smoking status: Former   • Smokeless tobacco: Never   • Tobacco comments:     quit 25 yrs ago   Vaping Use   • Vaping Use: Never used   Substance and Sexual Activity   • Alcohol use: Yes     Comment: social   • Drug use: No   • Sexual activity: Not Currently     Partners: Male     Birth control/protection: Post-menopausal   Other Topics Concern   • None   Social History Narrative   • None     Social Determinants of Health     Financial Resource Strain: Low Risk (9/20/2022)    Overall Financial Resource Strain (CARDIA)    • Difficulty of Paying Living Expenses: Not very hard   Food Insecurity: Not on file   Transportation Needs: No Transportation Needs (9/20/2022)    PRAPARE - Transportation    • Lack of Transportation (Medical): No    • Lack of Transportation (Non-Medical): No   Physical Activity: Not on file   Stress: Not on file   Social Connections: Not on file   Intimate Partner Violence: Not on file   Housing Stability: Not on file     Current Outpatient Medications on File Prior to Visit   Medication Sig   • amLODIPine (NORVASC) 5 mg tablet Take 1 tablet (5 mg total) by mouth daily   • BEE POLLEN PO Take by mouth   • buPROPion (Wellbutrin XL) 150 mg 24 hr tablet Take 1 tablet (150 mg total) by mouth daily   • buPROPion (Wellbutrin XL) 150 mg 24 hr tablet Take 1 tablet (150 mg total) by mouth daily   • Calcium Carb-Cholecalciferol (CALCIUM + D3 PO) Take by mouth   • co-enzyme Q-10 30 MG capsule Take 100 mg by mouth 2 (two) times a day    • diphenhydrAMINE (BENADRYL) 25 mg capsule Take 25 mg by mouth as needed Takes as needed   • ibuprofen (MOTRIN) 800 mg tablet Takes as needed     • levothyroxine (Synthroid) 50 mcg tablet Take 1 tablet (50 mcg total) by mouth daily   • losartan (COZAAR) 100 MG tablet Take 1 tablet (100 mg total) by mouth daily   • metoprolol succinate (TOPROL-XL) 100 mg 24 hr tablet Take 1 tablet (100 mg total) by mouth daily   • Multiple Vitamins-Minerals (PRESERVISION AREDS 2+MULTI VIT PO) 2 tablets in the morning   • NON FORMULARY 2 (two) times a day Orosine     Allergies   Allergen Reactions   • Diclofenac Swelling   • Atorvastatin Other (See Comments)     Muscle ache     Immunization History   Administered Date(s) Administered   • COVID-19 PFIZER VACCINE 0 3 ML IM 03/22/2021, 04/15/2021, 10/18/2021   • COVID-19 Pfizer Vac BIVALENT Jose Eduardo-sucrose 12 Yr+ IM (BOOSTER ONLY) 10/14/2022   • COVID-19 Pfizer vac (Jose Eduardo-sucrose, gray cap) 12 yr+ IM 04/28/2022 "  • INFLUENZA 09/30/2013, 09/29/2015, 12/12/2016, 12/14/2016, 12/14/2016, 10/31/2017, 10/17/2018, 10/21/2019, 09/30/2020, 09/23/2021   • Influenza Quadrivalent Preservative Free 3 years and older IM 12/12/2016   • Influenza Quadrivalent, 6-35 Months IM 09/29/2015   • Influenza Split High Dose Preservative Free IM 10/21/2019   • Influenza, high dose seasonal 0 7 mL 09/20/2022   • Influenza, seasonal, injectable 09/30/2013   • Pneumococcal Conjugate 13-Valent 08/02/2018   • Pneumococcal Polysaccharide PPV23 08/05/2019   • Tdap 10/21/2019   • Zoster 07/22/2015   • Zoster Vaccine Recombinant 08/14/2018, 10/17/2018   • influenza, trivalent, adjuvanted 10/17/2018, 09/30/2020       Objective     /84   Pulse 61   Resp 16   Ht 5' 6 75\" (1 695 m)   Wt 114 kg (250 lb 6 4 oz)   SpO2 98%   BMI 39 51 kg/m²     Physical Exam  Vitals reviewed  Constitutional:       General: She is not in acute distress  Appearance: Normal appearance  She is well-developed  HENT:      Head: Normocephalic and atraumatic  Right Ear: External ear normal       Left Ear: External ear normal    Eyes:      General: No scleral icterus  Conjunctiva/sclera: Conjunctivae normal    Neck:      Thyroid: No thyromegaly  Trachea: No tracheal deviation  Cardiovascular:      Rate and Rhythm: Normal rate and regular rhythm  Heart sounds: Normal heart sounds  No murmur heard  Pulmonary:      Effort: Pulmonary effort is normal  No respiratory distress  Breath sounds: Normal breath sounds  No wheezing or rales  Abdominal:      General: Bowel sounds are normal       Palpations: Abdomen is soft  Tenderness: There is no abdominal tenderness  There is no left CVA tenderness, guarding or rebound  Musculoskeletal:      Cervical back: Normal range of motion and neck supple  Right lower leg: Edema (trace) present  Left lower leg: Edema (trace) present  Lymphadenopathy:      Cervical: No cervical adenopathy   " Skin:     Coloration: Skin is not jaundiced or pale  Neurological:      General: No focal deficit present  Mental Status: She is alert and oriented to person, place, and time  Psychiatric:         Mood and Affect: Mood normal          Behavior: Behavior normal          Thought Content:  Thought content normal          Judgment: Judgment normal        Jax Robert MD

## 2023-05-24 NOTE — PATIENT INSTRUCTIONS
Problem List Items Addressed This Visit          Endocrine    Impaired fasting glucose     Continue with healthy diet and exercise, will recheck with next labs         Relevant Orders    Hemoglobin A1C    Acquired hypothyroidism - Primary     Last thyroid function test August 2022 were normal, will recheck, and will adjust levothyroxine as needed         Relevant Orders    TSH, 3rd generation with Free T4 reflex       Cardiovascular and Mediastinum    Benign essential hypertension     Well-controlled, continue meds along with healthy diet and exercise         Relevant Orders    CBC and differential    Comprehensive metabolic panel    Lipid Panel with Direct LDL reflex     Other Visit Diagnoses       Vitamin D deficiency        Relevant Orders    Vitamin D 25 hydroxy    Flank pain        Relevant Orders    UA w Reflex to Microscopic w Reflex to Culture

## 2023-05-24 NOTE — ASSESSMENT & PLAN NOTE
Last thyroid function test August 2022 were normal, will recheck, and will adjust levothyroxine as needed

## 2023-05-25 ENCOUNTER — PROCEDURE VISIT (OUTPATIENT)
Age: 68
End: 2023-05-25

## 2023-05-25 VITALS
HEIGHT: 67 IN | WEIGHT: 250 LBS | BODY MASS INDEX: 39.24 KG/M2 | HEART RATE: 60 BPM | DIASTOLIC BLOOD PRESSURE: 86 MMHG | SYSTOLIC BLOOD PRESSURE: 182 MMHG

## 2023-05-25 DIAGNOSIS — M79.18 MYOFASCIAL PAIN: ICD-10-CM

## 2023-05-25 DIAGNOSIS — M99.01 SEGMENTAL DYSFUNCTION OF CERVICAL REGION: ICD-10-CM

## 2023-05-25 DIAGNOSIS — M54.6 ACUTE BILATERAL THORACIC BACK PAIN: ICD-10-CM

## 2023-05-25 DIAGNOSIS — M99.05 SEGMENTAL DYSFUNCTION OF PELVIC REGION: Primary | ICD-10-CM

## 2023-05-25 DIAGNOSIS — M99.02 SEGMENTAL DYSFUNCTION OF THORACIC REGION: ICD-10-CM

## 2023-05-25 DIAGNOSIS — M99.04 SEGMENTAL DYSFUNCTION OF SACRAL REGION: ICD-10-CM

## 2023-05-25 DIAGNOSIS — M54.50 ACUTE BILATERAL LOW BACK PAIN WITHOUT SCIATICA: ICD-10-CM

## 2023-05-25 DIAGNOSIS — M54.2 NECK PAIN: ICD-10-CM

## 2023-05-25 DIAGNOSIS — M99.03 SEGMENTAL DYSFUNCTION OF LUMBAR REGION: ICD-10-CM

## 2023-05-25 DIAGNOSIS — M54.16 LUMBAR RADICULOPATHY: ICD-10-CM

## 2023-05-25 NOTE — PROGRESS NOTES
Date of first visit: 2/21/2023      HPI:  Silver Woods returns to the office for treatment today of neck pain upper back pain lower back and left hip pain  No other medical history changes  She relates that while camping recently she was walking down a very steep incline felt a good deal of tightness across the lower back and into the legs  Most recently she was cutting the grass and felt a significant flareup of back pain lumbosacral in nature  She denies any numbness or tingling  She denies any weaknesses  The following portions of the patient's history were reviewed and updated as appropriate: allergies, current medications, past family history, past medical history, past social history, past surgical history, and problem list     Review of Systems    Physical Exam:  Silver Woods is in some minor distress today she moves slowly about the exam room her transfer is slowed range of motion reduced secondary to muscle tightness  Pelvic obliquity noted elevated left versus right innominate misalignment of the left innominate on sacrum biomechanically joint dysfunction present left SI and L5-S1 motion unit active triggers in the left gluteus medius and TFL musculature  Parathoracic hypertonicity noted with a T4-T5 segmental dysfunction  Cervical range of motion reduced in left lateral bending right rotation joint dysfunction C5-C6 C1-C2    Assessment:   Diagnosis ICD-10-CM Associated Orders   1  Segmental dysfunction of pelvic region  M99 05       2  Lumbar radiculopathy  M54 16       3  Segmental dysfunction of lumbar region  M99 03       4  Segmental dysfunction of sacral region  M99 04       5  Acute bilateral low back pain without sciatica  M54 50       6  Segmental dysfunction of thoracic region  M99 02       7  Acute bilateral thoracic back pain  M54 6       8  Neck pain  M54 2       9  Segmental dysfunction of cervical region  M99 01       10   Myofascial pain  M79 18             Treatment: 22677  Manipulation to the left innominate, sacrum, L5 via Walker drop maneuver  Manipulation T4 producing good joint release well-tolerated  Manipulation C5 C1 well-tolerated      Discussion:  Continue her home stretching program icing to the left low back and hip we will see her back for follow-up

## 2023-06-06 ENCOUNTER — APPOINTMENT (OUTPATIENT)
Dept: LAB | Age: 68
End: 2023-06-06
Payer: MEDICARE

## 2023-06-06 DIAGNOSIS — E78.00 PURE HYPERCHOLESTEROLEMIA: ICD-10-CM

## 2023-06-06 DIAGNOSIS — E03.9 ACQUIRED HYPOTHYROIDISM: ICD-10-CM

## 2023-06-06 DIAGNOSIS — E55.9 VITAMIN D DEFICIENCY: ICD-10-CM

## 2023-06-06 DIAGNOSIS — I10 BENIGN ESSENTIAL HYPERTENSION: ICD-10-CM

## 2023-06-06 DIAGNOSIS — R73.01 IMPAIRED FASTING GLUCOSE: ICD-10-CM

## 2023-06-06 LAB
25(OH)D3 SERPL-MCNC: 46.7 NG/ML (ref 30–100)
ALBUMIN SERPL BCP-MCNC: 3.4 G/DL (ref 3.5–5)
ALP SERPL-CCNC: 131 U/L (ref 46–116)
ALT SERPL W P-5'-P-CCNC: 17 U/L (ref 12–78)
ANION GAP SERPL CALCULATED.3IONS-SCNC: 2 MMOL/L (ref 4–13)
AST SERPL W P-5'-P-CCNC: 13 U/L (ref 5–45)
BASOPHILS # BLD AUTO: 0.03 THOUSANDS/ÂΜL (ref 0–0.1)
BASOPHILS NFR BLD AUTO: 1 % (ref 0–1)
BILIRUB SERPL-MCNC: 0.42 MG/DL (ref 0.2–1)
BILIRUB UR QL STRIP: NEGATIVE
BUN SERPL-MCNC: 20 MG/DL (ref 5–25)
CALCIUM ALBUM COR SERPL-MCNC: 9.6 MG/DL (ref 8.3–10.1)
CALCIUM SERPL-MCNC: 9.1 MG/DL (ref 8.3–10.1)
CHLORIDE SERPL-SCNC: 109 MMOL/L (ref 96–108)
CHOLEST SERPL-MCNC: 227 MG/DL
CLARITY UR: CLEAR
CO2 SERPL-SCNC: 26 MMOL/L (ref 21–32)
COLOR UR: COLORLESS
CREAT SERPL-MCNC: 0.97 MG/DL (ref 0.6–1.3)
EOSINOPHIL # BLD AUTO: 0.17 THOUSAND/ÂΜL (ref 0–0.61)
EOSINOPHIL NFR BLD AUTO: 3 % (ref 0–6)
ERYTHROCYTE [DISTWIDTH] IN BLOOD BY AUTOMATED COUNT: 14.1 % (ref 11.6–15.1)
EST. AVERAGE GLUCOSE BLD GHB EST-MCNC: 114 MG/DL
GFR SERPL CREATININE-BSD FRML MDRD: 59 ML/MIN/1.73SQ M
GLUCOSE P FAST SERPL-MCNC: 101 MG/DL (ref 65–99)
GLUCOSE UR STRIP-MCNC: NEGATIVE MG/DL
HBA1C MFR BLD: 5.6 %
HCT VFR BLD AUTO: 39.3 % (ref 34.8–46.1)
HDLC SERPL-MCNC: 44 MG/DL
HGB BLD-MCNC: 12.4 G/DL (ref 11.5–15.4)
HGB UR QL STRIP.AUTO: NEGATIVE
IMM GRANULOCYTES # BLD AUTO: 0.02 THOUSAND/UL (ref 0–0.2)
IMM GRANULOCYTES NFR BLD AUTO: 0 % (ref 0–2)
KETONES UR STRIP-MCNC: NEGATIVE MG/DL
LDLC SERPL CALC-MCNC: 144 MG/DL (ref 0–100)
LEUKOCYTE ESTERASE UR QL STRIP: NEGATIVE
LYMPHOCYTES # BLD AUTO: 1.09 THOUSANDS/ÂΜL (ref 0.6–4.47)
LYMPHOCYTES NFR BLD AUTO: 21 % (ref 14–44)
MCH RBC QN AUTO: 27.5 PG (ref 26.8–34.3)
MCHC RBC AUTO-ENTMCNC: 31.6 G/DL (ref 31.4–37.4)
MCV RBC AUTO: 87 FL (ref 82–98)
MONOCYTES # BLD AUTO: 0.57 THOUSAND/ÂΜL (ref 0.17–1.22)
MONOCYTES NFR BLD AUTO: 11 % (ref 4–12)
NEUTROPHILS # BLD AUTO: 3.38 THOUSANDS/ÂΜL (ref 1.85–7.62)
NEUTS SEG NFR BLD AUTO: 64 % (ref 43–75)
NITRITE UR QL STRIP: NEGATIVE
NRBC BLD AUTO-RTO: 0 /100 WBCS
PH UR STRIP.AUTO: 6.5 [PH]
PLATELET # BLD AUTO: 260 THOUSANDS/UL (ref 149–390)
PMV BLD AUTO: 10.2 FL (ref 8.9–12.7)
POTASSIUM SERPL-SCNC: 3.9 MMOL/L (ref 3.5–5.3)
PROT SERPL-MCNC: 6.9 G/DL (ref 6.4–8.4)
PROT UR STRIP-MCNC: NEGATIVE MG/DL
RBC # BLD AUTO: 4.51 MILLION/UL (ref 3.81–5.12)
SODIUM SERPL-SCNC: 137 MMOL/L (ref 135–147)
SP GR UR STRIP.AUTO: 1.01 (ref 1–1.03)
T4 FREE SERPL-MCNC: 1.03 NG/DL (ref 0.61–1.12)
TRIGL SERPL-MCNC: 195 MG/DL
TSH SERPL DL<=0.05 MIU/L-ACNC: 4.59 UIU/ML (ref 0.45–4.5)
UROBILINOGEN UR STRIP-ACNC: <2 MG/DL
WBC # BLD AUTO: 5.26 THOUSAND/UL (ref 4.31–10.16)

## 2023-06-06 PROCEDURE — 84443 ASSAY THYROID STIM HORMONE: CPT

## 2023-06-06 PROCEDURE — 36415 COLL VENOUS BLD VENIPUNCTURE: CPT

## 2023-06-06 PROCEDURE — 80061 LIPID PANEL: CPT

## 2023-06-06 PROCEDURE — 82306 VITAMIN D 25 HYDROXY: CPT

## 2023-06-06 PROCEDURE — 83036 HEMOGLOBIN GLYCOSYLATED A1C: CPT

## 2023-06-06 PROCEDURE — 84439 ASSAY OF FREE THYROXINE: CPT

## 2023-06-06 PROCEDURE — 80053 COMPREHEN METABOLIC PANEL: CPT

## 2023-06-06 PROCEDURE — 85025 COMPLETE CBC W/AUTO DIFF WBC: CPT

## 2023-06-08 ENCOUNTER — PROCEDURE VISIT (OUTPATIENT)
Age: 68
End: 2023-06-08
Payer: MEDICARE

## 2023-06-08 VITALS — HEIGHT: 67 IN | SYSTOLIC BLOOD PRESSURE: 156 MMHG | DIASTOLIC BLOOD PRESSURE: 84 MMHG | BODY MASS INDEX: 39.45 KG/M2

## 2023-06-08 DIAGNOSIS — M99.05 SEGMENTAL DYSFUNCTION OF PELVIC REGION: Primary | ICD-10-CM

## 2023-06-08 DIAGNOSIS — M54.16 LUMBAR RADICULOPATHY: ICD-10-CM

## 2023-06-08 DIAGNOSIS — M54.6 ACUTE BILATERAL THORACIC BACK PAIN: ICD-10-CM

## 2023-06-08 DIAGNOSIS — M99.02 SEGMENTAL DYSFUNCTION OF THORACIC REGION: ICD-10-CM

## 2023-06-08 DIAGNOSIS — M54.50 ACUTE BILATERAL LOW BACK PAIN WITHOUT SCIATICA: ICD-10-CM

## 2023-06-08 DIAGNOSIS — M99.04 SEGMENTAL DYSFUNCTION OF SACRAL REGION: ICD-10-CM

## 2023-06-08 DIAGNOSIS — M54.2 NECK PAIN: ICD-10-CM

## 2023-06-08 DIAGNOSIS — M79.18 MYOFASCIAL PAIN: ICD-10-CM

## 2023-06-08 DIAGNOSIS — M99.01 SEGMENTAL DYSFUNCTION OF CERVICAL REGION: ICD-10-CM

## 2023-06-08 DIAGNOSIS — M99.03 SEGMENTAL DYSFUNCTION OF LUMBAR REGION: ICD-10-CM

## 2023-06-08 PROCEDURE — 98942 CHIROPRACTIC MANJ 5 REGIONS: CPT | Performed by: CHIROPRACTOR

## 2023-06-08 NOTE — PROGRESS NOTES
Date of first visit: 2/21/2023      HPI:  Dm Grayson returns to the office for treatment today of neck pain upper back pain lower back and left hip pain  No other medical history changes  She relates that while camping recently she was walking down a very steep incline felt a good deal of tightness across the lower back and into the legs  Most recently she was cutting the grass and felt a significant flareup of back pain lumbosacral in nature  She denies any numbness or tingling  She denies any weaknesses  Dm Grayson is in for recheck today reports slight improvement in her condition  VPAS  5      The following portions of the patient's history were reviewed and updated as appropriate: allergies, current medications, past family history, past medical history, past social history, past surgical history, and problem list     Review of Systems    Physical Exam:  Dm Grayson is in some minor distress today she moves slowly about the exam room her transfer is slowed range of motion reduced secondary to muscle tightness  Pelvic obliquity noted elevated left versus right innominate misalignment of the left innominate on sacrum biomechanically joint dysfunction present left SI and L5-S1 motion unit active triggers in the left gluteus medius and TFL musculature  Parathoracic hypertonicity noted with a T4-T5 segmental dysfunction  Cervical range of motion reduced in left lateral bending right rotation joint dysfunction C5-C6 C1-C2    Assessment:   Diagnosis ICD-10-CM Associated Orders   1  Segmental dysfunction of pelvic region  M99 05       2  Lumbar radiculopathy  M54 16       3  Segmental dysfunction of lumbar region  M99 03       4  Segmental dysfunction of sacral region  M99 04       5  Acute bilateral low back pain without sciatica  M54 50       6  Segmental dysfunction of thoracic region  M99 02       7  Acute bilateral thoracic back pain  M54 6       8  Neck pain  M54 2       9   Segmental dysfunction of cervical region  M99 01       10  Myofascial pain  M79 18             Treatment: 33198  Manipulation to the left innominate, sacrum, L5 via Walker drop maneuver  Manipulation T4 producing good joint release well-tolerated  Manipulation C5 C1 well-tolerated  Discussion:  Continue her home stretching program icing to the left low back and hip we will see her back for follow-up  She will continue icing and stretching  We did have a brief discussion about her recent blood work  Reviewed importance of a regular and daily exercise program along with diet modification she does admit to cut a string away from this a bit  She will await further directions from her PCP whom she has not discussed the results of her recent lab work with yet

## 2023-06-27 ENCOUNTER — PROCEDURE VISIT (OUTPATIENT)
Age: 68
End: 2023-06-27
Payer: MEDICARE

## 2023-06-27 VITALS — DIASTOLIC BLOOD PRESSURE: 82 MMHG | BODY MASS INDEX: 39.45 KG/M2 | HEIGHT: 67 IN | SYSTOLIC BLOOD PRESSURE: 138 MMHG

## 2023-06-27 DIAGNOSIS — M99.04 SEGMENTAL DYSFUNCTION OF SACRAL REGION: ICD-10-CM

## 2023-06-27 DIAGNOSIS — M99.02 SEGMENTAL DYSFUNCTION OF THORACIC REGION: ICD-10-CM

## 2023-06-27 DIAGNOSIS — M54.16 LUMBAR RADICULOPATHY: ICD-10-CM

## 2023-06-27 DIAGNOSIS — M99.05 SEGMENTAL DYSFUNCTION OF PELVIC REGION: Primary | ICD-10-CM

## 2023-06-27 DIAGNOSIS — M54.50 ACUTE BILATERAL LOW BACK PAIN WITHOUT SCIATICA: ICD-10-CM

## 2023-06-27 DIAGNOSIS — M99.03 SEGMENTAL DYSFUNCTION OF LUMBAR REGION: ICD-10-CM

## 2023-06-27 DIAGNOSIS — M54.2 NECK PAIN: ICD-10-CM

## 2023-06-27 DIAGNOSIS — M54.6 ACUTE BILATERAL THORACIC BACK PAIN: ICD-10-CM

## 2023-06-27 DIAGNOSIS — M79.18 MYOFASCIAL PAIN: ICD-10-CM

## 2023-06-27 DIAGNOSIS — M99.01 SEGMENTAL DYSFUNCTION OF CERVICAL REGION: ICD-10-CM

## 2023-06-27 PROCEDURE — 98942 CHIROPRACTIC MANJ 5 REGIONS: CPT | Performed by: CHIROPRACTOR

## 2023-06-27 NOTE — PROGRESS NOTES
Date of first visit: 2/21/2023      HPI:  Ilya Patten returns to the office for treatment today of neck pain upper back pain lower back and left hip pain  No other medical history changes  She relates that while camping recently she was walking down a very steep incline felt a good deal of tightness across the lower back and into the legs  Most recently she was cutting the grass and felt a significant flareup of back pain lumbosacral in nature  She denies any numbness or tingling  She denies any weaknesses  Ilya Patten is in for recheck today reports slight improvement in her condition  VPAS  3-4      The following portions of the patient's history were reviewed and updated as appropriate: allergies, current medications, past family history, past medical history, past social history, past surgical history, and problem list     Review of Systems    Physical Exam:  Ilya Patten is in some minor distress today she moves slowly about the exam room her transfer is slowed range of motion reduced secondary to muscle tightness  Pelvic obliquity noted elevated left versus right innominate misalignment of the left innominate on sacrum biomechanically joint dysfunction present left SI and L5-S1 motion unit active triggers in the left gluteus medius and TFL musculature  Parathoracic hypertonicity noted with a T4-T5 segmental dysfunction  Cervical range of motion reduced in left lateral bending right rotation joint dysfunction C5-C6 C1-C2    Assessment:   Diagnosis ICD-10-CM Associated Orders   1  Segmental dysfunction of pelvic region  M99 05       2  Lumbar radiculopathy  M54 16       3  Segmental dysfunction of lumbar region  M99 03       4  Segmental dysfunction of sacral region  M99 04       5  Acute bilateral low back pain without sciatica  M54 50       6  Segmental dysfunction of thoracic region  M99 02       7  Acute bilateral thoracic back pain  M54 6       8  Neck pain  M54 2       9   Segmental dysfunction of cervical region  M99 01       10  Myofascial pain  M79 18             Treatment: 89516  Manipulation to the left innominate, sacrum, L5 via Walker drop maneuver  Manipulation T4 producing good joint release well-tolerated  Manipulation C5 C1 well-tolerated  Discussion:  Continue her home stretching program icing to the left low back and hip we will see her back for follow-up  She will continue icing and stretching

## 2023-07-25 ENCOUNTER — OFFICE VISIT (OUTPATIENT)
Dept: BARIATRICS | Facility: CLINIC | Age: 68
End: 2023-07-25
Payer: MEDICARE

## 2023-07-25 ENCOUNTER — PROCEDURE VISIT (OUTPATIENT)
Age: 68
End: 2023-07-25
Payer: MEDICARE

## 2023-07-25 VITALS
WEIGHT: 252.8 LBS | HEIGHT: 67 IN | BODY MASS INDEX: 39.68 KG/M2 | RESPIRATION RATE: 16 BRPM | SYSTOLIC BLOOD PRESSURE: 130 MMHG | DIASTOLIC BLOOD PRESSURE: 80 MMHG | HEART RATE: 63 BPM

## 2023-07-25 VITALS — WEIGHT: 250 LBS | HEIGHT: 67 IN | BODY MASS INDEX: 39.24 KG/M2

## 2023-07-25 DIAGNOSIS — M99.01 SEGMENTAL DYSFUNCTION OF CERVICAL REGION: ICD-10-CM

## 2023-07-25 DIAGNOSIS — M99.02 SEGMENTAL DYSFUNCTION OF THORACIC REGION: ICD-10-CM

## 2023-07-25 DIAGNOSIS — M99.05 SEGMENTAL DYSFUNCTION OF PELVIC REGION: Primary | ICD-10-CM

## 2023-07-25 DIAGNOSIS — M54.50 ACUTE BILATERAL LOW BACK PAIN WITHOUT SCIATICA: ICD-10-CM

## 2023-07-25 DIAGNOSIS — E66.9 OBESITY, CLASS II, BMI 35-39.9: Primary | ICD-10-CM

## 2023-07-25 DIAGNOSIS — I10 BENIGN ESSENTIAL HYPERTENSION: ICD-10-CM

## 2023-07-25 DIAGNOSIS — M54.2 NECK PAIN: ICD-10-CM

## 2023-07-25 DIAGNOSIS — M99.04 SEGMENTAL DYSFUNCTION OF SACRAL REGION: ICD-10-CM

## 2023-07-25 DIAGNOSIS — M79.18 MYOFASCIAL PAIN: ICD-10-CM

## 2023-07-25 DIAGNOSIS — M54.16 LUMBAR RADICULOPATHY: ICD-10-CM

## 2023-07-25 DIAGNOSIS — M54.6 ACUTE BILATERAL THORACIC BACK PAIN: ICD-10-CM

## 2023-07-25 DIAGNOSIS — M99.03 SEGMENTAL DYSFUNCTION OF LUMBAR REGION: ICD-10-CM

## 2023-07-25 DIAGNOSIS — E03.9 ACQUIRED HYPOTHYROIDISM: ICD-10-CM

## 2023-07-25 PROCEDURE — 99214 OFFICE O/P EST MOD 30 MIN: CPT | Performed by: PHYSICIAN ASSISTANT

## 2023-07-25 PROCEDURE — 98942 CHIROPRACTIC MANJ 5 REGIONS: CPT | Performed by: CHIROPRACTOR

## 2023-07-25 RX ORDER — TOPIRAMATE 25 MG/1
25 TABLET ORAL DAILY
Qty: 90 TABLET | Refills: 0 | Status: SHIPPED | OUTPATIENT
Start: 2023-07-25

## 2023-07-25 NOTE — PROGRESS NOTES
Assessment/Plan:    Obesity, Class II, BMI 35-39.9  - Patient is pursuing Conservative Program with bundles with dietician. Previously completed VLCD. - Not interested in weight loss surgery. - Initial weight loss goal of 5-10% weight loss for improved health  - Postmenopausal.      Has been regaining some weight since finishing VLCD . She is continuing to do 1-2 meal replacements daily  At last visit she did start wellbutrin but has had weight gain since that time. Denies any side effects. Would recommend to d/c and will start topamax. She  does not have coverage for FDA approved weight loss medications. Discussed off label use of medications for weight loss. - Denies history of seizures, glaucoma, or kidney stones.    -Side effects reviewed of topamax  - Medication agreement contract signed 4/26/2023.  - Labs done August 2022. Initial: 268.5 lbs  Last visit: 247 lbs  Current: 252.8 BMI 39.89  Change: -15.7 lbs (-5.8 lbs since the last office visit)  Goal: 180 lbs    Goals:  Do not skip meals. Restart food logging. Continue nutrition recommendations per the dietician. 6286-6288 calories per day. Keep up the great work with water intake, at least 64 oz daily. Recommend starting to walk with a goal of at least 3 times a week. To continue bowling and lawn mowing for exercies       Benign essential hypertension  On norvasc and losartan  -should improve with weight loss, dietary, and lifestyle changes      Acquired hypothyroidism  On levothyroxine. Last TSH slightly elevated        Follow up in approximately 6 week nurse visit and RD visit and 4 month with Non-Surgical Physician/Advanced Practitioner. Diagnoses and all orders for this visit:    Obesity, Class II, BMI 35-39.9  -     topiramate (Topamax) 25 mg tablet; Take 1 tablet (25 mg total) by mouth daily    Benign essential hypertension  -     topiramate (Topamax) 25 mg tablet;  Take 1 tablet (25 mg total) by mouth daily    Acquired hypothyroidism          Subjective:   Chief Complaint   Patient presents with   • Follow-up     MWM 3mth f/u; waist 46.5in        Patient ID: Sally Vega  is a 77 y.o. female with excess weight/obesity here to pursue weight managment. Patient is pursuing Conservative Program with RD meal planning after VLCD  HPI  She is taking wellbutrin right now which was started at last OV. She sometimes is an emotional eater and it has helped with that. She feels it may have helped to slow weight gain. Her  does snack more and that tempts her. He does plan on getting help through the program in the future. She did jut get TSH checked and was alittle hypothyroid. She has Been taking 50mcg of levothroxine    Wt Readings from Last 10 Encounters:   07/25/23 115 kg (252 lb 12.8 oz)   07/25/23 113 kg (250 lb)   05/25/23 113 kg (250 lb)   05/24/23 114 kg (250 lb 6.4 oz)   05/10/23 113 kg (248 lb 6.4 oz)   04/26/23 112 kg (246 lb 12.8 oz)   03/28/23 112 kg (247 lb)   03/15/23 109 kg (241 lb 6.4 oz)   02/21/23 109 kg (240 lb)   02/15/23 107 kg (235 lb 8 oz)       Food logging:no-doing 1-2 meal replacements daily. No change from prior diet recall with RD  Increased appetite/cravings:  Fruit/Vegetable servings:  Exercise:bowling 2 times a week, gras mowing  Hydration:64 oz water at least, meal repalcement as   Dining out : 2 times a month      Colonoscopy-Completed    The following portions of the patient's history were reviewed and updated as appropriate:   She  has a past medical history of Arthritis, Environmental allergies, Fibroid, Hyperlipidemia, Hypertension, Hypothyroidism, Plantar fasciitis, and Varicella.   She   Patient Active Problem List    Diagnosis Date Noted   • Obesity, Class II, BMI 35-39.9 04/26/2023   • Sensorineural hearing loss (SNHL) of both ears 06/13/2022   • Pulsatile tinnitus 06/13/2022   • Exostosis of left external auditory canal 06/13/2022   • MIHAELA (obstructive sleep apnea)    • Morbid obesity ([address]) 08/20/2021   • Plantar fasciitis 02/06/2020   • Acquired hypothyroidism 11/28/2018   • Medicare annual wellness visit, subsequent 08/02/2018   • Impaired fasting glucose 12/12/2016   • Benign essential hypertension 08/09/2016   • Pure hypercholesterolemia 08/09/2016     She  has a past surgical history that includes Knee surgery. Her family history includes Arthritis in her mother; Bone cancer (age of onset: 71) in her maternal uncle; Coronary artery disease in her father and maternal uncle; Dementia in her mother; Endometrial cancer (age of onset: 48) in her maternal aunt; Hypertension in her mother; Lung cancer (age of onset: 61) in her maternal aunt; No Known Problems in her daughter, maternal aunt, maternal grandfather, maternal grandmother, paternal aunt, paternal grandfather, paternal grandmother, and son; Throat cancer (age of onset: 61) in her brother. She  reports that she has quit smoking. She has never used smokeless tobacco. She reports current alcohol use. She reports that she does not use drugs. Current Outpatient Medications   Medication Sig Dispense Refill   • amLODIPine (NORVASC) 5 mg tablet Take 1 tablet (5 mg total) by mouth daily 90 tablet 3   • BEE POLLEN PO Take by mouth     • buPROPion (Wellbutrin XL) 150 mg 24 hr tablet Take 1 tablet (150 mg total) by mouth daily 30 tablet 0   • Calcium Carb-Cholecalciferol (CALCIUM + D3 PO) Take by mouth     • co-enzyme Q-10 30 MG capsule Take 100 mg by mouth 2 (two) times a day      • diphenhydrAMINE (BENADRYL) 25 mg capsule Take 25 mg by mouth as needed Takes as needed     • ibuprofen (MOTRIN) 800 mg tablet Takes as needed.      • levothyroxine (Synthroid) 50 mcg tablet Take 1 tablet (50 mcg total) by mouth daily 90 tablet 3   • losartan (COZAAR) 100 MG tablet Take 1 tablet (100 mg total) by mouth daily 90 tablet 3   • metoprolol succinate (TOPROL-XL) 100 mg 24 hr tablet Take 1 tablet (100 mg total) by mouth daily 90 tablet 3   • Multiple Vitamins-Minerals (PRESERVISION AREDS 2+MULTI VIT PO) 2 tablets in the morning     • NON FORMULARY 2 (two) times a day Orosine     • topiramate (Topamax) 25 mg tablet Take 1 tablet (25 mg total) by mouth daily 90 tablet 0     No current facility-administered medications for this visit. Current Outpatient Medications on File Prior to Visit   Medication Sig   • amLODIPine (NORVASC) 5 mg tablet Take 1 tablet (5 mg total) by mouth daily   • BEE POLLEN PO Take by mouth   • buPROPion (Wellbutrin XL) 150 mg 24 hr tablet Take 1 tablet (150 mg total) by mouth daily   • Calcium Carb-Cholecalciferol (CALCIUM + D3 PO) Take by mouth   • co-enzyme Q-10 30 MG capsule Take 100 mg by mouth 2 (two) times a day    • diphenhydrAMINE (BENADRYL) 25 mg capsule Take 25 mg by mouth as needed Takes as needed   • ibuprofen (MOTRIN) 800 mg tablet Takes as needed. • levothyroxine (Synthroid) 50 mcg tablet Take 1 tablet (50 mcg total) by mouth daily   • losartan (COZAAR) 100 MG tablet Take 1 tablet (100 mg total) by mouth daily   • metoprolol succinate (TOPROL-XL) 100 mg 24 hr tablet Take 1 tablet (100 mg total) by mouth daily   • Multiple Vitamins-Minerals (PRESERVISION AREDS 2+MULTI VIT PO) 2 tablets in the morning   • NON FORMULARY 2 (two) times a day Orosine   • [DISCONTINUED] buPROPion (Wellbutrin XL) 150 mg 24 hr tablet Take 1 tablet (150 mg total) by mouth daily     No current facility-administered medications on file prior to visit. She is allergic to diclofenac and atorvastatin. .    Review of Systems   Constitutional: Negative for fever. Respiratory: Negative for shortness of breath. Cardiovascular: Negative for chest pain and palpitations. Gastrointestinal: Negative for abdominal pain, constipation, diarrhea and vomiting. Genitourinary: Negative for difficulty urinating. Skin: Negative for rash. Neurological: Negative for headaches. Psychiatric/Behavioral: Negative for dysphoric mood.  The patient is not nervous/anxious. Objective:    /80   Pulse 63   Resp 16   Ht 5' 6.75" (1.695 m)   Wt 115 kg (252 lb 12.8 oz)   BMI 39.89 kg/m²      Physical Exam  Vitals and nursing note reviewed. Constitutional:       General: She is not in acute distress. Appearance: She is well-developed. She is obese. HENT:      Head: Normocephalic and atraumatic. Eyes:      Conjunctiva/sclera: Conjunctivae normal.   Neck:      Thyroid: No thyromegaly. Pulmonary:      Effort: Pulmonary effort is normal. No respiratory distress. Skin:     Findings: No rash (visible). Neurological:      Mental Status: She is alert and oriented to person, place, and time.    Psychiatric:         Mood and Affect: Mood normal.         Behavior: Behavior normal.

## 2023-07-25 NOTE — PROGRESS NOTES
Date of first visit: 2/21/2023      HPI:  Nazario Domínguez returns to the office for treatment today of neck pain upper back pain lower back and left hip pain. No other medical history changes. Nazario Domínguez is in for recheck today reports slight improvement in her condition. VPAS  4      The following portions of the patient's history were reviewed and updated as appropriate: allergies, current medications, past family history, past medical history, past social history, past surgical history, and problem list.    Review of Systems    Physical Exam:  Nazario Domínguez is in some minor distress today she moves slowly about the exam room her transfer is slowed range of motion reduced secondary to muscle tightness. Pelvic obliquity noted elevated left versus right innominate misalignment of the left innominate on sacrum biomechanically joint dysfunction present left SI and L5-S1 motion unit active triggers in the left gluteus medius and TFL musculature. Parathoracic hypertonicity noted with a T4-T5 segmental dysfunction. Cervical range of motion reduced in left lateral bending right rotation joint dysfunction C5-C6 C1-C2    Assessment:   Diagnosis ICD-10-CM Associated Orders   1. Segmental dysfunction of pelvic region  M99.05       2. Lumbar radiculopathy  M54.16       3. Segmental dysfunction of lumbar region  M99.03       4. Segmental dysfunction of sacral region  M99.04       5. Acute bilateral low back pain without sciatica  M54.50       6. Segmental dysfunction of thoracic region  M99.02       7. Acute bilateral thoracic back pain  M54.6       8. Neck pain  M54.2       9. Segmental dysfunction of cervical region  M99.01       10. Myofascial pain  M79.18             Treatment: 78124  Manipulation to the left innominate, sacrum, L5 via Walker drop maneuver. Manipulation T4 producing good joint release well-tolerated. Manipulation C5 C1 well-tolerated.     Discussion:  Continue her home stretching program icing to the left low back and hip we will see her back for follow-up. She will continue icing and stretching.

## 2023-07-25 NOTE — ASSESSMENT & PLAN NOTE
- Patient is pursuing Conservative Program with bundles with dietician. Previously completed VLCD. - Not interested in weight loss surgery. - Initial weight loss goal of 5-10% weight loss for improved health  - Postmenopausal.      Has been regaining some weight since finishing VLCD . She is continuing to do 1-2 meal replacements daily  At last visit she did start wellbutrin but has had weight gain since that time. Denies any side effects. Would recommend to d/c and will start topamax. She  does not have coverage for FDA approved weight loss medications. Discussed off label use of medications for weight loss. - Denies history of seizures, glaucoma, or kidney stones.    -Side effects reviewed of topamax  - Medication agreement contract signed 4/26/2023.  - Labs done August 2022. Initial: 268.5 lbs  Last visit: 247 lbs  Current: 252.8 BMI 39.89  Change: -15.7 lbs (-5.8 lbs since the last office visit)  Goal: 180 lbs    Goals:  Do not skip meals. Restart food logging. Continue nutrition recommendations per the dietician. 6795-1923 calories per day. Keep up the great work with water intake, at least 64 oz daily. Recommend starting to walk with a goal of at least 3 times a week.  To continue bowling and lawn mowing for exercies

## 2023-08-28 ENCOUNTER — PROCEDURE VISIT (OUTPATIENT)
Age: 68
End: 2023-08-28
Payer: MEDICARE

## 2023-08-28 VITALS
RESPIRATION RATE: 17 BRPM | SYSTOLIC BLOOD PRESSURE: 184 MMHG | WEIGHT: 252.12 LBS | DIASTOLIC BLOOD PRESSURE: 89 MMHG | HEART RATE: 60 BPM | HEIGHT: 67 IN | BODY MASS INDEX: 39.57 KG/M2

## 2023-08-28 DIAGNOSIS — M54.6 ACUTE BILATERAL THORACIC BACK PAIN: ICD-10-CM

## 2023-08-28 DIAGNOSIS — M54.16 LUMBAR RADICULOPATHY: ICD-10-CM

## 2023-08-28 DIAGNOSIS — M99.02 SEGMENTAL DYSFUNCTION OF THORACIC REGION: ICD-10-CM

## 2023-08-28 DIAGNOSIS — M99.05 SEGMENTAL DYSFUNCTION OF PELVIC REGION: Primary | ICD-10-CM

## 2023-08-28 DIAGNOSIS — M99.03 SEGMENTAL DYSFUNCTION OF LUMBAR REGION: ICD-10-CM

## 2023-08-28 DIAGNOSIS — M54.2 NECK PAIN: ICD-10-CM

## 2023-08-28 DIAGNOSIS — M79.18 MYOFASCIAL PAIN: ICD-10-CM

## 2023-08-28 DIAGNOSIS — M54.50 ACUTE BILATERAL LOW BACK PAIN WITHOUT SCIATICA: ICD-10-CM

## 2023-08-28 DIAGNOSIS — M99.04 SEGMENTAL DYSFUNCTION OF SACRAL REGION: ICD-10-CM

## 2023-08-28 DIAGNOSIS — M99.01 SEGMENTAL DYSFUNCTION OF CERVICAL REGION: ICD-10-CM

## 2023-08-28 PROCEDURE — 98942 CHIROPRACTIC MANJ 5 REGIONS: CPT | Performed by: CHIROPRACTOR

## 2023-08-30 NOTE — PROGRESS NOTES
Date of first visit: 2/21/2023      HPI:  Zoraida Cox returns to the office for treatment today of neck pain upper back pain lower back and left hip pain. No other medical history changes. Zoraida Cox is in for recheck today reports slight improvement in her condition. VPAS  4      The following portions of the patient's history were reviewed and updated as appropriate: allergies, current medications, past family history, past medical history, past social history, past surgical history, and problem list.    Review of Systems    Physical Exam:  Zoraida Cox is in some minor distress today she moves slowly about the exam room her transfer is slowed range of motion reduced secondary to muscle tightness. Pelvic obliquity noted elevated left versus right innominate misalignment of the left innominate on sacrum biomechanically joint dysfunction present left SI and L5-S1 motion unit active triggers in the left gluteus medius and TFL musculature. Parathoracic hypertonicity noted with a T4-T5 segmental dysfunction. Cervical range of motion reduced in left lateral bending right rotation joint dysfunction C5-C6 C1-C2    Assessment:   Diagnosis ICD-10-CM Associated Orders   1. Segmental dysfunction of pelvic region  M99.05       2. Lumbar radiculopathy  M54.16       3. Segmental dysfunction of lumbar region  M99.03       4. Segmental dysfunction of sacral region  M99.04       5. Acute bilateral low back pain without sciatica  M54.50       6. Segmental dysfunction of thoracic region  M99.02       7. Acute bilateral thoracic back pain  M54.6       8. Neck pain  M54.2       9. Segmental dysfunction of cervical region  M99.01       10. Myofascial pain  M79.18             Treatment: 28786  Manipulation to the left innominate, sacrum, L5 via Walker drop maneuver. Manipulation T4 producing good joint release well-tolerated. Manipulation C5 C1 well-tolerated.     Discussion:  Continue her home stretching program icing to the left low back and hip we will see her back for follow-up. She will continue icing and stretching.

## 2023-09-25 NOTE — PROGRESS NOTES
Weight Management Medical Nutrition Assessment   Adonis Moran is here for 8/6 bundle (not having body comp). Current wt:    263  lbs. She has gained 10.1   lbs  x 2 months with overall loss of   5.2   Lbs since May 2022. At last visit with PA wellbutrin was d/c'd and topamax was ordered. Not feeling well with topamax, feeling tired and feels she is coughing more. Has nurse visit after our visit today and will let them know. She would like to re-attempt VLCD in February along with her . Feels source of recent gain is a result of more sweets consumed during camping season. Camping is now done and she reports she did not buy any sweets this week at the store. Encouraged to keep those trigger items out of the house. Continues to go bowling 2x/wk. She will f/u in 2 months.      Patient seen by Medical Provider in past 6 months:  yes  Requested to schedule appointment with Medical Provider: No    Anthropometric Measurements  Start Weight (#): 268.2 lbs 5/4/2022   Current Weight (#):263 lbs   TBW % Change from start weight:  1.9%  Ideal Body Weight (#): 153.7 lbs BMI 25 (66.8")  Goal Weight (#):  lbs     Weight Loss History  Previous weight loss attempts: Commercial Programs (Weight Watchers, Steven Hides, etc.)  Counseling with  MD  Exercise  Meal Replacements (Medifast, Slim Fast, etc.)  Self Created Diets (Portion Control, Healthy Food Choices, etc.)    Food and Nutrition Related History  Wake up: 8-9   Bed Time: 11    Food Recall  Breakfast: 8:30-10:00 replacement   Snack: skip    Lunch: 1:00-1:30: Healthy Choice (280-350), salad w/ranch 1 tbsp OR steak, vegetables OR stew OR egg, hashbrowns OR chicken salad from [employer]   Snack: bar  Dinner: 4-6:00 skip OR waffle w/jam   Snack: skip    Beverages: water  Volume of beverage intake: 80 oz    Weekends: Same  Cravings: no specific identified   Trouble area of day: not identified     Frequency of Eating out: none currently  Food restrictions:  n/a  Cooking: self Food Shopping: self    Physical Activity Intake  Activity: bowling 2x/wk, gardening/weeding/mowing  Frequency:2x/wk bowling   Physical limitations/barriers to exercise: n/a    Estimated Needs  Energy  [employer] Needs: BMR : 4999   1-2# loss weekly sedentary: 3560-0375         1-2# loss weekly lightly active: 8618-3433  Maintenance calories for sedentary activity level: 2090  Protein: 72-90 gm      (1.2-1.5g/kg IBW)  Fluid: 70 oz    (35mL/kg IBW)    Nutrition Diagnosis  Yes;     Overweight/obesity  related to Excess energy intake as evidenced by  BMI more than normative standard for age and sex (obesity-grade III 36+)       Nutrition Intervention    Nutrition Prescription  Calories: 1997-0137  Protein: 100 gm    Meal Plan (Adrien/Pro)  Breakfast: 200, 27  Snack:  Lunch: 350-400, 30  Snack: 160, 15   Dinner: 200, 27  Snack:100-150, 5-10    Nutrition Education:     calorie controlled meal plan  healthy snacks  Food journaling  Physical activity      Nutrition Counseling:  Strategies: as above      Monitoring and Evaluation:  Evaluation criteria:  Energy Intake  Meet protein needs  Maintain adequate hydration  Monitor weekly weight  Food logging/measuring  Physical activity   Meal planning    Barriers to learning:none  Readiness to change: Action:  (Changing behavior) and Relapse: (Returning to older behaviors and abandoning the new changes)     Comprehension: very good  Expected Compliance: good

## 2023-09-26 ENCOUNTER — PROCEDURE VISIT (OUTPATIENT)
Age: 68
End: 2023-09-26
Payer: MEDICARE

## 2023-09-26 VITALS
SYSTOLIC BLOOD PRESSURE: 138 MMHG | HEIGHT: 67 IN | WEIGHT: 252 LBS | BODY MASS INDEX: 39.55 KG/M2 | DIASTOLIC BLOOD PRESSURE: 84 MMHG

## 2023-09-26 DIAGNOSIS — M79.18 MYOFASCIAL PAIN: ICD-10-CM

## 2023-09-26 DIAGNOSIS — M99.03 SEGMENTAL DYSFUNCTION OF LUMBAR REGION: ICD-10-CM

## 2023-09-26 DIAGNOSIS — M54.16 LUMBAR RADICULOPATHY: ICD-10-CM

## 2023-09-26 DIAGNOSIS — M99.05 SEGMENTAL DYSFUNCTION OF PELVIC REGION: Primary | ICD-10-CM

## 2023-09-26 DIAGNOSIS — M54.50 ACUTE BILATERAL LOW BACK PAIN WITHOUT SCIATICA: ICD-10-CM

## 2023-09-26 DIAGNOSIS — M99.01 SEGMENTAL DYSFUNCTION OF CERVICAL REGION: ICD-10-CM

## 2023-09-26 DIAGNOSIS — M99.02 SEGMENTAL DYSFUNCTION OF THORACIC REGION: ICD-10-CM

## 2023-09-26 DIAGNOSIS — M99.04 SEGMENTAL DYSFUNCTION OF SACRAL REGION: ICD-10-CM

## 2023-09-26 DIAGNOSIS — M54.2 NECK PAIN: ICD-10-CM

## 2023-09-26 DIAGNOSIS — M54.6 ACUTE BILATERAL THORACIC BACK PAIN: ICD-10-CM

## 2023-09-26 PROCEDURE — 98942 CHIROPRACTIC MANJ 5 REGIONS: CPT | Performed by: CHIROPRACTOR

## 2023-09-26 NOTE — PROGRESS NOTES
Date of first visit: 2/21/2023      HPI:  Milan Constantino returns to the office for treatment today of neck pain upper back pain lower back and left hip pain. No other medical history changes. Milan Constantino is in for recheck today reports slight improvement in her condition. VPAS  4-5      The following portions of the patient's history were reviewed and updated as appropriate: allergies, current medications, past family history, past medical history, past social history, past surgical history, and problem list.    Review of Systems    Physical Exam:  Milan Constantino is in some minor distress today she moves slowly about the exam room her transfer is slowed range of motion reduced secondary to muscle tightness. Pelvic obliquity noted elevated left versus right innominate misalignment of the left innominate on sacrum biomechanically joint dysfunction present left SI and L5-S1 motion unit active triggers in the left gluteus medius and TFL musculature. Parathoracic hypertonicity noted with a T4-T5 segmental dysfunction. Cervical range of motion reduced in left lateral bending right rotation joint dysfunction C5-C6 C1-C2    Assessment:   Diagnosis ICD-10-CM Associated Orders   1. Segmental dysfunction of pelvic region  M99.05       2. Lumbar radiculopathy  M54.16       3. Segmental dysfunction of lumbar region  M99.03       4. Segmental dysfunction of sacral region  M99.04       5. Acute bilateral low back pain without sciatica  M54.50       6. Segmental dysfunction of thoracic region  M99.02       7. Acute bilateral thoracic back pain  M54.6       8. Neck pain  M54.2       9. Segmental dysfunction of cervical region  M99.01       10. Myofascial pain  M79.18             Treatment: 73499  Manipulation to the left innominate, sacrum, L5 via Walker drop maneuver. Manipulation T4 producing good joint release well-tolerated. Manipulation C5 C1 well-tolerated.     Discussion:  Continue her home stretching program icing to the left low back and hip we will see her back for follow-up. She will continue icing and stretching.

## 2023-09-27 ENCOUNTER — CLINICAL SUPPORT (OUTPATIENT)
Dept: BARIATRICS | Facility: CLINIC | Age: 68
End: 2023-09-27

## 2023-09-27 ENCOUNTER — TELEPHONE (OUTPATIENT)
Dept: BARIATRICS | Facility: CLINIC | Age: 68
End: 2023-09-27

## 2023-09-27 ENCOUNTER — OFFICE VISIT (OUTPATIENT)
Dept: BARIATRICS | Facility: CLINIC | Age: 68
End: 2023-09-27

## 2023-09-27 VITALS
SYSTOLIC BLOOD PRESSURE: 140 MMHG | RESPIRATION RATE: 16 BRPM | DIASTOLIC BLOOD PRESSURE: 85 MMHG | WEIGHT: 263 LBS | HEART RATE: 66 BPM | HEIGHT: 66 IN | BODY MASS INDEX: 42.27 KG/M2

## 2023-09-27 DIAGNOSIS — R63.5 ABNORMAL WEIGHT GAIN: ICD-10-CM

## 2023-09-27 DIAGNOSIS — R63.5 ABNORMAL WEIGHT GAIN: Primary | ICD-10-CM

## 2023-09-27 PROCEDURE — RECHECK

## 2023-09-27 PROCEDURE — WMDI30

## 2023-09-27 NOTE — PROGRESS NOTES
I called patient and left a message on voicemail to stop topamax 25 mg daily. Recommend following with PCP next week if no change.     Cherelle Juarez PA-C 09/27/23

## 2023-09-27 NOTE — PROGRESS NOTES
Patient last visit weight:  Patient current visit weight:    If you are taking phentermine or other oral weight loss medications, are you experiencing any of the following symptoms:  Headache: Yes  Blurred Vision: YEs  Chest Pain: No  Palpitations:No  Insomnia: NO  SPECIFY ORAL MEDICATION AND DOSAGE: Topamax  Pt stated she been on the Topamax for a month and been feeling aches and a cough (like a smokers cough )Fuzzy , feeling off balance, don't feel good at all at times. Doesnt  want to get out of bed. Trys to eat for the upset stomach to go away and doesnt help and bad acid reflex as well. She wants off the medication     If you are taking an injectable medication,  are you experiencing any of the following symptoms:  Bloating:   Nausea:  Vomiting:   Constipation:   Diarrhea:  SPECIFY INJECTABLE MEDICATION AND CURRENT DOSAGE:      Vitals:    - Is BP less than 100/60? NO  - Is BP greater than 140/90? NO  - Is HR greater than 100? No  **If yes to any of the above, have patient relax and repeat in 5-10 minutes**    Repeat values:    - Is BP less than 100/60?  - Is BP greater than 140/90?  - Is HR greater than 100?   **If values remain outside of ranges above, please consult provider for next steps**

## 2023-09-29 DIAGNOSIS — R73.09 IMPAIRED GLUCOSE METABOLISM: ICD-10-CM

## 2023-09-29 DIAGNOSIS — E66.9 OBESITY, CLASS II, BMI 35-39.9: Primary | ICD-10-CM

## 2023-09-29 RX ORDER — METFORMIN HYDROCHLORIDE 750 MG/1
750 TABLET, EXTENDED RELEASE ORAL DAILY
Qty: 90 TABLET | Refills: 0 | Status: SHIPPED | OUTPATIENT
Start: 2023-09-29 | End: 2023-11-28 | Stop reason: SDUPTHER

## 2023-10-10 ENCOUNTER — RA CDI HCC (OUTPATIENT)
Dept: OTHER | Facility: HOSPITAL | Age: 68
End: 2023-10-10

## 2023-10-17 ENCOUNTER — OFFICE VISIT (OUTPATIENT)
Dept: INTERNAL MEDICINE CLINIC | Facility: CLINIC | Age: 68
End: 2023-10-17
Payer: MEDICARE

## 2023-10-17 VITALS
DIASTOLIC BLOOD PRESSURE: 76 MMHG | SYSTOLIC BLOOD PRESSURE: 132 MMHG | WEIGHT: 263.4 LBS | BODY MASS INDEX: 42.33 KG/M2 | HEART RATE: 79 BPM | HEIGHT: 66 IN | OXYGEN SATURATION: 98 %

## 2023-10-17 DIAGNOSIS — E78.00 PURE HYPERCHOLESTEROLEMIA: ICD-10-CM

## 2023-10-17 DIAGNOSIS — N18.31 STAGE 3A CHRONIC KIDNEY DISEASE (HCC): ICD-10-CM

## 2023-10-17 DIAGNOSIS — Z00.00 MEDICARE ANNUAL WELLNESS VISIT, SUBSEQUENT: ICD-10-CM

## 2023-10-17 DIAGNOSIS — I10 ESSENTIAL HYPERTENSION, BENIGN: ICD-10-CM

## 2023-10-17 DIAGNOSIS — R73.09 IMPAIRED GLUCOSE METABOLISM: ICD-10-CM

## 2023-10-17 DIAGNOSIS — R73.01 IMPAIRED FASTING GLUCOSE: ICD-10-CM

## 2023-10-17 DIAGNOSIS — Z12.31 SCREENING MAMMOGRAM, ENCOUNTER FOR: ICD-10-CM

## 2023-10-17 DIAGNOSIS — E66.9 OBESITY, CLASS II, BMI 35-39.9: ICD-10-CM

## 2023-10-17 DIAGNOSIS — G47.33 OSA (OBSTRUCTIVE SLEEP APNEA): ICD-10-CM

## 2023-10-17 DIAGNOSIS — E03.9 ACQUIRED HYPOTHYROIDISM: Primary | ICD-10-CM

## 2023-10-17 DIAGNOSIS — I10 BENIGN ESSENTIAL HYPERTENSION: ICD-10-CM

## 2023-10-17 PROCEDURE — G0439 PPPS, SUBSEQ VISIT: HCPCS | Performed by: INTERNAL MEDICINE

## 2023-10-17 PROCEDURE — 99214 OFFICE O/P EST MOD 30 MIN: CPT | Performed by: INTERNAL MEDICINE

## 2023-10-17 RX ORDER — METOPROLOL SUCCINATE 100 MG/1
100 TABLET, EXTENDED RELEASE ORAL DAILY
Qty: 90 TABLET | Refills: 3 | Status: SHIPPED | OUTPATIENT
Start: 2023-10-17

## 2023-10-17 RX ORDER — LOSARTAN POTASSIUM 100 MG/1
100 TABLET ORAL DAILY
Qty: 90 TABLET | Refills: 3 | Status: SHIPPED | OUTPATIENT
Start: 2023-10-17

## 2023-10-17 RX ORDER — AMLODIPINE BESYLATE 5 MG/1
5 TABLET ORAL DAILY
Qty: 90 TABLET | Refills: 3 | Status: SHIPPED | OUTPATIENT
Start: 2023-10-17

## 2023-10-17 RX ORDER — LEVOTHYROXINE SODIUM 0.05 MG/1
50 TABLET ORAL DAILY
Qty: 90 TABLET | Refills: 3 | Status: SHIPPED | OUTPATIENT
Start: 2023-10-17

## 2023-10-17 NOTE — PATIENT INSTRUCTIONS
Problem List Items Addressed This Visit          Endocrine    Impaired fasting glucose     A1c good, continue with healthy diet and exercise, will recheck         Relevant Orders    Hemoglobin A1C    Acquired hypothyroidism - Primary     No excessive fatigue, constipation, or cold intolerance, continue levothyroxine 50 mcg daily along with monitoring         Relevant Medications    levothyroxine (Synthroid) 50 mcg tablet    metoprolol succinate (TOPROL-XL) 100 mg 24 hr tablet    Other Relevant Orders    TSH, 3rd generation with Free T4 reflex       Respiratory    MIHAELA (obstructive sleep apnea)     Continue CPAP            Cardiovascular and Mediastinum    Benign essential hypertension     Blood pressures controlled, continue meds along with healthy diet and exercise         Relevant Medications    amLODIPine (NORVASC) 5 mg tablet    losartan (COZAAR) 100 MG tablet    metoprolol succinate (TOPROL-XL) 100 mg 24 hr tablet       Genitourinary    Stage 3a chronic kidney disease (HCC)     Lab Results   Component Value Date    EGFR 59 06/06/2023    EGFR 85 08/12/2022    EGFR 73 07/19/2022    CREATININE 0.97 06/06/2023    CREATININE 0.76 08/12/2022    CREATININE 0.82 07/19/2022   Avoid excessive use of NSAIDs, stay hydrated            Other    Pure hypercholesterolemia     Continue with healthy diet and exercise, and patient taking an over-the-counter supplement for this. Patient had a problem with statins in the past, aches         Relevant Orders    Comprehensive metabolic panel    Lipid Panel with Direct LDL reflex    Medicare annual wellness visit, subsequent     Discussed preventative health, cancer screening, immunizations, and safety issues. Last colonoscopy March 2021 with recommendations to recheck again in 5 years. Patient had DEXA scan August 25, 2022. Patient had mammogram August 25, 2022. Patient had Prevnar 13, Pneumovax 23, Tdap vaccination, RSV vaccination, Shingrix vaccines.   She had the flu shot this year also. Obesity, Class II, BMI 35-39.9     Other Visit Diagnoses       Screening mammogram, encounter for        Relevant Orders    Mammo screening bilateral w cad    Impaired glucose metabolism        Essential hypertension, benign        Relevant Medications    amLODIPine (NORVASC) 5 mg tablet    losartan (COZAAR) 100 MG tablet    metoprolol succinate (TOPROL-XL) 100 mg 24 hr tablet            Medicare Preventive Visit Patient Instructions  Thank you for completing your Welcome to Medicare Visit or Medicare Annual Wellness Visit today. Your next wellness visit will be due in one year (10/17/2024). The screening/preventive services that you may require over the next 5-10 years are detailed below. Some tests may not apply to you based off risk factors and/or age. Screening tests ordered at today's visit but not completed yet may show as past due. Also, please note that scanned in results may not display below. Preventive Screenings:  Service Recommendations Previous Testing/Comments   Colorectal Cancer Screening  * Colonoscopy    * Fecal Occult Blood Test (FOBT)/Fecal Immunochemical Test (FIT)  * Fecal DNA/Cologuard Test  * Flexible Sigmoidoscopy Age: 43-73 years old   Colonoscopy: every 10 years (may be performed more frequently if at higher risk)  OR  FOBT/FIT: every 1 year  OR  Cologuard: every 3 years  OR  Sigmoidoscopy: every 5 years  Screening may be recommended earlier than age 39 if at higher risk for colorectal cancer. Also, an individualized decision between you and your healthcare provider will decide whether screening between the ages of 77-80 would be appropriate.  Colonoscopy: 03/02/2021  FOBT/FIT: Not on file  Cologuard: Not on file  Sigmoidoscopy: Not on file          Breast Cancer Screening Age: 36 years old  Frequency: every 1-2 years  Not required if history of left and right mastectomy Mammogram: 08/25/2022        Cervical Cancer Screening Between the ages of 21-29, pap smear recommended once every 3 years. Between the ages of 32-69, can perform pap smear with HPV co-testing every 5 years. Recommendations may differ for women with a history of total hysterectomy, cervical cancer, or abnormal pap smears in past. Pap Smear: 04/12/2022        Hepatitis C Screening Once for adults born between 1945 and 1965  More frequently in patients at high risk for Hepatitis C Hep C Antibody: 09/08/2017        Diabetes Screening 1-2 times per year if you're at risk for diabetes or have pre-diabetes Fasting glucose: 101 mg/dL (6/6/2023)  A1C: 5.6 % (6/6/2023)      Cholesterol Screening Once every 5 years if you don't have a lipid disorder. May order more often based on risk factors. Lipid panel: 06/06/2023          Other Preventive Screenings Covered by Medicare:  Abdominal Aortic Aneurysm (AAA) Screening: covered once if your at risk. You're considered to be at risk if you have a family history of AAA. Lung Cancer Screening: covers low dose CT scan once per year if you meet all of the following conditions: (1) Age 48-67; (2) No signs or symptoms of lung cancer; (3) Current smoker or have quit smoking within the last 15 years; (4) You have a tobacco smoking history of at least 20 pack years (packs per day multiplied by number of years you smoked); (5) You get a written order from a healthcare provider.   Glaucoma Screening: covered annually if you're considered high risk: (1) You have diabetes OR (2) Family history of glaucoma OR (3)  aged 48 and older OR (3)  American aged 72 and older  Osteoporosis Screening: covered every 2 years if you meet one of the following conditions: (1) You're estrogen deficient and at risk for osteoporosis based off medical history and other findings; (2) Have a vertebral abnormality; (3) On glucocorticoid therapy for more than 3 months; (4) Have primary hyperparathyroidism; (5) On osteoporosis medications and need to assess response to drug therapy. Last bone density test (DXA Scan): 08/25/2022. HIV Screening: covered annually if you're between the age of 14-79. Also covered annually if you are younger than 13 and older than 72 with risk factors for HIV infection. For pregnant patients, it is covered up to 3 times per pregnancy. Immunizations:  Immunization Recommendations   Influenza Vaccine Annual influenza vaccination during flu season is recommended for all persons aged >= 6 months who do not have contraindications   Pneumococcal Vaccine   * Pneumococcal conjugate vaccine = PCV13 (Prevnar 13), PCV15 (Vaxneuvance), PCV20 (Prevnar 20)  * Pneumococcal polysaccharide vaccine = PPSV23 (Pneumovax) Adults 71-17 yo with certain risk factors or if 69+ yo  If never received any pneumonia vaccine: recommend Prevnar 20 (PCV20)  Give PCV20 if previously received 1 dose of PCV13 or PPSV23   Hepatitis B Vaccine 3 dose series if at intermediate or high risk (ex: diabetes, end stage renal disease, liver disease)   Respiratory syncytial virus (RSV) Vaccine - COVERED BY MEDICARE PART D  * RSVPreF3 (Arexvy) CDC recommends that adults 61years of age and older may receive a single dose of RSV vaccine using shared clinical decision-making (SCDM)   Tetanus (Td) Vaccine - COST NOT COVERED BY MEDICARE PART B Following completion of primary series, a booster dose should be given every 10 years to maintain immunity against tetanus. Td may also be given as tetanus wound prophylaxis. Tdap Vaccine - COST NOT COVERED BY MEDICARE PART B Recommended at least once for all adults. For pregnant patients, recommended with each pregnancy.    Shingles Vaccine (Shingrix) - COST NOT COVERED BY MEDICARE PART B  2 shot series recommended in those 19 years and older who have or will have weakened immune systems or those 50 years and older     Health Maintenance Due:      Topic Date Due    Breast Cancer Screening: Mammogram  08/25/2023    Colorectal Cancer Screening  03/02/2026 Hepatitis C Screening  Completed     Immunizations Due:  There are no preventive care reminders to display for this patient. Advance Directives   What are advance directives? Advance directives are legal documents that state your wishes and plans for medical care. These plans are made ahead of time in case you lose your ability to make decisions for yourself. Advance directives can apply to any medical decision, such as the treatments you want, and if you want to donate organs. What are the types of advance directives? There are many types of advance directives, and each state has rules about how to use them. You may choose a combination of any of the following:  Living will: This is a written record of the treatment you want. You can also choose which treatments you do not want, which to limit, and which to stop at a certain time. This includes surgery, medicine, IV fluid, and tube feedings. Durable power of  for healthcare [facility]): This is a written record that states who you want to make healthcare choices for you when you are unable to make them for yourself. This person, called a proxy, is usually a family member or a friend. You may choose more than 1 proxy. Do not resuscitate (DNR) order:  A DNR order is used in case your heart stops beating or you stop breathing. It is a request not to have certain forms of treatment, such as CPR. A DNR order may be included in other types of advance directives. Medical directive: This covers the care that you want if you are in a coma, near death, or unable to make decisions for yourself. You can list the treatments you want for each condition. Treatment may include pain medicine, surgery, blood transfusions, dialysis, IV or tube feedings, and a ventilator (breathing machine). Values history: This document has questions about your views, beliefs, and how you feel and think about life.  This information can help others choose the care that you would choose. Why are advance directives important? An advance directive helps you control your care. Although spoken wishes may be used, it is better to have your wishes written down. Spoken wishes can be misunderstood, or not followed. Treatments may be given even if you do not want them. An advance directive may make it easier for your family to make difficult choices about your care. Weight Management   Why it is important to manage your weight:  Being overweight increases your risk of health conditions such as heart disease, high blood pressure, type 2 diabetes, and certain types of cancer. It can also increase your risk for osteoarthritis, sleep apnea, and other respiratory problems. Aim for a slow, steady weight loss. Even a small amount of weight loss can lower your risk of health problems. How to lose weight safely:  A safe and healthy way to lose weight is to eat fewer calories and get regular exercise. You can lose up about 1 pound a week by decreasing the number of calories you eat by 500 calories each day. Healthy meal plan for weight management:  A healthy meal plan includes a variety of foods, contains fewer calories, and helps you stay healthy. A healthy meal plan includes the following:  Eat whole-grain foods more often. A healthy meal plan should contain fiber. Fiber is the part of grains, fruits, and vegetables that is not broken down by your body. Whole-grain foods are healthy and provide extra fiber in your diet. Some examples of whole-grain foods are whole-wheat breads and pastas, oatmeal, brown rice, and bulgur. Eat a variety of vegetables every day. Include dark, leafy greens such as spinach, kale, jazlyn greens, and mustard greens. Eat yellow and orange vegetables such as carrots, sweet potatoes, and winter squash. Eat a variety of fruits every day. Choose fresh or canned fruit (canned in its own juice or light syrup) instead of juice. Fruit juice has very little or no fiber.   Eat low-fat dairy foods. Drink fat-free (skim) milk or 1% milk. Eat fat-free yogurt and low-fat cottage cheese. Try low-fat cheeses such as mozzarella and other reduced-fat cheeses. Choose meat and other protein foods that are low in fat. Choose beans or other legumes such as split peas or lentils. Choose fish, skinless poultry (chicken or turkey), or lean cuts of red meat (beef or pork). Before you cook meat or poultry, cut off any visible fat. Use less fat and oil. Try baking foods instead of frying them. Add less fat, such as margarine, sour cream, regular salad dressing and mayonnaise to foods. Eat fewer high-fat foods. Some examples of high-fat foods include french fries, doughnuts, ice cream, and cakes. Eat fewer sweets. Limit foods and drinks that are high in sugar. This includes candy, cookies, regular soda, and sweetened drinks. Exercise:  Exercise at least 30 minutes per day on most days of the week. Some examples of exercise include walking, biking, dancing, and swimming. You can also fit in more physical activity by taking the stairs instead of the elevator or parking farther away from stores. Ask your healthcare provider about the best exercise plan for you. © Copyright [employer] 2018 Information is for End User's use only and may not be sold, redistributed or otherwise used for commercial purposes.  All illustrations and images included in CareNotes® are the copyrighted property of [employer].A[employer]M., Inc. or  [employer]

## 2023-10-17 NOTE — ASSESSMENT & PLAN NOTE
No excessive fatigue, constipation, or cold intolerance, continue levothyroxine 50 mcg daily along with monitoring

## 2023-10-17 NOTE — ASSESSMENT & PLAN NOTE
Discussed preventative health, cancer screening, immunizations, and safety issues. Last colonoscopy March 2021 with recommendations to recheck again in 5 years. Patient had DEXA scan August 25, 2022. Patient had mammogram August 25, 2022. Patient had Prevnar 13, Pneumovax 23, Tdap vaccination, RSV vaccination, Shingrix vaccines. She had the flu shot this year also.

## 2023-10-17 NOTE — ASSESSMENT & PLAN NOTE
Lab Results   Component Value Date    EGFR 59 06/06/2023    EGFR 85 08/12/2022    EGFR 73 07/19/2022    CREATININE 0.97 06/06/2023    CREATININE 0.76 08/12/2022    CREATININE 0.82 07/19/2022   Avoid excessive use of NSAIDs, stay hydrated

## 2023-10-17 NOTE — PROGRESS NOTES
Assessment and Plan:     Problem List Items Addressed This Visit        Endocrine    Impaired fasting glucose     A1c good, continue with healthy diet and exercise, will recheck         Relevant Orders    Hemoglobin A1C    Acquired hypothyroidism - Primary     No excessive fatigue, constipation, or cold intolerance, continue levothyroxine 50 mcg daily along with monitoring         Relevant Medications    levothyroxine (Synthroid) 50 mcg tablet    metoprolol succinate (TOPROL-XL) 100 mg 24 hr tablet    Other Relevant Orders    TSH, 3rd generation with Free T4 reflex       Respiratory    MIHAELA (obstructive sleep apnea)     Continue CPAP            Cardiovascular and Mediastinum    Benign essential hypertension     Blood pressures controlled, continue meds along with healthy diet and exercise         Relevant Medications    amLODIPine (NORVASC) 5 mg tablet    losartan (COZAAR) 100 MG tablet    metoprolol succinate (TOPROL-XL) 100 mg 24 hr tablet       Genitourinary    Stage 3a chronic kidney disease (HCC)     Lab Results   Component Value Date    EGFR 59 06/06/2023    EGFR 85 08/12/2022    EGFR 73 07/19/2022    CREATININE 0.97 06/06/2023    CREATININE 0.76 08/12/2022    CREATININE 0.82 07/19/2022   Avoid excessive use of NSAIDs, stay hydrated            Other    Pure hypercholesterolemia     Continue with healthy diet and exercise, and patient taking an over-the-counter supplement for this. Patient had a problem with statins in the past, aches         Relevant Orders    Comprehensive metabolic panel    Lipid Panel with Direct LDL reflex    Medicare annual wellness visit, subsequent     Discussed preventative health, cancer screening, immunizations, and safety issues. Last colonoscopy March 2021 with recommendations to recheck again in 5 years. Patient had DEXA scan August 25, 2022. Patient had mammogram August 25, 2022. Patient had Prevnar 13, Pneumovax 23, Tdap vaccination, RSV vaccination, Shingrix vaccines.   She had the flu shot this year also. Obesity, Class II, BMI 35-39.9   Other Visit Diagnoses     Screening mammogram, encounter for        Relevant Orders    Mammo screening bilateral w cad    Impaired glucose metabolism        Essential hypertension, benign        Relevant Medications    amLODIPine (NORVASC) 5 mg tablet    losartan (COZAAR) 100 MG tablet    metoprolol succinate (TOPROL-XL) 100 mg 24 hr tablet            Depression Screening and Follow-up Plan: Patient was screened for depression during today's encounter. They screened negative with a PHQ-2 score of 0. Preventive health issues were discussed with patient, and age appropriate screening tests were ordered as noted in patient's After Visit Summary. Personalized health advice and appropriate referrals for health education or preventive services given if needed, as noted in patient's After Visit Summary. History of Present Illness:     Patient presents for a Medicare Wellness Visit    Patient here for annual wellness visit and follow-up of chronic conditions       Patient Care Team:  Shaheen Meek MD as PCP - General     Review of Systems:     Review of Systems   Constitutional:  Negative for chills, fatigue and fever. HENT:  Negative for congestion, nosebleeds, postnasal drip, sore throat and trouble swallowing. Eyes:  Negative for pain. Respiratory:  Negative for cough, chest tightness, shortness of breath and wheezing. Cardiovascular:  Negative for chest pain, palpitations and leg swelling. Gastrointestinal:  Negative for abdominal pain, constipation, diarrhea, nausea and vomiting. Endocrine: Negative for polydipsia and polyuria. Genitourinary:  Negative for dysuria, flank pain and hematuria. Musculoskeletal:  Negative for arthralgias. Skin:  Negative for rash. Neurological:  Negative for dizziness, tremors, light-headedness and headaches. Hematological:  Does not bruise/bleed easily.    Psychiatric/Behavioral: Negative for confusion and dysphoric mood. The patient is not nervous/anxious.          Problem List:     Patient Active Problem List   Diagnosis   • Benign essential hypertension   • Impaired fasting glucose   • Pure hypercholesterolemia   • Medicare annual wellness visit, subsequent   • Acquired hypothyroidism   • Plantar fasciitis   • Morbid obesity ([address])   • MIHAELA (obstructive sleep apnea)   • Sensorineural hearing loss (SNHL) of both ears   • Pulsatile tinnitus   • Exostosis of left external auditory canal   • Obesity, Class II, BMI 35-39.9   • Stage 3a chronic kidney disease (HCC)      Past Medical and Surgical History:     Past Medical History:   Diagnosis Date   • Arthritis    • Environmental allergies    • Fibroid    • Hyperlipidemia    • Hypertension    • Hypothyroidism    • Plantar fasciitis    • Varicella      Past Surgical History:   Procedure Laterality Date   • KNEE SURGERY        Family History:     Family History   Problem Relation Age of Onset   • Hypertension Mother    • Dementia Mother    • Arthritis Mother    • Coronary artery disease Father    • Throat cancer Brother 61   • Endometrial cancer Maternal Aunt 50   • Lung cancer Maternal Aunt 60   • No Known Problems Maternal Aunt    • Bone cancer Maternal Uncle 69   • Coronary artery disease Maternal Uncle    • No Known Problems Paternal Aunt    • No Known Problems Maternal Grandmother    • No Known Problems Maternal Grandfather    • No Known Problems Paternal Grandmother    • No Known Problems Paternal Grandfather    • No Known Problems Daughter    • No Known Problems Son       Social History:     Social History     Socioeconomic History   • Marital status: /Civil Union     Spouse name: None   • Number of children: None   • Years of education: None   • Highest education level: None   Occupational History   • None   Tobacco Use   • Smoking status: Former   • Smokeless tobacco: Never   • Tobacco comments:     quit 25 yrs ago   Vaping Use   • Vaping Use: Never used   Substance and Sexual Activity   • Alcohol use: Yes     Comment: social   • Drug use: No   • Sexual activity: Not Currently     Partners: Male     Birth control/protection: Post-menopausal   Other Topics Concern   • None   Social History Narrative   • None     Social Determinants of Health     Financial Resource Strain: Low Risk  (10/11/2023)    Overall Financial Resource Strain (CARDIA)    • Difficulty of Paying Living Expenses: Not very hard   Food Insecurity: Not on file   Transportation Needs: No Transportation Needs (10/11/2023)    PRAPARE - Transportation    • Lack of Transportation (Medical): No    • Lack of Transportation (Non-Medical): No   Physical Activity: Not on file   Stress: Not on file   Social Connections: Not on file   Intimate Partner Violence: Not on file   Housing Stability: Not on file      Medications and Allergies:     Current Outpatient Medications   Medication Sig Dispense Refill   • amLODIPine (NORVASC) 5 mg tablet Take 1 tablet (5 mg total) by mouth daily 90 tablet 3   • BEE POLLEN PO Take by mouth     • Calcium Carb-Cholecalciferol (CALCIUM + D3 PO) Take by mouth     • co-enzyme Q-10 30 MG capsule Take 100 mg by mouth 2 (two) times a day      • diphenhydrAMINE (BENADRYL) 25 mg capsule Take 25 mg by mouth as needed Takes as needed     • ibuprofen (MOTRIN) 800 mg tablet Takes as needed.      • levothyroxine (Synthroid) 50 mcg tablet Take 1 tablet (50 mcg total) by mouth daily 90 tablet 3   • losartan (COZAAR) 100 MG tablet Take 1 tablet (100 mg total) by mouth daily 90 tablet 3   • metFORMIN (GLUCOPHAGE-XR) 750 mg 24 hr tablet Take 1 tablet (750 mg total) by mouth in the morning 90 tablet 0   • metoprolol succinate (TOPROL-XL) 100 mg 24 hr tablet Take 1 tablet (100 mg total) by mouth daily 90 tablet 3   • Multiple Vitamins-Minerals (PRESERVISION AREDS 2+MULTI VIT PO) 2 tablets in the morning     • NON FORMULARY 2 (two) times a day Orosine       No current facility-administered medications for this visit. Allergies   Allergen Reactions   • Diclofenac Swelling   • Atorvastatin Other (See Comments)     Muscle ache      Immunizations:     Immunization History   Administered Date(s) Administered   • COVID-19 PFIZER VACCINE 0.3 ML IM 03/22/2021, 04/15/2021, 10/18/2021   • COVID-19 Pfizer Vac BIVALENT Jose Eduardo-sucrose 12 Yr+ IM 10/14/2022   • COVID-19 Pfizer vac (Jose Eduardo-sucrose, gray cap) 12 yr+ IM 04/28/2022   • INFLUENZA 09/30/2013, 09/29/2015, 12/12/2016, 12/14/2016, 12/14/2016, 10/31/2017, 10/17/2018, 10/21/2019, 09/30/2020, 09/23/2021, 09/20/2023   • Influenza Quadrivalent Preservative Free 3 years and older IM 12/12/2016   • Influenza Quadrivalent, 6-35 Months IM 09/29/2015   • Influenza Split High Dose Preservative Free IM 10/21/2019   • Influenza, high dose seasonal 0.7 mL 09/20/2022   • Influenza, seasonal, injectable 09/30/2013   • Pneumococcal Conjugate 13-Valent 08/02/2018   • Pneumococcal Polysaccharide PPV23 08/05/2019   • Tdap 10/21/2019   • Zoster 07/22/2015   • Zoster Vaccine Recombinant 08/14/2018, 10/17/2018   • influenza, trivalent, adjuvanted 10/17/2018, 09/30/2020      Health Maintenance:         Topic Date Due   • Breast Cancer Screening: Mammogram  08/25/2023   • Colorectal Cancer Screening  03/02/2026   • Hepatitis C Screening  Completed     There are no preventive care reminders to display for this patient. Medicare Screening Tests and Risk Assessments:     Joel Carson is here for her Subsequent Wellness visit. Health Risk Assessment:   Patient rates overall health as good. Patient feels that their physical health rating is same. Patient is satisfied with their life. Eyesight was rated as slightly worse. Hearing was rated as slightly worse. Patient feels that their emotional and mental health rating is same. Patients states they are never, rarely angry. Patient states they are sometimes unusually tired/fatigued.  Pain experienced in the last 7 days has been some. Patient's pain rating has been 4/10. Patient states that she has experienced no weight loss or gain in last 6 months. Depression Screening:   PHQ-2 Score: 0      Fall Risk Screening: In the past year, patient has experienced: no history of falling in past year      Urinary Incontinence Screening:   Patient has not leaked urine accidently in the last six months. Home Safety:  Patient has trouble with stairs inside or outside of their home. Patient has working smoke alarms and has working carbon monoxide detector. Home safety hazards include: none. Nutrition:   Current diet is Limited junk food. Medications:   Patient is currently taking over-the-counter supplements. OTC medications include: see medication list. Patient is able to manage medications. Activities of Daily Living (ADLs)/Instrumental Activities of Daily Living (IADLs):   Walk and transfer into and out of bed and chair?: Yes  Dress and groom yourself?: Yes    Bathe or shower yourself?: Yes    Feed yourself? Yes  Do your laundry/housekeeping?: Yes  Manage your money, pay your bills and track your expenses?: Yes  Make your own meals?: Yes    Do your own shopping?: Yes    Durable Medical Equipment Suppliers  [employer]    Previous Hospitalizations:   Any hospitalizations or ED visits within the last 12 months?: No      Advance Care Planning:   Living will: Yes    Durable POA for healthcare:  Yes    Advanced directive: Yes      Cognitive Screening:   Provider or family/friend/caregiver concerned regarding cognition?: No    PREVENTIVE SCREENINGS      Cardiovascular Screening:    General: Screening Not Indicated, History Lipid Disorder, Risks and Benefits Discussed and Screening Current      Diabetes Screening:     General: Screening Current and Risks and Benefits Discussed      Colorectal Cancer Screening:     General: Screening Current      Breast Cancer Screening:     General: Risks and Benefits Discussed    Due for: Mammogram        Cervical Cancer Screening:    General: Screening Not Indicated      Osteoporosis Screening:    General: Risks and Benefits Discussed      Abdominal Aortic Aneurysm (AAA) Screening:        General: Screening Not Indicated      Lung Cancer Screening:     General: Screening Not Indicated      Hepatitis C Screening:    General: Screening Current    Screening, Brief Intervention, and Referral to Treatment (SBIRT)    Screening  Typical number of drinks in a day: 0  Typical number of drinks in a week: 0  Interpretation: Low risk drinking behavior. AUDIT-C Screenin) How often did you have a drink containing alcohol in the past year? monthly or less  2) How many drinks did you have on a typical day when you were drinking in the past year? 0  3) How often did you have 6 or more drinks on one occasion in the past year? never    AUDIT-C Score: 1  Interpretation: Score 0-2 (female): Negative screen for alcohol misuse    Single Item Drug Screening:  How often have you used an illegal drug (including marijuana) or a prescription medication for non-medical reasons in the past year? never    Single Item Drug Screen Score: 0  Interpretation: Negative screen for possible drug use disorder    Brief Intervention  Alcohol & drug use screenings were reviewed. No concerns regarding substance use disorder identified. Other Counseling Topics:   Car/seat belt/driving safety, skin self-exam and sunscreen. No results found. Physical Exam:     /76   Pulse 79   Ht 5' 6" (1.676 m)   Wt 119 kg (263 lb 6.4 oz)   SpO2 98%   BMI 42.51 kg/m²     Physical Exam  Vitals reviewed. Constitutional:       Appearance: Normal appearance. She is well-developed. HENT:      Head: Normocephalic and atraumatic. Right Ear: External ear normal.      Left Ear: External ear normal.      Nose: Nose normal.      Mouth/Throat:      Mouth: Mucous membranes are moist.   Eyes:      General: No scleral icterus. Conjunctiva/sclera: Conjunctivae normal.   Neck:      Thyroid: No thyromegaly. Trachea: No tracheal deviation. Cardiovascular:      Rate and Rhythm: Normal rate and regular rhythm. Heart sounds: Normal heart sounds. No murmur heard. Pulmonary:      Effort: No respiratory distress. Breath sounds: Normal breath sounds. No wheezing or rales. Abdominal:      General: Bowel sounds are normal.      Palpations: Abdomen is soft. There is no mass. Tenderness: There is no abdominal tenderness. There is no guarding. Musculoskeletal:      Cervical back: Normal range of motion and neck supple. Right lower leg: No edema. Left lower leg: No edema. Lymphadenopathy:      Cervical: No cervical adenopathy. Skin:     Coloration: Skin is not jaundiced or pale. Neurological:      General: No focal deficit present. Mental Status: She is alert and oriented to person, place, and time. Psychiatric:         Mood and Affect: Mood normal.         Behavior: Behavior normal.         Thought Content:  Thought content normal.         Judgment: Judgment normal.          Osmin Alexis MD

## 2023-10-17 NOTE — ASSESSMENT & PLAN NOTE
Continue with healthy diet and exercise, and patient taking an over-the-counter supplement for this.   Patient had a problem with statins in the past, aches

## 2023-10-24 ENCOUNTER — PROCEDURE VISIT (OUTPATIENT)
Age: 68
End: 2023-10-24
Payer: MEDICARE

## 2023-10-24 VITALS
BODY MASS INDEX: 41.28 KG/M2 | SYSTOLIC BLOOD PRESSURE: 189 MMHG | HEART RATE: 62 BPM | WEIGHT: 263 LBS | HEIGHT: 67 IN | DIASTOLIC BLOOD PRESSURE: 99 MMHG

## 2023-10-24 DIAGNOSIS — M54.16 LUMBAR RADICULOPATHY: ICD-10-CM

## 2023-10-24 DIAGNOSIS — M54.50 ACUTE BILATERAL LOW BACK PAIN WITHOUT SCIATICA: ICD-10-CM

## 2023-10-24 DIAGNOSIS — M99.01 SEGMENTAL DYSFUNCTION OF CERVICAL REGION: ICD-10-CM

## 2023-10-24 DIAGNOSIS — M54.6 ACUTE BILATERAL THORACIC BACK PAIN: ICD-10-CM

## 2023-10-24 DIAGNOSIS — M99.03 SEGMENTAL DYSFUNCTION OF LUMBAR REGION: ICD-10-CM

## 2023-10-24 DIAGNOSIS — M99.05 SEGMENTAL DYSFUNCTION OF PELVIC REGION: Primary | ICD-10-CM

## 2023-10-24 DIAGNOSIS — M99.02 SEGMENTAL DYSFUNCTION OF THORACIC REGION: ICD-10-CM

## 2023-10-24 DIAGNOSIS — M79.18 MYOFASCIAL PAIN: ICD-10-CM

## 2023-10-24 DIAGNOSIS — M54.2 NECK PAIN: ICD-10-CM

## 2023-10-24 DIAGNOSIS — M99.04 SEGMENTAL DYSFUNCTION OF SACRAL REGION: ICD-10-CM

## 2023-10-24 PROCEDURE — 98942 CHIROPRACTIC MANJ 5 REGIONS: CPT | Performed by: CHIROPRACTOR

## 2023-10-24 NOTE — PROGRESS NOTES
Date of first visit: 2/21/2023      HPI:  Yesenia Soares returns to the office for treatment today of neck pain upper back pain lower back and left hip pain. No other medical history changes. Yesenia Soares is in for recheck today reports slight improvement in her condition. VPAS  4      The following portions of the patient's history were reviewed and updated as appropriate: allergies, current medications, past family history, past medical history, past social history, past surgical history, and problem list.    Review of Systems    Physical Exam:  Yesenia Soares is in some minor distress today she moves slowly about the exam room her transfer is slowed range of motion reduced secondary to muscle tightness. Pelvic obliquity noted elevated left versus right innominate misalignment of the left innominate on sacrum biomechanically joint dysfunction present left SI and L5-S1 motion unit active triggers in the left gluteus medius and TFL musculature. Parathoracic hypertonicity noted with a T4-T5 segmental dysfunction. Cervical range of motion reduced in left lateral bending right rotation joint dysfunction C5-C6 C1-C2    Assessment:   Diagnosis ICD-10-CM Associated Orders   1. Segmental dysfunction of pelvic region  M99.05       2. Lumbar radiculopathy  M54.16       3. Segmental dysfunction of lumbar region  M99.03       4. Segmental dysfunction of sacral region  M99.04       5. Acute bilateral low back pain without sciatica  M54.50       6. Segmental dysfunction of thoracic region  M99.02       7. Acute bilateral thoracic back pain  M54.6       8. Neck pain  M54.2       9. Segmental dysfunction of cervical region  M99.01       10. Myofascial pain  M79.18             Treatment: [id number]  Manipulation to the left innominate, sacrum, L5 via Walker drop maneuver. Manipulation T4 producing good joint release well-tolerated. Manipulation C5 C1 well-tolerated.     Discussion:  Continue her home stretching program icing to the left low back and hip we will see her back for follow-up. She will continue icing and stretching.

## 2023-11-01 ENCOUNTER — APPOINTMENT (OUTPATIENT)
Dept: LAB | Age: 68
End: 2023-11-01
Payer: MEDICARE

## 2023-11-01 ENCOUNTER — HOSPITAL ENCOUNTER (OUTPATIENT)
Dept: RADIOLOGY | Age: 68
Discharge: HOME | End: 2023-11-01
Payer: MEDICARE

## 2023-11-01 DIAGNOSIS — E03.9 ACQUIRED HYPOTHYROIDISM: ICD-10-CM

## 2023-11-01 DIAGNOSIS — R73.01 IMPAIRED FASTING GLUCOSE: ICD-10-CM

## 2023-11-01 DIAGNOSIS — Z12.31 ENCOUNTER FOR SCREENING MAMMOGRAM FOR MALIGNANT NEOPLASM OF BREAST: ICD-10-CM

## 2023-11-01 DIAGNOSIS — E78.00 PURE HYPERCHOLESTEROLEMIA: ICD-10-CM

## 2023-11-01 LAB
ALBUMIN SERPL BCP-MCNC: 3.8 G/DL (ref 3.5–5)
ALP SERPL-CCNC: 108 U/L (ref 34–104)
ALT SERPL W P-5'-P-CCNC: 11 U/L (ref 7–52)
ANION GAP SERPL CALCULATED.3IONS-SCNC: 6 MMOL/L
AST SERPL W P-5'-P-CCNC: 14 U/L (ref 13–39)
BILIRUB SERPL-MCNC: 0.48 MG/DL (ref 0.2–1)
BUN SERPL-MCNC: 21 MG/DL (ref 5–25)
CALCIUM SERPL-MCNC: 9.1 MG/DL (ref 8.4–10.2)
CHLORIDE SERPL-SCNC: 105 MMOL/L (ref 96–108)
CHOLEST SERPL-MCNC: 238 MG/DL
CO2 SERPL-SCNC: 28 MMOL/L (ref 21–32)
CREAT SERPL-MCNC: 0.79 MG/DL (ref 0.6–1.3)
EST. AVERAGE GLUCOSE BLD GHB EST-MCNC: 126 MG/DL
GFR SERPL CREATININE-BSD FRML MDRD: 76 ML/MIN/1.73SQ M
GLUCOSE P FAST SERPL-MCNC: 100 MG/DL (ref 65–99)
HBA1C MFR BLD: 6 %
HDLC SERPL-MCNC: 45 MG/DL
LDLC SERPL CALC-MCNC: 165 MG/DL (ref 0–100)
POTASSIUM SERPL-SCNC: 3.6 MMOL/L (ref 3.5–5.3)
PROT SERPL-MCNC: 6.4 G/DL (ref 6.4–8.4)
SODIUM SERPL-SCNC: 139 MMOL/L (ref 135–147)
TRIGL SERPL-MCNC: 139 MG/DL
TSH SERPL DL<=0.05 MIU/L-ACNC: 3.86 UIU/ML (ref 0.45–4.5)

## 2023-11-01 PROCEDURE — 36415 COLL VENOUS BLD VENIPUNCTURE: CPT

## 2023-11-01 PROCEDURE — 80053 COMPREHEN METABOLIC PANEL: CPT

## 2023-11-01 PROCEDURE — 84443 ASSAY THYROID STIM HORMONE: CPT

## 2023-11-01 PROCEDURE — 77063 BREAST TOMOSYNTHESIS BI: CPT

## 2023-11-01 PROCEDURE — 83036 HEMOGLOBIN GLYCOSYLATED A1C: CPT

## 2023-11-01 PROCEDURE — 77067 SCR MAMMO BI INCL CAD: CPT

## 2023-11-01 PROCEDURE — 80061 LIPID PANEL: CPT

## 2023-11-03 ENCOUNTER — HOSPITAL ENCOUNTER (OUTPATIENT)
Dept: MAMMOGRAPHY | Facility: CLINIC | Age: 68
Discharge: HOME | End: 2023-11-03
Payer: MEDICARE

## 2023-11-03 ENCOUNTER — HOSPITAL ENCOUNTER (OUTPATIENT)
Dept: ULTRASOUND IMAGING | Facility: CLINIC | Age: 68
Discharge: HOME | End: 2023-11-03
Payer: MEDICARE

## 2023-11-03 DIAGNOSIS — R92.8 ABNORMAL SCREENING MAMMOGRAM: ICD-10-CM

## 2023-11-03 PROCEDURE — G0279 TOMOSYNTHESIS, MAMMO: HCPCS

## 2023-11-03 PROCEDURE — 77066 DX MAMMO INCL CAD BI: CPT

## 2023-11-03 PROCEDURE — 76642 ULTRASOUND BREAST LIMITED: CPT

## 2023-11-06 ENCOUNTER — TELEPHONE (OUTPATIENT)
Dept: INTERNAL MEDICINE CLINIC | Facility: CLINIC | Age: 68
End: 2023-11-06

## 2023-11-06 NOTE — TELEPHONE ENCOUNTER
Called pt left msg to call back in regards Lab results     Per Dr. Skyler Millan     No findings of malignancy in either breast, can return to routine mammogram screening

## 2023-11-09 ENCOUNTER — NEW PATIENT (OUTPATIENT)
Dept: URBAN - METROPOLITAN AREA CLINIC 6 | Facility: CLINIC | Age: 68
End: 2023-11-09

## 2023-11-09 DIAGNOSIS — G43.B0: ICD-10-CM

## 2023-11-09 DIAGNOSIS — H43.811: ICD-10-CM

## 2023-11-09 DIAGNOSIS — H25.813: ICD-10-CM

## 2023-11-09 PROCEDURE — 99204 OFFICE O/P NEW MOD 45 MIN: CPT

## 2023-11-09 PROCEDURE — 92250 FUNDUS PHOTOGRAPHY W/I&R: CPT

## 2023-11-09 ASSESSMENT — TONOMETRY
OS_IOP_MMHG: 12
OD_IOP_MMHG: 14

## 2023-11-09 ASSESSMENT — VISUAL ACUITY
OD_CC: 20/50
OD_CC: J5
OS_CC: J1+
OS_CC: 20/25
OD_PH: 20/40

## 2023-11-14 ENCOUNTER — TELEPHONE (OUTPATIENT)
Age: 68
End: 2023-11-14

## 2023-11-14 NOTE — TELEPHONE ENCOUNTER
Yovani Chavez at [phone] [facility] called requesting recent office visit be faxed to [phone], patient is having surgery and has elevated BMI and anesthesia is requesting note.  Any questions Yovani Chavez can be reached at [phone] extension [phone]

## 2023-11-17 NOTE — TELEPHONE ENCOUNTER
Ren Whiting from [facility] called again and states did not receive requested information. Would like resent to [phone].  Questions Ren Whiting can be reached at [phone] Extension [phone]

## 2023-11-21 ENCOUNTER — PRE-OP CATARACT MEASUREMENTS (OUTPATIENT)
Dept: URBAN - METROPOLITAN AREA CLINIC 6 | Facility: CLINIC | Age: 68
End: 2023-11-21

## 2023-11-21 ENCOUNTER — CONSULT (OUTPATIENT)
Dept: INTERNAL MEDICINE CLINIC | Facility: CLINIC | Age: 68
End: 2023-11-21
Payer: MEDICARE

## 2023-11-21 VITALS — DIASTOLIC BLOOD PRESSURE: 80 MMHG | RESPIRATION RATE: 12 BRPM | SYSTOLIC BLOOD PRESSURE: 134 MMHG | HEART RATE: 60 BPM

## 2023-11-21 DIAGNOSIS — H25.813: ICD-10-CM

## 2023-11-21 DIAGNOSIS — H04.123: ICD-10-CM

## 2023-11-21 DIAGNOSIS — E03.9 ACQUIRED HYPOTHYROIDISM: ICD-10-CM

## 2023-11-21 DIAGNOSIS — I10 BENIGN ESSENTIAL HYPERTENSION: ICD-10-CM

## 2023-11-21 DIAGNOSIS — Z01.818 PREOP EXAMINATION: Primary | ICD-10-CM

## 2023-11-21 PROCEDURE — 99213 OFFICE O/P EST LOW 20 MIN: CPT | Performed by: INTERNAL MEDICINE

## 2023-11-21 PROCEDURE — 92012 INTRM OPH EXAM EST PATIENT: CPT

## 2023-11-21 PROCEDURE — 92136 OPHTHALMIC BIOMETRY: CPT

## 2023-11-21 ASSESSMENT — VISUAL ACUITY
OD_CC: 20/50
OS_GLARE: 20/50
OD_GLARE: 20/400
OD_PH: 20/40
OS_CC: 20/20

## 2023-11-21 ASSESSMENT — KERATOMETRY
OD_AXISANGLE2_DEGREES: 32
OS_K1POWER_DIOPTERS: 43.25
OS_AXISANGLE2_DEGREES: 7
OD_K1POWER_DIOPTERS: 43.00
OS_AXISANGLE_DEGREES: 97
OD_K2POWER_DIOPTERS: 43.75
OS_K2POWER_DIOPTERS: 43.75
OD_AXISANGLE_DEGREES: 122

## 2023-11-21 ASSESSMENT — TONOMETRY
OS_IOP_MMHG: 13
OD_IOP_MMHG: 13

## 2023-11-21 NOTE — PROGRESS NOTES
Name: Rin Marie      : 1955      MRN: [id number]  Encounter Provider: Juan Guillen MD  Encounter Date: 2023   Encounter department: [facility]     1. Preop examination  Assessment & Plan:  Patient is medically cleared for cataract surgeries. No cardiopulmonary complaints, exam today is good. Blood pressure is well controlled and she can continue her blood pressure medications. Patient not on any blood thinners. 2. Acquired hypothyroidism  Assessment & Plan:  Continue levothyroxine along with monitoring      3. Benign essential hypertension  Assessment & Plan:  Well controlled, continue meds             Subjective     Patient here for preop medical clearance for cataract surgery. No cardiopulmonary complaints, no chest pain, shortness of breath, no lightheadedness. No problems with anesthesia in the past      Review of Systems   Constitutional:  Negative for chills, fatigue and fever. HENT:  Negative for congestion, nosebleeds, postnasal drip, sore throat and trouble swallowing. Eyes:  Negative for pain. Respiratory:  Negative for cough, chest tightness, shortness of breath and wheezing. Cardiovascular:  Negative for chest pain, palpitations and leg swelling. Gastrointestinal:  Negative for abdominal pain, constipation, diarrhea, nausea and vomiting. Endocrine: Negative for polydipsia and polyuria. Genitourinary:  Negative for dysuria, flank pain and hematuria. Musculoskeletal:  Negative for arthralgias. Skin:  Negative for rash. Neurological:  Negative for dizziness, tremors, light-headedness and headaches. Hematological:  Does not bruise/bleed easily. Psychiatric/Behavioral:  Negative for confusion and dysphoric mood. The patient is not nervous/anxious.         Past Medical History:   Diagnosis Date   • Anemia A child on   • Arthritis    • Disease of thyroid gland 2 years ago    medication   • Environmental allergies    • Fibroid    • HL (hearing loss)    • Hyperlipidemia    • Hypertension    • Hypothyroidism    • Obesity 10 years    In my fifties   • Plantar fasciitis    • Scoliosis    • Varicella    • Visual impairment      Past Surgical History:   Procedure Laterality Date   • KNEE SURGERY     • TUBAL LIGATION       Family History   Problem Relation Age of Onset   • Hypertension Mother    • Dementia Mother            • Arthritis Mother            • Thyroid disease Mother            • Hearing loss Mother            • Coronary artery disease Father    • Throat cancer Brother 61   • Endometrial cancer Maternal Aunt 50   • Lung cancer Maternal Aunt 60   • No Known Problems Maternal Aunt    • Bone cancer Maternal Uncle 69   • Coronary artery disease Maternal Uncle    • No Known Problems Paternal Aunt    • No Known Problems Maternal Grandmother    • No Known Problems Maternal Grandfather    • No Known Problems Paternal Grandmother    • No Known Problems Paternal Grandfather    • No Known Problems Daughter    • No Known Problems Son    • Alcohol abuse Brother              Social History     Socioeconomic History   • Marital status: /Civil Union     Spouse name: None   • Number of children: None   • Years of education: None   • Highest education level: None   Occupational History   • None   Tobacco Use   • Smoking status: Former     Packs/day: 0.25     Years: 34.00     Total pack years: 8.50     Types: Cigarettes     Quit date: 2003     Years since quittin.4   • Smokeless tobacco: Never   • Tobacco comments:     quit 25 yrs ago   Vaping Use   • Vaping Use: Never used   Substance and Sexual Activity   • Alcohol use: Yes     Comment: social   • Drug use: No   • Sexual activity: Not Currently     Partners: Male     Birth control/protection: Post-menopausal   Other Topics Concern   • None   Social History Narrative   • None     Social Determinants of Health     Financial Resource Strain: Low Risk  (10/11/2023)    Overall Financial Resource Strain (CARDIA)    • Difficulty of Paying Living Expenses: Not very hard   Food Insecurity: Not on file   Transportation Needs: No Transportation Needs (10/11/2023)    PRAPARE - Transportation    • Lack of Transportation (Medical): No    • Lack of Transportation (Non-Medical): No   Physical Activity: Not on file   Stress: Not on file   Social Connections: Not on file   Intimate Partner Violence: Not on file   Housing Stability: Not on file     Current Outpatient Medications on File Prior to Visit   Medication Sig   • amLODIPine (NORVASC) 5 mg tablet Take 1 tablet (5 mg total) by mouth daily   • BEE POLLEN PO Take by mouth   • Calcium Carb-Cholecalciferol (CALCIUM + D3 PO) Take by mouth   • co-enzyme Q-10 30 MG capsule Take 100 mg by mouth 2 (two) times a day    • diphenhydrAMINE (BENADRYL) 25 mg capsule Take 25 mg by mouth as needed Takes as needed   • ibuprofen (MOTRIN) 800 mg tablet Takes as needed.    • levothyroxine (Synthroid) 50 mcg tablet Take 1 tablet (50 mcg total) by mouth daily   • losartan (COZAAR) 100 MG tablet Take 1 tablet (100 mg total) by mouth daily   • metFORMIN (GLUCOPHAGE-XR) 750 mg 24 hr tablet Take 1 tablet (750 mg total) by mouth in the morning   • metoprolol succinate (TOPROL-XL) 100 mg 24 hr tablet Take 1 tablet (100 mg total) by mouth daily   • Multiple Vitamins-Minerals (PRESERVISION AREDS 2+MULTI VIT PO) 2 tablets in the morning   • NON FORMULARY 2 (two) times a day Orosine     Allergies   Allergen Reactions   • Diclofenac Swelling   • Atorvastatin Other (See Comments)     Muscle ache     Immunization History   Administered Date(s) Administered   • COVID-19 PFIZER VACCINE 0.3 ML IM 03/22/2021, 04/15/2021, 10/18/2021   • COVID-19 Pfizer Vac BIVALENT Jose Eduardo-sucrose 12 Yr+ IM 10/14/2022   • COVID-19 Pfizer mRNA vacc PF jose eduardo-sucrose 12 yr and older ([address]) 10/02/2023   • Travisfort vac (Jose Eduardo-sucrose, gray cap) 12 yr+ IM 04/28/2022   • INFLUENZA 09/30/2013, 09/29/2015, 12/12/2016, 12/14/2016, 12/14/2016, 10/31/2017, 10/17/2018, 10/21/2019, 09/30/2020, 09/23/2021, 09/20/2023   • Influenza Quadrivalent Preservative Free 3 years and older IM 12/12/2016   • Influenza Quadrivalent, 6-35 Months IM 09/29/2015   • Influenza Split High Dose Preservative Free IM 10/21/2019   • Influenza, high dose seasonal 0.7 mL 09/20/2022   • Influenza, seasonal, injectable 09/30/2013   • Pneumococcal Conjugate 13-Valent 08/02/2018   • Pneumococcal Polysaccharide PPV23 08/05/2019   • Respiratory Syncytial Virus Vaccine (Recombinant) 10/02/2023   • Tdap 10/21/2019   • Zoster 07/22/2015   • Zoster Vaccine Recombinant 08/14/2018, 10/17/2018   • influenza, trivalent, adjuvanted 10/17/2018, 09/30/2020       Objective     /80   Pulse 60   Resp 12     Physical Exam  Vitals reviewed. Constitutional:       General: She is not in acute distress. Appearance: She is well-developed. HENT:      Head: Normocephalic and atraumatic. Right Ear: External ear normal.      Left Ear: External ear normal.   Eyes:      General: No scleral icterus. Conjunctiva/sclera: Conjunctivae normal.   Neck:      Thyroid: No thyromegaly. Trachea: No tracheal deviation. Cardiovascular:      Rate and Rhythm: Normal rate and regular rhythm. Heart sounds: Normal heart sounds. No murmur heard. Pulmonary:      Effort: Pulmonary effort is normal. No respiratory distress. Breath sounds: Normal breath sounds. No wheezing or rales. Musculoskeletal:      Cervical back: Normal range of motion and neck supple. Right lower leg: Edema (trace) present. Left lower leg: Edema (trace) present. Lymphadenopathy:      Cervical: No cervical adenopathy. Skin:     Coloration: Skin is not jaundiced or pale. Neurological:      Mental Status: She is alert and oriented to person, place, and time.    Psychiatric: Behavior: Behavior normal.         Thought Content:  Thought content normal.         Judgment: Judgment normal.       Marybeth June MD

## 2023-11-21 NOTE — PATIENT INSTRUCTIONS
Problem List Items Addressed This Visit          Endocrine    Acquired hypothyroidism     Continue levothyroxine along with monitoring            Cardiovascular and Mediastinum    Benign essential hypertension     Well controlled, continue meds            Other    Preop examination - Primary     Patient is medically cleared for cataract surgeries. No cardiopulmonary complaints, exam today is good. Blood pressure is well controlled and she can continue her blood pressure medications. Patient not on any blood thinners.

## 2023-11-21 NOTE — LETTER
2023     Jama Mikki, DO  [facility]  [address]  [address] [address] [address]    Patient: Kate Aburto   YOB: 1955   Date of Visit: 2023       Dear Dr. Jonathan العلي: Thank you for referring Maurice Rossi to me for evaluation. Below are my notes for this consultation. If you have questions, please do not hesitate to call me. I look forward to following your patient along with you. Sincerely,        Jim Elder MD        CC: No Recipients    Jim Elder MD  2023  2:37 PM  Incomplete  Name: Kate Aburto      : 1955      MRN: [id number]  Encounter Provider: Jim Elder MD  Encounter Date: 2023   Enc[facility]    Assessment & Plan     1. Preop examination  Assessment & Plan:  Patient is medically cleared for cataract surgeries. No cardiopulmonary complaints, exam today is good. Blood pressure is well controlled and she can continue her blood pressure medications. Patient not on any blood thinners. 2. Acquired hypothyroidism  Assessment & Plan:  Continue levothyroxine along with monitoring      3. Benign essential hypertension  Assessment & Plan:  Well controlled, continue meds             Subjective     Patient here for preop medical clearance for cataract surgery. No cardiopulmonary complaints, no chest pain, shortness of breath, no lightheadedness. No problems with anesthesia in the past      Review of Systems   Constitutional:  Negative for chills, fatigue and fever. HENT:  Negative for congestion, nosebleeds, postnasal drip, sore throat and trouble swallowing. Eyes:  Negative for pain. Respiratory:  Negative for cough, chest tightness, shortness of breath and wheezing. Cardiovascular:  Negative for chest pain, palpitations and leg swelling. Gastrointestinal:  Negative for abdominal pain, constipation, diarrhea, nausea and vomiting.    Endocrine: Negative for polydipsia and polyuria. Genitourinary:  Negative for dysuria, flank pain and hematuria. Musculoskeletal:  Negative for arthralgias. Skin:  Negative for rash. Neurological:  Negative for dizziness, tremors, light-headedness and headaches. Hematological:  Does not bruise/bleed easily. Psychiatric/Behavioral:  Negative for confusion and dysphoric mood. The patient is not nervous/anxious.         Past Medical History:   Diagnosis Date   • Anemia A child on   • Arthritis    • Disease of thyroid gland 2 years ago    medication   • Environmental allergies    • Fibroid    • HL (hearing loss)    • Hyperlipidemia    • Hypertension    • Hypothyroidism    • Obesity 10 years    In my fifties   • Plantar fasciitis    • Scoliosis    • Varicella    • Visual impairment      Past Surgical History:   Procedure Laterality Date   • KNEE SURGERY     • TUBAL LIGATION       Family History   Problem Relation Age of Onset   • Hypertension Mother    • Dementia Mother            • Arthritis Mother            • Thyroid disease Mother            • Hearing loss Mother            • Coronary artery disease Father    • Throat cancer Brother 61   • Endometrial cancer Maternal Aunt 50   • Lung cancer Maternal Aunt 60   • No Known Problems Maternal Aunt    • Bone cancer Maternal Uncle 69   • Coronary artery disease Maternal Uncle    • No Known Problems Paternal Aunt    • No Known Problems Maternal Grandmother    • No Known Problems Maternal Grandfather    • No Known Problems Paternal Grandmother    • No Known Problems Paternal Grandfather    • No Known Problems Daughter    • No Known Problems Son    • Alcohol abuse Brother              Social History     Socioeconomic History   • Marital status: /Civil Union     Spouse name: None   • Number of children: None   • Years of education: None   • Highest education level: None   Occupational History   • None   Tobacco Use   • Smoking status: Former Packs/day: 0.25     Years: 34.00     Total pack years: 8.50     Types: Cigarettes     Quit date: 2003     Years since quittin.4   • Smokeless tobacco: Never   • Tobacco comments:     quit 25 yrs ago   Vaping Use   • Vaping Use: Never used   Substance and Sexual Activity   • Alcohol use: Yes     Comment: social   • Drug use: No   • Sexual activity: Not Currently     Partners: Male     Birth control/protection: Post-menopausal   Other Topics Concern   • None   Social History Narrative   • None     Social Determinants of Health     Financial Resource Strain: Low Risk  (10/11/2023)    Overall Financial Resource Strain (CARDIA)    • Difficulty of Paying Living Expenses: Not very hard   Food Insecurity: Not on file   Transportation Needs: No Transportation Needs (10/11/2023)    PRAPARE - Transportation    • Lack of Transportation (Medical): No    • Lack of Transportation (Non-Medical): No   Physical Activity: Not on file   Stress: Not on file   Social Connections: Not on file   Intimate Partner Violence: Not on file   Housing Stability: Not on file     Current Outpatient Medications on File Prior to Visit   Medication Sig   • amLODIPine (NORVASC) 5 mg tablet Take 1 tablet (5 mg total) by mouth daily   • BEE POLLEN PO Take by mouth   • Calcium Carb-Cholecalciferol (CALCIUM + D3 PO) Take by mouth   • co-enzyme Q-10 30 MG capsule Take 100 mg by mouth 2 (two) times a day    • diphenhydrAMINE (BENADRYL) 25 mg capsule Take 25 mg by mouth as needed Takes as needed   • ibuprofen (MOTRIN) 800 mg tablet Takes as needed.    • levothyroxine (Synthroid) 50 mcg tablet Take 1 tablet (50 mcg total) by mouth daily   • losartan (COZAAR) 100 MG tablet Take 1 tablet (100 mg total) by mouth daily   • metFORMIN (GLUCOPHAGE-XR) 750 mg 24 hr tablet Take 1 tablet (750 mg total) by mouth in the morning   • metoprolol succinate (TOPROL-XL) 100 mg 24 hr tablet Take 1 tablet (100 mg total) by mouth daily   • Multiple Vitamins-Minerals (PRESERVISION AREDS 2+MULTI VIT PO) 2 tablets in the morning   • NON FORMULARY 2 (two) times a day Orosine     Allergies   Allergen Reactions   • Diclofenac Swelling   • Atorvastatin Other (See Comments)     Muscle ache     Immunization History   Administered Date(s) Administered   • COVID-19 PFIZER VACCINE 0.3 ML IM 03/22/2021, 04/15/2021, 10/18/2021   • COVID-19 Pfizer Vac BIVALENT Jose Eduardo-sucrose 12 Yr+ IM 10/14/2022   • COVID-19 Pfizer mRNA vacc PF jose eduardo-sucrose 12 yr and older (Comirnaty) 10/02/2023   • COVID-19 Pfizer vac (Jose Eduardo-sucrose, gray cap) 12 yr+ IM 04/28/2022   • INFLUENZA 09/30/2013, 09/29/2015, 12/12/2016, 12/14/2016, 12/14/2016, 10/31/2017, 10/17/2018, 10/21/2019, 09/30/2020, 09/23/2021, 09/20/2023   • Influenza Quadrivalent Preservative Free 3 years and older IM 12/12/2016   • Influenza Quadrivalent, 6-35 Months IM 09/29/2015   • Influenza Split High Dose Preservative Free IM 10/21/2019   • Influenza, high dose seasonal 0.7 mL 09/20/2022   • Influenza, seasonal, injectable 09/30/2013   • Pneumococcal Conjugate 13-Valent 08/02/2018   • Pneumococcal Polysaccharide PPV23 08/05/2019   • Respiratory Syncytial Virus Vaccine (Recombinant) 10/02/2023   • Tdap 10/21/2019   • Zoster 07/22/2015   • Zoster Vaccine Recombinant 08/14/2018, 10/17/2018   • influenza, trivalent, adjuvanted 10/17/2018, 09/30/2020       Objective     /80   Pulse 60   Resp 12     Physical Exam  Vitals reviewed. Constitutional:       General: She is not in acute distress. Appearance: She is well-developed. HENT:      Head: Normocephalic and atraumatic. Right Ear: External ear normal.      Left Ear: External ear normal.   Eyes:      General: No scleral icterus. Conjunctiva/sclera: Conjunctivae normal.   Neck:      Thyroid: No thyromegaly. Trachea: No tracheal deviation. Cardiovascular:      Rate and Rhythm: Normal rate and regular rhythm. Heart sounds: Normal heart sounds. No murmur heard.   Pulmonary: Effort: Pulmonary effort is normal. No respiratory distress. Breath sounds: Normal breath sounds. No wheezing or rales. Musculoskeletal:      Cervical back: Normal range of motion and neck supple. Right lower leg: Edema (trace) present. Left lower leg: Edema (trace) present. Lymphadenopathy:      Cervical: No cervical adenopathy. Skin:     Coloration: Skin is not jaundiced or pale. Neurological:      Mental Status: She is alert and oriented to person, place, and time. Psychiatric:         Behavior: Behavior normal.         Thought Content:  Thought content normal.         Judgment: Judgment normal.       Marybeth June MD

## 2023-11-21 NOTE — ASSESSMENT & PLAN NOTE
Patient is medically cleared for cataract surgeries. No cardiopulmonary complaints, exam today is good. Blood pressure is well controlled and she can continue her blood pressure medications. Patient not on any blood thinners.

## 2023-11-24 NOTE — PROGRESS NOTES
Weight Management Medical Nutrition Assessment   Elle Musa is here for meal planning. Current wt:    262   lbs. She has lost 1 lbs  x 1 month with overall loss of   6.2   Lbs since May 2022. No longer on topamax due to side effects. Tolerating metformin, does note diarrhea. Taking prior to meal replacement in the a.m. and may benefit from taking with a meal. Message sent to provider. Does not positive changes to appetite. Appears to have a long stretch between breakfast and lunch. Recommend add snack at this time. Less physical activity as her  is recovering from hip surgery. Hoping to increase this as able. Her goal is to be cleared for VLCD and start this in February with her . Recent dx of CKD-3A however was not referred to nephrology and her last GFR increased to 76. She will f/u in February.       Patient seen by Medical Provider in past 6 months:  yes  Requested to schedule appointment with Medical Provider: No    Anthropometric Measurements  Start Weight (#): 268.2 lbs 5/4/2022   Current Weight (#):262 lbs   TBW % Change from start weight:  2.3%  Ideal Body Weight (#): 153.7 lbs BMI 25 (66.8")  Goal Weight (#):  lbs     Weight Loss History  Previous weight loss attempts: Commercial Programs (Weight Watchers, Hello Market, etc.)  Counseling with  MD  Exercise  Meal Replacements (Medifast, Slim Fast, etc.)  Self Created Diets (Portion Control, Healthy Food Choices, etc.)    Food and Nutrition Related History  Wake up: 8-9   Bed Time: 11    Food Recall  Breakfast:  10:00 replacement   Snack: skip OR yogurt  Lunch: 3:00: Healthy Choice (280-350) OR string bean/ham casserole OR ~1 cup spaghetti w/meat sauce   Snack: skip  Dinner: 6-7:00 salad w/1 pickled egg, handful of cheddar cheese OR fritos and dip   Snack: skip    Beverages: water  Volume of beverage intake: 64 oz    Weekends: Same  Cravings: no specific identified   Trouble area of day: not identified     Frequency of Eating out: none currently  Food restrictions:  n/a  Cooking: self   Food Shopping: self    Physical Activity Intake  Activity: walking   Frequency: daily short walks     Physical limitations/barriers to exercise: n/a    Estimated Needs  Energy  B[employer]ys Energy Needs: BMR : 3312   1-2# loss weekly sedentary: 1311-2038         1-2# loss weekly lightly active: 9506-1637  Maintenance calories for sedentary activity level: 2090  Protein: 72-90 gm      (1.2-1.5g/kg IBW)  Fluid: 70 oz    (35mL/kg IBW)    Nutrition Diagnosis  Yes;     Overweight/obesity  related to Excess energy intake as evidenced by  BMI more than normative standard for age and sex (obesity-grade III 36+)       Nutrition Intervention    Nutrition Prescription  Calories: 6177-3755  Protein: 100 gm    Meal Plan (Adrien/Pro)  Breakfast: 200, 27  Snack:  Lunch: 350-400, 30  Snack: 160, 15   Dinner: 200, 27  Snack:100-150, 5-10    Nutrition Education:     calorie controlled meal plan  healthy snacks  Food journaling  Physical activity      Nutrition Counseling:  Strategies: as above      Monitoring and Evaluation:  Evaluation criteria:  Energy Intake  Meet protein needs  Maintain adequate hydration  Monitor weekly weight  Food logging/measuring  Physical activity   Meal planning    Barriers to learning:none  Readiness to change: Action:  (Changing behavior)    Comprehension: very good  Expected Compliance: good

## 2023-11-28 ENCOUNTER — OFFICE VISIT (OUTPATIENT)
Dept: BARIATRICS | Facility: CLINIC | Age: 68
End: 2023-11-28

## 2023-11-28 ENCOUNTER — OFFICE VISIT (OUTPATIENT)
Dept: BARIATRICS | Facility: CLINIC | Age: 68
End: 2023-11-28
Payer: MEDICARE

## 2023-11-28 VITALS
HEIGHT: 65 IN | WEIGHT: 262 LBS | SYSTOLIC BLOOD PRESSURE: 147 MMHG | HEART RATE: 71 BPM | BODY MASS INDEX: 43.65 KG/M2 | RESPIRATION RATE: 16 BRPM | DIASTOLIC BLOOD PRESSURE: 96 MMHG

## 2023-11-28 DIAGNOSIS — I10 BENIGN ESSENTIAL HYPERTENSION: ICD-10-CM

## 2023-11-28 DIAGNOSIS — R63.5 ABNORMAL WEIGHT GAIN: Primary | ICD-10-CM

## 2023-11-28 DIAGNOSIS — R73.09 IMPAIRED GLUCOSE METABOLISM: ICD-10-CM

## 2023-11-28 DIAGNOSIS — E66.01 MORBID OBESITY (HCC): Primary | ICD-10-CM

## 2023-11-28 DIAGNOSIS — E66.9 OBESITY, CLASS II, BMI 35-39.9: ICD-10-CM

## 2023-11-28 DIAGNOSIS — N18.31 STAGE 3A CHRONIC KIDNEY DISEASE (HCC): ICD-10-CM

## 2023-11-28 PROCEDURE — RECHECK

## 2023-11-28 PROCEDURE — WMDI30

## 2023-11-28 PROCEDURE — 99214 OFFICE O/P EST MOD 30 MIN: CPT | Performed by: PHYSICIAN ASSISTANT

## 2023-11-28 RX ORDER — METFORMIN HYDROCHLORIDE 750 MG/1
750 TABLET, EXTENDED RELEASE ORAL DAILY
Qty: 90 TABLET | Refills: 0 | Status: SHIPPED | OUTPATIENT
Start: 2023-11-28

## 2023-11-28 RX ORDER — LOTEPREDNOL ETABONATE 10 MG/ML
SUSPENSION TOPICAL
COMMUNITY
Start: 2023-11-21

## 2023-11-28 NOTE — PROGRESS NOTES
Assessment/Plan: Morbid obesity ([address])  -Patient is pursuing conservative plan but would like to restart VLCD in March. Labs ordered and EKG today  -Initial weight loss goal of 5-10% weight loss for improved health    Initial:268.5 lbs   Current:262  Change:-6.5  Goal:180    Currently on metformin. To take with a meal and if no change with loose stool after 2 weeks to stop. Prior was on topamax but had side effects and was on wellbutrin which did improve energy level but did not help with weight loss. To consider wegovy if covered by insurance in the future    To continue meal replacment for breakfast. Discussed trying to increase activity and join the gym with her  when cleared by ortho    Stage 3a chronic kidney disease [facility])  Lab Results   Component Value Date    EGFR 76 11/01/2023    EGFR 59 06/06/2023    EGFR 85 08/12/2022    CREATININE 0.79 11/01/2023    CREATININE 0.97 06/06/2023    CREATININE 0.76 08/12/2022   Labs improved     Benign essential hypertension  Elevated today. On losartan , norvasc and metoprolol        Follow up in approximately  4 months  with Non-Surgical Physician/Advanced Practitioner and Non-Surgical Dietician. Diagnoses and all orders for this visit:    Morbid obesity ([address])  -     Comprehensive metabolic panel; Future  -     Hemoglobin A1C; Future  -     CBC and differential; Future  -     Magnesium; Future  -     ECG 12 lead; Future    Obesity, Class II, BMI 35-39.9  -     metFORMIN (GLUCOPHAGE-XR) 750 mg 24 hr tablet; Take 1 tablet (750 mg total) by mouth in the morning    Impaired glucose metabolism  -     metFORMIN (GLUCOPHAGE-XR) 750 mg 24 hr tablet; Take 1 tablet (750 mg total) by mouth in the morning  -     Comprehensive metabolic panel; Future  -     Hemoglobin A1C; Future  -     CBC and differential; Future  -     Magnesium; Future  -     ECG 12 lead;  Future    Stage 3a chronic kidney disease (HCC)    Benign essential hypertension    Other orders  -     Inveltys 1 % SUSP;  (Patient not taking: Reported on 11/28/2023)          Subjective:   Chief Complaint   Patient presents with    Follow-up     MWM- 4mth f/u- Waist 51in        Patient ID: Courtney Hirsch  is a 77 y.o. female with excess weight/obesity here to pursue weight managment. Patient is pursuing Conservative Program after VLCD    HPI  She is taking metformin once a day. She does have loose stool with it. She does feel it is helping with appetite control. She has had weight gain after VLCD. She would be intterested in doing it again in the spring with her . He has consult here in February. Wt Readings from Last 10 Encounters:   11/28/23 119 kg (262 lb)   10/24/23 119 kg (263 lb)   10/17/23 119 kg (263 lb 6.4 oz)   09/27/23 119 kg (263 lb)   09/26/23 114 kg (252 lb)   08/28/23 114 kg (252 lb 1.9 oz)   07/25/23 115 kg (252 lb 12.8 oz)   07/25/23 113 kg (250 lb)   05/25/23 113 kg (250 lb)   05/24/23 114 kg (250 lb 6.4 oz)       Increased appetite/cravings:improved on metformin  Fruit/Vegetable servings:  Exercise:walking 48 yds with  (just had hip replacement)  Hydration:water 68 oz     Diet Recall:  B: meal replacement  S: sometimes snack bar  L: healthychoice  w/salad or homemade food  D: salad       The following portions of the patient's history were reviewed and updated as appropriate: She  has a past medical history of Anemia (A child on), Arthritis, Disease of thyroid gland (2 years ago), Environmental allergies, Fibroid, HL (hearing loss) (2021), Hyperlipidemia, Hypertension, Hypothyroidism, Obesity (10 years), Plantar fasciitis, Scoliosis (2022), Varicella, and Visual impairment (2021).   She   Patient Active Problem List    Diagnosis Date Noted    Preop examination 11/21/2023    Stage 3a chronic kidney disease ([address]) 10/17/2023    Obesity, Class II, BMI 35-39.9 04/26/2023    Sensorineural hearing loss (SNHL) of both ears 06/13/2022    Pulsatile tinnitus 06/13/2022    Exostosis of left external auditory canal 06/13/2022    MIHAELA (obstructive sleep apnea)     Morbid obesity ([address]) 08/20/2021    Plantar fasciitis 02/06/2020    Acquired hypothyroidism 11/28/2018    Medicare annual wellness visit, subsequent 08/02/2018    Impaired fasting glucose 12/12/2016    Benign essential hypertension 08/09/2016    Pure hypercholesterolemia 08/09/2016     She  has a past surgical history that includes Knee surgery and Tubal ligation (1979). Her family history includes Alcohol abuse in her brother; Arthritis in her mother; Bone cancer (age of onset: 71) in her maternal uncle; Coronary artery disease in her father and maternal uncle; Dementia in her mother; Endometrial cancer (age of onset: 48) in her maternal aunt; Hearing loss in her mother; Hypertension in her mother; Lung cancer (age of onset: 61) in her maternal aunt; No Known Problems in her daughter, maternal aunt, maternal grandfather, maternal grandmother, paternal aunt, paternal grandfather, paternal grandmother, and son; Throat cancer (age of onset: 61) in her brother; Thyroid disease in her mother. She  reports that she quit smoking about 20 years ago. Her smoking use included cigarettes. She has a 8.50 pack-year smoking history. She has never used smokeless tobacco. She reports current alcohol use. She reports that she does not use drugs. Current Outpatient Medications   Medication Sig Dispense Refill    amLODIPine (NORVASC) 5 mg tablet Take 1 tablet (5 mg total) by mouth daily 90 tablet 3    BEE POLLEN PO Take by mouth      Calcium Carb-Cholecalciferol (CALCIUM + D3 PO) Take by mouth      co-enzyme Q-10 30 MG capsule Take 100 mg by mouth 2 (two) times a day       diphenhydrAMINE (BENADRYL) 25 mg capsule Take 25 mg by mouth as needed Takes as needed      ibuprofen (MOTRIN) 800 mg tablet as needed Takes as needed.       levothyroxine (Synthroid) 50 mcg tablet Take 1 tablet (50 mcg total) by mouth daily 90 tablet 3    losartan (COZAAR) 100 MG tablet Take 1 tablet (100 mg total) by mouth daily 90 tablet 3    metFORMIN (GLUCOPHAGE-XR) 750 mg 24 hr tablet Take 1 tablet (750 mg total) by mouth in the morning 90 tablet 0    metoprolol succinate (TOPROL-XL) 100 mg 24 hr tablet Take 1 tablet (100 mg total) by mouth daily 90 tablet 3    Multiple Vitamins-Minerals (PRESERVISION AREDS 2+MULTI VIT PO) 2 tablets in the morning      NON FORMULARY 2 (two) times a day Orosine      Inveltys 1 % SUSP  (Patient not taking: Reported on 11/28/2023)       No current facility-administered medications for this visit. Current Outpatient Medications on File Prior to Visit   Medication Sig    amLODIPine (NORVASC) 5 mg tablet Take 1 tablet (5 mg total) by mouth daily    BEE POLLEN PO Take by mouth    Calcium Carb-Cholecalciferol (CALCIUM + D3 PO) Take by mouth    co-enzyme Q-10 30 MG capsule Take 100 mg by mouth 2 (two) times a day     diphenhydrAMINE (BENADRYL) 25 mg capsule Take 25 mg by mouth as needed Takes as needed    ibuprofen (MOTRIN) 800 mg tablet as needed Takes as needed. levothyroxine (Synthroid) 50 mcg tablet Take 1 tablet (50 mcg total) by mouth daily    losartan (COZAAR) 100 MG tablet Take 1 tablet (100 mg total) by mouth daily    metoprolol succinate (TOPROL-XL) 100 mg 24 hr tablet Take 1 tablet (100 mg total) by mouth daily    Multiple Vitamins-Minerals (PRESERVISION AREDS 2+MULTI VIT PO) 2 tablets in the morning    NON FORMULARY 2 (two) times a day Orosine    [DISCONTINUED] metFORMIN (GLUCOPHAGE-XR) 750 mg 24 hr tablet Take 1 tablet (750 mg total) by mouth in the morning    Inveltys 1 % SUSP  (Patient not taking: Reported on 11/28/2023)     No current facility-administered medications on file prior to visit. She is allergic to diclofenac and atorvastatin. .    Review of Systems   Constitutional:  Negative for chills and fever. Respiratory:  Negative for cough and shortness of breath. Cardiovascular:  Negative for chest pain and palpitations. Gastrointestinal:  Negative for abdominal pain and vomiting. Skin:  Negative for rash. Neurological:  Negative for seizures and syncope. All other systems reviewed and are negative. Objective:    /96 (BP Location: Left arm, Patient Position: Sitting)   Pulse 71   Resp 16   Ht 5' 5" (1.651 m)   Wt 119 kg (262 lb)   BMI 43.60 kg/m²      Physical Exam  Vitals and nursing note reviewed. Constitutional:       General: She is not in acute distress. Appearance: She is well-developed. She is obese. HENT:      Head: Normocephalic and atraumatic. Eyes:      Conjunctiva/sclera: Conjunctivae normal.   Neck:      Thyroid: No thyromegaly. Pulmonary:      Effort: Pulmonary effort is normal. No respiratory distress. Skin:     Findings: No rash (visible). Neurological:      Mental Status: She is alert and oriented to person, place, and time.    Psychiatric:         Mood and Affect: Mood normal.         Behavior: Behavior normal.

## 2023-11-28 NOTE — ASSESSMENT & PLAN NOTE
Lab Results   Component Value Date    EGFR 76 11/01/2023    EGFR 59 06/06/2023    EGFR 85 08/12/2022    CREATININE 0.79 11/01/2023    CREATININE 0.97 06/06/2023    CREATININE 0.76 08/12/2022   Labs improved

## 2023-11-29 ENCOUNTER — PROCEDURE VISIT (OUTPATIENT)
Age: 68
End: 2023-11-29
Payer: MEDICARE

## 2023-11-29 VITALS
WEIGHT: 262 LBS | BODY MASS INDEX: 43.65 KG/M2 | DIASTOLIC BLOOD PRESSURE: 81 MMHG | SYSTOLIC BLOOD PRESSURE: 168 MMHG | HEIGHT: 65 IN

## 2023-11-29 DIAGNOSIS — M99.04 SEGMENTAL DYSFUNCTION OF SACRAL REGION: ICD-10-CM

## 2023-11-29 DIAGNOSIS — M79.18 MYOFASCIAL PAIN: ICD-10-CM

## 2023-11-29 DIAGNOSIS — M99.03 SEGMENTAL DYSFUNCTION OF LUMBAR REGION: ICD-10-CM

## 2023-11-29 DIAGNOSIS — M54.2 NECK PAIN: ICD-10-CM

## 2023-11-29 DIAGNOSIS — M99.02 SEGMENTAL DYSFUNCTION OF THORACIC REGION: ICD-10-CM

## 2023-11-29 DIAGNOSIS — M99.01 SEGMENTAL DYSFUNCTION OF CERVICAL REGION: ICD-10-CM

## 2023-11-29 DIAGNOSIS — M54.50 ACUTE BILATERAL LOW BACK PAIN WITHOUT SCIATICA: ICD-10-CM

## 2023-11-29 DIAGNOSIS — M54.16 LUMBAR RADICULOPATHY: ICD-10-CM

## 2023-11-29 DIAGNOSIS — M99.05 SEGMENTAL DYSFUNCTION OF PELVIC REGION: Primary | ICD-10-CM

## 2023-11-29 DIAGNOSIS — M54.6 ACUTE BILATERAL THORACIC BACK PAIN: ICD-10-CM

## 2023-11-29 PROCEDURE — 98942 CHIROPRACTIC MANJ 5 REGIONS: CPT | Performed by: CHIROPRACTOR

## 2023-12-04 ENCOUNTER — SURGERY/PROCEDURE (OUTPATIENT)
Dept: URBAN - METROPOLITAN AREA SURGICAL CENTER 6 | Facility: SURGICAL CENTER | Age: 68
End: 2023-12-04

## 2023-12-04 DIAGNOSIS — H25.811: ICD-10-CM

## 2023-12-04 PROCEDURE — 68841 INSJ RX ELUT IMPLT LAC CANAL: CPT

## 2023-12-04 PROCEDURE — 66984 XCAPSL CTRC RMVL W/O ECP: CPT

## 2023-12-04 NOTE — PROGRESS NOTES
Date of first visit: 2/21/2023      HPI:  Elle Musa returns to the office for treatment today of neck pain upper back pain lower back and left hip pain. No other medical history changes. Pain on neck extension. Elle Musa is in for recheck today reports slight improvement in her condition. VPAS  3-4      The following portions of the patient's history were reviewed and updated as appropriate: allergies, current medications, past family history, past medical history, past social history, past surgical history, and problem list.    Review of Systems    Physical Exam:  Elle Musa is in some minor distress today she moves slowly about the exam room her transfer is slowed range of motion reduced secondary to muscle tightness. Pelvic obliquity noted elevated left versus right innominate misalignment of the left innominate on sacrum biomechanically joint dysfunction present left SI and L5-S1 motion unit active triggers in the left gluteus medius and TFL musculature. Parathoracic hypertonicity noted with a T4-T5 segmental dysfunction. Cervical range of motion reduced in left lateral bending right rotation joint dysfunction C5-C6 C1-C2    Assessment:   Diagnosis ICD-10-CM Associated Orders   1. Segmental dysfunction of pelvic region  M99.05       2. Lumbar radiculopathy  M54.16       3. Segmental dysfunction of lumbar region  M99.03       4. Segmental dysfunction of sacral region  M99.04       5. Acute bilateral low back pain without sciatica  M54.50       6. Segmental dysfunction of thoracic region  M99.02       7. Acute bilateral thoracic back pain  M54.6       8. Neck pain  M54.2       9. Segmental dysfunction of cervical region  M99.01       10. Myofascial pain  M79.18             Treatment: 74541  Manipulation to the left innominate, sacrum, L5 via Walker drop maneuver. Manipulation T4 producing good joint release well-tolerated. Manipulation C5 C1 well-tolerated.     Discussion:  Continue her home stretching program icing to the left low back and hip we will see her back for follow-up. She will continue icing and stretching.

## 2023-12-05 ENCOUNTER — 1 DAY POST-OP (OUTPATIENT)
Dept: URBAN - METROPOLITAN AREA CLINIC 6 | Facility: CLINIC | Age: 68
End: 2023-12-05

## 2023-12-05 DIAGNOSIS — H25.812: ICD-10-CM

## 2023-12-05 DIAGNOSIS — Z96.1: ICD-10-CM

## 2023-12-05 PROCEDURE — 92136 OPHTHALMIC BIOMETRY: CPT | Mod: 26,LT

## 2023-12-05 PROCEDURE — 99024 POSTOP FOLLOW-UP VISIT: CPT

## 2023-12-05 ASSESSMENT — KERATOMETRY
OD_AXISANGLE_DEGREES: 122
OD_K1POWER_DIOPTERS: 43.00
OD_AXISANGLE2_DEGREES: 32
OS_K2POWER_DIOPTERS: 43.75
OS_K2POWER_DIOPTERS: 43.75
OD_AXISANGLE_DEGREES: 122
OS_K1POWER_DIOPTERS: 43.25
OS_AXISANGLE2_DEGREES: 7
OS_AXISANGLE_DEGREES: 97
OD_K2POWER_DIOPTERS: 43.75
OS_AXISANGLE2_DEGREES: 7
OS_K1POWER_DIOPTERS: 43.25
OD_K2POWER_DIOPTERS: 43.75
OD_AXISANGLE2_DEGREES: 32
OD_K1POWER_DIOPTERS: 43.00
OS_AXISANGLE_DEGREES: 97

## 2023-12-05 ASSESSMENT — TONOMETRY
OS_IOP_MMHG: 12
OD_IOP_MMHG: 13

## 2023-12-05 ASSESSMENT — VISUAL ACUITY
OD_SC: 20/80
OS_CC: 20/30
OD_PH: 20/40
OS_GLARE: 20/70
OD_OTHER: 1 DAY POST OP

## 2023-12-12 ENCOUNTER — 1 WEEK POST-OP (OUTPATIENT)
Dept: URBAN - METROPOLITAN AREA CLINIC 6 | Facility: CLINIC | Age: 68
End: 2023-12-12

## 2023-12-12 DIAGNOSIS — H25.812: ICD-10-CM

## 2023-12-12 DIAGNOSIS — H04.123: ICD-10-CM

## 2023-12-12 DIAGNOSIS — Z96.1: ICD-10-CM

## 2023-12-12 PROCEDURE — 99024 POSTOP FOLLOW-UP VISIT: CPT

## 2023-12-12 ASSESSMENT — VISUAL ACUITY
OD_SC: 20/40-2
OD_OTHER: 1 WEEK POST OP
OD_PH: 20/30-2
OS_CC: 20/30
OS_GLARE: 20/70

## 2023-12-12 ASSESSMENT — KERATOMETRY
OD_AXISANGLE2_DEGREES: 32
OS_AXISANGLE2_DEGREES: 7
OS_K2POWER_DIOPTERS: 43.75
OS_K1POWER_DIOPTERS: 43.25
OS_AXISANGLE_DEGREES: 97
OD_K1POWER_DIOPTERS: 43.00
OD_K2POWER_DIOPTERS: 43.75
OD_AXISANGLE_DEGREES: 122

## 2023-12-12 ASSESSMENT — TONOMETRY
OD_IOP_MMHG: 10
OS_IOP_MMHG: 11

## 2023-12-18 ENCOUNTER — SURGERY/PROCEDURE (OUTPATIENT)
Dept: URBAN - METROPOLITAN AREA SURGICAL CENTER 6 | Facility: SURGICAL CENTER | Age: 68
End: 2023-12-18

## 2023-12-18 DIAGNOSIS — H25.812: ICD-10-CM

## 2023-12-18 PROCEDURE — 66984 XCAPSL CTRC RMVL W/O ECP: CPT | Mod: 79,LT

## 2023-12-18 PROCEDURE — 68841 INSJ RX ELUT IMPLT LAC CANAL: CPT | Mod: 79,LT

## 2023-12-19 ENCOUNTER — 1 DAY POST-OP (OUTPATIENT)
Dept: URBAN - METROPOLITAN AREA CLINIC 6 | Facility: CLINIC | Age: 68
End: 2023-12-19

## 2023-12-19 DIAGNOSIS — Z96.1: ICD-10-CM

## 2023-12-19 DIAGNOSIS — Z98.890: ICD-10-CM

## 2023-12-19 PROCEDURE — 99024 POSTOP FOLLOW-UP VISIT: CPT

## 2023-12-19 ASSESSMENT — KERATOMETRY
OS_AXISANGLE_DEGREES: 97
OS_K1POWER_DIOPTERS: 43.25
OD_K2POWER_DIOPTERS: 43.75
OS_K2POWER_DIOPTERS: 43.75
OS_K2POWER_DIOPTERS: 43.75
OD_AXISANGLE_DEGREES: 122
OD_K1POWER_DIOPTERS: 43.00
OS_AXISANGLE2_DEGREES: 7
OS_AXISANGLE_DEGREES: 97
OD_K1POWER_DIOPTERS: 43.00
OS_K1POWER_DIOPTERS: 43.25
OS_AXISANGLE2_DEGREES: 7
OD_AXISANGLE2_DEGREES: 32
OD_AXISANGLE2_DEGREES: 32
OD_K2POWER_DIOPTERS: 43.75
OD_AXISANGLE_DEGREES: 122

## 2023-12-19 ASSESSMENT — VISUAL ACUITY
OD_SC: 20/30-2
OS_SC: 20/25-2

## 2023-12-19 ASSESSMENT — TONOMETRY
OD_IOP_MMHG: 15
OS_IOP_MMHG: 16

## 2023-12-26 ENCOUNTER — 1 WEEK POST-OP (OUTPATIENT)
Dept: URBAN - METROPOLITAN AREA CLINIC 6 | Facility: CLINIC | Age: 68
End: 2023-12-26

## 2023-12-26 DIAGNOSIS — Z96.1: ICD-10-CM

## 2023-12-26 PROCEDURE — 99024 POSTOP FOLLOW-UP VISIT: CPT

## 2023-12-26 ASSESSMENT — TONOMETRY
OS_IOP_MMHG: 11
OD_IOP_MMHG: 11

## 2023-12-26 ASSESSMENT — KERATOMETRY
OD_K2POWER_DIOPTERS: 43.75
OD_AXISANGLE_DEGREES: 122
OS_K1POWER_DIOPTERS: 43.25
OD_AXISANGLE2_DEGREES: 32
OS_AXISANGLE_DEGREES: 97
OS_K2POWER_DIOPTERS: 43.75
OS_AXISANGLE2_DEGREES: 7
OD_K1POWER_DIOPTERS: 43.00

## 2023-12-26 ASSESSMENT — VISUAL ACUITY
OD_SC: 20/50
OS_SC: 20/20

## 2023-12-27 ENCOUNTER — NURSE TRIAGE (OUTPATIENT)
Dept: OTHER | Facility: OTHER | Age: 68
End: 2023-12-27

## 2023-12-27 ENCOUNTER — TELEMEDICINE (OUTPATIENT)
Dept: INTERNAL MEDICINE CLINIC | Facility: CLINIC | Age: 68
End: 2023-12-27
Payer: MEDICARE

## 2023-12-27 VITALS — WEIGHT: 262 LBS | BODY MASS INDEX: 43.65 KG/M2 | HEIGHT: 65 IN

## 2023-12-27 DIAGNOSIS — U07.1 COVID-19: Primary | ICD-10-CM

## 2023-12-27 PROCEDURE — 99213 OFFICE O/P EST LOW 20 MIN: CPT | Performed by: INTERNAL MEDICINE

## 2023-12-27 RX ORDER — NIRMATRELVIR AND RITONAVIR 300-100 MG
3 KIT ORAL 2 TIMES DAILY
Qty: 30 TABLET | Refills: 0 | Status: SHIPPED | OUTPATIENT
Start: 2023-12-27 | End: 2024-01-01

## 2023-12-27 NOTE — PROGRESS NOTES
COVID-19 Outpatient Progress Note    Assessment/Plan:    Problem List Items Addressed This Visit    None  Visit Diagnoses     COVID-19    -  Primary    Relevant Medications    nirmatrelvir & ritonavir (Paxlovid, 300/100,) tablet therapy pack         Disposition:     Patient with asymptomatic/mild COVID-19: They were recommended to isolate for at least 5 days (day 0 is the day symptoms appeared or the date the specimen was collected for the positive test for people who are asymptomatic). If they are asymptomatic or symptoms are improving with no fevers in the past 24 hours, isolation may be ended followed by 5 days of wearing a high quality mask when around others to minimize risk of infecting others. They should wear a mask through day 10 and a test-based strategy may be used to remove a mask sooner.      Discussed symptom directed medication options with patient.     Patient meets criteria for Paxlovid and they have been counseled appropriately regarding risks, benefits, side effects, and alternative treatment options. After discussion, patient agrees to treatment.    Possible side effects of Paxlovid?    Possible side effects of Paxlovid are:  - Liver Problems. Notify us right away if you start to experience loss of appetite, yellowing of your skin and the whites of eyes (jaundice), dark-colored urine, pale colored stools and itchy skin, stomach area (abdominal) pain.  - Resistance to HIV Medicines. If you have untreated HIV infection, Paxlovid may lead to some HIV medicines not working as well in the future.  - Other possible side effects include: altered sense of taste, diarrhea, high blood pressure, or muscle aches.    I have spent a total time of 15 minutes on the day of the encounter for this patient including risks and benefits of treatment options, instructions for management and impressions.       Encounter provider: Lea Reyes, MD     Provider located at: [address] [facility]  [place]  [address] [place] [id number]     Recent Visits  No visits were found meeting these conditions.  Showing recent visits within past 7 days and meeting all other requirements  Today's Visits  Date Type Provider Dept   12/27/23 Telemedicine Lea Reyes, MD [facility]   Showing today's visits and meeting all other requirements  Future Appointments  No visits were found meeting these conditions.  Showing future appointments within next 150 days and meeting all other requirements     This virtual check-in was done via [employer] and patient was informed that this is a secure, HIPAA-compliant platform. She agrees to proceed.    Patient agrees to participate in a virtual check in via telephone or video visit instead of presenting to the office to address urgent/immediate medical needs. Patient is aware this is a billable service. She acknowledged consent and understanding of privacy and security of the video platform. The patient has agreed to participate and understands they can discontinue the visit at any time.    After connecting through [employer], the patient was identified by name and date of birth. Rhianna Gerardo was informed that this was a telemedicine visit and that the exam was being conducted confidentially over secure lines. My office door was closed. No one else was in the room. Rhianna Gerardo acknowledged consent and understanding of privacy and security of the telemedicine visit. I informed the patient that I have reviewed her record in Epic and presented the opportunity for her to ask any questions regarding the visit today. The patient agreed to participate.     Verification of patient location:  Patient is located in the following state in which I hold an active license: [place]    Subjective:   Rhianna Gerardo is a 68 y.o. female who has been screened for COVID-19. Patient's symptoms include chills, nasal congestion, rhinorrhea, sore throat, cough, myalgias and  headache. Patient denies fever, shortness of breath, chest tightness, abdominal pain, nausea, vomiting and diarrhea.     - Date of symptom onset: 12/26/2023  - Date of positive COVID-19 test: 12/27/2023. Type of test: Home antigen.     COVID-19 vaccination status: Fully vaccinated with booster    Rhianna has been staying home and has isolated themselves in her home. She is taking care to not share personal items and is cleaning all surfaces that are touched often, like counters, tabletops, and doorknobs using household cleaning sprays or wipes. She is wearing a mask when she leaves her room.     Lab Results   Component Value Date    SARSCOV2 Not Detected 12/21/2020       Review of Systems   Constitutional:  Positive for chills. Negative for fever.   HENT:  Positive for congestion, rhinorrhea and sore throat.    Respiratory:  Positive for cough. Negative for chest tightness and shortness of breath.    Gastrointestinal:  Negative for abdominal pain, diarrhea, nausea and vomiting.   Musculoskeletal:  Positive for myalgias.   Neurological:  Positive for headaches.     Current Outpatient Medications on File Prior to Visit   Medication Sig   • amLODIPine (NORVASC) 5 mg tablet Take 1 tablet (5 mg total) by mouth daily   • BEE POLLEN PO Take by mouth   • Calcium Carb-Cholecalciferol (CALCIUM + D3 PO) Take by mouth   • co-enzyme Q-10 30 MG capsule Take 100 mg by mouth 2 (two) times a day    • diphenhydrAMINE (BENADRYL) 25 mg capsule Take 25 mg by mouth as needed Takes as needed   • ibuprofen (MOTRIN) 800 mg tablet as needed Takes as needed.   • levothyroxine (Synthroid) 50 mcg tablet Take 1 tablet (50 mcg total) by mouth daily   • losartan (COZAAR) 100 MG tablet Take 1 tablet (100 mg total) by mouth daily   • metFORMIN (GLUCOPHAGE-XR) 750 mg 24 hr tablet Take 1 tablet (750 mg total) by mouth in the morning   • metoprolol succinate (TOPROL-XL) 100 mg 24 hr tablet Take 1 tablet (100 mg total) by mouth daily   • Multiple  "Vitamins-Minerals (PRESERVISION AREDS 2+MULTI VIT PO) 2 tablets in the morning   • NON FORMULARY 2 (two) times a day Orosine   • Inveltys 1 % SUSP  (Patient not taking: Reported on 11/28/2023)       Objective:    Ht 5' 5\" (1.651 m)   Wt 119 kg (262 lb)   BMI 43.60 kg/m²        Physical Exam  Constitutional:       Appearance: She is ill-appearing.   Pulmonary:      Effort: No respiratory distress.       Lea Reyes, MD    "

## 2023-12-27 NOTE — TELEPHONE ENCOUNTER
"Regarding: covid +  ----- Message from Eileen Albright sent at 2023  6:41 AM EST -----  \" My wife just tested positive for covid. She has a lot of congestion. The covid test we had is  and it showed a very faint line. \"    "

## 2024-01-05 ENCOUNTER — TELEPHONE (OUTPATIENT)
Age: 69
End: 2024-01-05

## 2024-01-05 ENCOUNTER — TELEMEDICINE (OUTPATIENT)
Dept: INTERNAL MEDICINE CLINIC | Facility: CLINIC | Age: 69
End: 2024-01-05
Payer: MEDICARE

## 2024-01-05 DIAGNOSIS — J01.00 ACUTE NON-RECURRENT MAXILLARY SINUSITIS: Primary | ICD-10-CM

## 2024-01-05 PROCEDURE — 99213 OFFICE O/P EST LOW 20 MIN: CPT | Performed by: INTERNAL MEDICINE

## 2024-01-05 RX ORDER — AMOXICILLIN AND CLAVULANATE POTASSIUM 875; 125 MG/1; MG/1
1 TABLET, FILM COATED ORAL EVERY 12 HOURS SCHEDULED
Qty: 14 TABLET | Refills: 0 | Status: SHIPPED | OUTPATIENT
Start: 2024-01-05 | End: 2024-01-12

## 2024-01-05 NOTE — TELEPHONE ENCOUNTER
Patient called and is having head/nasal congestion, cough and when blows nose blood and yellow comes out of left nostril and nose is blocked. Patient is scheduled for 1/08/2024, advised urgent care as well if getting worse. Would like to know what can take OTC, Please call [phone]

## 2024-01-05 NOTE — PATIENT INSTRUCTIONS
Problem List Items Addressed This Visit          Respiratory    Acute non-recurrent maxillary sinusitis - Primary     I recommend patient  nasal steroid like Flonase over-the-counter, and will also send antibiotic for possible early sinusitis.         Relevant Medications    amoxicillin-clavulanate (AUGMENTIN) 875-125 mg per tablet

## 2024-01-05 NOTE — ASSESSMENT & PLAN NOTE
I recommend patient  nasal steroid like Flonase over-the-counter, and will also send antibiotic for possible early sinusitis.

## 2024-01-05 NOTE — PROGRESS NOTES
Virtual Regular Visit    Verification of patient location:    Patient is located at Home in the following state in which I hold an active license [place]      Assessment/Plan:    Problem List Items Addressed This Visit        Respiratory    Acute non-recurrent maxillary sinusitis - Primary     I recommend patient  nasal steroid like Flonase over-the-counter, and will also send antibiotic for possible early sinusitis.         Relevant Medications    amoxicillin-clavulanate (AUGMENTIN) 875-125 mg per tablet         Depression Screening and Follow-up Plan: Patient was screened for depression during today's encounter. They screened negative with a PHQ-2 score of 0.        Reason for visit is   Chief Complaint   Patient presents with   • Virtual Regular Visit          Encounter provider Leandro Moreno MD    Provider located at [address] [facility]  [address] [place] [address] [place] [address]      Recent Visits  No visits were found meeting these conditions.  Showing recent visits within past 7 days and meeting all other requirements  Today's Visits  Date Type Provider Dept   01/05/24 Telemedicine Leandro Moreno MD  [facility]   Showing today's visits and meeting all other requirements  Future Appointments  No visits were found meeting these conditions.  Showing future appointments within next 150 days and meeting all other requirements       The patient was identified by name and date of birth. Rhiannajese Gerardo was informed that this is a telemedicine visit and that the visit is being conducted through the Epic Embedded platform. She agrees to proceed..  My office door was closed. No one else was in the room.  She acknowledged consent and understanding of privacy and security of the video platform. The patient has agreed to participate and understands they can discontinue the visit at any time.    Patient is aware this is a billable service.     Subjective  Rhianna Gerardo is a  68 y.o. female  .      Onset of symptoms 12/26 and tested positive for COVID.  Pt tested 3 days ago and was negative for COVID.  Pt having head congestion, cough, runny nose.  Has yellow mucus.           Past Medical History:   Diagnosis Date   • Anemia A child on   • Arthritis    • Disease of thyroid gland 2 years ago    medication   • Environmental allergies    • Fibroid    • HL (hearing loss) 2021   • Hyperlipidemia    • Hypertension    • Hypothyroidism    • Obesity 10 years    In my fifties   • Plantar fasciitis    • Scoliosis 2022   • Varicella    • Visual impairment 2021       Past Surgical History:   Procedure Laterality Date   • KNEE SURGERY     • TUBAL LIGATION  1979       Current Outpatient Medications   Medication Sig Dispense Refill   • amLODIPine (NORVASC) 5 mg tablet Take 1 tablet (5 mg total) by mouth daily 90 tablet 3   • amoxicillin-clavulanate (AUGMENTIN) 875-125 mg per tablet Take 1 tablet by mouth every 12 (twelve) hours for 7 days 14 tablet 0   • BEE POLLEN PO Take by mouth     • Calcium Carb-Cholecalciferol (CALCIUM + D3 PO) Take by mouth     • co-enzyme Q-10 30 MG capsule Take 100 mg by mouth 2 (two) times a day      • diphenhydrAMINE (BENADRYL) 25 mg capsule Take 25 mg by mouth as needed Takes as needed     • ibuprofen (MOTRIN) 800 mg tablet as needed Takes as needed.     • levothyroxine (Synthroid) 50 mcg tablet Take 1 tablet (50 mcg total) by mouth daily 90 tablet 3   • losartan (COZAAR) 100 MG tablet Take 1 tablet (100 mg total) by mouth daily 90 tablet 3   • metFORMIN (GLUCOPHAGE-XR) 750 mg 24 hr tablet Take 1 tablet (750 mg total) by mouth in the morning 90 tablet 0   • metoprolol succinate (TOPROL-XL) 100 mg 24 hr tablet Take 1 tablet (100 mg total) by mouth daily 90 tablet 3   • Multiple Vitamins-Minerals (PRESERVISION AREDS 2+MULTI VIT PO) 2 tablets in the morning     • NON FORMULARY 2 (two) times a day Orosine     • Inveltys 1 % SUSP  (Patient not taking: Reported on 11/28/2023)        No current facility-administered medications for this visit.        Allergies   Allergen Reactions   • Diclofenac Swelling   • Atorvastatin Other (See Comments)     Muscle ache       Review of Systems   Constitutional:  Negative for chills and fever.   HENT:  Positive for congestion. Negative for sore throat.    Respiratory:  Positive for cough. Negative for shortness of breath.    Cardiovascular:  Negative for chest pain.   Neurological:  Positive for headaches.       Video Exam    There were no vitals filed for this visit.    Physical Exam  Constitutional:       General: She is not in acute distress.     Appearance: Normal appearance.   Pulmonary:      Effort: Pulmonary effort is normal. No respiratory distress.   Neurological:      Mental Status: She is alert.          Visit Time  Total Visit Duration: 20

## 2024-01-23 ENCOUNTER — POST-OP CHECK (OUTPATIENT)
Dept: URBAN - METROPOLITAN AREA CLINIC 6 | Facility: CLINIC | Age: 69
End: 2024-01-23

## 2024-01-23 DIAGNOSIS — Z96.1: ICD-10-CM

## 2024-01-23 DIAGNOSIS — H04.123: ICD-10-CM

## 2024-01-23 PROCEDURE — 99024 POSTOP FOLLOW-UP VISIT: CPT

## 2024-01-23 ASSESSMENT — TONOMETRY
OS_IOP_MMHG: 9
OD_IOP_MMHG: 10

## 2024-01-23 ASSESSMENT — KERATOMETRY
OS_K2POWER_DIOPTERS: 43.75
OD_K2POWER_DIOPTERS: 43.75
OS_AXISANGLE_DEGREES: 97
OS_K1POWER_DIOPTERS: 43.25
OD_K1POWER_DIOPTERS: 43.00
OS_AXISANGLE2_DEGREES: 7
OD_AXISANGLE_DEGREES: 122
OD_AXISANGLE2_DEGREES: 32

## 2024-01-23 ASSESSMENT — VISUAL ACUITY
OD_PH: 20/25+2
OU_SC: J2
OD_SC: 20/30
OS_SC: 20/20-2

## 2024-01-24 ENCOUNTER — PROCEDURE VISIT (OUTPATIENT)
Age: 69
End: 2024-01-24
Payer: MEDICARE

## 2024-01-24 VITALS — WEIGHT: 262 LBS | HEIGHT: 65 IN | BODY MASS INDEX: 43.65 KG/M2

## 2024-01-24 DIAGNOSIS — M79.18 MYOFASCIAL PAIN: ICD-10-CM

## 2024-01-24 DIAGNOSIS — M99.01 SEGMENTAL DYSFUNCTION OF CERVICAL REGION: ICD-10-CM

## 2024-01-24 DIAGNOSIS — M54.2 NECK PAIN: ICD-10-CM

## 2024-01-24 DIAGNOSIS — M54.16 LUMBAR RADICULOPATHY: ICD-10-CM

## 2024-01-24 DIAGNOSIS — M54.6 ACUTE BILATERAL THORACIC BACK PAIN: ICD-10-CM

## 2024-01-24 DIAGNOSIS — M99.02 SEGMENTAL DYSFUNCTION OF THORACIC REGION: ICD-10-CM

## 2024-01-24 DIAGNOSIS — M99.05 SEGMENTAL DYSFUNCTION OF PELVIC REGION: Primary | ICD-10-CM

## 2024-01-24 DIAGNOSIS — M99.03 SEGMENTAL DYSFUNCTION OF LUMBAR REGION: ICD-10-CM

## 2024-01-24 DIAGNOSIS — M54.50 ACUTE BILATERAL LOW BACK PAIN WITHOUT SCIATICA: ICD-10-CM

## 2024-01-24 DIAGNOSIS — M99.04 SEGMENTAL DYSFUNCTION OF SACRAL REGION: ICD-10-CM

## 2024-01-24 PROCEDURE — 98942 CHIROPRACTIC MANJ 5 REGIONS: CPT | Performed by: CHIROPRACTOR

## 2024-01-24 NOTE — PROGRESS NOTES
Date of first visit: 2/21/2023      HPI:  Lizzie returns to the office for treatment today of neck pain upper back pain lower back and left hip pain. No other medical history changes.  Pain on neck extension.    Lizzie is in for recheck today reports slight improvement in her condition.    VPAS  3-4      The following portions of the patient's history were reviewed and updated as appropriate: allergies, current medications, past family history, past medical history, past social history, past surgical history, and problem list.    Review of Systems    Physical Exam:  Lizzie is in some minor distress today she moves slowly about the exam room her transfer is slowed range of motion reduced secondary to muscle tightness.    Pelvic obliquity noted elevated left versus right innominate misalignment of the left innominate on sacrum biomechanically joint dysfunction present left SI and L5-S1 motion unit active triggers in the left gluteus medius and TFL musculature.    Parathoracic hypertonicity noted with a T4-T5 segmental dysfunction.    Cervical range of motion reduced in left lateral bending right rotation joint dysfunction C5-C6 C1-C2    Assessment:   Diagnosis ICD-10-CM Associated Orders   1. Segmental dysfunction of pelvic region  M99.05       2. Lumbar radiculopathy  M54.16       3. Segmental dysfunction of lumbar region  M99.03       4. Segmental dysfunction of sacral region  M99.04       5. Acute bilateral low back pain without sciatica  M54.50       6. Segmental dysfunction of thoracic region  M99.02       7. Acute bilateral thoracic back pain  M54.6       8. Neck pain  M54.2       9. Segmental dysfunction of cervical region  M99.01       10. Myofascial pain  M79.18             Treatment: 62347  Manipulation to the left innominate, sacrum, L5 via Walker drop maneuver.  Manipulation T4 producing good joint release well-tolerated.  Manipulation C5 C1 well-tolerated.    Discussion:  Continue her home stretching program  icing to the left low back and hip we will see her back for follow-up.  She will continue icing and stretching.

## 2024-02-09 ENCOUNTER — RA CDI HCC (OUTPATIENT)
Dept: OTHER | Facility: HOSPITAL | Age: 69
End: 2024-02-09

## 2024-02-10 NOTE — PROGRESS NOTES
HCC coding opportunities          Chart Reviewed number of suggestions sent to Provider: 1     Patients Insurance     Medicare Insurance: Medicare        I12.9

## 2024-02-19 ENCOUNTER — OFFICE VISIT (OUTPATIENT)
Dept: INTERNAL MEDICINE CLINIC | Facility: CLINIC | Age: 69
End: 2024-02-19
Payer: MEDICARE

## 2024-02-19 ENCOUNTER — TELEPHONE (OUTPATIENT)
Dept: INTERNAL MEDICINE CLINIC | Facility: CLINIC | Age: 69
End: 2024-02-19

## 2024-02-19 VITALS
DIASTOLIC BLOOD PRESSURE: 80 MMHG | WEIGHT: 269 LBS | HEART RATE: 72 BPM | SYSTOLIC BLOOD PRESSURE: 138 MMHG | HEIGHT: 65 IN | BODY MASS INDEX: 44.82 KG/M2 | RESPIRATION RATE: 12 BRPM

## 2024-02-19 DIAGNOSIS — I10 BENIGN ESSENTIAL HYPERTENSION: Primary | ICD-10-CM

## 2024-02-19 DIAGNOSIS — G47.33 OSA (OBSTRUCTIVE SLEEP APNEA): ICD-10-CM

## 2024-02-19 DIAGNOSIS — E66.01 MORBID OBESITY (HCC): ICD-10-CM

## 2024-02-19 DIAGNOSIS — E03.9 ACQUIRED HYPOTHYROIDISM: ICD-10-CM

## 2024-02-19 DIAGNOSIS — N18.31 STAGE 3A CHRONIC KIDNEY DISEASE (HCC): ICD-10-CM

## 2024-02-19 DIAGNOSIS — R73.01 IMPAIRED FASTING GLUCOSE: ICD-10-CM

## 2024-02-19 PROCEDURE — 99214 OFFICE O/P EST MOD 30 MIN: CPT | Performed by: INTERNAL MEDICINE

## 2024-02-19 PROCEDURE — 93000 ELECTROCARDIOGRAM COMPLETE: CPT | Performed by: INTERNAL MEDICINE

## 2024-02-19 NOTE — ASSESSMENT & PLAN NOTE
Lab Results   Component Value Date    EGFR 76 11/01/2023    EGFR 59 06/06/2023    EGFR 85 08/12/2022    CREATININE 0.79 11/01/2023    CREATININE 0.97 06/06/2023    CREATININE 0.76 08/12/2022   Avoid excessive use of NSAIDs, stay hydrated

## 2024-02-19 NOTE — ASSESSMENT & PLAN NOTE
Borderline, continue meds, EKG done today shows Normal sinus rhythm at 65 bpm, normal axis, no ischemic changes, no significant change from previous EKGs

## 2024-02-19 NOTE — PROGRESS NOTES
Name: Rhianna Gerardo      : 1955      MRN: [id number]  Encounter Provider: Leandro Moreno MD  Encounter Date: 2024   Enc[facility]    Assessment & Plan     1. Benign essential hypertension  Assessment & Plan:  Borderline, continue meds, EKG done today shows Normal sinus rhythm at 65 bpm, normal axis, no ischemic changes, no significant change from previous EKGs    Orders:  -     POCT ECG    2. Morbid obesity (HCC)  Assessment & Plan:  Follow-up with weight management      3. Stage 3a chronic kidney disease (HCC)  Assessment & Plan:  Lab Results   Component Value Date    EGFR 76 2023    EGFR 59 2023    EGFR 85 2022    CREATININE 0.79 2023    CREATININE 0.97 2023    CREATININE 0.76 2022   Avoid excessive use of NSAIDs, stay hydrated      4. Impaired fasting glucose  Assessment & Plan:  Patient on metformin for weight loss with weight management      5. Acquired hypothyroidism  Assessment & Plan:  TSH normal in November, continue levothyroxine 50 mcg daily along with monitoring      6. MIHAELA (obstructive sleep apnea)  Assessment & Plan:  Continue CPAP             Subjective     Patient here for regular follow-up      Review of Systems   Constitutional:  Negative for chills, fatigue and fever.   HENT:  Negative for congestion, nosebleeds, postnasal drip, sore throat and trouble swallowing.    Eyes:  Negative for pain.   Respiratory:  Negative for cough, chest tightness, shortness of breath and wheezing.    Cardiovascular:  Negative for chest pain, palpitations and leg swelling.   Gastrointestinal:  Negative for abdominal pain, constipation, diarrhea, nausea and vomiting.   Endocrine: Negative for polydipsia and polyuria.   Genitourinary:  Negative for dysuria, flank pain and hematuria.   Musculoskeletal:  Negative for arthralgias.   Skin:  Negative for rash.   Neurological:  Negative for dizziness, tremors, light-headedness and  headaches.   Hematological:  Does not bruise/bleed easily.   Psychiatric/Behavioral:  Negative for confusion and dysphoric mood. The patient is not nervous/anxious.        Past Medical History:   Diagnosis Date   • Anemia A child on   • Arthritis    • Disease of thyroid gland 2 years ago    medication   • Environmental allergies    • Fibroid    • HL (hearing loss)    • Hyperlipidemia    • Hypertension    • Hypothyroidism    • Obesity 10 years    In my fifties   • Plantar fasciitis    • Scoliosis    • Varicella    • Visual impairment      Past Surgical History:   Procedure Laterality Date   • KNEE SURGERY     • TUBAL LIGATION       Family History   Problem Relation Age of Onset   • Hypertension Mother    • Dementia Mother            • Arthritis Mother            • Thyroid disease Mother            • Hearing loss Mother            • Coronary artery disease Father    • Throat cancer Brother 60   • Endometrial cancer Maternal Aunt 50   • Lung cancer Maternal Aunt 60   • No Known Problems Maternal Aunt    • Bone cancer Maternal Uncle 69   • Coronary artery disease Maternal Uncle    • No Known Problems Paternal Aunt    • No Known Problems Maternal Grandmother    • No Known Problems Maternal Grandfather    • No Known Problems Paternal Grandmother    • No Known Problems Paternal Grandfather    • No Known Problems Daughter    • No Known Problems Son    • Alcohol abuse Brother              Social History     Socioeconomic History   • Marital status: /Civil Union     Spouse name: None   • Number of children: None   • Years of education: None   • Highest education level: None   Occupational History   • None   Tobacco Use   • Smoking status: Former     Current packs/day: 0.00     Average packs/day: 0.3 packs/day for 34.0 years (8.5 ttl pk-yrs)     Types: Cigarettes     Start date: 1969     Quit date: 2003     Years since quittin.6   • Smokeless  tobacco: Never   • Tobacco comments:     quit 25 yrs ago   Vaping Use   • Vaping status: Never Used   Substance and Sexual Activity   • Alcohol use: Yes     Comment: social   • Drug use: No   • Sexual activity: Not Currently     Partners: Male     Birth control/protection: Post-menopausal   Other Topics Concern   • None   Social History Narrative   • None     Social Determinants of Health     Financial Resource Strain: Low Risk  (10/11/2023)    Overall Financial Resource Strain (CARDIA)    • Difficulty of Paying Living Expenses: Not very hard   Food Insecurity: Not on file   Transportation Needs: No Transportation Needs (10/11/2023)    PRAPARE - Transportation    • Lack of Transportation (Medical): No    • Lack of Transportation (Non-Medical): No   Physical Activity: Not on file   Stress: Not on file   Social Connections: Not on file   Intimate Partner Violence: Not on file   Housing Stability: Not on file     Current Outpatient Medications on File Prior to Visit   Medication Sig   • amLODIPine (NORVASC) 5 mg tablet Take 1 tablet (5 mg total) by mouth daily   • BEE POLLEN PO Take by mouth   • Calcium Carb-Cholecalciferol (CALCIUM + D3 PO) Take by mouth   • co-enzyme Q-10 30 MG capsule Take 100 mg by mouth 2 (two) times a day    • diphenhydrAMINE (BENADRYL) 25 mg capsule Take 25 mg by mouth as needed Takes as needed   • ibuprofen (MOTRIN) 800 mg tablet as needed Takes as needed.   • levothyroxine (Synthroid) 50 mcg tablet Take 1 tablet (50 mcg total) by mouth daily   • losartan (COZAAR) 100 MG tablet Take 1 tablet (100 mg total) by mouth daily   • metFORMIN (GLUCOPHAGE-XR) 750 mg 24 hr tablet Take 1 tablet (750 mg total) by mouth in the morning   • metoprolol succinate (TOPROL-XL) 100 mg 24 hr tablet Take 1 tablet (100 mg total) by mouth daily   • Multiple Vitamins-Minerals (PRESERVISION AREDS 2+MULTI VIT PO) 2 tablets in the morning     Allergies   Allergen Reactions   • Diclofenac Swelling   • Atorvastatin Other  "(See Comments)     Muscle ache     Immunization History   Administered Date(s) Administered   • COVID-19 PFIZER VACCINE 0.3 ML IM 03/22/2021, 04/15/2021, 10/18/2021   • COVID-19 Pfizer Vac BIVALENT Jose Eduardo-sucrose 12 Yr+ IM 10/14/2022   • COVID-19 Pfizer mRNA vacc PF jose eduardo-sucrose 12 yr and older (Comirnaty) 10/02/2023   • COVID-19 Pfizer vac (Jose Eduardo-sucrose, gray cap) 12 yr+ IM 04/28/2022   • INFLUENZA 09/30/2013, 09/29/2015, 12/12/2016, 12/14/2016, 12/14/2016, 10/31/2017, 10/17/2018, 10/21/2019, 09/30/2020, 09/23/2021, 09/20/2023   • Influenza Quadrivalent Preservative Free 3 years and older IM 12/12/2016   • Influenza Quadrivalent, 6-35 Months IM 09/29/2015   • Influenza Split High Dose Preservative Free IM 10/21/2019   • Influenza, high dose seasonal 0.7 mL 09/20/2022   • Influenza, seasonal, injectable 09/30/2013   • Pneumococcal Conjugate 13-Valent 08/02/2018   • Pneumococcal Polysaccharide PPV23 08/05/2019   • Respiratory Syncytial Virus Vaccine (Recombinant) 10/02/2023   • Tdap 10/21/2019   • Zoster 07/22/2015   • Zoster Vaccine Recombinant 08/14/2018, 10/17/2018   • influenza, trivalent, adjuvanted 10/17/2018, 09/30/2020       Objective     /80   Pulse 72   Resp 12   Ht 5' 5\" (1.651 m)   Wt 122 kg (269 lb)   BMI 44.76 kg/m²     Physical Exam  Vitals reviewed.   Constitutional:       General: She is not in acute distress.     Appearance: Normal appearance. She is well-developed.   HENT:      Head: Normocephalic and atraumatic.      Right Ear: External ear normal.      Left Ear: External ear normal.   Eyes:      General: No scleral icterus.     Conjunctiva/sclera: Conjunctivae normal.   Neck:      Thyroid: No thyromegaly.      Trachea: No tracheal deviation.   Cardiovascular:      Rate and Rhythm: Normal rate and regular rhythm.      Heart sounds: Normal heart sounds. No murmur heard.  Pulmonary:      Effort: Pulmonary effort is normal. No respiratory distress.      Breath sounds: Normal breath sounds. " No wheezing or rales.   Musculoskeletal:      Cervical back: Normal range of motion and neck supple.      Right lower leg: Edema (mild) present.      Left lower leg: Edema (mild) present.   Lymphadenopathy:      Cervical: No cervical adenopathy.   Skin:     Coloration: Skin is not jaundiced or pale.   Neurological:      General: No focal deficit present.      Mental Status: She is alert and oriented to person, place, and time.   Psychiatric:         Mood and Affect: Mood normal.         Behavior: Behavior normal.         Thought Content: Thought content normal.         Judgment: Judgment normal.       Leandro Moreno MD

## 2024-02-19 NOTE — PATIENT INSTRUCTIONS
Problem List Items Addressed This Visit          Endocrine    Impaired fasting glucose     Patient on metformin for weight loss with weight management         Acquired hypothyroidism     TSH normal in November, continue levothyroxine 50 mcg daily along with monitoring            Respiratory    MIHAELA (obstructive sleep apnea)     Continue CPAP            Cardiovascular and Mediastinum    Benign essential hypertension - Primary     Borderline, continue meds, EKG done today shows Normal sinus rhythm at 65 bpm, normal axis, no ischemic changes, no significant change from previous EKGs         Relevant Orders    POCT ECG       Genitourinary    Stage 3a chronic kidney disease (HCC)     Lab Results   Component Value Date    EGFR 76 11/01/2023    EGFR 59 06/06/2023    EGFR 85 08/12/2022    CREATININE 0.79 11/01/2023    CREATININE 0.97 06/06/2023    CREATININE 0.76 08/12/2022   Avoid excessive use of NSAIDs, stay hydrated            Other    Morbid obesity (HCC)     Follow-up with weight management

## 2024-02-20 DIAGNOSIS — E55.9 VITAMIN D DEFICIENCY: ICD-10-CM

## 2024-02-20 DIAGNOSIS — E78.00 PURE HYPERCHOLESTEROLEMIA: Primary | ICD-10-CM

## 2024-02-20 NOTE — TELEPHONE ENCOUNTER
Done, let pt know.  Labs in system already from another provider to get, she can get the additional labs I put in at the same time whenever she goes for the blood work   2.15

## 2024-02-21 ENCOUNTER — PROCEDURE VISIT (OUTPATIENT)
Age: 69
End: 2024-02-21
Payer: MEDICARE

## 2024-02-21 VITALS — BODY MASS INDEX: 44.82 KG/M2 | WEIGHT: 269 LBS | HEIGHT: 65 IN

## 2024-02-21 DIAGNOSIS — M79.18 MYOFASCIAL PAIN: ICD-10-CM

## 2024-02-21 DIAGNOSIS — M99.05 SEGMENTAL DYSFUNCTION OF PELVIC REGION: Primary | ICD-10-CM

## 2024-02-21 DIAGNOSIS — M54.50 ACUTE BILATERAL LOW BACK PAIN WITHOUT SCIATICA: ICD-10-CM

## 2024-02-21 DIAGNOSIS — M99.04 SEGMENTAL DYSFUNCTION OF SACRAL REGION: ICD-10-CM

## 2024-02-21 DIAGNOSIS — M54.6 ACUTE BILATERAL THORACIC BACK PAIN: ICD-10-CM

## 2024-02-21 DIAGNOSIS — M54.2 NECK PAIN: ICD-10-CM

## 2024-02-21 DIAGNOSIS — M54.16 LUMBAR RADICULOPATHY: ICD-10-CM

## 2024-02-21 DIAGNOSIS — R73.09 IMPAIRED GLUCOSE METABOLISM: ICD-10-CM

## 2024-02-21 DIAGNOSIS — M99.03 SEGMENTAL DYSFUNCTION OF LUMBAR REGION: ICD-10-CM

## 2024-02-21 DIAGNOSIS — E66.9 OBESITY, CLASS II, BMI 35-39.9: ICD-10-CM

## 2024-02-21 DIAGNOSIS — M99.02 SEGMENTAL DYSFUNCTION OF THORACIC REGION: ICD-10-CM

## 2024-02-21 DIAGNOSIS — M99.01 SEGMENTAL DYSFUNCTION OF CERVICAL REGION: ICD-10-CM

## 2024-02-21 PROBLEM — Z00.00 MEDICARE ANNUAL WELLNESS VISIT, SUBSEQUENT: Status: RESOLVED | Noted: 2018-08-02 | Resolved: 2024-02-21

## 2024-02-21 PROBLEM — J01.00 ACUTE NON-RECURRENT MAXILLARY SINUSITIS: Status: RESOLVED | Noted: 2024-01-05 | Resolved: 2024-02-21

## 2024-02-21 PROCEDURE — 98942 CHIROPRACTIC MANJ 5 REGIONS: CPT | Performed by: CHIROPRACTOR

## 2024-02-21 RX ORDER — METFORMIN HYDROCHLORIDE 750 MG/1
750 TABLET, EXTENDED RELEASE ORAL EVERY MORNING
Qty: 90 TABLET | Refills: 1 | Status: SHIPPED | OUTPATIENT
Start: 2024-02-21

## 2024-02-21 NOTE — PROGRESS NOTES
Date of first visit: 2/21/2023      HPI:  Lizzie returns to the office for treatment today of neck pain upper back pain lower back and left hip pain. No other medical history changes.      VPAS  3-4      The following portions of the patient's history were reviewed and updated as appropriate: allergies, current medications, past family history, past medical history, past social history, past surgical history, and problem list.    Review of Systems    Physical Exam:  Lizzie is in some minor distress today she moves slowly about the exam room her transfer is slowed range of motion reduced secondary to muscle tightness.    Pelvic obliquity noted elevated left versus right innominate misalignment of the left innominate on sacrum biomechanically joint dysfunction present left SI and L5-S1 motion unit active triggers in the left gluteus medius and TFL musculature.    Parathoracic hypertonicity noted with a T4-T5 segmental dysfunction.    Cervical range of motion reduced in left lateral bending right rotation joint dysfunction C5-C6 C1-C2    Assessment:   Diagnosis ICD-10-CM Associated Orders   1. Segmental dysfunction of pelvic region  M99.05       2. Lumbar radiculopathy  M54.16       3. Segmental dysfunction of lumbar region  M99.03       4. Segmental dysfunction of sacral region  M99.04       5. Acute bilateral low back pain without sciatica  M54.50       6. Segmental dysfunction of thoracic region  M99.02       7. Acute bilateral thoracic back pain  M54.6       8. Neck pain  M54.2       9. Segmental dysfunction of cervical region  M99.01       10. Myofascial pain  M79.18             Treatment: 06187  Manipulation to the left innominate, sacrum, L5 via Walker drop maneuver.  Manipulation T4 producing good joint release well-tolerated.  Manipulation C5 C1 well-tolerated.    Discussion:  Continue her home stretching program icing to the left low back and hip we will see her back for follow-up.  She will continue icing and  stretching.

## 2024-02-28 ENCOUNTER — APPOINTMENT (OUTPATIENT)
Dept: LAB | Age: 69
End: 2024-02-28
Payer: MEDICARE

## 2024-02-28 DIAGNOSIS — E55.9 VITAMIN D DEFICIENCY: ICD-10-CM

## 2024-02-28 DIAGNOSIS — R73.09 IMPAIRED GLUCOSE METABOLISM: ICD-10-CM

## 2024-02-28 DIAGNOSIS — E78.00 PURE HYPERCHOLESTEROLEMIA: ICD-10-CM

## 2024-02-28 DIAGNOSIS — E66.01 MORBID OBESITY (HCC): ICD-10-CM

## 2024-02-28 LAB
25(OH)D3 SERPL-MCNC: 35.5 NG/ML (ref 30–100)
ALBUMIN SERPL BCP-MCNC: 3.8 G/DL (ref 3.5–5)
ALP SERPL-CCNC: 98 U/L (ref 34–104)
ALT SERPL W P-5'-P-CCNC: 11 U/L (ref 7–52)
ANION GAP SERPL CALCULATED.3IONS-SCNC: 8 MMOL/L
AST SERPL W P-5'-P-CCNC: 13 U/L (ref 13–39)
BASOPHILS # BLD AUTO: 0.03 THOUSANDS/ÂΜL (ref 0–0.1)
BASOPHILS NFR BLD AUTO: 1 % (ref 0–1)
BILIRUB SERPL-MCNC: 0.51 MG/DL (ref 0.2–1)
BUN SERPL-MCNC: 17 MG/DL (ref 5–25)
CALCIUM SERPL-MCNC: 8.7 MG/DL (ref 8.4–10.2)
CHLORIDE SERPL-SCNC: 106 MMOL/L (ref 96–108)
CHOLEST SERPL-MCNC: 218 MG/DL
CO2 SERPL-SCNC: 26 MMOL/L (ref 21–32)
CREAT SERPL-MCNC: 0.81 MG/DL (ref 0.6–1.3)
EOSINOPHIL # BLD AUTO: 0.21 THOUSAND/ÂΜL (ref 0–0.61)
EOSINOPHIL NFR BLD AUTO: 4 % (ref 0–6)
ERYTHROCYTE [DISTWIDTH] IN BLOOD BY AUTOMATED COUNT: 15.2 % (ref 11.6–15.1)
EST. AVERAGE GLUCOSE BLD GHB EST-MCNC: 120 MG/DL
GFR SERPL CREATININE-BSD FRML MDRD: 73 ML/MIN/1.73SQ M
GLUCOSE P FAST SERPL-MCNC: 107 MG/DL (ref 65–99)
HBA1C MFR BLD: 5.8 %
HCT VFR BLD AUTO: 36.9 % (ref 34.8–46.1)
HDLC SERPL-MCNC: 41 MG/DL
HGB BLD-MCNC: 12 G/DL (ref 11.5–15.4)
IMM GRANULOCYTES # BLD AUTO: 0.04 THOUSAND/UL (ref 0–0.2)
IMM GRANULOCYTES NFR BLD AUTO: 1 % (ref 0–2)
LDLC SERPL CALC-MCNC: 150 MG/DL (ref 0–100)
LYMPHOCYTES # BLD AUTO: 1.21 THOUSANDS/ÂΜL (ref 0.6–4.47)
LYMPHOCYTES NFR BLD AUTO: 26 % (ref 14–44)
MAGNESIUM SERPL-MCNC: 2.1 MG/DL (ref 1.9–2.7)
MCH RBC QN AUTO: 27.6 PG (ref 26.8–34.3)
MCHC RBC AUTO-ENTMCNC: 32.5 G/DL (ref 31.4–37.4)
MCV RBC AUTO: 85 FL (ref 82–98)
MONOCYTES # BLD AUTO: 0.52 THOUSAND/ÂΜL (ref 0.17–1.22)
MONOCYTES NFR BLD AUTO: 11 % (ref 4–12)
NEUTROPHILS # BLD AUTO: 2.71 THOUSANDS/ÂΜL (ref 1.85–7.62)
NEUTS SEG NFR BLD AUTO: 57 % (ref 43–75)
NRBC BLD AUTO-RTO: 0 /100 WBCS
PLATELET # BLD AUTO: 277 THOUSANDS/UL (ref 149–390)
PMV BLD AUTO: 9.8 FL (ref 8.9–12.7)
POTASSIUM SERPL-SCNC: 3.6 MMOL/L (ref 3.5–5.3)
PROT SERPL-MCNC: 6.3 G/DL (ref 6.4–8.4)
RBC # BLD AUTO: 4.34 MILLION/UL (ref 3.81–5.12)
SODIUM SERPL-SCNC: 140 MMOL/L (ref 135–147)
TRIGL SERPL-MCNC: 134 MG/DL
TSH SERPL DL<=0.05 MIU/L-ACNC: 2.89 UIU/ML (ref 0.45–4.5)
WBC # BLD AUTO: 4.72 THOUSAND/UL (ref 4.31–10.16)

## 2024-02-28 PROCEDURE — 80053 COMPREHEN METABOLIC PANEL: CPT

## 2024-02-28 PROCEDURE — 83735 ASSAY OF MAGNESIUM: CPT

## 2024-02-28 PROCEDURE — 84443 ASSAY THYROID STIM HORMONE: CPT

## 2024-02-28 PROCEDURE — 80061 LIPID PANEL: CPT

## 2024-02-28 PROCEDURE — 83036 HEMOGLOBIN GLYCOSYLATED A1C: CPT

## 2024-02-28 PROCEDURE — 36415 COLL VENOUS BLD VENIPUNCTURE: CPT

## 2024-02-28 PROCEDURE — 82306 VITAMIN D 25 HYDROXY: CPT

## 2024-02-28 PROCEDURE — 85025 COMPLETE CBC W/AUTO DIFF WBC: CPT

## 2024-03-18 ENCOUNTER — OFFICE VISIT (OUTPATIENT)
Dept: SLEEP CENTER | Facility: CLINIC | Age: 69
End: 2024-03-18
Payer: MEDICARE

## 2024-03-18 VITALS
OXYGEN SATURATION: 97 % | HEIGHT: 65 IN | WEIGHT: 270 LBS | SYSTOLIC BLOOD PRESSURE: 118 MMHG | DIASTOLIC BLOOD PRESSURE: 66 MMHG | BODY MASS INDEX: 44.98 KG/M2 | HEART RATE: 61 BPM

## 2024-03-18 DIAGNOSIS — G47.33 OSA (OBSTRUCTIVE SLEEP APNEA): Primary | ICD-10-CM

## 2024-03-18 DIAGNOSIS — E66.9 OBESITY, CLASS II, BMI 35-39.9: ICD-10-CM

## 2024-03-18 PROCEDURE — 99213 OFFICE O/P EST LOW 20 MIN: CPT | Performed by: INTERNAL MEDICINE

## 2024-03-18 NOTE — PATIENT INSTRUCTIONS
CPAP   AMBULATORY CARE:   CPAP (continuous positive airway pressure)  is a treatment that uses air pressure to keep your airways open while you sleep. CPAP is used to treat obstructive sleep apnea (MIHAELA). A CPAP machine connects the mask to the machine with a hose. Air pressure prevents your airway from collapsing or becoming blocked during sleep. Use your CPAP machine when you sleep, even when you nap. You may need to use a CPAP machine for the rest of your life.       Make CPAP easier to use:   At first, try to use your CPAP for a few hours every night.  Then slowly increase the length of time you use your machine. It takes time to adjust to CPAP treatment.    You may need to use a special moisturizer made for CPAP users.  You may need a mask that is a different size, shape, or material. Talk to your healthcare provider if your mask feels uncomfortable or irritates your skin.    Use a saline nasal spray at bedtime to help relieve nasal irritation.  A chin strap to help keep your mouth closed. A different type of mask can help dry mouth. Some machines come with a heated humidifier to help relieve these symptoms.    Talk to your healthcare provider if you are having problems adjusting to the air pressure.  He or she can tell you how to adjust the air pressure on your CPAP. You may need to start at a lower pressure and slowly increase it over time.    Call your doctor if:   You continue to feel very sleepy during the day, even after you wear your CPAP device as directed.    Your CPAP is causing a problem, such as a rash, that does not improve.    You have questions or concerns about your condition, care, or equipment.    Follow up with your doctor as directed:  Tell your healthcare provider if your mask no longer fits properly. Write down your questions so you remember to ask them during your visits.  © Copyright [employer] 2023 Information is for End User's use only and may not be sold, redistributed or otherwise used  for commercial purposes.  The above information is an  only. It is not intended as medical advice for individual conditions or treatments. Talk to your doctor, nurse or pharmacist before following any medical regimen to see if it is safe and effective for you.

## 2024-03-18 NOTE — PROGRESS NOTES
Pulmonary/Sleep Follow Up Note   Rhianna Gerardo 68 y.o. female MRN: [id number]  3/18/2024      Assessment and Plan:    1. MIHAELA (obstructive sleep apnea)  She just received a replaced CPAP machine by [employer] through the recall process she has a DreamStation 1 fixed pressure at CPAP 16 using it every night averaging 9 hours of sleep feeling refreshed upon awakening  I reminded her of the importance of continue using the CPAP  Supplies prescription renewal  Annual follow-up  - PAP DME Resupply/Reorder    2. Obesity, Class II, BMI 35-39.9  BMI 44.93        Return in about 1 year (around 3/18/2025).    History of Present Illness   HPI:  Rhianna Gerardo is a 68 y.o. female who is here for follow-up appointment for her severe sleep apnea history she used to follow-up with Dr. Medina last saw him in December 2022 her AHI originally is 42 events per hour has been using CPAP for many years with improvement of his sleep quality and excessive daytime sleepiness.  She has been using her CPAP every night her CPAP machine was replaced through [facility] by the recall process she is currently having DreamStation 1 that is functional with minimal residual AHI and an excellent mask fitting denies excessive daytime sleepiness or snoring while using her CPAP.    Sitting and reading: Slight chance of dozing  Watching TV: Would never doze  Sitting, inactive in a public place (e.g. a theatre or a meeting): Slight chance of dozing  As a passenger in a car for an hour without a break: Slight chance of dozing  Lying down to rest in the afternoon when circumstances permit: Would never doze  Sitting and talking to someone: Would never doze  Sitting quietly after a lunch without alcohol: Would never doze  In a car, while stopped for a few minutes in traffic: Would never doze  Total score: 3          Historical Information   Past Medical History:   Diagnosis Date    Anemia A child on    Arthritis     Disease of thyroid gland 2 years ago     medication    Environmental allergies     Fibroid     HL (hearing loss)     Hyperlipidemia     Hypertension     Hypothyroidism     Obesity 10 years    In my fifties    Plantar fasciitis     Scoliosis     Varicella     Visual impairment      Past Surgical History:   Procedure Laterality Date    KNEE SURGERY      TUBAL LIGATION       Family History   Problem Relation Age of Onset    Hypertension Mother     Dementia Mother             Arthritis Mother             Thyroid disease Mother             Hearing loss Mother             Coronary artery disease Father     Throat cancer Brother 60    Endometrial cancer Maternal Aunt 50    Lung cancer Maternal Aunt 60    No Known Problems Maternal Aunt     Bone cancer Maternal Uncle 69    Coronary artery disease Maternal Uncle     No Known Problems Paternal Aunt     No Known Problems Maternal Grandmother     No Known Problems Maternal Grandfather     No Known Problems Paternal Grandmother     No Known Problems Paternal Grandfather     No Known Problems Daughter     No Known Problems Son     Alcohol abuse Brother                  Meds/Allergies     Current Outpatient Medications:     amLODIPine (NORVASC) 5 mg tablet, Take 1 tablet (5 mg total) by mouth daily, Disp: 90 tablet, Rfl: 3    BEE POLLEN PO, Take by mouth, Disp: , Rfl:     Calcium Carb-Cholecalciferol (CALCIUM + D3 PO), Take by mouth, Disp: , Rfl:     co-enzyme Q-10 30 MG capsule, Take 100 mg by mouth 2 (two) times a day , Disp: , Rfl:     diphenhydrAMINE (BENADRYL) 25 mg capsule, Take 25 mg by mouth as needed Takes as needed, Disp: , Rfl:     ibuprofen (MOTRIN) 800 mg tablet, as needed Takes as needed., Disp: , Rfl:     levothyroxine (Synthroid) 50 mcg tablet, Take 1 tablet (50 mcg total) by mouth daily, Disp: 90 tablet, Rfl: 3    losartan (COZAAR) 100 MG tablet, Take 1 tablet (100 mg total) by mouth daily, Disp: 90 tablet, Rfl: 3    metoprolol succinate (TOPROL-XL)  "100 mg 24 hr tablet, Take 1 tablet (100 mg total) by mouth daily, Disp: 90 tablet, Rfl: 3    Multiple Vitamins-Minerals (PRESERVISION AREDS 2+MULTI VIT PO), 2 tablets in the morning, Disp: , Rfl:     metFORMIN (GLUCOPHAGE-XR) 750 mg 24 hr tablet, TAKE 1 TABLET IN THE MORNING, Disp: 90 tablet, Rfl: 1  Allergies   Allergen Reactions    Diclofenac Swelling    Atorvastatin Other (See Comments)     Muscle ache       Vitals: Blood pressure 118/66, pulse 61, height 5' 5\" (1.651 m), weight 122 kg (270 lb), SpO2 97%, not currently breastfeeding. Body mass index is 44.93 kg/m². Oxygen Therapy  SpO2: 97 %      Physical Exam  General:  Awake alert and oriented x 3, conversant without conversational dyspnea, NAD, normal affect  HEENT:   Sclera noninjected, nonicteric OU, Nares patent,  no craniofacial abnormalities, Mucous membranes, moist, no oral lesions, normal dentition, Mallampati class 4  NECK:  Trachea midline, no accessory muscle use, no stridor,  JVP not elevated  CARDIAC: Reg, single s1/S2, no m/r/g  PULM: CTA bilaterally no wheezing, rhonchi or rales  NEURO: no focal neurologic deficits, AAOx3, moving all extremities appropriately    Labs: I have personally reviewed pertinent lab results., ABG: No results found for: \"PHART\", \"RKM4LKT\", \"PO2ART\", \"HRH2NYU\", \"P2DGGJOO\", \"BEART\", \"SOURCE\", BNP: No results found for: \"BNP\", CBC: No results found for: \"WBC\", \"HGB\", \"HCT\", \"MCV\", \"PLT\", \"ADJUSTEDWBC\", \"RBC\", \"MCH\", \"MCHC\", \"RDW\", \"MPV\", \"NRBC\", CMP: No results found for: \"SODIUM\", \"K\", \"CL\", \"CO2\", \"ANIONGAP\", \"BUN\", \"CREATININE\", \"GLUCOSE\", \"CALCIUM\", \"AST\", \"ALT\", \"ALKPHOS\", \"PROT\", \"BILITOT\", \"EGFR\", PT/INR: No results found for: \"PT\", \"INR\", Troponin: No results found for: \"TROPONINI\"  Lab Results   Component Value Date    WBC 4.72 02/28/2024    HGB 12.0 02/28/2024    HCT 36.9 02/28/2024    MCV 85 02/28/2024     02/28/2024     Lab Results   Component Value Date    GLUCOSE 93 03/02/2015    CALCIUM 8.7 02/28/2024 " "    09/08/2017    K 3.6 02/28/2024    CO2 26 02/28/2024     02/28/2024    BUN 17 02/28/2024    CREATININE 0.81 02/28/2024     No results found for: \"IGE\"  Lab Results   Component Value Date    ALT 11 02/28/2024    AST 13 02/28/2024    ALKPHOS 98 02/28/2024    BILITOT 0.5 09/08/2017           Sleep studies: I have personally reviewed pertinent reports.    AHI 42 events/hr --> severe MIHAELA    Compliance report:I have personally reviewed pertinent reports.      Type of CPAP:  Fixed 16                                   Percent usage: 100%                                   Average time used: 9 hrs 12 min                                  Time in large leak: not high                                   Residual AHI: 0.6              Dyllan Ledesma MD  [facility]       Portions of the record may have been created with voice recognition software. Occasional wrong word or \"sound a like\" substitutions may have occurred due to the inherent limitations of voice recognition software. Read the chart carefully and recognize, using context, where substitutions have occurred.  "

## 2024-03-20 ENCOUNTER — TELEPHONE (OUTPATIENT)
Dept: SLEEP CENTER | Facility: CLINIC | Age: 69
End: 2024-03-20

## 2024-03-20 ENCOUNTER — PROCEDURE VISIT (OUTPATIENT)
Age: 69
End: 2024-03-20
Payer: MEDICARE

## 2024-03-20 VITALS
WEIGHT: 270 LBS | DIASTOLIC BLOOD PRESSURE: 94 MMHG | SYSTOLIC BLOOD PRESSURE: 168 MMHG | HEIGHT: 65 IN | HEART RATE: 89 BPM | BODY MASS INDEX: 44.98 KG/M2

## 2024-03-20 DIAGNOSIS — M54.50 ACUTE BILATERAL LOW BACK PAIN WITHOUT SCIATICA: ICD-10-CM

## 2024-03-20 DIAGNOSIS — M99.04 SEGMENTAL DYSFUNCTION OF SACRAL REGION: ICD-10-CM

## 2024-03-20 DIAGNOSIS — M54.16 LUMBAR RADICULOPATHY: ICD-10-CM

## 2024-03-20 DIAGNOSIS — M54.6 ACUTE BILATERAL THORACIC BACK PAIN: ICD-10-CM

## 2024-03-20 DIAGNOSIS — M54.2 NECK PAIN: ICD-10-CM

## 2024-03-20 DIAGNOSIS — M99.02 SEGMENTAL DYSFUNCTION OF THORACIC REGION: ICD-10-CM

## 2024-03-20 DIAGNOSIS — M79.18 MYOFASCIAL PAIN: ICD-10-CM

## 2024-03-20 DIAGNOSIS — M99.05 SEGMENTAL DYSFUNCTION OF PELVIC REGION: Primary | ICD-10-CM

## 2024-03-20 DIAGNOSIS — M99.03 SEGMENTAL DYSFUNCTION OF LUMBAR REGION: ICD-10-CM

## 2024-03-20 DIAGNOSIS — M99.01 SEGMENTAL DYSFUNCTION OF CERVICAL REGION: ICD-10-CM

## 2024-03-20 PROCEDURE — 98942 CHIROPRACTIC MANJ 5 REGIONS: CPT | Performed by: CHIROPRACTOR

## 2024-03-20 NOTE — PROGRESS NOTES
Date of first visit: 2/21/2023      HPI:  Lizzie returns to the office for treatment today of neck pain upper back pain lower back and left hip pain. No other medical history changes.      VPAS  4      The following portions of the patient's history were reviewed and updated as appropriate: allergies, current medications, past family history, past medical history, past social history, past surgical history, and problem list.    Review of Systems    Physical Exam:  Lizzie is in some minor distress today she moves slowly about the exam room her transfer is slowed range of motion reduced secondary to muscle tightness.    Pelvic obliquity noted elevated left versus right innominate misalignment of the left innominate on sacrum biomechanically joint dysfunction present left SI and L5-S1 motion unit active triggers in the left gluteus medius and TFL musculature.    Parathoracic hypertonicity noted with a T4-T5 segmental dysfunction.    Cervical range of motion reduced in left lateral bending right rotation joint dysfunction C5-C6 C1-C2    Assessment:   Diagnosis ICD-10-CM Associated Orders   1. Segmental dysfunction of pelvic region  M99.05       2. Lumbar radiculopathy  M54.16       3. Segmental dysfunction of lumbar region  M99.03       4. Segmental dysfunction of sacral region  M99.04       5. Acute bilateral low back pain without sciatica  M54.50       6. Segmental dysfunction of thoracic region  M99.02       7. Acute bilateral thoracic back pain  M54.6       8. Neck pain  M54.2       9. Segmental dysfunction of cervical region  M99.01       10. Myofascial pain  M79.18             Treatment: 56854  Manipulation to the left innominate, sacrum, L5 via Walker drop maneuver.  Manipulation T4 producing good joint release well-tolerated.  Manipulation C5 C1 well-tolerated.    Discussion:  Continue her home stretching program icing to the left low back and hip we will see her back for follow-up.  She will continue icing and  stretching.

## 2024-03-21 LAB

## 2024-04-04 ENCOUNTER — OFFICE VISIT (OUTPATIENT)
Dept: BARIATRICS | Facility: CLINIC | Age: 69
End: 2024-04-04
Payer: MEDICARE

## 2024-04-04 VITALS
SYSTOLIC BLOOD PRESSURE: 125 MMHG | HEIGHT: 65 IN | DIASTOLIC BLOOD PRESSURE: 70 MMHG | HEART RATE: 67 BPM | RESPIRATION RATE: 16 BRPM | TEMPERATURE: 97.7 F | BODY MASS INDEX: 44.68 KG/M2 | WEIGHT: 268.2 LBS

## 2024-04-04 DIAGNOSIS — E78.00 PURE HYPERCHOLESTEROLEMIA: ICD-10-CM

## 2024-04-04 DIAGNOSIS — R73.01 IMPAIRED FASTING GLUCOSE: ICD-10-CM

## 2024-04-04 DIAGNOSIS — E66.01 CLASS 3 SEVERE OBESITY DUE TO EXCESS CALORIES WITH SERIOUS COMORBIDITY AND BODY MASS INDEX (BMI) OF 40.0 TO 44.9 IN ADULT (HCC): Primary | ICD-10-CM

## 2024-04-04 DIAGNOSIS — G47.33 OSA (OBSTRUCTIVE SLEEP APNEA): ICD-10-CM

## 2024-04-04 DIAGNOSIS — N18.31 STAGE 3A CHRONIC KIDNEY DISEASE (HCC): ICD-10-CM

## 2024-04-04 DIAGNOSIS — I10 BENIGN ESSENTIAL HYPERTENSION: ICD-10-CM

## 2024-04-04 DIAGNOSIS — Z91.89 AT RISK FOR CARDIOVASCULAR EVENT: ICD-10-CM

## 2024-04-04 PROBLEM — E66.813 CLASS 3 SEVERE OBESITY DUE TO EXCESS CALORIES WITH SERIOUS COMORBIDITY AND BODY MASS INDEX (BMI) OF 40.0 TO 44.9 IN ADULT (HCC): Status: ACTIVE | Noted: 2023-04-26

## 2024-04-04 PROCEDURE — 99214 OFFICE O/P EST MOD 30 MIN: CPT | Performed by: PHYSICIAN ASSISTANT

## 2024-04-04 NOTE — ASSESSMENT & PLAN NOTE
- Patient is pursuing Conservative Program with bundles with dietician. Previously completed VLCD.   - Not interested in weight loss surgery.  - Initial weight loss goal of 5-10% weight loss for improved health    Initial: 268.5 lbs  Last visit: 252.8  Current: 268.2  Change: -0.3  Goal: 180 lbs    Goals:  Do not skip meals.recommend switching to protein waffle  Continue nutrition recommendations per the dietician and continue meal replacement plan  To continue walking for exercise and recommend adding on a workout a week with silversneakers       To start wegovy to reduce CV risk from HTN, MIHAELA, HLD and to reduce weight. They have tried more than 6 months of lifestyle modifications including diet and activity changes and has had insignificant weight loss of less than 1 lb a week. Patient denies personal and family history of MCT and MEN2 tumors. Patient denies personal history of pancreatitis. Side effects discussed but not limited to diarrhea, bloating, constipation, GI upset, heartburn, increased heart rate, headache, low blood sugar, fatigue and dizziness. Titration and medication administration discussed.      Past meds-topamax (ineffective), metformin (side effects), wellbutrin (weight gain)

## 2024-04-04 NOTE — PROGRESS NOTES
Assessment/Plan:    Class 3 severe obesity due to excess calories with serious comorbidity and body mass index (BMI) of 40.0 to 44.9 in adult (HCC)  - Patient is pursuing Conservative Program with bundles with dietician. Previously completed VLCD.   - Not interested in weight loss surgery.  - Initial weight loss goal of 5-10% weight loss for improved health    Initial: 268.5 lbs  Last visit: 252.8  Current: 268.2  Change: -0.3  Goal: 180 lbs    Goals:  Do not skip meals.recommend switching to protein waffle  Continue nutrition recommendations per the dietician and continue meal replacement plan  To continue walking for exercise and recommend adding on a workout a week with silversneakers       To start wegovy to reduce CV risk from HTN, MIHAELA, HLD and to reduce weight. They have tried more than 6 months of lifestyle modifications including diet and activity changes and has had insignificant weight loss of less than 1 lb a week. Patient denies personal and family history of MCT and MEN2 tumors. Patient denies personal history of pancreatitis. Side effects discussed but not limited to diarrhea, bloating, constipation, GI upset, heartburn, increased heart rate, headache, low blood sugar, fatigue and dizziness. Titration and medication administration discussed.      Past meds-topamax (ineffective), metformin (side effects), wellbutrin (weight gain)    Benign essential hypertension  On losartan , norvasc and metoprolol    MIHAELA (obstructive sleep apnea)  -encouraged continued use of CPAP machine  -may improve with 20-30% weight loss      Impaired fasting glucose  Should improve with weight loss and to start on wegovy    Pure hypercholesterolemia  On atorvastatin  The 10-year ASCVD risk score (Zara CERON, et al., 2019) is: 13%    Values used to calculate the score:      Age: 68 years      Sex: Female      Is Non- : No      Diabetic: No      Tobacco smoker: No      Systolic Blood Pressure: 125 mmHg       Is BP treated: Yes      HDL Cholesterol: 41 mg/dL      Total Cholesterol: 218 mg/dL        Follow up in approximately  4 months  with Non-Surgical Physician/Advanced Practitioner.     Diagnoses and all orders for this visit:    Class 3 severe obesity due to excess calories with serious comorbidity and body mass index (BMI) of 40.0 to 44.9 in adult ([facility])    BMI 40.0-44.9, adult ([facility])  -     Semaglutide-Weight Management (WEGOVY) 0.25 MG/0.5ML; Inject 0.5 mL (0.25 mg total) under the skin once a week    MIHAELA (obstructive sleep apnea)  -     Semaglutide-Weight Management (WEGOVY) 0.25 MG/0.5ML; Inject 0.5 mL (0.25 mg total) under the skin once a week    Benign essential hypertension  -     Semaglutide-Weight Management (WEGOVY) 0.25 MG/0.5ML; Inject 0.5 mL (0.25 mg total) under the skin once a week    Impaired fasting glucose  -     Semaglutide-Weight Management (WEGOVY) 0.25 MG/0.5ML; Inject 0.5 mL (0.25 mg total) under the skin once a week    Stage 3a chronic kidney disease (HCC)  -     Semaglutide-Weight Management (WEGOVY) 0.25 MG/0.5ML; Inject 0.5 mL (0.25 mg total) under the skin once a week    Pure hypercholesterolemia  -     Semaglutide-Weight Management (WEGOVY) 0.25 MG/0.5ML; Inject 0.5 mL (0.25 mg total) under the skin once a week    At risk for cardiovascular event  -     Semaglutide-Weight Management (WEGOVY) 0.25 MG/0.5ML; Inject 0.5 mL (0.25 mg total) under the skin once a week          Subjective:   Chief Complaint   Patient presents with    Obesity    Follow-up     MWM-4M F/u        Patient ID: Rhianna Gerardo  is a 68 y.o. female with excess weight/obesity here to pursue weight managment.  Patient is pursuing Conservative Program.     HPI    Here for followup. She was on topamax but had side effects . She was started on metformin after and it was causing loose stool and did not help with weight loss. She stopped it. She is doing meal planning with RD and  will be starting VLCD.      She is  currently being treated for HTN, HLD, MIHAELA and impaired fasting glucose.    Wt Readings from Last 10 Encounters:   04/04/24 122 kg (268 lb 3.2 oz)   03/20/24 122 kg (270 lb)   03/18/24 122 kg (270 lb)   02/21/24 122 kg (269 lb)   02/19/24 122 kg (269 lb)   01/24/24 119 kg (262 lb)   12/27/23 119 kg (262 lb)   11/29/23 119 kg (262 lb)   11/28/23 119 kg (262 lb)   10/24/23 119 kg (263 lb)       Food logging:  Increased appetite/cravings:  Exercise:1 mile a day walking- started about a month ago  Hydration:water-at least 50 oz a day    Diet Recall:  B: meal replacement shake  L:[place] meals (400-600) calories.  Can be lunch or dinner.  Also could do a bar then   D:2 eggo waffle w/PB    The following portions of the patient's history were reviewed and updated as appropriate: She  has a past medical history of Anemia (A child on), Arthritis, Disease of thyroid gland (2 years ago), Environmental allergies, Fibroid, HL (hearing loss) (2021), Hyperlipidemia, Hypertension, Hypothyroidism, Obesity (10 years), Plantar fasciitis, Scoliosis (2022), Varicella, and Visual impairment (2021).  She   Patient Active Problem List    Diagnosis Date Noted    Preop examination 11/21/2023    Stage 3a chronic kidney disease (HCC) 10/17/2023    Class 3 severe obesity due to excess calories with serious comorbidity and body mass index (BMI) of 40.0 to 44.9 in adult (HCC) 04/26/2023    Sensorineural hearing loss (SNHL) of both ears 06/13/2022    Pulsatile tinnitus 06/13/2022    Exostosis of left external auditory canal 06/13/2022    MIHAELA (obstructive sleep apnea)     Morbid obesity (HCC) 08/20/2021    Plantar fasciitis 02/06/2020    Acquired hypothyroidism 11/28/2018    Impaired fasting glucose 12/12/2016    Benign essential hypertension 08/09/2016    Pure hypercholesterolemia 08/09/2016     She  has a past surgical history that includes Knee surgery and Tubal ligation (1979).  Her family history includes Alcohol abuse in her brother; Arthritis  in her mother; Bone cancer (age of onset: 69) in her maternal uncle; Coronary artery disease in her father and maternal uncle; Dementia in her mother; Endometrial cancer (age of onset: 50) in her maternal aunt; Hearing loss in her mother; Hypertension in her mother; Lung cancer (age of onset: 60) in her maternal aunt; No Known Problems in her daughter, maternal aunt, maternal grandfather, maternal grandmother, paternal aunt, paternal grandfather, paternal grandmother, and son; Throat cancer (age of onset: 60) in her brother; Thyroid disease in her mother.  She  reports that she quit smoking about 20 years ago. Her smoking use included cigarettes. She started smoking about 54 years ago. She has a 8.5 pack-year smoking history. She has never used smokeless tobacco. She reports current alcohol use. She reports that she does not use drugs.  Current Outpatient Medications   Medication Sig Dispense Refill    amLODIPine (NORVASC) 5 mg tablet Take 1 tablet (5 mg total) by mouth daily 90 tablet 3    BEE POLLEN PO Take by mouth      Calcium Carb-Cholecalciferol (CALCIUM + D3 PO) Take by mouth      co-enzyme Q-10 30 MG capsule Take 100 mg by mouth 2 (two) times a day       diphenhydrAMINE (BENADRYL) 25 mg capsule Take 25 mg by mouth as needed Takes as needed      ibuprofen (MOTRIN) 800 mg tablet as needed Takes as needed.      levothyroxine (Synthroid) 50 mcg tablet Take 1 tablet (50 mcg total) by mouth daily 90 tablet 3    losartan (COZAAR) 100 MG tablet Take 1 tablet (100 mg total) by mouth daily 90 tablet 3    metoprolol succinate (TOPROL-XL) 100 mg 24 hr tablet Take 1 tablet (100 mg total) by mouth daily 90 tablet 3    Multiple Vitamins-Minerals (PRESERVISION AREDS 2+MULTI VIT PO) 2 tablets in the morning      Semaglutide-Weight Management (WEGOVY) 0.25 MG/0.5ML Inject 0.5 mL (0.25 mg total) under the skin once a week 2 mL 0     No current facility-administered medications for this visit.     Current Outpatient Medications  "on File Prior to Visit   Medication Sig    amLODIPine (NORVASC) 5 mg tablet Take 1 tablet (5 mg total) by mouth daily    BEE POLLEN PO Take by mouth    Calcium Carb-Cholecalciferol (CALCIUM + D3 PO) Take by mouth    co-enzyme Q-10 30 MG capsule Take 100 mg by mouth 2 (two) times a day     diphenhydrAMINE (BENADRYL) 25 mg capsule Take 25 mg by mouth as needed Takes as needed    ibuprofen (MOTRIN) 800 mg tablet as needed Takes as needed.    levothyroxine (Synthroid) 50 mcg tablet Take 1 tablet (50 mcg total) by mouth daily    losartan (COZAAR) 100 MG tablet Take 1 tablet (100 mg total) by mouth daily    metoprolol succinate (TOPROL-XL) 100 mg 24 hr tablet Take 1 tablet (100 mg total) by mouth daily    Multiple Vitamins-Minerals (PRESERVISION AREDS 2+MULTI VIT PO) 2 tablets in the morning     No current facility-administered medications on file prior to visit.     She is allergic to diclofenac and atorvastatin..    Review of Systems   Constitutional:  Negative for fever.   Respiratory:  Negative for shortness of breath.    Cardiovascular:  Negative for chest pain and palpitations.   Gastrointestinal:  Negative for abdominal pain, constipation, diarrhea and vomiting.   Genitourinary:  Negative for difficulty urinating.   Skin:  Negative for rash.   Neurological:  Negative for headaches.   Psychiatric/Behavioral:  Negative for dysphoric mood. The patient is not nervous/anxious.        Objective:    /70   Pulse 67   Temp 97.7 °F (36.5 °C)   Resp 16   Ht 5' 5\" (1.651 m)   Wt 122 kg (268 lb 3.2 oz)   BMI 44.63 kg/m²      Physical Exam  Vitals and nursing note reviewed.   Constitutional:       General: She is not in acute distress.     Appearance: She is well-developed. She is obese.   HENT:      Head: Normocephalic and atraumatic.   Eyes:      Conjunctiva/sclera: Conjunctivae normal.   Neck:      Thyroid: No thyromegaly.   Pulmonary:      Effort: Pulmonary effort is normal. No respiratory distress.   Skin:     " Findings: No rash (visible).   Neurological:      Mental Status: She is alert and oriented to person, place, and time.   Psychiatric:         Behavior: Behavior normal.

## 2024-04-04 NOTE — ASSESSMENT & PLAN NOTE
On atorvastatin  The 10-year ASCVD risk score (Zara CERON, et al., 2019) is: 13%    Values used to calculate the score:      Age: 68 years      Sex: Female      Is Non- : No      Diabetic: No      Tobacco smoker: No      Systolic Blood Pressure: 125 mmHg      Is BP treated: Yes      HDL Cholesterol: 41 mg/dL      Total Cholesterol: 218 mg/dL

## 2024-04-05 ENCOUNTER — TELEPHONE (OUTPATIENT)
Dept: BARIATRICS | Facility: CLINIC | Age: 69
End: 2024-04-05

## 2024-04-05 NOTE — TELEPHONE ENCOUNTER
PA for wegovy    Submitted via    [x]CMM-KEY [id number]  [][employer]-Case ID #    []Faxed to plan   []Other website    []Phone call Case ID #      Office notes sent, clinical questions answered. Awaiting determination    Turnaround time for your insurance to make a decision on your Prior Authorization can take 7-21 business days.

## 2024-04-07 ENCOUNTER — TELEPHONE (OUTPATIENT)
Age: 69
End: 2024-04-07

## 2024-04-07 NOTE — TELEPHONE ENCOUNTER
PA for Wegovy 0.25mg Injections    Submitted via    []CMM-KEY   [x]TaliriNatanael Ulien-Case ID # [id number]   []Faxed to plan   []Other website   []Phone call Case ID #     Office notes sent, clinical questions answered. Awaiting determination    Turnaround time for your insurance to make a decision on your Prior Authorization can take 7-21 business days.

## 2024-04-09 ENCOUNTER — TELEPHONE (OUTPATIENT)
Dept: BARIATRICS | Facility: CLINIC | Age: 69
End: 2024-04-09

## 2024-04-09 NOTE — TELEPHONE ENCOUNTER
PA for wegovy Denied    Reason:        Message sent to office clinical pool Yes    Denial letter scanned into Media Yes    Appeal started No  ( Provider will need to decide if appeal is warranted and send clinical documentation to PA team for initiation.)

## 2024-04-12 ENCOUNTER — CLINICAL SUPPORT (OUTPATIENT)
Dept: BARIATRICS | Facility: CLINIC | Age: 69
End: 2024-04-12

## 2024-04-12 VITALS — BODY MASS INDEX: 44.52 KG/M2 | WEIGHT: 267.2 LBS | HEIGHT: 65 IN

## 2024-04-12 DIAGNOSIS — R63.5 ABNORMAL WEIGHT GAIN: Primary | ICD-10-CM

## 2024-04-12 PROCEDURE — RECHECK

## 2024-04-12 PROCEDURE — VLCD

## 2024-04-12 NOTE — PROGRESS NOTES
Initial RD session for VLCD completed. Components of diet discussed including ketosis, hydration, possible side effects. 2 weeks of product ordered and received. Will f/u 4/19 via email.

## 2024-04-17 ENCOUNTER — PROCEDURE VISIT (OUTPATIENT)
Age: 69
End: 2024-04-17
Payer: MEDICARE

## 2024-04-17 VITALS
WEIGHT: 267 LBS | SYSTOLIC BLOOD PRESSURE: 189 MMHG | DIASTOLIC BLOOD PRESSURE: 92 MMHG | BODY MASS INDEX: 44.48 KG/M2 | HEART RATE: 89 BPM | HEIGHT: 65 IN

## 2024-04-17 DIAGNOSIS — M54.16 LUMBAR RADICULOPATHY: ICD-10-CM

## 2024-04-17 DIAGNOSIS — M99.02 SEGMENTAL DYSFUNCTION OF THORACIC REGION: ICD-10-CM

## 2024-04-17 DIAGNOSIS — M99.01 SEGMENTAL DYSFUNCTION OF CERVICAL REGION: ICD-10-CM

## 2024-04-17 DIAGNOSIS — M99.05 SEGMENTAL DYSFUNCTION OF PELVIC REGION: Primary | ICD-10-CM

## 2024-04-17 DIAGNOSIS — M99.04 SEGMENTAL DYSFUNCTION OF SACRAL REGION: ICD-10-CM

## 2024-04-17 DIAGNOSIS — M79.18 MYOFASCIAL PAIN: ICD-10-CM

## 2024-04-17 DIAGNOSIS — M54.6 ACUTE BILATERAL THORACIC BACK PAIN: ICD-10-CM

## 2024-04-17 DIAGNOSIS — M54.2 NECK PAIN: ICD-10-CM

## 2024-04-17 DIAGNOSIS — M99.03 SEGMENTAL DYSFUNCTION OF LUMBAR REGION: ICD-10-CM

## 2024-04-17 DIAGNOSIS — M54.50 ACUTE BILATERAL LOW BACK PAIN WITHOUT SCIATICA: ICD-10-CM

## 2024-04-17 PROCEDURE — 98942 CHIROPRACTIC MANJ 5 REGIONS: CPT | Performed by: CHIROPRACTOR

## 2024-04-19 ENCOUNTER — TELEPHONE (OUTPATIENT)
Dept: BARIATRICS | Facility: CLINIC | Age: 69
End: 2024-04-19

## 2024-04-20 NOTE — PROGRESS NOTES
Date of first visit: 2/21/2023      HPI:  Lizzie returns to the office for treatment today of neck pain upper back pain lower back and left hip pain. No other medical history changes.      VPAS  4      The following portions of the patient's history were reviewed and updated as appropriate: allergies, current medications, past family history, past medical history, past social history, past surgical history, and problem list.    Review of Systems    Physical Exam:  Lizzie is in some minor distress today she moves slowly about the exam room her transfer is slowed range of motion reduced secondary to muscle tightness.    Pelvic obliquity noted elevated left versus right innominate misalignment of the left innominate on sacrum biomechanically joint dysfunction present left SI and L5-S1 motion unit active triggers in the left gluteus medius and TFL musculature.    Parathoracic hypertonicity noted with a T4-T5 segmental dysfunction.    Cervical range of motion reduced in left lateral bending right rotation joint dysfunction C5-C6 C1-C2    Assessment:   Diagnosis ICD-10-CM Associated Orders   1. Segmental dysfunction of pelvic region  M99.05       2. Lumbar radiculopathy  M54.16       3. Segmental dysfunction of lumbar region  M99.03       4. Segmental dysfunction of sacral region  M99.04       5. Acute bilateral low back pain without sciatica  M54.50       6. Segmental dysfunction of thoracic region  M99.02       7. Acute bilateral thoracic back pain  M54.6       8. Neck pain  M54.2       9. Segmental dysfunction of cervical region  M99.01       10. Myofascial pain  M79.18             Treatment: 71060  Manipulation to the left innominate, sacrum, L5 via Walker drop maneuver.  Manipulation T4 producing good joint release well-tolerated.  Manipulation C5 C1 well-tolerated.    Discussion:  Continue her home stretching program icing to the left low back and hip we will see her back for follow-up.  She will continue icing and  stretching.

## 2024-04-24 ENCOUNTER — ANNUAL EXAM (OUTPATIENT)
Dept: OBGYN CLINIC | Facility: CLINIC | Age: 69
End: 2024-04-24
Payer: MEDICARE

## 2024-04-24 VITALS
WEIGHT: 262 LBS | HEIGHT: 65 IN | DIASTOLIC BLOOD PRESSURE: 80 MMHG | BODY MASS INDEX: 43.65 KG/M2 | SYSTOLIC BLOOD PRESSURE: 140 MMHG

## 2024-04-24 DIAGNOSIS — Z01.419 WELL WOMAN EXAM WITH ROUTINE GYNECOLOGICAL EXAM: Primary | ICD-10-CM

## 2024-04-24 DIAGNOSIS — D25.1 INTRAMURAL LEIOMYOMA OF UTERUS: ICD-10-CM

## 2024-04-24 DIAGNOSIS — Z12.31 ENCOUNTER FOR SCREENING MAMMOGRAM FOR MALIGNANT NEOPLASM OF BREAST: ICD-10-CM

## 2024-04-24 PROCEDURE — G0101 CA SCREEN;PELVIC/BREAST EXAM: HCPCS | Performed by: OBSTETRICS & GYNECOLOGY

## 2024-04-30 NOTE — PROGRESS NOTES
"Weight Management Medical Nutrition Assessment    Is here for VLCD f/u. Current wt:258.6 lbs. Loss of 8.6 lbs x 2 weeks. No adverse effects noted. Hydration adequate. Using snacks as needed on days when she is more active. Will continue VLCD at this time. Labs ordered but do not need to be drawn until 5/23 appointment as next visit is in 9 days due to travel.     Patient seen by Medical Provider in past 6 months:  yes  Requested to schedule appointment with Medical Provider: No    Anthropometric Measurements  Start Weight (#): 268.2 lbs 5/4/2022; start VLCD 267 lbs    Current Weight (#): 258.6 lbs   TBW % Change from start weight:   3.2%  Ideal Body Weight (#): 125 65\" (159.3 lbs BMI 25)  Goal Weight (#):  lbs     Weight Loss History  Previous weight loss attempts: Commercial Programs (Weight Watchers, [employer], etc.)  Counseling with  MD  Exercise  Meal Replacements (Medifast, Slim Fast, etc.)  Self Created Diets (Portion Control, Healthy Food Choices, etc.)    Food and Nutrition Related History  Wake up: 8-9   Bed Time: 11    Food Recall  Breakfast:  10:00  replacement   Snack:    Lunch: 3:00:  replacement   Snack:    Dinner: 6-7:00  replacement   Snack:  bar     Beverages: water  Volume of beverage intake:  80 oz +    Weekends: Same  Cravings: no specific identified   Trouble area of day: not identified     Frequency of Eating out: none currently  Food restrictions:  carbs <50 gm while on VLCD  Cooking: self   Food Shopping: self    Physical Activity Intake  Activity:  yardwork  Frequency: varies      Physical limitations/barriers to exercise: no intense exercise while on VLCD    Estimated Needs  Energy  [employer] Needs: BMR :  1694   1-2# loss weekly sedentary: 1033 - 1533         1-2# loss weekly lightly active:  7388-0517  Maintenance calories for sedentary activity level:  2033  Protein: 72-90 gm      (1.2-1.5g/kg IBW)  Fluid: 70 oz    (35mL/kg IBW)    Nutrition Diagnosis  Yes;  "   Overweight/obesity  related to Excess energy intake as evidenced by  BMI more than normative standard for age and sex (obesity-grade III 40+)       Nutrition Intervention    Nutrition Prescription  Calories:  760  Protein: 96  gm    Meal Plan (Adrien/Pro)  Breakfast: 200, 27  Snack:  Lunch: 200, 27  Snack: 160, 15   Dinner: 200, 27  Snack:    Nutrition Education:     VLCD  Physical activity  hydration     Nutrition Counseling:  Strategies: as above      Monitoring and Evaluation:  Evaluation criteria:  Energy Intake  Meet protein needs  Maintain adequate hydration  Monitor weekly weight  Food logging/measuring  Physical activity   Meal planning    Barriers to learning:none  Readiness to change: Action:  (Changing behavior)    Comprehension: very good  Expected Compliance: good

## 2024-05-01 ENCOUNTER — CLINICAL SUPPORT (OUTPATIENT)
Dept: BARIATRICS | Facility: CLINIC | Age: 69
End: 2024-05-01

## 2024-05-01 VITALS
DIASTOLIC BLOOD PRESSURE: 75 MMHG | SYSTOLIC BLOOD PRESSURE: 120 MMHG | WEIGHT: 258.6 LBS | BODY MASS INDEX: 43.09 KG/M2 | HEIGHT: 65 IN

## 2024-05-01 DIAGNOSIS — G47.33 OSA (OBSTRUCTIVE SLEEP APNEA): ICD-10-CM

## 2024-05-01 DIAGNOSIS — I10 BENIGN ESSENTIAL HYPERTENSION: ICD-10-CM

## 2024-05-01 DIAGNOSIS — R63.5 ABNORMAL WEIGHT GAIN: Primary | ICD-10-CM

## 2024-05-01 DIAGNOSIS — Z71.3 ENCOUNTER FOR MONITORING OF KETOGENIC DIET: ICD-10-CM

## 2024-05-01 DIAGNOSIS — R73.01 IMPAIRED FASTING GLUCOSE: ICD-10-CM

## 2024-05-01 DIAGNOSIS — N18.31 STAGE 3A CHRONIC KIDNEY DISEASE (HCC): ICD-10-CM

## 2024-05-01 PROCEDURE — RECHECK

## 2024-05-01 PROCEDURE — VLCD

## 2024-05-01 NOTE — PROGRESS NOTES
"Weight Management Medical Nutrition Assessment  Rhianna is here for 4 week VLCD f/u. Current wt: 254.9lbs. Loss of 3.7lbs x 9 days. Overall loss of 12.3# x 4 weeks. Labs ordered by Peggy Woods RD at visit on 5/1/24. Will complete prior to 5/23. Started Lasix per cardiology. Discussed using emergency meal on Mother's day. Using snacks as needed. Hydration adequate. No adverse effects noted. Will continue VLCD at this time.     Patient seen by Medical Provider in past 6 months:  yes  Requested to schedule appointment with Medical Provider: No    Anthropometric Measurements  Start Weight (#): 268.2 lbs 5/4/2022; start VLCD 267 lbs    Current Weight (#): 254.9#  TBW % Change from start weight: 5%  Ideal Body Weight (#): 125 65\" (159.3 lbs BMI 25)  Goal Weight (#):  lbs     Weight Loss History  Previous weight loss attempts: Commercial Programs (Weight Watchers, [employer], etc.)  Counseling with  MD  Exercise  Meal Replacements (Medifast, Slim Fast, etc.)  Self Created Diets (Portion Control, Healthy Food Choices, etc.)    Food and Nutrition Related History  Wake up: 8-9   Bed Time: 11    Food Recall  Breakfast:  10:00  replacement   Snack:    Lunch: 3:00:  replacement   Snack: carrots + 1 Tbsp peanut butter OR almonds OR hard boiled egg  Dinner: 6-7:00  replacement   Snack:  bar     Beverages: water  Volume of beverage intake:  80 oz +    Weekends: Same  Cravings: no specific identified   Trouble area of day: not identified     Frequency of Eating out: none currently  Food restrictions:  carbs <50 gm while on VLCD  Cooking: self   Food Shopping: self    Physical Activity Intake  Activity:  yardwork  Frequency: varies      Physical limitations/barriers to exercise: no intense exercise while on VLCD    Estimated Needs  Energy  [employer] Needs: BMR :  1677   1-2# loss weekly sedentary: 1012 - 1512         1-2# loss weekly lightly active:  8381-5561  Maintenance calories for sedentary activity level:  " 2012  Protein: 72-90 gm      (1.2-1.5g/kg IBW)  Fluid: 70 oz    (35mL/kg IBW)    Nutrition Diagnosis  Yes;    Overweight/obesity  related to Excess energy intake as evidenced by  BMI more than normative standard for age and sex (obesity-grade III 40+)       Nutrition Intervention    Nutrition Prescription  Calories:  760 calories  Protein: 96  gm    Meal Plan (Adrien/Pro)  Breakfast: 200, 27  Snack:  Lunch: 200, 27  Snack: 160, 15   Dinner: 200, 27  Snack:    Nutrition Education:     VLCD  Physical activity  hydration     Nutrition Counseling:  Strategies: as above      Monitoring and Evaluation:  Evaluation criteria:  Energy Intake  Meet protein needs  Maintain adequate hydration  Monitor weekly weight  Food logging/measuring  Physical activity   Meal planning    Barriers to learning:none  Readiness to change: Action:  (Changing behavior)    Comprehension: very good  Expected Compliance: good

## 2024-05-03 ENCOUNTER — HOSPITAL ENCOUNTER (OUTPATIENT)
Dept: RADIOLOGY | Age: 69
Discharge: HOME | End: 2024-05-03
Payer: MEDICARE

## 2024-05-03 DIAGNOSIS — D25.1 INTRAMURAL LEIOMYOMA OF UTERUS: ICD-10-CM

## 2024-05-03 PROCEDURE — 76830 TRANSVAGINAL US NON-OB: CPT

## 2024-05-03 PROCEDURE — 76856 US EXAM PELVIC COMPLETE: CPT

## 2024-05-10 ENCOUNTER — CLINICAL SUPPORT (OUTPATIENT)
Dept: BARIATRICS | Facility: CLINIC | Age: 69
End: 2024-05-10

## 2024-05-10 VITALS
DIASTOLIC BLOOD PRESSURE: 80 MMHG | BODY MASS INDEX: 42.47 KG/M2 | HEIGHT: 65 IN | SYSTOLIC BLOOD PRESSURE: 136 MMHG | WEIGHT: 254.9 LBS

## 2024-05-10 DIAGNOSIS — R63.5 ABNORMAL WEIGHT GAIN: Primary | ICD-10-CM

## 2024-05-10 PROCEDURE — VLCD

## 2024-05-10 PROCEDURE — RECHECK

## 2024-05-20 ENCOUNTER — APPOINTMENT (OUTPATIENT)
Dept: LAB | Age: 69
End: 2024-05-20
Payer: MEDICARE

## 2024-05-20 DIAGNOSIS — G47.33 OSA (OBSTRUCTIVE SLEEP APNEA): ICD-10-CM

## 2024-05-20 DIAGNOSIS — Z71.3 ENCOUNTER FOR MONITORING OF KETOGENIC DIET: ICD-10-CM

## 2024-05-20 DIAGNOSIS — I10 BENIGN ESSENTIAL HYPERTENSION: ICD-10-CM

## 2024-05-20 DIAGNOSIS — R73.01 IMPAIRED FASTING GLUCOSE: ICD-10-CM

## 2024-05-20 DIAGNOSIS — R63.5 ABNORMAL WEIGHT GAIN: ICD-10-CM

## 2024-05-20 DIAGNOSIS — N18.31 STAGE 3A CHRONIC KIDNEY DISEASE (HCC): ICD-10-CM

## 2024-05-20 LAB
ALBUMIN SERPL BCP-MCNC: 4.1 G/DL (ref 3.5–5)
ALP SERPL-CCNC: 81 U/L (ref 34–104)
ALT SERPL W P-5'-P-CCNC: 13 U/L (ref 7–52)
ANION GAP SERPL CALCULATED.3IONS-SCNC: 12 MMOL/L (ref 4–13)
AST SERPL W P-5'-P-CCNC: 14 U/L (ref 13–39)
BASOPHILS # BLD AUTO: 0.04 THOUSANDS/ÂΜL (ref 0–0.1)
BASOPHILS NFR BLD AUTO: 1 % (ref 0–1)
BILIRUB SERPL-MCNC: 0.38 MG/DL (ref 0.2–1)
BUN SERPL-MCNC: 24 MG/DL (ref 5–25)
CALCIUM SERPL-MCNC: 9.3 MG/DL (ref 8.4–10.2)
CHLORIDE SERPL-SCNC: 106 MMOL/L (ref 96–108)
CO2 SERPL-SCNC: 25 MMOL/L (ref 21–32)
CREAT SERPL-MCNC: 0.99 MG/DL (ref 0.6–1.3)
EOSINOPHIL # BLD AUTO: 0.13 THOUSAND/ÂΜL (ref 0–0.61)
EOSINOPHIL NFR BLD AUTO: 2 % (ref 0–6)
ERYTHROCYTE [DISTWIDTH] IN BLOOD BY AUTOMATED COUNT: 13.8 % (ref 11.6–15.1)
GFR SERPL CREATININE-BSD FRML MDRD: 57 ML/MIN/1.73SQ M
GLUCOSE P FAST SERPL-MCNC: 103 MG/DL (ref 65–99)
HCT VFR BLD AUTO: 40.5 % (ref 34.8–46.1)
HGB BLD-MCNC: 13.2 G/DL (ref 11.5–15.4)
IMM GRANULOCYTES # BLD AUTO: 0.02 THOUSAND/UL (ref 0–0.2)
IMM GRANULOCYTES NFR BLD AUTO: 0 % (ref 0–2)
LYMPHOCYTES # BLD AUTO: 1.3 THOUSANDS/ÂΜL (ref 0.6–4.47)
LYMPHOCYTES NFR BLD AUTO: 23 % (ref 14–44)
MAGNESIUM SERPL-MCNC: 2.1 MG/DL (ref 1.9–2.7)
MCH RBC QN AUTO: 27.9 PG (ref 26.8–34.3)
MCHC RBC AUTO-ENTMCNC: 32.6 G/DL (ref 31.4–37.4)
MCV RBC AUTO: 86 FL (ref 82–98)
MONOCYTES # BLD AUTO: 0.58 THOUSAND/ÂΜL (ref 0.17–1.22)
MONOCYTES NFR BLD AUTO: 10 % (ref 4–12)
NEUTROPHILS # BLD AUTO: 3.63 THOUSANDS/ÂΜL (ref 1.85–7.62)
NEUTS SEG NFR BLD AUTO: 64 % (ref 43–75)
NRBC BLD AUTO-RTO: 0 /100 WBCS
PLATELET # BLD AUTO: 251 THOUSANDS/UL (ref 149–390)
PMV BLD AUTO: 11.6 FL (ref 8.9–12.7)
POTASSIUM SERPL-SCNC: 3.8 MMOL/L (ref 3.5–5.3)
PROT SERPL-MCNC: 6.8 G/DL (ref 6.4–8.4)
RBC # BLD AUTO: 4.73 MILLION/UL (ref 3.81–5.12)
SODIUM SERPL-SCNC: 143 MMOL/L (ref 135–147)
TSH SERPL DL<=0.05 MIU/L-ACNC: 1.75 UIU/ML (ref 0.45–4.5)
WBC # BLD AUTO: 5.7 THOUSAND/UL (ref 4.31–10.16)

## 2024-05-20 PROCEDURE — 83735 ASSAY OF MAGNESIUM: CPT

## 2024-05-20 PROCEDURE — 84443 ASSAY THYROID STIM HORMONE: CPT

## 2024-05-20 PROCEDURE — 85025 COMPLETE CBC W/AUTO DIFF WBC: CPT

## 2024-05-20 PROCEDURE — 80053 COMPREHEN METABOLIC PANEL: CPT

## 2024-05-20 PROCEDURE — 36415 COLL VENOUS BLD VENIPUNCTURE: CPT

## 2024-05-21 ENCOUNTER — PROCEDURE VISIT (OUTPATIENT)
Age: 69
End: 2024-05-21
Payer: MEDICARE

## 2024-05-21 VITALS
BODY MASS INDEX: 41.65 KG/M2 | HEART RATE: 71 BPM | SYSTOLIC BLOOD PRESSURE: 107 MMHG | DIASTOLIC BLOOD PRESSURE: 74 MMHG | WEIGHT: 250 LBS | HEIGHT: 65 IN

## 2024-05-21 DIAGNOSIS — M54.6 ACUTE BILATERAL THORACIC BACK PAIN: ICD-10-CM

## 2024-05-21 DIAGNOSIS — M99.03 SEGMENTAL DYSFUNCTION OF LUMBAR REGION: ICD-10-CM

## 2024-05-21 DIAGNOSIS — M99.05 SEGMENTAL DYSFUNCTION OF PELVIC REGION: Primary | ICD-10-CM

## 2024-05-21 DIAGNOSIS — M99.02 SEGMENTAL DYSFUNCTION OF THORACIC REGION: ICD-10-CM

## 2024-05-21 DIAGNOSIS — M99.04 SEGMENTAL DYSFUNCTION OF SACRAL REGION: ICD-10-CM

## 2024-05-21 DIAGNOSIS — M54.2 NECK PAIN: ICD-10-CM

## 2024-05-21 DIAGNOSIS — M99.01 SEGMENTAL DYSFUNCTION OF CERVICAL REGION: ICD-10-CM

## 2024-05-21 DIAGNOSIS — M79.18 MYOFASCIAL PAIN: ICD-10-CM

## 2024-05-21 DIAGNOSIS — M54.16 LUMBAR RADICULOPATHY: ICD-10-CM

## 2024-05-21 DIAGNOSIS — M54.50 ACUTE BILATERAL LOW BACK PAIN WITHOUT SCIATICA: ICD-10-CM

## 2024-05-21 PROCEDURE — 98942 CHIROPRACTIC MANJ 5 REGIONS: CPT | Performed by: CHIROPRACTOR

## 2024-05-21 RX ORDER — FUROSEMIDE 20 MG/1
TABLET ORAL
COMMUNITY
Start: 2024-05-07

## 2024-05-21 NOTE — PROGRESS NOTES
"Weight Management Medical Nutrition Assessment  Rhianna is here for 6 week VLCD f/u. Current wt: 250.8 lbs. Loss of 4.1 lbs x 2 wks. Overall loss of  16.4# x 6 weeks (avg 2.7 lbs/wk).  Started lasix due to LLE. BP today 100/70. Reminded to check daily and call provider if BP consistently <100/60. Not always drinking 80 oz water. Discussed importance of hydration compliance for kidney health and prevention of dehydration. Had an episode of dizziness when mowing lawn. Suggest use propel or gatorade zero as well as low carb snacks when having to do more activity. Will continue VLCD at this time.     Patient seen by Medical Provider in past 6 months:  yes  Requested to schedule appointment with Medical Provider: No    Anthropometric Measurements  Start Weight (#): 268.2 lbs 5/4/2022; start VLCD 267 lbs    Current Weight (#): 250.8#  TBW % Change from start weight: 6.0%  Ideal Body Weight (#): 125 65\" (159.3 lbs BMI 25)  Goal Weight (#):  lbs     Weight Loss History  Previous weight loss attempts: Commercial Programs (Weight Watchers, [email or web address], etc.)  Counseling with  MD  Exercise  Meal Replacements (Medifast, Slim Fast, etc.)  Self Created Diets (Portion Control, Healthy Food Choices, etc.)    Food and Nutrition Related History  Wake up: 8-9   Bed Time: 11    Food Recall  Breakfast:  10:00  replacement   Snack:    Lunch: 3:00:  replacement   Snack: carrots + 1 Tbsp peanut butter OR almonds OR hard boiled egg  Dinner: 6-7:00  replacement   Snack:  bar     Beverages: water  Volume of beverage intake:  68 oz +    Weekends: Same  Cravings: no specific identified   Trouble area of day: not identified     Frequency of Eating out: none currently  Food restrictions:  carbs <50 gm while on VLCD  Cooking: self   Food Shopping: self    Physical Activity Intake  Activity:  yardwork  Frequency: varies      Physical limitations/barriers to exercise: no intense exercise while on VLCD    Estimated Needs  Energy  [address] " [place] [employer] Needs: BMR :  1658   1-2# loss weekly sedentary: 990 - 1490         1-2# loss weekly lightly active:  5569-7894  Maintenance calories for sedentary activity level:  1990  Protein: 72-90 gm      (1.2-1.5g/kg IBW)  Fluid: 70 oz    (35mL/kg IBW)    Nutrition Diagnosis  Yes;    Overweight/obesity  related to Excess energy intake as evidenced by  BMI more than normative standard for age and sex (obesity-grade III 40+)       Nutrition Intervention    Nutrition Prescription  Calories:  760 calories  Protein: 96  gm    Meal Plan (Adrien/Pro)  Breakfast: 200, 27  Snack:  Lunch: 200, 27  Snack: 160, 15   Dinner: 200, 27  Snack:    Nutrition Education:     VLCD  Physical activity  hydration     Nutrition Counseling:  Strategies: as above      Monitoring and Evaluation:  Evaluation criteria:  Energy Intake  Meet protein needs  Maintain adequate hydration  Monitor weekly weight  Food logging/measuring  Physical activity   Meal planning    Barriers to learning:none  Readiness to change: Action:  (Changing behavior)    Comprehension: very good  Expected Compliance: good

## 2024-05-21 NOTE — PROGRESS NOTES
Date of first visit: 2/21/2023      HPI:  Lizzie returns to the office for treatment today of neck pain upper back pain lower back and left hip pain. No other medical history changes.      VPAS  4      The following portions of the patient's history were reviewed and updated as appropriate: allergies, current medications, past family history, past medical history, past social history, past surgical history, and problem list.    Review of Systems    Physical Exam:  Lizzie is in some minor distress today she moves slowly about the exam room her transfer is slowed range of motion reduced secondary to muscle tightness.    Pelvic obliquity noted elevated left versus right innominate misalignment of the left innominate on sacrum biomechanically joint dysfunction present left SI and L5-S1 motion unit active triggers in the left gluteus medius and TFL musculature.    Parathoracic hypertonicity noted with a T4-T5 segmental dysfunction.    Cervical range of motion reduced in left lateral bending right rotation joint dysfunction C5-C6 C1-C2    Assessment:   Diagnosis ICD-10-CM Associated Orders   1. Segmental dysfunction of pelvic region  M99.05       2. Lumbar radiculopathy  M54.16       3. Segmental dysfunction of lumbar region  M99.03       4. Segmental dysfunction of sacral region  M99.04       5. Acute bilateral low back pain without sciatica  M54.50       6. Segmental dysfunction of thoracic region  M99.02       7. Acute bilateral thoracic back pain  M54.6       8. Neck pain  M54.2       9. Segmental dysfunction of cervical region  M99.01       10. Myofascial pain  M79.18             Treatment: 88512  Manipulation to the left innominate, sacrum, L5 via Walker drop maneuver.  Manipulation T4 producing good joint release well-tolerated.  Manipulation C5 C1 well-tolerated.    Discussion:  Continue her home stretching program icing to the left low back and hip we will see her back for follow-up.  She will continue icing and  stretching.

## 2024-05-23 ENCOUNTER — CLINICAL SUPPORT (OUTPATIENT)
Dept: BARIATRICS | Facility: CLINIC | Age: 69
End: 2024-05-23

## 2024-05-23 VITALS
HEIGHT: 65 IN | BODY MASS INDEX: 41.79 KG/M2 | DIASTOLIC BLOOD PRESSURE: 70 MMHG | SYSTOLIC BLOOD PRESSURE: 100 MMHG | WEIGHT: 250.8 LBS

## 2024-05-23 DIAGNOSIS — R63.5 ABNORMAL WEIGHT GAIN: Primary | ICD-10-CM

## 2024-05-23 PROCEDURE — VLCD

## 2024-05-23 PROCEDURE — RECHECK

## 2024-05-30 ENCOUNTER — FOLLOW UP (OUTPATIENT)
Dept: URBAN - METROPOLITAN AREA CLINIC 6 | Facility: CLINIC | Age: 69
End: 2024-05-30

## 2024-05-30 DIAGNOSIS — H43.813: ICD-10-CM

## 2024-05-30 DIAGNOSIS — H04.123: ICD-10-CM

## 2024-05-30 DIAGNOSIS — Z96.1: ICD-10-CM

## 2024-05-30 DIAGNOSIS — G43.B0: ICD-10-CM

## 2024-05-30 PROCEDURE — 92014 COMPRE OPH EXAM EST PT 1/>: CPT

## 2024-05-30 ASSESSMENT — KERATOMETRY
OS_AXISANGLE_DEGREES: 97
OS_AXISANGLE2_DEGREES: 7
OS_K2POWER_DIOPTERS: 43.75
OD_AXISANGLE2_DEGREES: 32
OD_K2POWER_DIOPTERS: 43.75
OD_AXISANGLE_DEGREES: 122
OS_K1POWER_DIOPTERS: 43.25
OD_K1POWER_DIOPTERS: 43.00

## 2024-05-30 ASSESSMENT — TONOMETRY
OD_IOP_MMHG: 11
OS_IOP_MMHG: 10

## 2024-05-30 ASSESSMENT — VISUAL ACUITY
OD_SC: 20/30-1
OD_CC: J2
OS_SC: 20/25+2
OS_CC: J1+

## 2024-06-07 ENCOUNTER — CLINICAL SUPPORT (OUTPATIENT)
Dept: BARIATRICS | Facility: CLINIC | Age: 69
End: 2024-06-07

## 2024-06-07 VITALS
BODY MASS INDEX: 41.12 KG/M2 | HEIGHT: 65 IN | DIASTOLIC BLOOD PRESSURE: 70 MMHG | SYSTOLIC BLOOD PRESSURE: 124 MMHG | WEIGHT: 246.8 LBS

## 2024-06-07 DIAGNOSIS — N18.31 STAGE 3A CHRONIC KIDNEY DISEASE (HCC): ICD-10-CM

## 2024-06-07 DIAGNOSIS — R63.5 ABNORMAL WEIGHT GAIN: Primary | ICD-10-CM

## 2024-06-07 DIAGNOSIS — E03.9 ACQUIRED HYPOTHYROIDISM: ICD-10-CM

## 2024-06-07 DIAGNOSIS — R73.01 IMPAIRED FASTING GLUCOSE: ICD-10-CM

## 2024-06-07 DIAGNOSIS — I10 BENIGN ESSENTIAL HYPERTENSION: ICD-10-CM

## 2024-06-07 PROCEDURE — RECHECK

## 2024-06-07 PROCEDURE — VLCD

## 2024-06-17 NOTE — PROGRESS NOTES
"Weight Management Medical Nutrition Assessment  Rhianna is here for 10 week VLCD f/u. Current wt:   lbs. Loss of   lbs x 15 days. Overall loss of   # x ~9 weeks (avg   lbs/wk).      BP avg 102-137/64-84 mg/dl. Remains on lasix. Doing better with water intake. She has no concerns at this time. Upcoming anniversary dinner discussed. Will continue VLCD at this time.     Patient seen by Medical Provider in past 6 months:  yes  Requested to schedule appointment with Medical Provider: No    Anthropometric Measurements  Start Weight (#): 268.2 lbs 5/4/2022; start VLCD 267 lbs    Current Weight (#):   TBW % Change from start weight:   Ideal Body Weight (#): 125 65\" (159.3 lbs BMI 25)  Goal Weight (#):  lbs     Weight Loss History  Previous weight loss attempts: Commercial Programs (Weight Watchers, [employer], etc.)  Counseling with  MD  Exercise  Meal Replacements (Medifast, Slim Fast, etc.)  Self Created Diets (Portion Control, Healthy Food Choices, etc.)    Food and Nutrition Related History  Wake up: 8-9   Bed Time: 11    Food Recall  Breakfast:  10:00  replacement   Snack:    Lunch: 3:00:  replacement   Snack: carrots + 1 Tbsp peanut butter OR almonds OR hard boiled egg  Dinner: 6-7:00  replacement   Snack:  bar     Beverages: water  Volume of beverage intake:  68 oz +    Weekends: Same  Cravings: no specific identified   Trouble area of day: not identified     Frequency of Eating out: none currently  Food restrictions:  carbs <50 gm while on VLCD  Cooking: self   Food Shopping: self    Physical Activity Intake  Activity:  yardwork  Frequency: varies      Physical limitations/barriers to exercise: no intense exercise while on VLCD    Estimated Needs ***  Energy  [address] [place] Energy Needs: BMR :  1640   1-2# loss weekly sedentary: 968 - 1468        1-2# loss weekly lightly active:  2056-3139  Maintenance calories for sedentary activity level:  1968  Protein: 72-90 gm      (1.2-1.5g/kg IBW)  Fluid: 70 oz    " (35mL/kg IBW)    Nutrition Diagnosis  Yes;    Overweight/obesity  related to Excess energy intake as evidenced by  BMI more than normative standard for age and sex (obesity-grade III 40+) ***       Nutrition Intervention    Nutrition Prescription  Calories:  760 calories  Protein: 96  gm    Meal Plan (Adrien/Pro)  Breakfast: 200, 27  Snack:  Lunch: 200, 27  Snack: 160, 15   Dinner: 200, 27  Snack:    Nutrition Education:     VLCD  Physical activity  hydration     Nutrition Counseling:  Strategies: as above      Monitoring and Evaluation:  Evaluation criteria:  Energy Intake  Meet protein needs  Maintain adequate hydration  Monitor weekly weight  Food logging/measuring  Physical activity   Meal planning    Barriers to learning:none  Readiness to change: Action:  (Changing behavior)    Comprehension: very good  Expected Compliance: good

## 2024-06-18 ENCOUNTER — RA CDI HCC (OUTPATIENT)
Dept: OTHER | Facility: HOSPITAL | Age: 69
End: 2024-06-18

## 2024-06-24 ENCOUNTER — APPOINTMENT (OUTPATIENT)
Dept: LAB | Age: 69
End: 2024-06-24
Payer: MEDICARE

## 2024-06-24 ENCOUNTER — CLINICAL SUPPORT (OUTPATIENT)
Dept: BARIATRICS | Facility: CLINIC | Age: 69
End: 2024-06-24

## 2024-06-24 VITALS
DIASTOLIC BLOOD PRESSURE: 84 MMHG | WEIGHT: 247.6 LBS | SYSTOLIC BLOOD PRESSURE: 128 MMHG | HEIGHT: 65 IN | BODY MASS INDEX: 41.25 KG/M2

## 2024-06-24 DIAGNOSIS — E03.9 ACQUIRED HYPOTHYROIDISM: ICD-10-CM

## 2024-06-24 DIAGNOSIS — I10 BENIGN ESSENTIAL HYPERTENSION: ICD-10-CM

## 2024-06-24 DIAGNOSIS — R63.5 ABNORMAL WEIGHT GAIN: ICD-10-CM

## 2024-06-24 DIAGNOSIS — R63.5 ABNORMAL WEIGHT GAIN: Primary | ICD-10-CM

## 2024-06-24 DIAGNOSIS — R73.01 IMPAIRED FASTING GLUCOSE: ICD-10-CM

## 2024-06-24 DIAGNOSIS — N18.31 STAGE 3A CHRONIC KIDNEY DISEASE (HCC): ICD-10-CM

## 2024-06-24 LAB
ALBUMIN SERPL BCG-MCNC: 3.9 G/DL (ref 3.5–5)
ALP SERPL-CCNC: 80 U/L (ref 34–104)
ALT SERPL W P-5'-P-CCNC: 11 U/L (ref 7–52)
ANION GAP SERPL CALCULATED.3IONS-SCNC: 12 MMOL/L (ref 4–13)
AST SERPL W P-5'-P-CCNC: 14 U/L (ref 13–39)
BASOPHILS # BLD AUTO: 0.03 THOUSANDS/ÂΜL (ref 0–0.1)
BASOPHILS NFR BLD AUTO: 1 % (ref 0–1)
BILIRUB SERPL-MCNC: 0.39 MG/DL (ref 0.2–1)
BUN SERPL-MCNC: 28 MG/DL (ref 5–25)
CALCIUM SERPL-MCNC: 9.2 MG/DL (ref 8.4–10.2)
CHLORIDE SERPL-SCNC: 105 MMOL/L (ref 96–108)
CO2 SERPL-SCNC: 23 MMOL/L (ref 21–32)
CREAT SERPL-MCNC: 0.86 MG/DL (ref 0.6–1.3)
EOSINOPHIL # BLD AUTO: 0.1 THOUSAND/ÂΜL (ref 0–0.61)
EOSINOPHIL NFR BLD AUTO: 2 % (ref 0–6)
ERYTHROCYTE [DISTWIDTH] IN BLOOD BY AUTOMATED COUNT: 14.8 % (ref 11.6–15.1)
GFR SERPL CREATININE-BSD FRML MDRD: 68 ML/MIN/1.73SQ M
GLUCOSE P FAST SERPL-MCNC: 98 MG/DL (ref 65–99)
HCT VFR BLD AUTO: 39.5 % (ref 34.8–46.1)
HGB BLD-MCNC: 12.9 G/DL (ref 11.5–15.4)
IMM GRANULOCYTES # BLD AUTO: 0.02 THOUSAND/UL (ref 0–0.2)
IMM GRANULOCYTES NFR BLD AUTO: 0 % (ref 0–2)
LYMPHOCYTES # BLD AUTO: 0.99 THOUSANDS/ÂΜL (ref 0.6–4.47)
LYMPHOCYTES NFR BLD AUTO: 18 % (ref 14–44)
MAGNESIUM SERPL-MCNC: 2.1 MG/DL (ref 1.9–2.7)
MCH RBC QN AUTO: 28 PG (ref 26.8–34.3)
MCHC RBC AUTO-ENTMCNC: 32.7 G/DL (ref 31.4–37.4)
MCV RBC AUTO: 86 FL (ref 82–98)
MONOCYTES # BLD AUTO: 0.56 THOUSAND/ÂΜL (ref 0.17–1.22)
MONOCYTES NFR BLD AUTO: 10 % (ref 4–12)
NEUTROPHILS # BLD AUTO: 3.9 THOUSANDS/ÂΜL (ref 1.85–7.62)
NEUTS SEG NFR BLD AUTO: 69 % (ref 43–75)
NRBC BLD AUTO-RTO: 0 /100 WBCS
PLATELET # BLD AUTO: 272 THOUSANDS/UL (ref 149–390)
PMV BLD AUTO: 11.2 FL (ref 8.9–12.7)
POTASSIUM SERPL-SCNC: 3.7 MMOL/L (ref 3.5–5.3)
PROT SERPL-MCNC: 6.5 G/DL (ref 6.4–8.4)
RBC # BLD AUTO: 4.6 MILLION/UL (ref 3.81–5.12)
SODIUM SERPL-SCNC: 140 MMOL/L (ref 135–147)
TSH SERPL DL<=0.05 MIU/L-ACNC: 2.3 UIU/ML (ref 0.45–4.5)
WBC # BLD AUTO: 5.6 THOUSAND/UL (ref 4.31–10.16)

## 2024-06-24 PROCEDURE — 84443 ASSAY THYROID STIM HORMONE: CPT

## 2024-06-24 PROCEDURE — 36415 COLL VENOUS BLD VENIPUNCTURE: CPT

## 2024-06-24 PROCEDURE — 80053 COMPREHEN METABOLIC PANEL: CPT

## 2024-06-24 PROCEDURE — 83735 ASSAY OF MAGNESIUM: CPT

## 2024-06-24 PROCEDURE — VLCD

## 2024-06-24 PROCEDURE — RECHECK

## 2024-06-24 PROCEDURE — 85025 COMPLETE CBC W/AUTO DIFF WBC: CPT

## 2024-06-24 NOTE — PROGRESS NOTES
"Weight Management Medical Nutrition Assessment  Rhianna is here with her  for meal planning after transitioning from VLCD 1 1/2 wks ago. Current wt: 245 lbs. She has lost 2.6 lbs x 1 1/2 wks with overall loss of 23.2 lbs x just over 2 yrs. Has not had any issues with transition from VLCD. Has been using regular meals this week but next week will be receiving the [employer]vata meals. Has utilized guidelines provided by RD at last visit. Upcoming camping trip discussed. Options for f/u reviewed. She will f/u in 2 months.    Patient seen by Medical Provider in past 6 months:  yes  Requested to schedule appointment with Medical Provider: No    Anthropometric Measurements  Start Weight (#): 268.2 lbs 5/4/2022; start VLCD 267 lbs    Current Weight (#): 245#  TBW % Change from start weight: 8.7%  Ideal Body Weight (#): 125 65\" (159.3 lbs BMI 25)  Goal Weight (#):  lbs     Weight Loss History  Previous weight loss attempts: Commercial Programs (Weight Watchers, [employer], etc.)  Counseling with  MD  Exercise  Meal Replacements (Medifast, Slim Fast, etc.)  Self Created Diets (Portion Control, Healthy Food Choices, etc.)    Food and Nutrition Related History  Wake up: 8-9   Bed Time: 11    Food Recall  Breakfast:  10:00  replacement   Snack:    Lunch: 3:00:  replacement   Snack: carrots + 1 Tbsp peanut butter OR almonds OR hard boiled egg  Dinner: 6-7:00  replacement OR taco pizza (thin crust, vegetables, chicken, beef) + apple dumpling with vanilla ice cream (did not have both foods on the same day)  Snack:  bar     Beverages: water  Volume of beverage intake:  68 oz +    Weekends: Same  Cravings: no specific identified   Trouble area of day: not identified     Frequency of Eating out: none currently  Food restrictions:  carbs <50 gm while on VLCD  Cooking: self   Food Shopping: self    Physical Activity Intake  Activity:  yardwork  Frequency: varies      Physical limitations/barriers to exercise: no intense " exercise while on VLCD    Estimated Needs   Energy  [place] [employer] Needs: BMR :  1655   1-2# loss weekly sedentary: 986 - 1486        1-2# loss weekly lightly active:  1968-9598  Maintenance calories for sedentary activity level:  1968  Protein: 72-90 gm      (1.2-1.5g/kg IBW)  Fluid: 70 oz    (35mL/kg IBW)    Nutrition Diagnosis  Yes;    Overweight/obesity  related to Excess energy intake as evidenced by  BMI more than normative standard for age and sex (obesity-grade III 40+)       Nutrition Intervention    Nutrition Prescription  Calories: 1,100 calories, <75g net CHO  Protein: 85-120g  Fluid: 80 oz    Meal Plan (Adrien/Pro/Carb)  Breakfast: 200, 27, 10  Snack:  Lunch: <500, 30-40, 10-30 (encouraged 300-400kcal)  Snack:  Dinner: 200, 27, 10  Snack: 160, 15, 13  If meal 300-400kcal can add in additional snack of 100-150kcal.    Nutrition Education:    Low carb meal  Net CHO     Nutrition Counseling:  Strategies: as above      Monitoring and Evaluation:  Evaluation criteria:  Energy Intake  Meet protein needs  Maintain adequate hydration  Monitor weekly weight  Food logging/measuring  Physical activity   Meal planning    Barriers to learning:none  Readiness to change: Action:  (Changing behavior)    Comprehension: very good  Expected Compliance: good

## 2024-06-24 NOTE — PROGRESS NOTES
"Weight Management Medical Nutrition Assessment  Rhianna is here for 10 week VLCD f/u. Presents with . Current wt:247.6lbs. Gain of 0.8lbs x 2 wks. Overall loss of   19.6# x ~9 weeks (avg 1.9lbs/wk). Reports dining out while camping. Ate taco pizza and apple dumpling. Per dietary recall carb intake likely exceeded 50g on 2 occasions. Discussed option of transition to partial replacement to avoid going in and out of ketosis. Plan to order conscious carb [employer] meals. She usually picks meals. RD reviewed nutrition facts with patient. Carb content ranging from 20-35g. Meals she ordered for next week ranging from 300-500kcal.  Encouraged low carb meal of 300-400kcal however not all meals met this criteria. Goal <500kcal per meal. Developed low carb meal plan. Requested clearance to resume exercise. RD notified clinical coordinator. Labs completed today. Hydration adequate. Ordered product for 1 week.    Patient seen by Medical Provider in past 6 months:  yes  Requested to schedule appointment with Medical Provider: No    Anthropometric Measurements  Start Weight (#): 268.2 lbs 5/4/2022; start VLCD 267 lbs    Current Weight (#): 247.6#  TBW % Change from start weight: 7.4%  Ideal Body Weight (#): 125 65\" (159.3 lbs BMI 25)  Goal Weight (#):  lbs     Weight Loss History  Previous weight loss attempts: Commercial Programs (Weight Watchers, [employer], etc.)  Counseling with  MD  Exercise  Meal Replacements (Medifast, Slim Fast, etc.)  Self Created Diets (Portion Control, Healthy Food Choices, etc.)    Food and Nutrition Related History  Wake up: 8-9   Bed Time: 11    Food Recall  Breakfast:  10:00  replacement   Snack:    Lunch: 3:00:  replacement   Snack: carrots + 1 Tbsp peanut butter OR almonds OR hard boiled egg  Dinner: 6-7:00  replacement OR taco pizza (thin crust, vegetables, chicken, beef) + apple dumpling with vanilla ice cream (did not have both foods on the same day)  Snack:  bar     Beverages: " water  Volume of beverage intake:  68 oz +    Weekends: Same  Cravings: no specific identified   Trouble area of day: not identified     Frequency of Eating out: none currently  Food restrictions:  carbs <50 gm while on VLCD  Cooking: self   Food Shopping: self    Physical Activity Intake  Activity:  yardwork  Frequency: varies      Physical limitations/barriers to exercise: no intense exercise while on VLCD    Estimated Needs   Energy  [employer] Needs: BMR :  1640   1-2# loss weekly sedentary: 968 - 1468        1-2# loss weekly lightly active:  1991-4042  Maintenance calories for sedentary activity level:  1968  Protein: 72-90 gm      (1.2-1.5g/kg IBW)  Fluid: 70 oz    (35mL/kg IBW)    Nutrition Diagnosis  Yes;    Overweight/obesity  related to Excess energy intake as evidenced by  BMI more than normative standard for age and sex (obesity-grade III 40+)       Nutrition Intervention    Nutrition Prescription  Calories: 1,100 calories, <75g net CHO  Protein: 85-120g  Fluid: 80 oz    Meal Plan (Adrien/Pro/Carb)  Breakfast: 200, 27, 10  Snack:  Lunch: <500, 30-40, 10-30 (encouraged 300-400kcal)  Snack:  Dinner: 200, 27, 10  Snack: 160, 15, 13  If meal 300-400kcal can add in additional snack of 100-150kcal.    Nutrition Education:    Low carb meal  Net CHO     Nutrition Counseling:  Strategies: as above      Monitoring and Evaluation:  Evaluation criteria:  Energy Intake  Meet protein needs  Maintain adequate hydration  Monitor weekly weight  Food logging/measuring  Physical activity   Meal planning    Barriers to learning:none  Readiness to change: Action:  (Changing behavior)    Comprehension: very good  Expected Compliance: good

## 2024-06-25 ENCOUNTER — OFFICE VISIT (OUTPATIENT)
Dept: INTERNAL MEDICINE CLINIC | Facility: CLINIC | Age: 69
End: 2024-06-25
Payer: MEDICARE

## 2024-06-25 ENCOUNTER — PROCEDURE VISIT (OUTPATIENT)
Age: 69
End: 2024-06-25
Payer: MEDICARE

## 2024-06-25 VITALS
HEART RATE: 78 BPM | DIASTOLIC BLOOD PRESSURE: 84 MMHG | SYSTOLIC BLOOD PRESSURE: 128 MMHG | HEIGHT: 65 IN | WEIGHT: 245 LBS | BODY MASS INDEX: 40.82 KG/M2

## 2024-06-25 VITALS
SYSTOLIC BLOOD PRESSURE: 120 MMHG | HEART RATE: 61 BPM | BODY MASS INDEX: 40.82 KG/M2 | HEIGHT: 65 IN | WEIGHT: 245 LBS | OXYGEN SATURATION: 96 % | DIASTOLIC BLOOD PRESSURE: 88 MMHG

## 2024-06-25 DIAGNOSIS — E03.9 ACQUIRED HYPOTHYROIDISM: ICD-10-CM

## 2024-06-25 DIAGNOSIS — M99.01 SEGMENTAL DYSFUNCTION OF CERVICAL REGION: ICD-10-CM

## 2024-06-25 DIAGNOSIS — E78.00 PURE HYPERCHOLESTEROLEMIA: ICD-10-CM

## 2024-06-25 DIAGNOSIS — M54.2 NECK PAIN: ICD-10-CM

## 2024-06-25 DIAGNOSIS — M79.18 MYOFASCIAL PAIN: ICD-10-CM

## 2024-06-25 DIAGNOSIS — M54.6 ACUTE BILATERAL THORACIC BACK PAIN: ICD-10-CM

## 2024-06-25 DIAGNOSIS — N18.31 STAGE 3A CHRONIC KIDNEY DISEASE (HCC): ICD-10-CM

## 2024-06-25 DIAGNOSIS — G43.109 OCULAR MIGRAINE: ICD-10-CM

## 2024-06-25 DIAGNOSIS — R73.01 IMPAIRED FASTING GLUCOSE: ICD-10-CM

## 2024-06-25 DIAGNOSIS — M99.04 SEGMENTAL DYSFUNCTION OF SACRAL REGION: ICD-10-CM

## 2024-06-25 DIAGNOSIS — M99.03 SEGMENTAL DYSFUNCTION OF LUMBAR REGION: ICD-10-CM

## 2024-06-25 DIAGNOSIS — M99.02 SEGMENTAL DYSFUNCTION OF THORACIC REGION: ICD-10-CM

## 2024-06-25 DIAGNOSIS — M54.50 ACUTE BILATERAL LOW BACK PAIN WITHOUT SCIATICA: ICD-10-CM

## 2024-06-25 DIAGNOSIS — I10 BENIGN ESSENTIAL HYPERTENSION: Primary | ICD-10-CM

## 2024-06-25 DIAGNOSIS — R25.1 TREMOR: ICD-10-CM

## 2024-06-25 DIAGNOSIS — M99.05 SEGMENTAL DYSFUNCTION OF PELVIC REGION: Primary | ICD-10-CM

## 2024-06-25 PROCEDURE — G2211 COMPLEX E/M VISIT ADD ON: HCPCS | Performed by: INTERNAL MEDICINE

## 2024-06-25 PROCEDURE — 99214 OFFICE O/P EST MOD 30 MIN: CPT | Performed by: INTERNAL MEDICINE

## 2024-06-25 PROCEDURE — 98941 CHIROPRACT MANJ 3-4 REGIONS: CPT | Performed by: CHIROPRACTOR

## 2024-06-25 NOTE — PROGRESS NOTES
Date of first visit: 2/21/2023      HPI:  Lizzie returns to the office for treatment today of neck pain upper back pain lower back and left hip pain. No other medical history changes.      VPAS  3-4      The following portions of the patient's history were reviewed and updated as appropriate: allergies, current medications, past family history, past medical history, past social history, past surgical history, and problem list.    Review of Systems    Physical Exam:  Lizzie is in some minor distress today she moves slowly about the exam room her transfer is slowed range of motion reduced secondary to muscle tightness.    Pelvic obliquity noted elevated left versus right innominate misalignment of the left innominate on sacrum biomechanically joint dysfunction present left SI and L5-S1 motion unit active triggers in the left gluteus medius and TFL musculature.    Parathoracic hypertonicity noted with a T4-T5 segmental dysfunction.    Cervical range of motion reduced in left lateral bending right rotation joint dysfunction C5-C6 C1-C2    Assessment:   Diagnosis ICD-10-CM Associated Orders   1. Segmental dysfunction of pelvic region  M99.05       2. Segmental dysfunction of lumbar region  M99.03       3. Segmental dysfunction of sacral region  M99.04       4. Acute bilateral low back pain without sciatica  M54.50       5. Segmental dysfunction of thoracic region  M99.02       6. Acute bilateral thoracic back pain  M54.6       7. Neck pain  M54.2       8. Segmental dysfunction of cervical region  M99.01       9. Myofascial pain  M79.18             Treatment: 86980  Manipulation to the left innominate, sacrum, L5 via Walker drop maneuver.  Manipulation T4 producing good joint release well-tolerated.  Manipulation C5 C1 well-tolerated.    Discussion:  Continue her home stretching program icing to the left low back and hip we will see her back for follow-up.  She will continue icing and stretching.

## 2024-06-25 NOTE — PATIENT INSTRUCTIONS
Problem List Items Addressed This Visit          Cardiovascular and Mediastinum    Benign essential hypertension - Primary     Pressure is well-controlled, continue amlodipine, metoprolol, and losartan         Ocular migraine     Patient denies getting headaches with this, they happen about once a month with some jagged lines he has seen an eye doctor for this.  Will refer to neurology to see about any other options         Relevant Orders    Ambulatory referral to Neurology       Endocrine    Impaired fasting glucose     Continue with healthy diet         Acquired hypothyroidism     Thyroid function tests yesterday were normal, continue levothyroxine 50 mcg daily along with monitoring            Genitourinary    Stage 3a chronic kidney disease (HCC)     Lab Results   Component Value Date    EGFR 68 06/24/2024    EGFR 57 05/20/2024    EGFR 73 02/28/2024    CREATININE 0.86 06/24/2024    CREATININE 0.99 05/20/2024    CREATININE 0.81 02/28/2024   Most recent GFR 68, avoid excessive use of NSAIDs, continue blood pressure meds            Neurology/Sleep    Tremor     No resting pill-rolling tremor, patient reports this when she is holding things, will get evaluation with neurology         Relevant Orders    Ambulatory referral to Neurology       Other    Pure hypercholesterolemia     Continue with healthy diet

## 2024-06-25 NOTE — ASSESSMENT & PLAN NOTE
No resting pill-rolling tremor, patient reports this when she is holding things, will get evaluation with neurology

## 2024-06-25 NOTE — ASSESSMENT & PLAN NOTE
Patient denies getting headaches with this, they happen about once a month with some jagged lines he has seen an eye doctor for this.  Will refer to neurology to see about any other options

## 2024-06-25 NOTE — ASSESSMENT & PLAN NOTE
Lab Results   Component Value Date    EGFR 68 06/24/2024    EGFR 57 05/20/2024    EGFR 73 02/28/2024    CREATININE 0.86 06/24/2024    CREATININE 0.99 05/20/2024    CREATININE 0.81 02/28/2024   Most recent GFR 68, avoid excessive use of NSAIDs, continue blood pressure meds

## 2024-06-25 NOTE — ASSESSMENT & PLAN NOTE
Thyroid function tests yesterday were normal, continue levothyroxine 50 mcg daily along with monitoring

## 2024-06-25 NOTE — PROGRESS NOTES
Ambulatory Visit  Name: Rhianna Gerardo      : 1955      MRN: [id number]  Encounter Provider: Leandro Moreno MD  Encounter Date: 2024   Encounter department: [facility]    Assessment & Plan   1. Benign essential hypertension  Assessment & Plan:  Pressure is well-controlled, continue amlodipine, metoprolol, and losartan  2. Acquired hypothyroidism  Assessment & Plan:  Thyroid function tests yesterday were normal, continue levothyroxine 50 mcg daily along with monitoring  3. Impaired fasting glucose  Assessment & Plan:  Continue with healthy diet  4. Stage 3a chronic kidney disease (HCC)  Assessment & Plan:  Lab Results   Component Value Date    EGFR 68 2024    EGFR 57 2024    EGFR 73 2024    CREATININE 0.86 2024    CREATININE 0.99 2024    CREATININE 0.81 2024   Most recent GFR 68, avoid excessive use of NSAIDs, continue blood pressure meds  5. Tremor  Assessment & Plan:  No resting pill-rolling tremor, patient reports this when she is holding things, will get evaluation with neurology  Orders:  -     Ambulatory referral to Neurology; Future  6. Pure hypercholesterolemia  Assessment & Plan:  Continue with healthy diet  7. Ocular migraine  Assessment & Plan:  Patient denies getting headaches with this, they happen about once a month with some jagged lines he has seen an eye doctor for this.  Will refer to neurology to see about any other options  Orders:  -     Ambulatory referral to Neurology; Future         History of Present Illness     Pt here for regular follow up.  Pt has been seeing eye doctor with symptoms consistent with ocular migraines.  She gets them about once a month.  Patient has noticed some intermittent tremor in the left hand, described as a coarse tremor, she has noticed it when holding the steering wheel at times or when she picks something up.  No resting tremor      Review of Systems   Constitutional:  Negative for chills,  fatigue and fever.   HENT:  Negative for congestion, nosebleeds, postnasal drip, sore throat and trouble swallowing.    Eyes:  Negative for pain.   Respiratory:  Negative for cough, chest tightness, shortness of breath and wheezing.    Cardiovascular:  Negative for chest pain, palpitations and leg swelling.   Gastrointestinal:  Negative for abdominal pain, constipation, diarrhea, nausea and vomiting.   Endocrine: Negative for polydipsia and polyuria.   Genitourinary:  Negative for dysuria, flank pain and hematuria.   Musculoskeletal:  Negative for arthralgias.   Skin:  Negative for rash.   Neurological:  Positive for tremors. Negative for dizziness and headaches.   Hematological:  Does not bruise/bleed easily.   Psychiatric/Behavioral:  Negative for confusion and dysphoric mood. The patient is not nervous/anxious.      Past Medical History:   Diagnosis Date   • Anemia A child on   • Arthritis    • Disease of thyroid gland 2 years ago    medication   • Environmental allergies    • Fibroid    • HL (hearing loss)    • Hyperlipidemia    • Hypertension    • Hyperthyroidism 4years   • Hypothyroidism    • Obesity 10 years    In my fifties   • Plantar fasciitis    • Scoliosis    • Varicella    • Visual impairment      Past Surgical History:   Procedure Laterality Date   • KNEE SURGERY     • TUBAL LIGATION       Family History   Problem Relation Age of Onset   • Hypertension Mother    • Dementia Mother            • Arthritis Mother            • Thyroid disease Mother            • Hearing loss Mother            • Coronary artery disease Father    • Heart failure Father            • Throat cancer Brother 60   • Endometrial cancer Maternal Aunt 50   • Lung cancer Maternal Aunt 60   • No Known Problems Maternal Aunt    • Bone cancer Maternal Uncle 69   • Coronary artery disease Maternal Uncle    • No Known Problems Paternal Aunt    • No Known Problems Maternal Grandmother     • No Known Problems Maternal Grandfather    • No Known Problems Paternal Grandmother    • No Known Problems Paternal Grandfather    • No Known Problems Daughter    • No Known Problems Son    • Alcohol abuse Brother              Social History     Tobacco Use   • Smoking status: Former     Current packs/day: 0.00     Average packs/day: 0.3 packs/day for 34.0 years (8.5 ttl pk-yrs)     Types: Cigarettes     Start date: 1969     Quit date: 2003     Years since quittin.0   • Smokeless tobacco: Never   • Tobacco comments:     quit 25 yrs ago   Vaping Use   • Vaping status: Never Used   Substance and Sexual Activity   • Alcohol use: Yes     Comment: social   • Drug use: No   • Sexual activity: Not Currently     Partners: Male     Birth control/protection: Post-menopausal     Current Outpatient Medications on File Prior to Visit   Medication Sig   • amLODIPine (NORVASC) 5 mg tablet Take 1 tablet (5 mg total) by mouth daily   • BEE POLLEN PO Take by mouth   • Calcium Carb-Cholecalciferol (CALCIUM + D3 PO) Take by mouth   • co-enzyme Q-10 30 MG capsule Take 100 mg by mouth 2 (two) times a day    • diphenhydrAMINE (BENADRYL) 25 mg capsule Take 25 mg by mouth as needed Takes as needed   • furosemide (LASIX) 20 mg tablet    • ibuprofen (MOTRIN) 800 mg tablet as needed Takes as needed.   • levothyroxine (Synthroid) 50 mcg tablet Take 1 tablet (50 mcg total) by mouth daily   • losartan (COZAAR) 100 MG tablet Take 1 tablet (100 mg total) by mouth daily   • metoprolol succinate (TOPROL-XL) 100 mg 24 hr tablet Take 1 tablet (100 mg total) by mouth daily   • Multiple Vitamins-Minerals (PRESERVISION AREDS 2+MULTI VIT PO) 2 tablets in the morning (Patient not taking: Reported on 2024)     Allergies   Allergen Reactions   • Diclofenac Swelling   • Atorvastatin Other (See Comments)     Muscle ache     Immunization History   Administered Date(s) Administered   • COVID-19 PFIZER VACCINE 0.3 ML IM 2021,  "04/15/2021, 10/18/2021   • COVID-19 Pfizer Vac BIVALENT Jose Eduardo-sucrose 12 Yr+ IM 10/14/2022   • COVID-19 Pfizer mRNA vacc PF jose eduardo-sucrose 12 yr and older (Comirnaty) 10/02/2023   • COVID-19 Pfizer vac (Jose Eduardo-sucrose, gray cap) 12 yr+ IM 04/28/2022   • INFLUENZA 09/30/2013, 09/29/2015, 12/12/2016, 12/14/2016, 12/14/2016, 10/31/2017, 10/17/2018, 10/21/2019, 09/30/2020, 09/23/2021, 09/20/2023   • Influenza Quadrivalent Preservative Free 3 years and older IM 12/12/2016   • Influenza Quadrivalent, 6-35 Months IM 09/29/2015   • Influenza Split High Dose Preservative Free IM 10/21/2019   • Influenza, high dose seasonal 0.7 mL 09/20/2022   • Influenza, seasonal, injectable 09/30/2013   • Pneumococcal Conjugate 13-Valent 08/02/2018   • Pneumococcal Polysaccharide PPV23 08/05/2019   • Respiratory Syncytial Virus Vaccine (Recombinant) 10/02/2023   • Tdap 10/21/2019   • Zoster 07/22/2015   • Zoster Vaccine Recombinant 08/14/2018, 10/17/2018   • influenza, trivalent, adjuvanted 10/17/2018, 09/30/2020     Objective     /88   Pulse 61   Ht 5' 5\" (1.651 m)   Wt 111 kg (245 lb)   SpO2 96%   BMI 40.77 kg/m²     Physical Exam  Vitals reviewed.   Constitutional:       Appearance: Normal appearance. She is well-developed.   HENT:      Head: Normocephalic and atraumatic.      Right Ear: External ear normal.      Left Ear: External ear normal.      Nose: Nose normal.   Eyes:      General: No scleral icterus.     Conjunctiva/sclera: Conjunctivae normal.   Neck:      Thyroid: No thyromegaly.      Trachea: No tracheal deviation.   Cardiovascular:      Rate and Rhythm: Normal rate and regular rhythm.      Heart sounds: Normal heart sounds. No murmur heard.  Pulmonary:      Effort: No respiratory distress.      Breath sounds: Normal breath sounds. No wheezing or rales.   Musculoskeletal:      Cervical back: Normal range of motion and neck supple.      Right lower leg: Edema (mild) present.      Left lower leg: Edema (trace) present. "   Lymphadenopathy:      Cervical: No cervical adenopathy.   Skin:     Coloration: Skin is not jaundiced or pale.   Neurological:      General: No focal deficit present.      Mental Status: She is alert and oriented to person, place, and time.      Comments: No resting tremor noted   Psychiatric:         Behavior: Behavior normal.         Thought Content: Thought content normal.         Judgment: Judgment normal.       Administrative Statements

## 2024-07-03 ENCOUNTER — CLINICAL SUPPORT (OUTPATIENT)
Dept: BARIATRICS | Facility: CLINIC | Age: 69
End: 2024-07-03

## 2024-07-03 VITALS — BODY MASS INDEX: 38.45 KG/M2 | HEIGHT: 67 IN | WEIGHT: 245 LBS

## 2024-07-03 DIAGNOSIS — R63.5 ABNORMAL WEIGHT GAIN: Primary | ICD-10-CM

## 2024-07-03 PROCEDURE — RECHECK

## 2024-07-03 PROCEDURE — WMDI30

## 2024-07-25 ENCOUNTER — PROCEDURE VISIT (OUTPATIENT)
Age: 69
End: 2024-07-25
Payer: MEDICARE

## 2024-07-25 VITALS
HEIGHT: 67 IN | HEART RATE: 80 BPM | BODY MASS INDEX: 38.45 KG/M2 | WEIGHT: 245 LBS | SYSTOLIC BLOOD PRESSURE: 132 MMHG | DIASTOLIC BLOOD PRESSURE: 77 MMHG

## 2024-07-25 DIAGNOSIS — M54.2 NECK PAIN: ICD-10-CM

## 2024-07-25 DIAGNOSIS — M99.01 SEGMENTAL DYSFUNCTION OF CERVICAL REGION: ICD-10-CM

## 2024-07-25 DIAGNOSIS — M99.05 SEGMENTAL DYSFUNCTION OF PELVIC REGION: Primary | ICD-10-CM

## 2024-07-25 DIAGNOSIS — M99.02 SEGMENTAL DYSFUNCTION OF THORACIC REGION: ICD-10-CM

## 2024-07-25 DIAGNOSIS — M54.50 ACUTE BILATERAL LOW BACK PAIN WITHOUT SCIATICA: ICD-10-CM

## 2024-07-25 DIAGNOSIS — M54.6 ACUTE BILATERAL THORACIC BACK PAIN: ICD-10-CM

## 2024-07-25 DIAGNOSIS — M99.04 SEGMENTAL DYSFUNCTION OF SACRAL REGION: ICD-10-CM

## 2024-07-25 DIAGNOSIS — M54.16 LUMBAR RADICULOPATHY: ICD-10-CM

## 2024-07-25 DIAGNOSIS — M79.18 MYOFASCIAL PAIN: ICD-10-CM

## 2024-07-25 DIAGNOSIS — M99.03 SEGMENTAL DYSFUNCTION OF LUMBAR REGION: ICD-10-CM

## 2024-07-25 PROCEDURE — 98942 CHIROPRACTIC MANJ 5 REGIONS: CPT | Performed by: CHIROPRACTOR

## 2024-07-25 NOTE — PROGRESS NOTES
Date of first visit: 2/21/2023      HPI:  Lizzie returns to the office for treatment today of neck pain upper back pain lower back and left hip pain. No other medical history changes.      VPAS  3-4      The following portions of the patient's history were reviewed and updated as appropriate: allergies, current medications, past family history, past medical history, past social history, past surgical history, and problem list.    Review of Systems    Physical Exam:  Lizzie is in some minor distress today she moves slowly about the exam room her transfer is slowed range of motion reduced secondary to muscle tightness.    Pelvic obliquity noted elevated left versus right innominate misalignment of the left innominate on sacrum biomechanically joint dysfunction present left SI and L5-S1 motion unit active triggers in the left gluteus medius and TFL musculature.    Parathoracic hypertonicity noted with a T4-T5 segmental dysfunction.    Cervical range of motion reduced in left lateral bending right rotation joint dysfunction C5-C6 C1-C2    Assessment:   Diagnosis ICD-10-CM Associated Orders   1. Segmental dysfunction of pelvic region  M99.05       2. Segmental dysfunction of lumbar region  M99.03       3. Segmental dysfunction of sacral region  M99.04       4. Acute bilateral low back pain without sciatica  M54.50       5. Segmental dysfunction of thoracic region  M99.02       6. Acute bilateral thoracic back pain  M54.6       7. Neck pain  M54.2       8. Segmental dysfunction of cervical region  M99.01       9. Myofascial pain  M79.18       10. Lumbar radiculopathy  M54.16             Treatment: 91293  Manipulation to the left innominate, sacrum, L5 via Walker drop maneuver.  Manipulation T4 producing good joint release well-tolerated.  Manipulation C5 C1 well-tolerated.    Discussion:  Continue her home stretching program icing to the left low back and hip we will see her back for follow-up.  She will continue icing and  stretching.

## 2024-08-07 ENCOUNTER — OFFICE VISIT (OUTPATIENT)
Dept: BARIATRICS | Facility: CLINIC | Age: 69
End: 2024-08-07
Payer: MEDICARE

## 2024-08-07 VITALS
RESPIRATION RATE: 16 BRPM | TEMPERATURE: 98.6 F | HEIGHT: 67 IN | BODY MASS INDEX: 38.24 KG/M2 | HEART RATE: 66 BPM | WEIGHT: 243.6 LBS | DIASTOLIC BLOOD PRESSURE: 81 MMHG | SYSTOLIC BLOOD PRESSURE: 145 MMHG

## 2024-08-07 DIAGNOSIS — I10 BENIGN ESSENTIAL HYPERTENSION: ICD-10-CM

## 2024-08-07 DIAGNOSIS — E78.00 PURE HYPERCHOLESTEROLEMIA: ICD-10-CM

## 2024-08-07 DIAGNOSIS — E66.01 CLASS 2 SEVERE OBESITY DUE TO EXCESS CALORIES WITH SERIOUS COMORBIDITY AND BODY MASS INDEX (BMI) OF 38.0 TO 38.9 IN ADULT (HCC): Primary | ICD-10-CM

## 2024-08-07 PROCEDURE — 99214 OFFICE O/P EST MOD 30 MIN: CPT | Performed by: PHYSICIAN ASSISTANT

## 2024-08-07 PROCEDURE — G2211 COMPLEX E/M VISIT ADD ON: HCPCS | Performed by: PHYSICIAN ASSISTANT

## 2024-08-07 RX ORDER — BUPROPION HYDROCHLORIDE 150 MG/1
150 TABLET ORAL DAILY
Qty: 90 TABLET | Refills: 1 | Status: SHIPPED | OUTPATIENT
Start: 2024-08-07

## 2024-08-07 RX ORDER — LUTEIN 6 MG
TABLET ORAL
COMMUNITY

## 2024-08-07 NOTE — ASSESSMENT & PLAN NOTE
- Patient is pursuing Conservative Program with bundles with dietician. Previously completed VLCD.   - Not interested in weight loss surgery.  - Initial weight loss goal of 5-10% weight loss for improved health    Initial: 268.5 lbs  Last visit: 268.2  Current: 243.6  Change: -24.6  Goal: 180 lbs    Goals:  To continue with 2 meal replacements a day and low calorie lunch  To continue to walk daily    Insurance woould not cover wegovy.  She had tried multiple options in the past.  Topamax and metformin were not helpful.  Wellbutrin did help with mood/energy so will retry that   Unable to take phentermine and qsymia due to hypertension, metoprolol use and age

## 2024-08-07 NOTE — PROGRESS NOTES
Assessment/Plan:    Class 2 severe obesity due to excess calories with serious comorbidity and body mass index (BMI) of 38.0 to 38.9 in adult (HCC)  - Patient is pursuing Conservative Program with bundles with dietician. Previously completed VLCD.   - Not interested in weight loss surgery.  - Initial weight loss goal of 5-10% weight loss for improved health    Initial: 268.5 lbs  Last visit: 268.2  Current: 243.6  Change: -24.6  Goal: 180 lbs    Goals:  To continue with 2 meal replacements a day and low calorie lunch  To continue to walk daily    Insurance woould not cover wegovy.  She had tried multiple options in the past.  Topamax and metformin were not helpful.  Wellbutrin did help with mood/energy so will retry that   Unable to take phentermine and qsymia due to hypertension, metoprolol use and age       Pure hypercholesterolemia  -should improve with weight loss, dietary, and lifestyle changes      Benign essential hypertension  On norvasc, metoprolol and losartan  -should improve with weight loss, dietary, and lifestyle changes          Return in about 6 months (around 2/7/2025).     Diagnoses and all orders for this visit:    Class 2 severe obesity due to excess calories with serious comorbidity and body mass index (BMI) of 38.0 to 38.9 in adult (HCC)  -     buPROPion (Wellbutrin XL) 150 mg 24 hr tablet; Take 1 tablet (150 mg total) by mouth daily    Pure hypercholesterolemia    Benign essential hypertension    Other orders  -     Lutein 20 MG TABS; Take by mouth          Subjective:   Chief Complaint   Patient presents with    Follow-up     MWM- 4 mo F/u; Waist 45in        Patient ID: Rhianna Gerardo  is a 69 y.o. female with excess weight/obesity here to pursue weight managment.  Patient is pursuing Conservative Program with meal planning after VLCD.     HPI  She was doing VLCD and lost 20 lb.  She is doing 2 meal replacements and regular meal for lunch. Typically between 400-510 and under 40 carbs.   Doing skinny pop, carrots w/dip and celery PB  Wt Readings from Last 10 Encounters:   08/07/24 110 kg (243 lb 9.6 oz)   07/25/24 111 kg (245 lb)   07/03/24 111 kg (245 lb)   06/25/24 111 kg (245 lb)   06/25/24 111 kg (245 lb)   06/24/24 112 kg (247 lb 9.6 oz)   06/07/24 112 kg (246 lb 12.8 oz)   05/23/24 114 kg (250 lb 12.8 oz)   05/21/24 113 kg (250 lb)   05/10/24 116 kg (254 lb 14.4 oz)       Food logging:  Increased appetite/cravings:often 1 x a week feeling more hungry.  Often on days when more active  Exercise:bowling 1 x week, walking more -tries to get walk  Hydration:at least 70 oz a day      The following portions of the patient's history were reviewed and updated as appropriate: She  has a past medical history of Anemia (A child on), Arthritis, Disease of thyroid gland (2 years ago), Environmental allergies, Fibroid, HL (hearing loss) (2021), Hyperlipidemia, Hypertension, Hyperthyroidism (4years), Hypothyroidism, Obesity (10 years), Plantar fasciitis, Scoliosis (2022), Varicella, and Visual impairment (2021).  She   Patient Active Problem List    Diagnosis Date Noted    Tremor 06/25/2024    Ocular migraine 06/25/2024    Preop examination 11/21/2023    Stage 3a chronic kidney disease (HCC) 10/17/2023    Class 2 severe obesity due to excess calories with serious comorbidity and body mass index (BMI) of 38.0 to 38.9 in adult ([facility]) 04/26/2023    Sensorineural hearing loss (SNHL) of both ears 06/13/2022    Pulsatile tinnitus 06/13/2022    Exostosis of left external auditory canal 06/13/2022    MIHAELA (obstructive sleep apnea)     Morbid obesity (HCC) 08/20/2021    Plantar fasciitis 02/06/2020    Acquired hypothyroidism 11/28/2018    Impaired fasting glucose 12/12/2016    Benign essential hypertension 08/09/2016    Pure hypercholesterolemia 08/09/2016     She  has a past surgical history that includes Knee surgery and Tubal ligation (1979).  Her family history includes Alcohol abuse in her brother; Arthritis in her  mother; Bone cancer (age of onset: 69) in her maternal uncle; Coronary artery disease in her father and maternal uncle; Dementia in her mother; Endometrial cancer (age of onset: 50) in her maternal aunt; Hearing loss in her mother; Heart failure in her father; Hypertension in her mother; Lung cancer (age of onset: 60) in her maternal aunt; No Known Problems in her daughter, maternal aunt, maternal grandfather, maternal grandmother, paternal aunt, paternal grandfather, paternal grandmother, and son; Throat cancer (age of onset: 60) in her brother; Thyroid disease in her mother.  She  reports that she quit smoking about 21 years ago. Her smoking use included cigarettes. She started smoking about 55 years ago. She has a 8.5 pack-year smoking history. She has never used smokeless tobacco. She reports current alcohol use. She reports that she does not use drugs.  Current Outpatient Medications   Medication Sig Dispense Refill    amLODIPine (NORVASC) 5 mg tablet Take 1 tablet (5 mg total) by mouth daily 90 tablet 3    BEE POLLEN PO Take by mouth      buPROPion (Wellbutrin XL) 150 mg 24 hr tablet Take 1 tablet (150 mg total) by mouth daily 90 tablet 1    Calcium Carb-Cholecalciferol (CALCIUM + D3 PO) Take by mouth      co-enzyme Q-10 30 MG capsule Take 100 mg by mouth 2 (two) times a day       diphenhydrAMINE (BENADRYL) 25 mg capsule Take 25 mg by mouth if needed Takes as needed      furosemide (LASIX) 20 mg tablet       ibuprofen (MOTRIN) 800 mg tablet if needed Takes as needed.      levothyroxine (Synthroid) 50 mcg tablet Take 1 tablet (50 mcg total) by mouth daily 90 tablet 3    losartan (COZAAR) 100 MG tablet Take 1 tablet (100 mg total) by mouth daily 90 tablet 3    Lutein 20 MG TABS Take by mouth      metoprolol succinate (TOPROL-XL) 100 mg 24 hr tablet Take 1 tablet (100 mg total) by mouth daily 90 tablet 3    Multiple Vitamins-Minerals (PRESERVISION AREDS 2+MULTI VIT PO) 2 tablets in the morning (Patient not  "taking: Reported on 4/24/2024)       No current facility-administered medications for this visit.     Current Outpatient Medications on File Prior to Visit   Medication Sig    amLODIPine (NORVASC) 5 mg tablet Take 1 tablet (5 mg total) by mouth daily    BEE POLLEN PO Take by mouth    Calcium Carb-Cholecalciferol (CALCIUM + D3 PO) Take by mouth    co-enzyme Q-10 30 MG capsule Take 100 mg by mouth 2 (two) times a day     diphenhydrAMINE (BENADRYL) 25 mg capsule Take 25 mg by mouth if needed Takes as needed    furosemide (LASIX) 20 mg tablet     ibuprofen (MOTRIN) 800 mg tablet if needed Takes as needed.    levothyroxine (Synthroid) 50 mcg tablet Take 1 tablet (50 mcg total) by mouth daily    losartan (COZAAR) 100 MG tablet Take 1 tablet (100 mg total) by mouth daily    Lutein 20 MG TABS Take by mouth    metoprolol succinate (TOPROL-XL) 100 mg 24 hr tablet Take 1 tablet (100 mg total) by mouth daily    Multiple Vitamins-Minerals (PRESERVISION AREDS 2+MULTI VIT PO) 2 tablets in the morning (Patient not taking: Reported on 4/24/2024)     No current facility-administered medications on file prior to visit.     She is allergic to diclofenac and atorvastatin..    Review of Systems   Constitutional:  Negative for fever.   Respiratory:  Negative for shortness of breath.    Cardiovascular:  Negative for chest pain and palpitations.   Gastrointestinal:  Negative for abdominal pain, constipation, diarrhea and vomiting.   Genitourinary:  Negative for difficulty urinating.   Musculoskeletal:  Positive for arthralgias. Negative for back pain.   Skin:  Negative for rash.   Neurological:  Negative for headaches.   Psychiatric/Behavioral:  Negative for dysphoric mood. The patient is not nervous/anxious.        Objective:    /81 (BP Location: Left arm, Patient Position: Sitting)   Pulse 66   Temp 98.6 °F (37 °C) (Tympanic)   Resp 16   Ht 5' 6.75\" (1.695 m)   Wt 110 kg (243 lb 9.6 oz)   BMI 38.44 kg/m²      Physical " Exam  Vitals and nursing note reviewed.   Constitutional:       General: She is not in acute distress.     Appearance: She is well-developed. She is obese.   HENT:      Head: Normocephalic and atraumatic.   Eyes:      Conjunctiva/sclera: Conjunctivae normal.   Neck:      Thyroid: No thyromegaly.   Pulmonary:      Effort: Pulmonary effort is normal. No respiratory distress.   Skin:     Findings: No rash (visible).   Neurological:      Mental Status: She is alert and oriented to person, place, and time.   Psychiatric:         Mood and Affect: Mood normal.         Behavior: Behavior normal.

## 2024-08-07 NOTE — ASSESSMENT & PLAN NOTE
On norvasc, metoprolol and losartan  -should improve with weight loss, dietary, and lifestyle changes

## 2024-08-27 ENCOUNTER — OFFICE VISIT (OUTPATIENT)
Dept: PODIATRY | Facility: CLINIC | Age: 69
End: 2024-08-27
Payer: MEDICARE

## 2024-08-27 VITALS
DIASTOLIC BLOOD PRESSURE: 74 MMHG | HEIGHT: 65 IN | WEIGHT: 249 LBS | BODY MASS INDEX: 41.48 KG/M2 | HEART RATE: 56 BPM | SYSTOLIC BLOOD PRESSURE: 121 MMHG

## 2024-08-27 DIAGNOSIS — M25.572 CHRONIC PAIN OF LEFT ANKLE: ICD-10-CM

## 2024-08-27 DIAGNOSIS — M76.822 POSTERIOR TIBIAL TENDONITIS, LEFT: ICD-10-CM

## 2024-08-27 DIAGNOSIS — G89.29 CHRONIC PAIN OF LEFT ANKLE: ICD-10-CM

## 2024-08-27 DIAGNOSIS — M79.672 LEFT FOOT PAIN: Primary | ICD-10-CM

## 2024-08-27 DIAGNOSIS — M19.072 DJD (DEGENERATIVE JOINT DISEASE), ANKLE AND FOOT, LEFT: ICD-10-CM

## 2024-08-27 PROCEDURE — 99203 OFFICE O/P NEW LOW 30 MIN: CPT | Performed by: PODIATRIST

## 2024-08-27 NOTE — PATIENT INSTRUCTIONS
Patient Education     Ankle Strengthening Exercises   About this topic   The ankle is a commonly injured joint in your body. It supports the full weight of your body. Your chance of hurting your ankle is less if the muscles in your ankle are strong. Doing exercises for the ankle can help with balance and keep some injuries from happening.  General   Before starting with a program, ask your doctor if you are healthy enough to do these exercises. Your doctor may have you work with a  or physical therapist to make a safe exercise program to meet your needs.  Strengthening Exercises   Strengthening exercises keep your muscles firm and strong. Stand or sit up tall on a chair without arms for your exercises. Start by repeating each exercise 2 to 3 times. Work up to doing each exercise 10 times. Try to do the exercises 2 to 3 times each day. Do all exercises slowly.  Ankle pumps seated ? Move each foot up and down like you are pressing down and lifting up on a gas pedal.  Ankle alphabet seated ? Act like you are writing the alphabet with each foot. Do not move your whole leg to do this, just move your ankle. Do all of the alphabet. Take short rests if you get tired.  Exercise band exercises ? Sit on the floor with your legs out in front of you for these. You can also sit in a chair.  Pulling foot up ? You will have to have someone hold the band for this exercise. Otherwise, you can tie a large knot into each end of the band and close the ends of the band in the bottom of a door. Have the band around the top of your foot. Pull your foot up towards your head. Bring your foot back to the starting position. Switch feet and repeat.  Pushing foot down ? Loop the band around the ball of the foot. Push down as if you were pressing on a gas pedal. Bring the foot back to the starting position. Switch feet and repeat.  Pulling foot in ? Loop the band around the ball of one foot. Cross your other leg over the leg with the band  around it. Put the top foot on top of the band as if you were stepping on it. Pull the bottom foot in. Bring the foot back to the starting position. Switch feet and repeat.  Pulling foot out ? Loop the band around the ball of one foot. Step on the band with your other foot and straighten the leg. Pull the bottom foot out. Bring the foot back to the starting position. Switch feet and repeat.  Heel raises ? Hold on to a counter. Lift your heels up and rise up onto your toes. Lower yourself back down.  Toe lifts ? Lift your toes up and put your weight onto your heels. Hold 3 to 5 seconds.  Single leg balance ? Lift one leg up and balance on the other leg for 10 to 30 seconds. Repeat 5 times. To make this exercise harder, try putting a thin pillow under your foot.               What will the results be?   Ankle is stronger and more stable  More range of motion  Better balance  Less chance of falling  Less chance of hurting your ankle  Easier to walk and do other activities  Helpful tips   Stay active and work out to keep your muscles strong and flexible.  Keep a healthy weight so there is not extra stress on your joints. Eat a healthy diet to keep your muscles healthy.  Be sure you do not hold your breath when exercising. This can raise your blood pressure. If you tend to hold your breath, try counting out loud when exercising. If any exercise bothers you, stop right away.  Try walking or cycling at an easy pace for a few minutes to warm up your muscles. Do this again after exercising.  If you have trouble with balance, be careful with the standing exercises. You may want to have someone with you when you are doing these. You may want to hold on to something stable while doing the exercises. If you don't feel safe, don't do them.  Exercise may be slightly uncomfortable, but you should not have sharp pains. If you do get sharp pains, stop what you are doing. If the sharp pains continue, call your doctor.  Wear shoes with  good support. Do not go barefoot.  Use proper footwear when playing sports or exercising. This may include athletic supports, shoes, or shoe inserts that keep your foot stable.  Last Reviewed Date   2021-07-26  Consumer Information Use and Disclaimer   This generalized information is a limited summary of diagnosis, treatment, and/or medication information. It is not meant to be comprehensive and should be used as a tool to help the user understand and/or assess potential diagnostic and treatment options. It does NOT include all information about conditions, treatments, medications, side effects, or risks that may apply to a specific patient. It is not intended to be medical advice or a substitute for the medical advice, diagnosis, or treatment of a health care provider based on the health care provider's examination and assessment of a patient’s specific and unique circumstances. Patients must speak with a health care provider for complete information about their health, medical questions, and treatment options, including any risks or benefits regarding use of medications. This information does not endorse any treatments or medications as safe, effective, or approved for treating a specific patient. [employer] and its affiliates disclaim any warranty or liability relating to this information or the use thereof. The use of this information is governed by the Terms of Use, available at https://www.wolterskluwer.com/en/know/clinical-effectiveness-terms   Copyright   Copyright © 2024 [employer] and its affiliates and/or licensors. All rights reserved.

## 2024-08-27 NOTE — PROGRESS NOTES
Name: Rhianna Gerardo      : 1955      MRN: [id number]  Encounter Provider: Josafat Abraham DPM  Encounter Date: 2024   Encounter department: [facility]    Assessment & Plan     1. Left foot pain  -     XR foot 3+ vw left  -     XR ankle 3+ vw left  -     Ambulatory referral to Physical Therapy; Future  2. Chronic pain of left ankle  -     XR ankle 3+ vw left  -     Ambulatory referral to Physical Therapy; Future  3. DJD (degenerative joint disease), ankle and foot, left  -     Ambulatory referral to Physical Therapy; Future  4. Posterior tibial tendonitis, left      My personal interpretation today of the x-rays that were taken show X-rays evaluated today show some osteoarthritic changes noted to level of first metatarsophalangeal joint in addition to posterior and plantar calcaneal spurring.  There is osteoarthritic changes noted within the midfoot as well.  No acute fractures or dislocations noted currently.      Discussed with patient custom orthotics.  Will also give her formal exercises for her ankle.    I did review the ankle x-rays does show some osteoarthritic changes noted at the level of medial and lateral aspects of the ankle that are mild.  No acute fractures or dislocations noted currently.    I personally discussed with patient at the visit today:     The diagnosis of this encounter  2.   Possible etiologies of the diagnosis  3.   Treatment options including advantages and disadvantages of treatment with potential risks and complications associated with these treatments  4.   Prevention strategies and ways to decrease pain     I answered all of the patients questions.      Return in about 3 months (around 2024).    Subjective     Location: left heel, ankle midfoot  Type of pain: sharp pain, end of day is worst.    Duration and Onset: April sudden onset  Aggravating factors: just being on the foot has been painful, swelling in the left leg. Is having  "procedure done by vascular surgery for left lower leg.   Treatment so far: has tried different shoegear , exercises         Constitutional:  Negative for chills and fever.   Respiratory:  Negative for chest tightness and shortness of breath.    Gastrointestinal:  Negative for nausea and vomiting.     Current Outpatient Medications on File Prior to Visit   Medication Sig   • amLODIPine (NORVASC) 5 mg tablet Take 1 tablet (5 mg total) by mouth daily   • BEE POLLEN PO Take by mouth   • buPROPion (Wellbutrin XL) 150 mg 24 hr tablet Take 1 tablet (150 mg total) by mouth daily   • Calcium Carb-Cholecalciferol (CALCIUM + D3 PO) Take by mouth   • co-enzyme Q-10 30 MG capsule Take 100 mg by mouth 2 (two) times a day    • diphenhydrAMINE (BENADRYL) 25 mg capsule Take 25 mg by mouth if needed Takes as needed   • furosemide (LASIX) 20 mg tablet    • ibuprofen (MOTRIN) 800 mg tablet if needed Takes as needed.   • levothyroxine (Synthroid) 50 mcg tablet Take 1 tablet (50 mcg total) by mouth daily   • losartan (COZAAR) 100 MG tablet Take 1 tablet (100 mg total) by mouth daily   • Lutein 20 MG TABS Take by mouth   • metoprolol succinate (TOPROL-XL) 100 mg 24 hr tablet Take 1 tablet (100 mg total) by mouth daily   • Multiple Vitamins-Minerals (PRESERVISION AREDS 2+MULTI VIT PO) 2 tablets in the morning (Patient not taking: Reported on 4/24/2024)       Objective     /74   Pulse 56   Ht 5' 5\" (1.651 m) Comment: verbal  Wt 113 kg (249 lb)   BMI 41.44 kg/m²     Vascular: Intact pedal pulses bilateral DP and PT.  Neurological: Gross protective sensation intact bilateral  Musculoskeletal: Muscle strength bilateral intact with dorsiflexion, inversion, eversion and plantarflexion.  There is tenderness on palpation to the medial aspect of the ankle as well as the lateral aspect of the ankle.  There is tenderness on palpation in the course of the posterior tibial tendon.  There is no significant discomfort noted palpation of the " level of first metatarsophalangeal joint.  There is some mild discomfort noted on range of motion of the ankle.  There is some swelling this noted to the lower extremity.  No bruising or ecchymosis noted currently.  No tenderness with tib-fib squeeze.  No other areas of discomfort or tenderness on examination.  Dermatological: No open lesions or ulcerations noted bilateral.

## 2024-08-27 NOTE — PROGRESS NOTES
"Weight Management Medical Nutrition Assessment  Rhianna is here with her  for 1 of 3 bundle. Current wt: 244.5 lbs. She has maintained her weight x 2  months with overall loss of  23.7 lbs since May 2022. Currently on wellbutrin. Keeping carbs <40 gm and 400 calories when using tovata meals. Reported some weight gain while at the beach but this has resolved. Upcoming camping trip but no concerns regarding this. Support and encouragement provided.  She will f/u in 2 months.    Patient seen by Medical Provider in past 6 months:  yes  Requested to schedule appointment with Medical Provider: No    Anthropometric Measurements  Start Weight (#): 268.2 lbs 5/4/2022    Current Weight (#): 244.5#  TBW % Change from start weight: 8.8%  Ideal Body Weight (#): 125 65\" (159.3 lbs BMI 25)  Goal Weight (#):  lbs     Weight Loss History  Previous weight loss attempts: Commercial Programs (Weight Watchers, GIVVER, etc.)  Counseling with  MD  Exercise  Meal Replacements (Medifast, Slim Fast, etc.)  Self Created Diets (Portion Control, Healthy Food Choices, etc.)    Food and Nutrition Related History  Wake up: 8-9   Bed Time: 11    Food Recall  Breakfast:  10:00  replacement   Snack:    Lunch: 3:00:  replacement   Snack: carrots + 1 Tbsp peanut butter OR almonds OR hard boiled egg  Dinner: 6-7:00  replacement OR taco pizza (thin crust, vegetables, chicken, beef) + apple dumpling with vanilla ice cream (did not have both foods on the same day)  Snack:  bar     Beverages: water  Volume of beverage intake:  68 oz +    Weekends: Same  Cravings: no specific identified   Trouble area of day: not identified     Frequency of Eating out: none currently  Food restrictions:  carbs <50 gm while on VLCD  Cooking: self   Food Shopping: self    Physical Activity Intake  Activity:  yardwork, bowling  Frequency: varies      Physical limitations/barriers to exercise: n/a    Estimated Needs   Energy  [employer] Needs: BMR " :  1655   1-2# loss weekly sedentary: 986 - 1486        1-2# loss weekly lightly active:  9826-1648  Maintenance calories for sedentary activity level:  1968  Protein: 72-90 gm      (1.2-1.5g/kg IBW)  Fluid: 70 oz    (35mL/kg IBW)    Nutrition Diagnosis  Yes;    Overweight/obesity  related to Excess energy intake as evidenced by  BMI more than normative standard for age and sex (obesity-grade III 40+)       Nutrition Intervention    Nutrition Prescription  Calories: 1,100 calories, <75g net CHO  Protein: 85-120g  Fluid: 80 oz    Meal Plan (Adrien/Pro/Carb)  Breakfast: 200, 27, 10  Snack:  Lunch: <500, 30-40, 10-30 (encouraged 300-400kcal)  Snack:  Dinner: 200, 27, 10  Snack: 160, 15, 13  If meal 300-400kcal can add in additional snack of 100-150kcal.    Nutrition Education:    Low carb meal  Net CHO     Nutrition Counseling:  Strategies: as above      Monitoring and Evaluation:  Evaluation criteria:  Energy Intake  Meet protein needs  Maintain adequate hydration  Monitor weekly weight  Food logging/measuring  Physical activity   Meal planning    Barriers to learning:none  Readiness to change: Action:  (Changing behavior)    Comprehension: very good  Expected Compliance: good

## 2024-08-28 ENCOUNTER — PROCEDURE VISIT (OUTPATIENT)
Age: 69
End: 2024-08-28
Payer: MEDICARE

## 2024-08-28 ENCOUNTER — PROBLEM (OUTPATIENT)
Dept: URBAN - METROPOLITAN AREA CLINIC 6 | Facility: CLINIC | Age: 69
End: 2024-08-28

## 2024-08-28 VITALS — BODY MASS INDEX: 41.48 KG/M2 | WEIGHT: 249 LBS | HEIGHT: 65 IN

## 2024-08-28 DIAGNOSIS — H11.32: ICD-10-CM

## 2024-08-28 DIAGNOSIS — M99.02 SEGMENTAL DYSFUNCTION OF THORACIC REGION: ICD-10-CM

## 2024-08-28 DIAGNOSIS — M99.01 SEGMENTAL DYSFUNCTION OF CERVICAL REGION: ICD-10-CM

## 2024-08-28 DIAGNOSIS — M54.2 NECK PAIN: ICD-10-CM

## 2024-08-28 DIAGNOSIS — M54.50 ACUTE BILATERAL LOW BACK PAIN WITHOUT SCIATICA: ICD-10-CM

## 2024-08-28 DIAGNOSIS — M54.16 LUMBAR RADICULOPATHY: ICD-10-CM

## 2024-08-28 DIAGNOSIS — M54.6 ACUTE BILATERAL THORACIC BACK PAIN: ICD-10-CM

## 2024-08-28 DIAGNOSIS — M99.05 SEGMENTAL DYSFUNCTION OF PELVIC REGION: Primary | ICD-10-CM

## 2024-08-28 DIAGNOSIS — M99.03 SEGMENTAL DYSFUNCTION OF LUMBAR REGION: ICD-10-CM

## 2024-08-28 DIAGNOSIS — M79.18 MYOFASCIAL PAIN: ICD-10-CM

## 2024-08-28 DIAGNOSIS — M99.04 SEGMENTAL DYSFUNCTION OF SACRAL REGION: ICD-10-CM

## 2024-08-28 PROCEDURE — 98942 CHIROPRACTIC MANJ 5 REGIONS: CPT | Performed by: CHIROPRACTOR

## 2024-08-28 PROCEDURE — 92012 INTRM OPH EXAM EST PATIENT: CPT

## 2024-08-28 ASSESSMENT — VISUAL ACUITY
OS_SC: 20/40+2
OU_SC: J1
OD_PH: 20/25-1
OS_PH: 20/30
OD_SC: 20/50-1

## 2024-08-28 ASSESSMENT — TONOMETRY
OD_IOP_MMHG: 12
OS_IOP_MMHG: 13

## 2024-08-28 ASSESSMENT — KERATOMETRY
OD_K2POWER_DIOPTERS: 43.75
OS_K2POWER_DIOPTERS: 43.75
OS_K1POWER_DIOPTERS: 43.25
OS_AXISANGLE2_DEGREES: 7
OD_K1POWER_DIOPTERS: 43.00
OD_AXISANGLE_DEGREES: 122
OD_AXISANGLE2_DEGREES: 32
OS_AXISANGLE_DEGREES: 97

## 2024-08-28 NOTE — PROGRESS NOTES
Date of first visit: 2/21/2023      HPI:  Lizzie returns to the office for treatment today of neck pain upper back pain lower back and left hip pain. No other medical history changes.  Did recently sprained her ankle has had some gait changes secondary to this she does have some arthritis on evaluation.    VPAS  4      The following portions of the patient's history were reviewed and updated as appropriate: allergies, current medications, past family history, past medical history, past social history, past surgical history, and problem list.    Review of Systems    Physical Exam:  Lizzie is in some minor distress today she moves slowly about the exam room her transfer is slowed range of motion reduced secondary to muscle tightness.    Pelvic obliquity noted elevated left versus right innominate misalignment of the left innominate on sacrum biomechanically joint dysfunction present left SI and L5-S1 motion unit active triggers in the left gluteus medius and TFL musculature.    Parathoracic hypertonicity noted with a T4-T5 segmental dysfunction.    Cervical range of motion reduced in left lateral bending right rotation joint dysfunction C5-C6 C1-C2    Assessment:   Diagnosis ICD-10-CM Associated Orders   1. Segmental dysfunction of pelvic region  M99.05       2. Segmental dysfunction of lumbar region  M99.03       3. Segmental dysfunction of sacral region  M99.04       4. Acute bilateral low back pain without sciatica  M54.50       5. Segmental dysfunction of thoracic region  M99.02       6. Acute bilateral thoracic back pain  M54.6       7. Neck pain  M54.2       8. Segmental dysfunction of cervical region  M99.01       9. Myofascial pain  M79.18       10. Lumbar radiculopathy  M54.16             Treatment: 24525  Manipulation to the left innominate, sacrum, L5 via Walker drop maneuver.  Manipulation T4 producing good joint release well-tolerated.  Manipulation C5 C1 well-tolerated.    Discussion:  Continue her home  stretching program icing to the left low back and hip we will see her back for follow-up.  She will continue icing and stretching.

## 2024-09-04 ENCOUNTER — CLINICAL SUPPORT (OUTPATIENT)
Dept: BARIATRICS | Facility: CLINIC | Age: 69
End: 2024-09-04

## 2024-09-04 VITALS — WEIGHT: 244.5 LBS | BODY MASS INDEX: 38.37 KG/M2 | HEIGHT: 67 IN

## 2024-09-04 DIAGNOSIS — R63.5 ABNORMAL WEIGHT GAIN: Primary | ICD-10-CM

## 2024-09-04 PROCEDURE — RECHECK

## 2024-09-04 PROCEDURE — DB3PK

## 2024-09-16 ENCOUNTER — EVALUATION (OUTPATIENT)
Dept: PHYSICAL THERAPY | Age: 69
End: 2024-09-16
Payer: MEDICARE

## 2024-09-16 DIAGNOSIS — G89.29 CHRONIC PAIN OF LEFT ANKLE: ICD-10-CM

## 2024-09-16 DIAGNOSIS — M25.572 CHRONIC PAIN OF LEFT ANKLE: ICD-10-CM

## 2024-09-16 DIAGNOSIS — M19.072 DJD (DEGENERATIVE JOINT DISEASE), ANKLE AND FOOT, LEFT: ICD-10-CM

## 2024-09-16 DIAGNOSIS — M79.672 LEFT FOOT PAIN: Primary | ICD-10-CM

## 2024-09-16 PROCEDURE — 97760 ORTHOTIC MGMT&TRAING 1ST ENC: CPT | Performed by: PHYSICAL THERAPIST

## 2024-09-17 NOTE — PROGRESS NOTES
Orthotic Evaluation    Today's date: 24  Patient name: Rhianna Gerardo  : 1955  MRN: [id number]  Referring provider: Josafat Abraham,*  Dx:   Encounter Diagnosis     ICD-10-CM    1. Left foot pain  M79.672 Ambulatory referral to Physical Therapy      2. Chronic pain of left ankle  M25.572 Ambulatory referral to Physical Therapy    G89.29       3. DJD (degenerative joint disease), ankle and foot, left  M19.072 Ambulatory referral to Physical Therapy                      Subjective:    Rhianna presents today for orthotic evaluation. she complains of pain, decreased strength, decreased ROM, decreased joint mobility, and ambulatory dysfunction with functional activities including ambulation. The patient plans to use her orthotic for walking and standing. The orthotic will be placed in a standard, size 10.    Objective:    Age: 69 y.o.  Weight: 244    Foot Posture Index Score    Right Foot  Talar dome - +1  Malleolar curve - +1   Calcaneal eversion - +1  Talonavicular bulge - +1  Medial longitudinal arch - +1  Too many toes - 0  Total Score R = +5    Left Foot  Talar dome - +1  Malleolar curve - +1   Calcaneal eversion - +2  Talonavicular bulge - +1  Medial longitudinal arch - +1  Too many toes - 0  Total Score L = +6    Reference Values  Normal =    0 to +5  Pronated =  +6 to +9 Highly Pronated =  10+  Supinated =   -1 to -4 Highly Supinated = -5 to -12    Objective    Assessment:    Patient requires custom foot orthosis with medial arch support to correct altered gait mechanics. Patient is not currently controlled by her current shoe wear. Patient was educated on the design of the custom orthotic and it's ability to offload her current pathology. Patient was also educated on potential shoe wear changes with device, break-in period, and skin checks to avoid potential skin break down.   32  Orthotic goals:  1) Patient will have custom foot orthoses fitted to her shoe.   2) Patient will be compliant  with break-in period of custom foot orthoses as prescribed by PT.   3) Patient will be compliant with custom foot orthoses use as prescribed by PT.     Plan:    Planned therapy interventions: orthotic fitting/training  Duration in visits: 2

## 2024-09-25 ENCOUNTER — PROCEDURE VISIT (OUTPATIENT)
Age: 69
End: 2024-09-25
Payer: MEDICARE

## 2024-09-25 VITALS
HEIGHT: 65 IN | DIASTOLIC BLOOD PRESSURE: 87 MMHG | WEIGHT: 244 LBS | BODY MASS INDEX: 40.65 KG/M2 | SYSTOLIC BLOOD PRESSURE: 138 MMHG | HEART RATE: 85 BPM

## 2024-09-25 DIAGNOSIS — M99.04 SEGMENTAL DYSFUNCTION OF SACRAL REGION: ICD-10-CM

## 2024-09-25 DIAGNOSIS — M99.05 SEGMENTAL DYSFUNCTION OF PELVIC REGION: Primary | ICD-10-CM

## 2024-09-25 DIAGNOSIS — M54.50 ACUTE BILATERAL LOW BACK PAIN WITHOUT SCIATICA: ICD-10-CM

## 2024-09-25 DIAGNOSIS — M99.02 SEGMENTAL DYSFUNCTION OF THORACIC REGION: ICD-10-CM

## 2024-09-25 DIAGNOSIS — M54.16 LUMBAR RADICULOPATHY: ICD-10-CM

## 2024-09-25 DIAGNOSIS — M79.18 MYOFASCIAL PAIN: ICD-10-CM

## 2024-09-25 DIAGNOSIS — M99.03 SEGMENTAL DYSFUNCTION OF LUMBAR REGION: ICD-10-CM

## 2024-09-25 DIAGNOSIS — M54.6 ACUTE BILATERAL THORACIC BACK PAIN: ICD-10-CM

## 2024-09-25 DIAGNOSIS — M54.2 NECK PAIN: ICD-10-CM

## 2024-09-25 DIAGNOSIS — M99.01 SEGMENTAL DYSFUNCTION OF CERVICAL REGION: ICD-10-CM

## 2024-09-25 PROCEDURE — 98942 CHIROPRACTIC MANJ 5 REGIONS: CPT | Performed by: CHIROPRACTOR

## 2024-09-25 NOTE — PROGRESS NOTES
Date of first visit: 2/21/2023      HPI:  Lizzie returns to the office for treatment today of neck pain upper back pain lower back and left hip pain. No other medical history changes.      VPAS  4      The following portions of the patient's history were reviewed and updated as appropriate: allergies, current medications, past family history, past medical history, past social history, past surgical history, and problem list.    Review of Systems    Physical Exam:  Lizzie is in some minor distress today she moves slowly about the exam room her transfer is slowed range of motion reduced secondary to muscle tightness.    Pelvic obliquity noted elevated left versus right innominate misalignment of the left innominate on sacrum biomechanically joint dysfunction present left SI and L5-S1 motion unit active triggers in the left gluteus medius and TFL musculature.    Parathoracic hypertonicity noted with a T4-T5 segmental dysfunction.    Cervical range of motion reduced in left lateral bending right rotation joint dysfunction C5-C6 C1-C2    Assessment:   Diagnosis ICD-10-CM Associated Orders   1. Segmental dysfunction of pelvic region  M99.05       2. Segmental dysfunction of lumbar region  M99.03       3. Segmental dysfunction of sacral region  M99.04       4. Acute bilateral low back pain without sciatica  M54.50       5. Segmental dysfunction of thoracic region  M99.02       6. Acute bilateral thoracic back pain  M54.6       7. Neck pain  M54.2       8. Segmental dysfunction of cervical region  M99.01       9. Myofascial pain  M79.18       10. Lumbar radiculopathy  M54.16             Treatment: 92371  Manipulation to the left innominate, sacrum, L5 via Walker drop maneuver.  Manipulation T4 producing good joint release well-tolerated.  Manipulation C5 C1 well-tolerated.    Discussion:  Continue her home stretching program icing to the left low back and hip we will see her back for follow-up.  She will continue icing and  stretching.

## 2024-10-11 DIAGNOSIS — I10 BENIGN ESSENTIAL HYPERTENSION: ICD-10-CM

## 2024-10-11 DIAGNOSIS — E03.9 ACQUIRED HYPOTHYROIDISM: ICD-10-CM

## 2024-10-11 RX ORDER — LOSARTAN POTASSIUM 100 MG/1
100 TABLET ORAL DAILY
Qty: 90 TABLET | Refills: 1 | Status: SHIPPED | OUTPATIENT
Start: 2024-10-11

## 2024-10-11 RX ORDER — LEVOTHYROXINE SODIUM 50 UG/1
50 TABLET ORAL DAILY
Qty: 90 TABLET | Refills: 1 | Status: SHIPPED | OUTPATIENT
Start: 2024-10-11

## 2024-10-15 ENCOUNTER — RA CDI HCC (OUTPATIENT)
Dept: OTHER | Facility: HOSPITAL | Age: 69
End: 2024-10-15

## 2024-10-15 PROBLEM — I12.9 HYPERTENSIVE KIDNEY DISEASE WITH CHRONIC KIDNEY DISEASE STAGE III (HCC): Status: ACTIVE | Noted: 2024-10-15

## 2024-10-15 PROBLEM — N18.30 HYPERTENSIVE KIDNEY DISEASE WITH CHRONIC KIDNEY DISEASE STAGE III (HCC): Status: ACTIVE | Noted: 2024-10-15

## 2024-10-16 ENCOUNTER — TELEPHONE (OUTPATIENT)
Age: 69
End: 2024-10-16

## 2024-10-16 ENCOUNTER — OFFICE VISIT (OUTPATIENT)
Dept: PHYSICAL THERAPY | Age: 69
End: 2024-10-16
Payer: COMMERCIAL

## 2024-10-16 DIAGNOSIS — M79.672 LEFT FOOT PAIN: Primary | ICD-10-CM

## 2024-10-16 NOTE — TELEPHONE ENCOUNTER
Patient called, requesting to speak with Peggy. Patient had appointment with Peggy today she stated her  purchase some products, patient notice she was missing Chocolate Salted Caramel Pudding Shakes.

## 2024-10-17 ENCOUNTER — CONSULT (OUTPATIENT)
Dept: NEUROLOGY | Facility: CLINIC | Age: 69
End: 2024-10-17
Payer: MEDICARE

## 2024-10-17 VITALS
BODY MASS INDEX: 41.93 KG/M2 | WEIGHT: 252 LBS | TEMPERATURE: 97.3 F | DIASTOLIC BLOOD PRESSURE: 86 MMHG | SYSTOLIC BLOOD PRESSURE: 140 MMHG | HEART RATE: 59 BPM | OXYGEN SATURATION: 97 %

## 2024-10-17 DIAGNOSIS — R25.1 TREMOR: ICD-10-CM

## 2024-10-17 DIAGNOSIS — G43.109 OCULAR MIGRAINE: ICD-10-CM

## 2024-10-17 DIAGNOSIS — G43.109 ACEPHALGIC MIGRAINE: Primary | ICD-10-CM

## 2024-10-17 PROCEDURE — 99204 OFFICE O/P NEW MOD 45 MIN: CPT | Performed by: PSYCHIATRY & NEUROLOGY

## 2024-10-17 RX ORDER — METHOCARBAMOL 500 MG/1
500 TABLET, FILM COATED ORAL 4 TIMES DAILY PRN
COMMUNITY
Start: 2024-10-15 | End: 2024-10-26

## 2024-10-17 NOTE — ASSESSMENT & PLAN NOTE
Patient reports having an occasional tremor of the left upper extremity that is most prominent when she picks up a heavy object or when she extends her arm such as when she is grabbing the steering wheel.  This occurs at least twice a month.  Last for 2 minutes.  She denies having a tremor at rest.  She endorses having neck pain.  Patient reports having weakness of the arm when she uses it for a long time.  Patient follows with chiropractor who does high speed neck manipulation.  Patient has been on metoprolol for at least 10 years.  She has been on Wellbutrin over the past 3 to 4 months however of tremor was present prior to that.  Exam normal without any evidence of tremor.    At this time, it is possible that patient may have an essential tremor that is being masked by her use of metoprolol.  I will do an MRI of the brain to evaluate for any structural causes of tremor. Patient advised on keeping a log to try and identify a pattern. I discussed with her red flags. Will continue to monitor. Follow up in 4 months   Orders:    Ambulatory referral to Neurology    MRI brain without contrast; Future

## 2024-10-17 NOTE — ASSESSMENT & PLAN NOTE
Patient with visual disturbances without headaches throughout the first half of this year.  She reports seeing jagged lines mostly out of her left eye but can happen from both eyes.  Symptoms usually occurred when she woke up in the morning.  She denies any associated pain.  These stopped affecting patient in July.  Patient reports that she is compliant with her CPAP.  She follows up with sleep medicine every year around January or February.  Patient was seen by ophthalmology and cleared from their standpoint.    At this point, it is possible that patient may be having acephalgic migraines.  I would like to do an MRI of her head given that she has multiple vascular risk factors that are not under control including an LDL of 150.  Blood pressure today was 140/80.  I want to make sure that symptoms are not secondary to stroke.  MRI brain without contrast ordered  Orders:    Ambulatory referral to Neurology    MRI brain without contrast; Future

## 2024-10-17 NOTE — PROGRESS NOTES
Ambulatory Visit  Name: Rhianna Gerardo      : 1955      MRN: [id number]  Encounter Provider: Irish Barba MD  Encounter Date: 10/17/2024   Encounter department: [facility] [place]    Assessment & Plan  Tremor  Patient reports having an occasional tremor of the left upper extremity that is most prominent when she picks up a heavy object or when she extends her arm such as when she is grabbing the steering wheel.  This occurs at least twice a month.  Last for 2 minutes.  She denies having a tremor at rest.  She endorses having neck pain.  Patient reports having weakness of the arm when she uses it for a long time.  Patient follows with chiropractor who does high speed neck manipulation.  Patient has been on metoprolol for at least 10 years.  She has been on Wellbutrin over the past 3 to 4 months however of tremor was present prior to that.  Exam normal without any evidence of tremor.    At this time, it is possible that patient may have an essential tremor that is being masked by her use of metoprolol.  I will do an MRI of the brain to evaluate for any structural causes of tremor. Patient advised on keeping a log to try and identify a pattern. I discussed with her red flags. Will continue to monitor. Follow up in 4 months   Orders:    Ambulatory referral to Neurology    MRI brain without contrast; Future    Ocular migraine  Patient with visual disturbances without headaches throughout the first half of this year.  She reports seeing jagged lines mostly out of her left eye but can happen from both eyes.  Symptoms usually occurred when she woke up in the morning.  She denies any associated pain.  These stopped affecting patient in July.  Patient reports that she is compliant with her CPAP.  She follows up with sleep medicine every year around January or February.  Patient was seen by ophthalmology and cleared from their standpoint.    At this point, it is possible that  patient may be having acephalgic migraines.  I would like to do an MRI of her head given that she has multiple vascular risk factors that are not under control including an LDL of 150.  Blood pressure today was 140/80.  I want to make sure that symptoms are not secondary to stroke.  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     Tremors:    Occasional left handed tremors.  when she picks up something or when she holds a plate or when she grabs the steering wheel. At least twice a month. Lasts for about two minutes. She sets objects down when it starts. No tremor at rest. Arm gets weak after she uses it for a period of time. Can't hold something up real long. Tremors ongoing for about 6 months to a year. No inciting event. Has neck pain. Goes to a chiropractor for neck and back. Gets high speed neck manipulation. MIHAELA CPAP compliant. Has had the same setting for 3 years. Goes to sleep doctor on a yearly basis. Has lost 10 pounds since a year ago. No family history of tremors. Patient can voluntarily stop it. Has been on the metoprolol for at least 10 years. Wel butrin 3-4 months    Ocular migraines:  Started at the beginning of the year through July was having them once a month. Can last from half hour to 45 minutes. Sees jagged lines mostly on the L side but can happen in both eyes. Last headache was in July. Usually occurred when she woke up in the morning. Mid morning, afternoon or early evening. No pain associated with them. Cleared by  ophthalmologist        HBA1C 5.8   /86      Review of Systems    Review of Systems   Constitutional:  Negative for appetite change, fatigue and fever.   HENT: Negative.  Negative for hearing loss, tinnitus, trouble swallowing and voice change.    Eyes: Negative.  Negative for photophobia, pain and visual disturbance.   Respiratory: Negative.  Negative for shortness of breath.    Cardiovascular: Negative.  Negative for palpitations.   Gastrointestinal: Negative.  Negative for nausea and vomiting.   Endocrine: Negative.  Negative for cold intolerance.   Genitourinary: Negative.  Negative for dysuria, frequency and urgency.   Musculoskeletal:  Negative for back pain, gait problem, myalgias, neck pain and neck stiffness.   Skin: Negative.  Negative for rash.   Allergic/Immunologic: Negative.    Neurological:  Positive for tremors (left hand mostly), weakness and headaches (occular migraines). Negative for dizziness, seizures, syncope, facial asymmetry, speech difficulty, light-headedness and numbness.   Hematological: Negative.  Does not bruise/bleed easily.   Psychiatric/Behavioral: Negative.  Negative for confusion, hallucinations and sleep disturbance.    All other systems reviewed and are negative.     I have personally reviewed the MA's review of systems and made changes as necessary.    Past Medical History   Past Medical History:   Diagnosis Date    Anemia A child on    Arthritis     Disease of thyroid gland 2 years ago    medication    Environmental allergies     Fibroid     HL (hearing loss)     Hyperlipidemia     Hypertension     Hyperthyroidism 4years    Hypothyroidism     Obesity 10 years    In my fifties    Plantar fasciitis     Scoliosis     Varicella     Visual impairment      Past Surgical History:   Procedure Laterality Date    KNEE SURGERY      TUBAL LIGATION       Family History   Problem Relation Age of Onset    Hypertension Mother     Dementia Mother              Arthritis Mother             Thyroid disease Mother             Hearing loss Mother             Coronary artery disease Father     Heart failure Father             Throat cancer Brother 60    Endometrial cancer Maternal Aunt 50    Lung cancer Maternal Aunt 60    No Known Problems Maternal Aunt     Bone cancer Maternal Uncle 69    Coronary artery disease Maternal Uncle     No Known Problems Paternal Aunt     No Known Problems Maternal Grandmother     No Known Problems Maternal Grandfather     No Known Problems Paternal Grandmother     No Known Problems Paternal Grandfather     No Known Problems Daughter     No Known Problems Son     Alcohol abuse Brother              Current Outpatient Medications on File Prior to Visit   Medication Sig Dispense Refill    amLODIPine (NORVASC) 5 mg tablet Take 1 tablet (5 mg total) by mouth daily 90 tablet 3    BEE POLLEN PO Take by mouth      buPROPion (Wellbutrin XL) 150 mg 24 hr tablet Take 1 tablet (150 mg total) by mouth daily 90 tablet 1    Calcium Carb-Cholecalciferol (CALCIUM + D3 PO) Take by mouth      co-enzyme Q-10 30 MG capsule Take 100 mg by mouth 2 (two) times a day       diphenhydrAMINE (BENADRYL) 25 mg capsule Take 25 mg by mouth if needed Takes as needed      furosemide (LASIX) 20 mg tablet       ibuprofen (MOTRIN) 800 mg tablet if needed Takes as needed.      levothyroxine 50 mcg tablet TAKE 1 TABLET DAILY 90 tablet 1    losartan (COZAAR) 100 MG tablet TAKE 1 TABLET DAILY 90 tablet 1    Lutein 20 MG TABS Take by mouth      metoprolol succinate (TOPROL-XL) 100 mg 24 hr tablet Take 1 tablet (100 mg total) by mouth daily 90 tablet 3     No current facility-administered medications on file prior to visit.     Allergies   Allergen Reactions    Diclofenac Swelling    Atorvastatin Other (See Comments)     Muscle ache      Current Outpatient Medications on File Prior to Visit   Medication Sig Dispense Refill    amLODIPine (NORVASC) 5  mg tablet Take 1 tablet (5 mg total) by mouth daily 90 tablet 3    BEE POLLEN PO Take by mouth      buPROPion (Wellbutrin XL) 150 mg 24 hr tablet Take 1 tablet (150 mg total) by mouth daily 90 tablet 1    Calcium Carb-Cholecalciferol (CALCIUM + D3 PO) Take by mouth      co-enzyme Q-10 30 MG capsule Take 100 mg by mouth 2 (two) times a day       diphenhydrAMINE (BENADRYL) 25 mg capsule Take 25 mg by mouth if needed Takes as needed      furosemide (LASIX) 20 mg tablet       ibuprofen (MOTRIN) 800 mg tablet if needed Takes as needed.      levothyroxine 50 mcg tablet TAKE 1 TABLET DAILY 90 tablet 1    losartan (COZAAR) 100 MG tablet TAKE 1 TABLET DAILY 90 tablet 1    Lutein 20 MG TABS Take by mouth      metoprolol succinate (TOPROL-XL) 100 mg 24 hr tablet Take 1 tablet (100 mg total) by mouth daily 90 tablet 3     No current facility-administered medications on file prior to visit.        Objective     /86 (BP Location: Left arm, Patient Position: Sitting, Cuff Size: Adult)   Pulse 59   Temp (!) 97.3 °F (36.3 °C) (Temporal)   Wt 114 kg (252 lb)   SpO2 97%   BMI 41.93 kg/m²     Physical Exam  Eyes:      Extraocular Movements: EOM normal.      Pupils: Pupils are equal, round, and reactive to light.   Neurological:      Mental Status: She is oriented to person, place, and time.      Motor: Motor strength is normal.     Coordination: Romberg Test abnormal. Finger-Nose-Finger Test normal.      Gait: Gait is intact.      Deep Tendon Reflexes:      Reflex Scores:       Bicep reflexes are 2+ on the right side and 2+ on the left side.       Brachioradialis reflexes are 2+ on the right side and 2+ on the left side.       Patellar reflexes are 1+ on the right side and 1+ on the left side.       Achilles reflexes are 0 on the right side and 0 on the left side.  Psychiatric:         Speech: Speech normal.       Neurologic Exam     Mental Status   Oriented to person, place, and time.   Attention: normal. Concentration:  normal.   Speech: speech is normal   Level of consciousness: alert  Knowledge: good.     Cranial Nerves     CN II   Visual fields full to confrontation.     CN III, IV, VI   Pupils are equal, round, and reactive to light.  Extraocular motions are normal.     CN V   Facial sensation intact.     CN VII   Facial expression full, symmetric.     CN VIII   CN VIII normal.     CN XI   CN XI normal.     CN XII   CN XII normal.     Motor Exam   Muscle bulk: normal    Strength   Strength 5/5 throughout. No exacerbation of tremor against resistance with supination of the LUE     Sensory Exam   Light touch normal.     Gait, Coordination, and Reflexes     Gait  Gait: normal    Coordination   Romberg: positive  Finger to nose coordination: normal    Tremor   Resting tremor: absent  Intention tremor: absent  Action tremor: absent    Reflexes   Right brachioradialis: 2+  Left brachioradialis: 2+  Right biceps: 2+  Left biceps: 2+  Right patellar: 1+  Left patellar: 1+  Right achilles: 0  Left achilles: 0  Right Gusman: present  Left Gusman: absent      Results/Data:      No pertinent imaging studies reviewed.    Administrative Statements   I have spent a total time of 30  minutes in caring for this patient on the day of the visit/encounter including Diagnostic results, Prognosis, Risks and benefits of tx options, Instructions for management, Patient and family education, Risk factor reductions, Impressions, Counseling / Coordination of care, Documenting in the medical record, Reviewing / ordering tests, medicine, procedures  , and Obtaining or reviewing history  .

## 2024-10-18 PROCEDURE — L3010 FOOT LONGITUDINAL ARCH SUPPO: HCPCS | Performed by: PHYSICAL THERAPIST

## 2024-10-18 NOTE — PROGRESS NOTES
Patient orthotics dispensed.  Excellent fit in current footwear achieved.  Gait pattern is changed and improved.  Patient instructed and provided handout in regard to break in period.  Patient advised to contact provider with any issues or questions.

## 2024-10-22 ENCOUNTER — OFFICE VISIT (OUTPATIENT)
Dept: INTERNAL MEDICINE CLINIC | Facility: CLINIC | Age: 69
End: 2024-10-22
Payer: MEDICARE

## 2024-10-22 VITALS
BODY MASS INDEX: 41.48 KG/M2 | TEMPERATURE: 97.3 F | WEIGHT: 249 LBS | HEIGHT: 65 IN | SYSTOLIC BLOOD PRESSURE: 120 MMHG | OXYGEN SATURATION: 95 % | HEART RATE: 62 BPM | DIASTOLIC BLOOD PRESSURE: 74 MMHG

## 2024-10-22 DIAGNOSIS — E03.9 ACQUIRED HYPOTHYROIDISM: Primary | ICD-10-CM

## 2024-10-22 DIAGNOSIS — R73.01 IMPAIRED FASTING GLUCOSE: ICD-10-CM

## 2024-10-22 DIAGNOSIS — I10 BENIGN ESSENTIAL HYPERTENSION: ICD-10-CM

## 2024-10-22 DIAGNOSIS — N18.31 STAGE 3A CHRONIC KIDNEY DISEASE (HCC): ICD-10-CM

## 2024-10-22 DIAGNOSIS — E55.9 VITAMIN D DEFICIENCY: ICD-10-CM

## 2024-10-22 DIAGNOSIS — Z23 ENCOUNTER FOR IMMUNIZATION: ICD-10-CM

## 2024-10-22 DIAGNOSIS — Z00.00 MEDICARE ANNUAL WELLNESS VISIT, SUBSEQUENT: ICD-10-CM

## 2024-10-22 DIAGNOSIS — E78.00 PURE HYPERCHOLESTEROLEMIA: ICD-10-CM

## 2024-10-22 PROCEDURE — G0439 PPPS, SUBSEQ VISIT: HCPCS | Performed by: INTERNAL MEDICINE

## 2024-10-22 PROCEDURE — 99214 OFFICE O/P EST MOD 30 MIN: CPT | Performed by: INTERNAL MEDICINE

## 2024-10-22 NOTE — ASSESSMENT & PLAN NOTE
Continue with healthy diet and exercise, will recheck A1c with next labs    Orders:    Hemoglobin A1C; Future

## 2024-10-22 NOTE — ASSESSMENT & PLAN NOTE
Discussed preventative health, cancer screening, immunizations, and safety issues.  Last colonoscopy March 2021 with recommendations recheck again in 5 years.  Patient had the Prevnar 13 and Pneumovax 23 and can get Prevnar 20 at any time since has been more than 5 years since her last pneumonia shot.  I recommend yearly flu shot.  Patient getting mammogram in November.  Last DEXA scan August 2022.  Patient had the Shingrix vaccines.  Patient had RSV vaccine.

## 2024-10-22 NOTE — PROGRESS NOTES
Ambulatory Visit  Name: Rhianna Gerardo      : 1955      MRN: [id number]  Encounter Provider: Leandro Moreno MD  Encounter Date: 10/22/2024   Encounter department: [facility]  Medicare annual wellness visit, subsequent  Discussed preventative health, cancer screening, immunizations, and safety issues.  Last colonoscopy 2021 with recommendations recheck again in 5 years.  Patient had the Prevnar 13 and Pneumovax 23 and can get Prevnar 20 at any time since has been more than 5 years since her last pneumonia shot.  I recommend yearly flu shot.  Patient getting mammogram in November.  Last DEXA scan 2022.  Patient had the Shingrix vaccines.  Patient had RSV vaccine.         Pure hypercholesterolemia  Continue with healthy diet and exercise    Orders:    CBC and differential; Future    Comprehensive metabolic panel; Future    Lipid Panel with Direct LDL reflex; Future    Acquired hypothyroidism  Last thyroid function tests were normal, continue levothyroxine along with monitoring    Orders:    TSH, 3rd generation with Free T4 reflex; Future    Impaired fasting glucose  Continue with healthy diet and exercise, will recheck A1c with next labs    Orders:    Hemoglobin A1C; Future    Vitamin D deficiency    Orders:    Vitamin D 25 hydroxy; Future    Encounter for immunization    Orders:    Pneumococcal Conjugate Vaccine 20-valent (Pcv20)    Benign essential hypertension  Blood pressure is well-controlled, continue meds         Stage 3a chronic kidney disease (HCC)  Lab Results   Component Value Date    EGFR 68 2024    EGFR 57 2024    EGFR 73 2024    CREATININE 0.86 2024    CREATININE 0.99 2024    CREATININE 0.81 2024   Last GFR in  was good at 68, avoid excessive use of NSAIDs            Preventive health issues were discussed with patient, and age appropriate screening tests were ordered as noted in patient's After  Visit Summary. Personalized health advice and appropriate referrals for health education or preventive services given if needed, as noted in patient's After Visit Summary.    History of Present Illness     Patient here for annual wellness visit and follow-up       Patient Care Team:  Leandro Moreno MD as PCP - General    Review of Systems   Constitutional:  Negative for chills, fatigue and fever.   HENT:  Negative for congestion, nosebleeds, postnasal drip, sore throat and trouble swallowing.    Eyes:  Negative for pain.   Respiratory:  Negative for cough, chest tightness, shortness of breath and wheezing.    Cardiovascular:  Negative for chest pain, palpitations and leg swelling.   Gastrointestinal:  Negative for abdominal pain, constipation, diarrhea, nausea and vomiting.   Endocrine: Negative for polydipsia and polyuria.   Genitourinary:  Negative for dysuria, flank pain and hematuria.   Musculoskeletal:  Negative for arthralgias.   Skin:  Negative for rash.   Neurological:  Negative for dizziness, tremors, light-headedness and headaches.   Hematological:  Does not bruise/bleed easily.   Psychiatric/Behavioral:  Negative for confusion and dysphoric mood. The patient is not nervous/anxious.      Medical History Reviewed by provider this encounter:  Tobacco  Allergies  Meds  Problems  Med Hx  Surg Hx  Fam Hx       Annual Wellness Visit Questionnaire   Last Medicare Wellness visit information reviewed, patient interviewed and updates made to the record today.      Health Risk Assessment:   Patient rates overall health as good. Patient feels that their physical health rating is same. Patient is very satisfied with their life. Eyesight was rated as same. Hearing was rated as same. Patient feels that their emotional and mental health rating is same. Patients states they are never, rarely angry. Patient states they are sometimes unusually tired/fatigued. Pain experienced in the last 7 days has been some.  Patient's pain rating has been 4/10. Patient states that she has experienced weight loss or gain in last 6 months.     Depression Screening:   PHQ-2 Score: 0      Fall Risk Screening:   In the past year, patient has experienced: no history of falling in past year      Urinary Incontinence Screening:   Patient has not leaked urine accidently in the last six months.     Home Safety:  Patient has trouble with stairs inside or outside of their home. Patient has working smoke alarms and has working carbon monoxide detector. Home safety hazards include: none.     Nutrition:   Current diet is Low Carb.     Medications:   Patient is currently taking over-the-counter supplements. OTC medications include: see medication list. Patient is able to manage medications.     Activities of Daily Living (ADLs)/Instrumental Activities of Daily Living (IADLs):   Walk and transfer into and out of bed and chair?: Yes  Dress and groom yourself?: Yes    Bathe or shower yourself?: Yes    Feed yourself? Yes  Do your laundry/housekeeping?: Yes  Manage your money, pay your bills and track your expenses?: Yes  Make your own meals?: Yes    Do your own shopping?: Yes    Previous Hospitalizations:   Any hospitalizations or ED visits within the last 12 months?: No      Advance Care Planning:   Living will: Yes    Durable POA for healthcare: Yes    Advanced directive: Yes      PREVENTIVE SCREENINGS      Cardiovascular Screening:    General: Screening Not Indicated, History Lipid Disorder, Risks and Benefits Discussed and Screening Current      Diabetes Screening:     General: Screening Current and Risks and Benefits Discussed      Colorectal Cancer Screening:     General: Screening Current      Breast Cancer Screening:     General: Screening Current and Risks and Benefits Discussed      Cervical Cancer Screening:    General: Screening Not Indicated      Osteoporosis Screening:    General: Risks and Benefits Discussed      Abdominal Aortic Aneurysm  (AAA) Screening:        General: Risks and Benefits Discussed and Screening Not Indicated      Lung Cancer Screening:     General: Screening Not Indicated and Risks and Benefits Discussed      Hepatitis C Screening:    General: Screening Current and Risks and Benefits Discussed    Screening, Brief Intervention, and Referral to Treatment (SBIRT)    Screening  Typical number of drinks in a day: 0  Typical number of drinks in a week: 0  Interpretation: Low risk drinking behavior.    AUDIT-C Screenin) How often did you have a drink containing alcohol in the past year? monthly or less  2) How many drinks did you have on a typical day when you were drinking in the past year? 1 to 2  3) How often did you have 6 or more drinks on one occasion in the past year? never    AUDIT-C Score: 1  Interpretation: Score 0-2 (female): Negative screen for alcohol misuse    Single Item Drug Screening:  How often have you used an illegal drug (including marijuana) or a prescription medication for non-medical reasons in the past year? never    Single Item Drug Screen Score: 0  Interpretation: Negative screen for possible drug use disorder    Brief Intervention  Alcohol & drug use screenings were reviewed. No concerns regarding substance use disorder identified.     Other Counseling Topics:   Car/seat belt/driving safety, skin self-exam and sunscreen.     Social Determinants of Health     Financial Resource Strain: Low Risk  (10/11/2023)    Overall Financial Resource Strain (CARDIA)     Difficulty of Paying Living Expenses: Not very hard   Food Insecurity: No Food Insecurity (10/15/2024)    Nursing - Inadequate Food Risk Classification     Worried About Running Out of Food in the Last Year: Never true     Ran Out of Food in the Last Year: Never true   Transportation Needs: No Transportation Needs (10/15/2024)    PRAPARE - Transportation     Lack of Transportation (Medical): No     Lack of Transportation (Non-Medical): No   Housing  "Stability: Low Risk  (10/15/2024)    Housing Stability Vital Sign     Unable to Pay for Housing in the Last Year: No     Number of Times Moved in the Last Year: 0     Homeless in the Last Year: No   Utilities: Not At Risk (10/15/2024)    [facility] Utilities     Threatened with loss of utilities: No     No results found.    Objective     /74 (BP Location: Left arm, Patient Position: Sitting, Cuff Size: Large)   Pulse 62   Temp (!) 97.3 °F (36.3 °C) (Probe)   Ht 5' 5\" (1.651 m)   Wt 113 kg (249 lb)   SpO2 95%   BMI 41.44 kg/m²     Physical Exam  Vitals reviewed.   Constitutional:       Appearance: Normal appearance. She is well-developed.   HENT:      Head: Normocephalic and atraumatic.      Right Ear: External ear normal.      Left Ear: External ear normal.      Nose: Nose normal.   Eyes:      General: No scleral icterus.     Conjunctiva/sclera: Conjunctivae normal.   Neck:      Thyroid: No thyromegaly.      Trachea: No tracheal deviation.   Cardiovascular:      Rate and Rhythm: Normal rate and regular rhythm.      Heart sounds: No murmur heard.  Pulmonary:      Effort: No respiratory distress.      Breath sounds: Normal breath sounds. No wheezing or rales.   Abdominal:      General: Bowel sounds are normal.      Palpations: Abdomen is soft. There is no mass.      Tenderness: There is no abdominal tenderness. There is no guarding.   Musculoskeletal:      Cervical back: Normal range of motion and neck supple.      Right lower leg: No edema.      Left lower leg: No edema.   Lymphadenopathy:      Cervical: No cervical adenopathy.   Skin:     Coloration: Skin is not jaundiced or pale.   Neurological:      General: No focal deficit present.      Mental Status: She is alert and oriented to person, place, and time.   Psychiatric:         Mood and Affect: Mood normal.         Behavior: Behavior normal.         Thought Content: Thought content normal.         Judgment: Judgment normal.         "

## 2024-10-22 NOTE — ASSESSMENT & PLAN NOTE
Last thyroid function tests were normal, continue levothyroxine along with monitoring    Orders:    TSH, 3rd generation with Free T4 reflex; Future

## 2024-10-22 NOTE — ASSESSMENT & PLAN NOTE
Lab Results   Component Value Date    EGFR 68 06/24/2024    EGFR 57 05/20/2024    EGFR 73 02/28/2024    CREATININE 0.86 06/24/2024    CREATININE 0.99 05/20/2024    CREATININE 0.81 02/28/2024   Last GFR in June was good at 68, avoid excessive use of NSAIDs

## 2024-10-22 NOTE — PATIENT INSTRUCTIONS
Problem List Items Addressed This Visit          Cardiovascular and Mediastinum    Benign essential hypertension     Blood pressure is well-controlled, continue meds            Endocrine    Impaired fasting glucose     Continue with healthy diet and exercise, will recheck A1c with next labs         Relevant Orders    Hemoglobin A1C    Acquired hypothyroidism - Primary     Last thyroid function tests were normal, continue levothyroxine along with monitoring         Relevant Orders    TSH, 3rd generation with Free T4 reflex       Genitourinary    Stage 3a chronic kidney disease (HCC)     Lab Results   Component Value Date    EGFR 68 06/24/2024    EGFR 57 05/20/2024    EGFR 73 02/28/2024    CREATININE 0.86 06/24/2024    CREATININE 0.99 05/20/2024    CREATININE 0.81 02/28/2024   Last GFR in June was good at 68, avoid excessive use of NSAIDs            Other    Pure hypercholesterolemia     Continue with healthy diet and exercise         Relevant Orders    CBC and differential    Comprehensive metabolic panel    Lipid Panel with Direct LDL reflex    Medicare annual wellness visit, subsequent     Discussed preventative health, cancer screening, immunizations, and safety issues.  Last colonoscopy March 2021 with recommendations recheck again in 5 years.  Patient had the Prevnar 13 and Pneumovax 23 and can get Prevnar 20 at any time since has been more than 5 years since her last pneumonia shot.  I recommend yearly flu shot.  Patient getting mammogram in November.  Last DEXA scan August 2022.  Patient had the Shingrix vaccines.  Patient had RSV vaccine.          Other Visit Diagnoses       Vitamin D deficiency        Relevant Orders    Vitamin D 25 hydroxy    Encounter for immunization        Relevant Orders    Pneumococcal Conjugate Vaccine 20-valent (Pcv20)            Medicare Preventive Visit Patient Instructions  Thank you for completing your Welcome to Medicare Visit or Medicare Annual Wellness Visit today. Your next  wellness visit will be due in one year (10/23/2025).  The screening/preventive services that you may require over the next 5-10 years are detailed below. Some tests may not apply to you based off risk factors and/or age. Screening tests ordered at today's visit but not completed yet may show as past due. Also, please note that scanned in results may not display below.  Preventive Screenings:  Service Recommendations Previous Testing/Comments   Colorectal Cancer Screening  * Colonoscopy    * Fecal Occult Blood Test (FOBT)/Fecal Immunochemical Test (FIT)  * Fecal DNA/Cologuard Test  * Flexible Sigmoidoscopy Age: 45-75 years old   Colonoscopy: every 10 years (may be performed more frequently if at higher risk)  OR  FOBT/FIT: every 1 year  OR  Cologuard: every 3 years  OR  Sigmoidoscopy: every 5 years  Screening may be recommended earlier than age 45 if at higher risk for colorectal cancer. Also, an individualized decision between you and your healthcare provider will decide whether screening between the ages of 76-85 would be appropriate. Colonoscopy: 03/02/2021  FOBT/FIT: Not on file  Cologuard: Not on file  Sigmoidoscopy: Not on file          Breast Cancer Screening Age: 40+ years old  Frequency: every 1-2 years  Not required if history of left and right mastectomy Mammogram: 11/03/2023        Cervical Cancer Screening Between the ages of 21-29, pap smear recommended once every 3 years.   Between the ages of 30-65, can perform pap smear with HPV co-testing every 5 years.   Recommendations may differ for women with a history of total hysterectomy, cervical cancer, or abnormal pap smears in past. Pap Smear: 04/24/2024        Hepatitis C Screening Once for adults born between 1945 and 1965  More frequently in patients at high risk for Hepatitis C Hep C Antibody: 09/08/2017        Diabetes Screening 1-2 times per year if you're at risk for diabetes or have pre-diabetes Fasting glucose: 98 mg/dL (6/24/2024)  A1C: 5.8 %  (2/28/2024)      Cholesterol Screening Once every 5 years if you don't have a lipid disorder. May order more often based on risk factors. Lipid panel: 02/28/2024          Other Preventive Screenings Covered by Medicare:  Abdominal Aortic Aneurysm (AAA) Screening: covered once if your at risk. You're considered to be at risk if you have a family history of AAA.  Lung Cancer Screening: covers low dose CT scan once per year if you meet all of the following conditions: (1) Age 55-77; (2) No signs or symptoms of lung cancer; (3) Current smoker or have quit smoking within the last 15 years; (4) You have a tobacco smoking history of at least 20 pack years (packs per day multiplied by number of years you smoked); (5) You get a written order from a healthcare provider.  Glaucoma Screening: covered annually if you're considered high risk: (1) You have diabetes OR (2) Family history of glaucoma OR (3)  aged 50 and older OR (4)  American aged 65 and older  Osteoporosis Screening: covered every 2 years if you meet one of the following conditions: (1) You're estrogen deficient and at risk for osteoporosis based off medical history and other findings; (2) Have a vertebral abnormality; (3) On glucocorticoid therapy for more than 3 months; (4) Have primary hyperparathyroidism; (5) On osteoporosis medications and need to assess response to drug therapy.   Last bone density test (DXA Scan): 08/25/2022.  HIV Screening: covered annually if you're between the age of 15-65. Also covered annually if you are younger than 15 and older than 65 with risk factors for HIV infection. For pregnant patients, it is covered up to 3 times per pregnancy.    Immunizations:  Immunization Recommendations   Influenza Vaccine Annual influenza vaccination during flu season is recommended for all persons aged >= 6 months who do not have contraindications   Pneumococcal Vaccine   * Pneumococcal conjugate vaccine = PCV13 (Prevnar 13),  PCV15 (Vaxneuvance), PCV20 (Prevnar 20)  * Pneumococcal polysaccharide vaccine = PPSV23 (Pneumovax) Adults 19-63 yo with certain risk factors or if 65+ yo  If never received any pneumonia vaccine: recommend Prevnar 20 (PCV20)  Give PCV20 if previously received 1 dose of PCV13 or PPSV23   Hepatitis B Vaccine 3 dose series if at intermediate or high risk (ex: diabetes, end stage renal disease, liver disease)   Respiratory syncytial virus (RSV) Vaccine - COVERED BY MEDICARE PART D  * RSVPreF3 (Arexvy) CDC recommends that adults 60 years of age and older may receive a single dose of RSV vaccine using shared clinical decision-making (SCDM)   Tetanus (Td) Vaccine - COST NOT COVERED BY MEDICARE PART B Following completion of primary series, a booster dose should be given every 10 years to maintain immunity against tetanus. Td may also be given as tetanus wound prophylaxis.   Tdap Vaccine - COST NOT COVERED BY MEDICARE PART B Recommended at least once for all adults. For pregnant patients, recommended with each pregnancy.   Shingles Vaccine (Shingrix) - COST NOT COVERED BY MEDICARE PART B  2 shot series recommended in those 19 years and older who have or will have weakened immune systems or those 50 years and older     Health Maintenance Due:      Topic Date Due    Breast Cancer Screening: Mammogram  11/03/2024    Colorectal Cancer Screening  03/02/2026    Hepatitis C Screening  Completed     Immunizations Due:  There are no preventive care reminders to display for this patient.  Advance Directives   What are advance directives?  Advance directives are legal documents that state your wishes and plans for medical care. These plans are made ahead of time in case you lose your ability to make decisions for yourself. Advance directives can apply to any medical decision, such as the treatments you want, and if you want to donate organs.   What are the types of advance directives?  There are many types of advance directives, and  each state has rules about how to use them. You may choose a combination of any of the following:  Living will:  This is a written record of the treatment you want. You can also choose which treatments you do not want, which to limit, and which to stop at a certain time. This includes surgery, medicine, IV fluid, and tube feedings.   Durable power of  for healthcare (DPAHC):  This is a written record that states who you want to make healthcare choices for you when you are unable to make them for yourself. This person, called a proxy, is usually a family member or a friend. You may choose more than 1 proxy.  Do not resuscitate (DNR) order:  A DNR order is used in case your heart stops beating or you stop breathing. It is a request not to have certain forms of treatment, such as CPR. A DNR order may be included in other types of advance directives.  Medical directive:  This covers the care that you want if you are in a coma, near death, or unable to make decisions for yourself. You can list the treatments you want for each condition. Treatment may include pain medicine, surgery, blood transfusions, dialysis, IV or tube feedings, and a ventilator (breathing machine).  Values history:  This document has questions about your views, beliefs, and how you feel and think about life. This information can help others choose the care that you would choose.  Why are advance directives important?  An advance directive helps you control your care. Although spoken wishes may be used, it is better to have your wishes written down. Spoken wishes can be misunderstood, or not followed. Treatments may be given even if you do not want them. An advance directive may make it easier for your family to make difficult choices about your care.   Weight Management   Why it is important to manage your weight:  Being overweight increases your risk of health conditions such as heart disease, high blood pressure, type 2 diabetes, and  certain types of cancer. It can also increase your risk for osteoarthritis, sleep apnea, and other respiratory problems. Aim for a slow, steady weight loss. Even a small amount of weight loss can lower your risk of health problems.  How to lose weight safely:  A safe and healthy way to lose weight is to eat fewer calories and get regular exercise. You can lose up about 1 pound a week by decreasing the number of calories you eat by 500 calories each day.   Healthy meal plan for weight management:  A healthy meal plan includes a variety of foods, contains fewer calories, and helps you stay healthy. A healthy meal plan includes the following:  Eat whole-grain foods more often.  A healthy meal plan should contain fiber. Fiber is the part of grains, fruits, and vegetables that is not broken down by your body. Whole-grain foods are healthy and provide extra fiber in your diet. Some examples of whole-grain foods are whole-wheat breads and pastas, oatmeal, brown rice, and bulgur.  Eat a variety of vegetables every day.  Include dark, leafy greens such as spinach, kale, jazlyn greens, and mustard greens. Eat yellow and orange vegetables such as carrots, sweet potatoes, and winter squash.   Eat a variety of fruits every day.  Choose fresh or canned fruit (canned in its own juice or light syrup) instead of juice. Fruit juice has very little or no fiber.  Eat low-fat dairy foods.  Drink fat-free (skim) milk or 1% milk. Eat fat-free yogurt and low-fat cottage cheese. Try low-fat cheeses such as mozzarella and other reduced-fat cheeses.  Choose meat and other protein foods that are low in fat.  Choose beans or other legumes such as split peas or lentils. Choose fish, skinless poultry (chicken or turkey), or lean cuts of red meat (beef or pork). Before you cook meat or poultry, cut off any visible fat.   Use less fat and oil.  Try baking foods instead of frying them. Add less fat, such as margarine, sour cream, regular salad  dressing and mayonnaise to foods. Eat fewer high-fat foods. Some examples of high-fat foods include french fries, doughnuts, ice cream, and cakes.  Eat fewer sweets.  Limit foods and drinks that are high in sugar. This includes candy, cookies, regular soda, and sweetened drinks.  Exercise:  Exercise at least 30 minutes per day on most days of the week. Some examples of exercise include walking, biking, dancing, and swimming. You can also fit in more physical activity by taking the stairs instead of the elevator or parking farther away from stores. Ask your healthcare provider about the best exercise plan for you.      © Copyright [employer] 2018 Information is for End User's use only and may not be sold, redistributed or otherwise used for commercial purposes. All illustrations and images included in CareNotes® are the copyrighted property of A.D.A.M., Inc. or [employer] (formerly [employer])

## 2024-10-25 NOTE — PROGRESS NOTES
"Weight Management Medical Nutrition Assessment  Rhianna is here with her  for 2 of 3 bundle. Current wt: 248.1  lbs. She has gained 3.6 lbs   x 2  months with overall loss of 20.1  lbs since May 2022. She has been getting ocular migraines and tremors, having MRI tomorrow. Currently on wellbutrin. Wondering if this can affect sleep. She is waking up at night and having trouble falling back asleep. She will let MA know today at nurse visit.  Had reported some weight gain last month when at the beach but reports she has started to lose again. Sometimes stuggles at dinner. Will have a meal replacement but feels she needs more so has a bar or popcorn. Suggest use of side salad for volume and fiber. Support and encouragement provided.  She will f/u in 2 months.    Patient seen by Medical Provider in past 6 months:  yes  Requested to schedule appointment with Medical Provider: No    Anthropometric Measurements  Start Weight (#): 268.2 lbs 5/4/2022    Current Weight (#): 248.1  TBW % Change from start weight: 7.5%  Ideal Body Weight (#): 125 65\" (159.3 lbs BMI 25)  Goal Weight (#):  lbs     Weight Loss History  Previous weight loss attempts: Commercial Programs (Weight Watchers, Meka Endy, etc.)  Counseling with  MD  Exercise  Meal Replacements (Medifast, Slim Fast, etc.)  Self Created Diets (Portion Control, Healthy Food Choices, etc.)    Food and Nutrition Related History  Wake up: 8-9   Bed Time: 11    Food Recall  Breakfast:  10:00  replacement   Snack:    Lunch: 12:30-1:00:  tovata meal (40 gm carb, 500-550 parminder)   Snack: carrots/celery + 1 Tbsp peanut butter or 50 parminder dip    Dinner: 6-7:00  replacement, bar or popcorn   Snack:       Beverages: water  Volume of beverage intake:  68 oz +    Weekends: Same  Cravings: no specific identified   Trouble area of day: not identified     Frequency of Eating out: none currently  Food restrictions:  carbs <50 gm while on VLCD  Cooking: self   Food Shopping: " self    Physical Activity Intake  Activity:  yardwork, bowling, PT  Frequency: varies      Physical limitations/barriers to exercise: back pain, foot     Estimated Needs   Energy  E[employer] Energy Needs: BMR :  1669  1-2# loss weekly sedentary: 1003 - 1503        1-2# loss weekly lightly active:  1063-7396  Maintenance calories for sedentary activity level:  1968  Protein: 72-90 gm      (1.2-1.5g/kg IBW)  Fluid: 70 oz    (35mL/kg IBW)    Nutrition Diagnosis  Yes;    Overweight/obesity  related to Excess energy intake as evidenced by  BMI more than normative standard for age and sex (obesity-grade III 40+)       Nutrition Intervention    Nutrition Prescription  Calories: 1,100 calories, <75g net CHO  Protein: 85-120g  Fluid: 80 oz    Meal Plan (Adrien/Pro/Carb)  Breakfast: 200, 27, 10  Snack:  Lunch: <500, 30-40, 10-30 (encouraged 300-400kcal)  Snack:  Dinner: 200, 27, 10  Snack: 160, 15, 13  If meal 300-400kcal can add in additional snack of 100-150kcal.    Nutrition Education:    Low carb meal  Net CHO     Nutrition Counseling:  Strategies: as above      Monitoring and Evaluation:  Evaluation criteria:  Energy Intake  Meet protein needs  Maintain adequate hydration  Monitor weekly weight  Food logging/measuring  Physical activity   Meal planning    Barriers to learning:none  Readiness to change: Action:  (Changing behavior)    Comprehension: very good  Expected Compliance: good

## 2024-11-04 ENCOUNTER — CLINICAL SUPPORT (OUTPATIENT)
Dept: BARIATRICS | Facility: CLINIC | Age: 69
End: 2024-11-04

## 2024-11-04 VITALS
DIASTOLIC BLOOD PRESSURE: 76 MMHG | BODY MASS INDEX: 41.34 KG/M2 | SYSTOLIC BLOOD PRESSURE: 130 MMHG | HEART RATE: 59 BPM | TEMPERATURE: 96.8 F | WEIGHT: 248.1 LBS | RESPIRATION RATE: 16 BRPM | HEIGHT: 65 IN

## 2024-11-04 DIAGNOSIS — R63.5 ABNORMAL WEIGHT GAIN: Primary | ICD-10-CM

## 2024-11-04 PROCEDURE — RECHECK

## 2024-11-04 NOTE — PROGRESS NOTES
Patient last visit weight:243 lb 9.6 oz  Patient current visit weight: 248.1 lb    If you are taking phentermine or other oral weight loss medications, are you experiencing any of the following symptoms:  Headache: - DULL HEADACHES ON AND OFF  Blurred Vision: NO  Chest Pain: NO  Palpitations:NO  Insomnia: YES  SPECIFY ORAL MEDICATION AND DOSAGE: WELLBUTRIN 150 MG- PATIENT STATING THAT WHEN SHE DOESN'T TAKE A SLEEPING AID SHE IS HAVING A HARD TIME- FEELING VERY RESTLESS.    If you are taking an injectable medication,  are you experiencing any of the following symptoms:  Bloating:   Nausea:  Vomiting:   Constipation:   Diarrhea:  SPECIFY INJECTABLE MEDICATION AND CURRENT DOSAGE:      Vitals:    Is BP less than 100/60?NO  Is BP greater than 140/90?NO  Is HR greater than 100?NO  **If yes to any of the above, have patient relax and repeat in 5-10 minutes**    Repeat values:    Is BP less than 100/60?  Is BP greater than 140/90?  Is HR greater than 100?  **If values remain outside of ranges above, please consult provider for next steps**

## 2024-11-05 ENCOUNTER — HOSPITAL ENCOUNTER (OUTPATIENT)
Dept: RADIOLOGY | Age: 69
Discharge: HOME | End: 2024-11-05
Payer: MEDICARE

## 2024-11-05 DIAGNOSIS — G43.109 OCULAR MIGRAINE: ICD-10-CM

## 2024-11-05 DIAGNOSIS — R25.1 TREMOR: ICD-10-CM

## 2024-11-05 PROCEDURE — 70551 MRI BRAIN STEM W/O DYE: CPT

## 2024-11-06 ENCOUNTER — HOSPITAL ENCOUNTER (OUTPATIENT)
Dept: RADIOLOGY | Age: 69
Discharge: HOME | End: 2024-11-06
Payer: MEDICARE

## 2024-11-06 DIAGNOSIS — Z12.31 ENCOUNTER FOR SCREENING MAMMOGRAM FOR MALIGNANT NEOPLASM OF BREAST: ICD-10-CM

## 2024-11-06 PROCEDURE — 77067 SCR MAMMO BI INCL CAD: CPT

## 2024-11-06 PROCEDURE — 77063 BREAST TOMOSYNTHESIS BI: CPT

## 2024-11-08 ENCOUNTER — TELEPHONE (OUTPATIENT)
Dept: BARIATRICS | Facility: CLINIC | Age: 69
End: 2024-11-08

## 2024-11-08 NOTE — TELEPHONE ENCOUNTER
----- Message from Cherelle Juarez PA-C sent at 11/4/2024 12:43 PM EST -----  It is not helping with weight loss so I would recommend taking it every other day until satuday and stopping. There isnt any other med options  ----- Message -----  From: Malia Hart MA  Sent: 11/4/2024  11:31 AM EST  To: Cherelle Juarez PA-C

## 2024-11-20 ENCOUNTER — PROCEDURE VISIT (OUTPATIENT)
Age: 69
End: 2024-11-20
Payer: MEDICARE

## 2024-11-20 VITALS — HEIGHT: 65 IN | WEIGHT: 248 LBS | BODY MASS INDEX: 41.32 KG/M2

## 2024-11-20 DIAGNOSIS — M99.01 SEGMENTAL DYSFUNCTION OF CERVICAL REGION: ICD-10-CM

## 2024-11-20 DIAGNOSIS — M54.2 NECK PAIN: ICD-10-CM

## 2024-11-20 DIAGNOSIS — M99.03 SEGMENTAL DYSFUNCTION OF LUMBAR REGION: ICD-10-CM

## 2024-11-20 DIAGNOSIS — M54.6 ACUTE BILATERAL THORACIC BACK PAIN: ICD-10-CM

## 2024-11-20 DIAGNOSIS — M99.02 SEGMENTAL DYSFUNCTION OF THORACIC REGION: ICD-10-CM

## 2024-11-20 DIAGNOSIS — M79.18 MYOFASCIAL PAIN: ICD-10-CM

## 2024-11-20 DIAGNOSIS — M54.16 LUMBAR RADICULOPATHY: ICD-10-CM

## 2024-11-20 DIAGNOSIS — M99.05 SEGMENTAL DYSFUNCTION OF PELVIC REGION: Primary | ICD-10-CM

## 2024-11-20 DIAGNOSIS — M99.04 SEGMENTAL DYSFUNCTION OF SACRAL REGION: ICD-10-CM

## 2024-11-20 DIAGNOSIS — M54.50 ACUTE BILATERAL LOW BACK PAIN WITHOUT SCIATICA: ICD-10-CM

## 2024-11-20 PROCEDURE — 98942 CHIROPRACTIC MANJ 5 REGIONS: CPT | Performed by: CHIROPRACTOR

## 2024-11-20 NOTE — PROGRESS NOTES
Date of first visit: 2/21/2023      HPI:  Lizzie returns to the office for treatment today of neck pain upper back pain lower back and left hip pain. No other medical history changes.      VPAS  3      The following portions of the patient's history were reviewed and updated as appropriate: allergies, current medications, past family history, past medical history, past social history, past surgical history, and problem list.    Review of Systems    Physical Exam:  Lizzie is in some minor distress today she moves slowly about the exam room her transfer is slowed range of motion reduced secondary to muscle tightness.    Pelvic obliquity noted elevated left versus right innominate misalignment of the left innominate on sacrum biomechanically joint dysfunction present left SI and L5-S1 motion unit active triggers in the left gluteus medius and TFL musculature.    Parathoracic hypertonicity noted with a T4-T5 segmental dysfunction.    Cervical range of motion reduced in left lateral bending right rotation joint dysfunction C5-C6 C1-C2    Assessment:   Diagnosis ICD-10-CM Associated Orders   1. Segmental dysfunction of pelvic region  M99.05       2. Segmental dysfunction of lumbar region  M99.03       3. Segmental dysfunction of sacral region  M99.04       4. Acute bilateral low back pain without sciatica  M54.50       5. Segmental dysfunction of thoracic region  M99.02       6. Acute bilateral thoracic back pain  M54.6       7. Neck pain  M54.2       8. Segmental dysfunction of cervical region  M99.01       9. Myofascial pain  M79.18       10. Lumbar radiculopathy  M54.16             Treatment: 94575  Manipulation to the left innominate, sacrum, L5 via Walker drop maneuver.  Manipulation T4 producing good joint release well-tolerated.  Manipulation C5 C1 via seated stairstepping technique.  No HVLA.    Discussion:  Continue her home stretching program icing to the left low back and hip we will see her back for follow-up.   She will continue icing and stretching.

## 2024-11-21 PROBLEM — Z00.00 MEDICARE ANNUAL WELLNESS VISIT, SUBSEQUENT: Status: RESOLVED | Noted: 2018-08-02 | Resolved: 2024-11-21

## 2024-11-27 ENCOUNTER — OFFICE VISIT (OUTPATIENT)
Dept: PODIATRY | Facility: CLINIC | Age: 69
End: 2024-11-27
Payer: MEDICARE

## 2024-11-27 VITALS — HEIGHT: 65 IN | BODY MASS INDEX: 42.65 KG/M2 | WEIGHT: 256 LBS

## 2024-11-27 DIAGNOSIS — G89.29 CHRONIC PAIN OF LEFT ANKLE: ICD-10-CM

## 2024-11-27 DIAGNOSIS — M25.572 CHRONIC PAIN OF LEFT ANKLE: ICD-10-CM

## 2024-11-27 DIAGNOSIS — M76.822 POSTERIOR TIBIAL TENDONITIS, LEFT: ICD-10-CM

## 2024-11-27 DIAGNOSIS — M79.672 LEFT FOOT PAIN: Primary | ICD-10-CM

## 2024-11-27 DIAGNOSIS — M19.072 DJD (DEGENERATIVE JOINT DISEASE), ANKLE AND FOOT, LEFT: ICD-10-CM

## 2024-11-27 PROCEDURE — 99213 OFFICE O/P EST LOW 20 MIN: CPT | Performed by: PODIATRIST

## 2024-11-27 PROCEDURE — 20605 DRAIN/INJ JOINT/BURSA W/O US: CPT | Performed by: PODIATRIST

## 2024-11-27 NOTE — PROGRESS NOTES
Name: Rhianna Gerardo      : 1955      MRN: [id number]  Encounter Provider: Josafat Abraham DPM  Encounter Date: 2024   Encounter department: [facility] PODI[facility] [place]    Assessment & Plan     1. Left foot pain  2. Chronic pain of left ankle  3. DJD (degenerative joint disease), ankle and foot, left  4. Posterior tibial tendonitis, left          Injection left ankle.  Continue orthotics, can have adjustments made by PT.  Improvement from previous evaluation.  Return in 3 months.    Medium joint arthrocentesis: L ankle  Universal Protocol:  procedure performed by consultantConsent: Verbal consent obtained.  Risks and benefits: risks, benefits and alternatives were discussed  Consent given by: patient  Patient understanding: patient states understanding of the procedure being performed  Patient identity confirmed: verbally with patient  Supporting Documentation  Indications: pain, joint swelling and diagnostic evaluation   Procedure Details  Location: ankle - L ankle  Needle size: 25 G  Ultrasound guidance: no    Patient tolerance: patient tolerated the procedure well with no immediate complications  Dressing:  Sterile dressing applied            I personally discussed with patient at the visit today:     The diagnosis of this encounter  2.   Possible etiologies of the diagnosis  3.   Treatment options including advantages and disadvantages of treatment with potential risks and complications associated with these treatments  4.   Prevention strategies and ways to decrease pain     I answered all of the patients questions.      Return in about 3 months (around 2025).    Subjective     Patient obtained orthotics and has some pain at the end of the day because the pain starts bothering her.  Has been doing exercises and having improvement with this.           Constitutional:  Negative for chills and fever.   Respiratory:  Negative for chest tightness and shortness of breath.   "  Gastrointestinal:  Negative for nausea and vomiting.     Current Outpatient Medications on File Prior to Visit   Medication Sig    BEE POLLEN PO Take by mouth    Calcium Carb-Cholecalciferol (CALCIUM + D3 PO) Take by mouth    co-enzyme Q-10 30 MG capsule Take 100 mg by mouth 2 (two) times a day     diphenhydrAMINE (BENADRYL) 25 mg capsule Take 25 mg by mouth as needed Takes as needed    ibuprofen (MOTRIN) 800 mg tablet if needed Takes as needed.    levothyroxine 50 mcg tablet TAKE 1 TABLET DAILY    losartan (COZAAR) 100 MG tablet TAKE 1 TABLET DAILY    Lutein 20 MG TABS Take by mouth    methocarbamol (ROBAXIN) 500 mg tablet Take 500 mg by mouth 4 (four) times a day as needed       Objective     Ht 5' 5\" (1.651 m)   Wt 116 kg (256 lb)   BMI 42.60 kg/m²     Vascular: Intact pedal pulses bilateral DP and PT.  Neurological: Gross protective sensation intact bilateral  Musculoskeletal: Muscle strength bilateral intact with dorsiflexion, inversion, eversion and plantarflexion. Some tenderness on palpation at the level of the ankle.  There is tenderness on palpation along the posterior tibial tendon.  No other areas of acute discomfort or tenderness noted currently.  No tenderness with tib-fib squeeze of the syndesmosis.  Dermatological: No open lesions or ulcerations noted bilateral.    "

## 2024-12-02 DIAGNOSIS — I10 ESSENTIAL HYPERTENSION, BENIGN: ICD-10-CM

## 2024-12-02 DIAGNOSIS — I10 BENIGN ESSENTIAL HYPERTENSION: ICD-10-CM

## 2024-12-03 RX ORDER — AMLODIPINE BESYLATE 5 MG/1
5 TABLET ORAL DAILY
Qty: 90 TABLET | Refills: 1 | Status: SHIPPED | OUTPATIENT
Start: 2024-12-03

## 2024-12-03 RX ORDER — METOPROLOL SUCCINATE 100 MG/1
100 TABLET, EXTENDED RELEASE ORAL DAILY
Qty: 90 TABLET | Refills: 1 | Status: SHIPPED | OUTPATIENT
Start: 2024-12-03

## 2024-12-09 ENCOUNTER — PATIENT MESSAGE (OUTPATIENT)
Dept: NEUROLOGY | Facility: CLINIC | Age: 69
End: 2024-12-09

## 2024-12-09 NOTE — PATIENT COMMUNICATION
Dr. Barba: please advise    MRI Brain 11/5/24  IMPRESSION:     1. Trace, chronic microangiopathy.  2. No acute infarction, intracranial hemorrhage or mass effect.  3. Left mastoid air cell effusion.

## 2024-12-10 ENCOUNTER — RESULTS FOLLOW-UP (OUTPATIENT)
Dept: NEUROLOGY | Facility: CLINIC | Age: 69
End: 2024-12-10

## 2024-12-19 ENCOUNTER — PROCEDURE VISIT (OUTPATIENT)
Age: 69
End: 2024-12-19
Payer: MEDICARE

## 2024-12-19 VITALS
HEART RATE: 67 BPM | DIASTOLIC BLOOD PRESSURE: 84 MMHG | SYSTOLIC BLOOD PRESSURE: 150 MMHG | HEIGHT: 65 IN | WEIGHT: 256 LBS | BODY MASS INDEX: 42.65 KG/M2

## 2024-12-19 DIAGNOSIS — M99.03 SEGMENTAL DYSFUNCTION OF LUMBAR REGION: ICD-10-CM

## 2024-12-19 DIAGNOSIS — M54.6 ACUTE BILATERAL THORACIC BACK PAIN: ICD-10-CM

## 2024-12-19 DIAGNOSIS — M99.02 SEGMENTAL DYSFUNCTION OF THORACIC REGION: ICD-10-CM

## 2024-12-19 DIAGNOSIS — M54.2 NECK PAIN: ICD-10-CM

## 2024-12-19 DIAGNOSIS — M99.04 SEGMENTAL DYSFUNCTION OF SACRAL REGION: ICD-10-CM

## 2024-12-19 DIAGNOSIS — M54.16 LUMBAR RADICULOPATHY: ICD-10-CM

## 2024-12-19 DIAGNOSIS — M54.50 ACUTE BILATERAL LOW BACK PAIN WITHOUT SCIATICA: ICD-10-CM

## 2024-12-19 DIAGNOSIS — M79.18 MYOFASCIAL PAIN: ICD-10-CM

## 2024-12-19 DIAGNOSIS — M99.01 SEGMENTAL DYSFUNCTION OF CERVICAL REGION: ICD-10-CM

## 2024-12-19 DIAGNOSIS — M99.05 SEGMENTAL DYSFUNCTION OF PELVIC REGION: Primary | ICD-10-CM

## 2024-12-19 PROCEDURE — 98942 CHIROPRACTIC MANJ 5 REGIONS: CPT | Performed by: CHIROPRACTOR

## 2024-12-19 NOTE — PROGRESS NOTES
Date of first visit: 2/21/2023      HPI:  Lizzie returns to the office for treatment today of neck pain upper back pain lower back and left hip pain. No other medical history changes.      VPAS  3-4      The following portions of the patient's history were reviewed and updated as appropriate: allergies, current medications, past family history, past medical history, past social history, past surgical history, and problem list.    Review of Systems    Physical Exam:  Lizzie is in some minor distress today she moves slowly about the exam room her transfer is slowed range of motion reduced secondary to muscle tightness.    Pelvic obliquity noted elevated left versus right innominate misalignment of the left innominate on sacrum biomechanically joint dysfunction present left SI and L5-S1 motion unit active triggers in the left gluteus medius and TFL musculature.    Parathoracic hypertonicity noted with a T4-T5 segmental dysfunction.    Cervical range of motion reduced in left lateral bending right rotation joint dysfunction C5-C6 C1-C2    Assessment:   Diagnosis ICD-10-CM Associated Orders   1. Segmental dysfunction of pelvic region  M99.05       2. Segmental dysfunction of lumbar region  M99.03       3. Segmental dysfunction of sacral region  M99.04       4. Acute bilateral low back pain without sciatica  M54.50       5. Segmental dysfunction of thoracic region  M99.02       6. Acute bilateral thoracic back pain  M54.6       7. Neck pain  M54.2       8. Segmental dysfunction of cervical region  M99.01       9. Myofascial pain  M79.18       10. Lumbar radiculopathy  M54.16             Treatment: 71651  Manipulation to the left innominate, sacrum, L5 via Walker drop maneuver.  Manipulation T4 producing good joint release well-tolerated.  Manipulation C5 C1 well-tolerated via stairstepping maneuver.  No HVLA..    Discussion:  Continue her home stretching program icing to the left low back and hip we will see her back for  follow-up.  She will continue icing and stretching.

## 2024-12-30 NOTE — PROGRESS NOTES
"Weight Management Medical Nutrition Assessment  Rhianna is here with her  for 3 of 3 bundle. Current wt:  257.8  lbs. She has gained 9.7  lbs   x 2  months with overall loss of  10.4 lbs since May 2022. She is no longer on wellbutrin. Sleeping better. Also reports edema has been better. Did have some extras over the holidays that likely contributed to weight gain however food recall reveals appropriate intake. Sometimes still having bar with replacement. Encouraged not to have both as she likely doesn't need that much protein at once. She does feel when she has a side salad with the replacement this is helpful. She does have labs ordered which include an A1C. ? If metformin would be appropriate. She did take this earlier in 2024 but had some side effects and did not feel it was effective. Support and encouragement provided.  She will f/u in 2 months with provider and myself in 3 months.    Patient seen by Medical Provider in past 6 months:  yes  Requested to schedule appointment with Medical Provider: No    Anthropometric Measurements  Start Weight (#): 268.2 lbs 5/4/2022    Current Weight (#): 257.8  TBW % Change from start weight: 3.8%  Ideal Body Weight (#): 125 65\" (159.3 lbs BMI 25)  Goal Weight (#):  lbs     Weight Loss History  Previous weight loss attempts: Commercial Programs (Weight Watchers, [employer], etc.)  Counseling with  MD  Exercise  Meal Replacements (Medifast, Slim Fast, etc.)  Self Created Diets (Portion Control, Healthy Food Choices, etc.)    Food and Nutrition Related History  Wake up: 8-9   Bed Time: 11    Food Recall  Breakfast:  10:00  replacement    Snack:    Lunch: 12:30-1:00:  tovata meal (40 gm carb, 500-550 parminder)   Snack: carrots/celery + 1 Tbsp peanut butter or 50 parminder dip OR nuts   Dinner: 6-7:00  replacement, side salad, string cheese, ranch dressing  Snack:  bar     Beverages: water  Volume of beverage intake:  68 oz +    Weekends: Same  Cravings: no specific " identified   Trouble area of day: not identified     Frequency of Eating out: every 2 wks  Food restrictions:  n/a  Cooking: self   Food Shopping: self    Physical Activity Intake  Activity: gym: 20 minutes on bike, strength training, bowling  Frequency: 3x/wk gym, 1x/wk bowling      Physical limitations/barriers to exercise: back pain, foot     Estimated Needs   Energy  [employer] Needs: BMR :  1713  1-2# loss weekly sedentary: 1056 - 1556       1-2# loss weekly lightly active:  7896-2984  Maintenance calories for sedentary activity level:  1956  Protein: 72-90 gm      (1.2-1.5g/kg IBW)  Fluid: 70 oz    (35mL/kg IBW)    Nutrition Diagnosis  Yes;    Overweight/obesity  related to Excess energy intake as evidenced by  BMI more than normative standard for age and sex (obesity-grade III 40+)       Nutrition Intervention    Nutrition Prescription  Calories: 1,100 calories, <75g net CHO  Protein: 85-120g  Fluid: 80 oz    Meal Plan (Adrien/Pro/Carb)  Breakfast: 200, 27, 10  Snack:  Lunch: <500, 30-40, 10-30 (encouraged 300-400kcal)  Snack:  Dinner: 200, 27, 10  Snack: 160, 15, 13  If meal 300-400kcal can add in additional snack of 100-150kcal.    Nutrition Education:    Low carb meal  Net CHO     Nutrition Counseling:  Strategies: as above      Monitoring and Evaluation:  Evaluation criteria:  Energy Intake  Meet protein needs  Maintain adequate hydration  Monitor weekly weight  Food logging/measuring  Physical activity   Meal planning    Barriers to learning:none  Readiness to change: Action:  (Changing behavior)  & relapse  Comprehension: very good  Expected Compliance: good

## 2025-01-08 ENCOUNTER — CLINICAL SUPPORT (OUTPATIENT)
Dept: BARIATRICS | Facility: CLINIC | Age: 70
End: 2025-01-08

## 2025-01-08 VITALS — HEIGHT: 67 IN | BODY MASS INDEX: 40.46 KG/M2 | WEIGHT: 257.8 LBS

## 2025-01-08 DIAGNOSIS — R63.5 ABNORMAL WEIGHT GAIN: Primary | ICD-10-CM

## 2025-01-08 PROCEDURE — RECHECK

## 2025-01-23 ENCOUNTER — APPOINTMENT (OUTPATIENT)
Dept: LAB | Age: 70
End: 2025-01-23
Payer: MEDICARE

## 2025-01-23 DIAGNOSIS — E78.00 PURE HYPERCHOLESTEROLEMIA: ICD-10-CM

## 2025-01-23 DIAGNOSIS — E55.9 VITAMIN D DEFICIENCY: ICD-10-CM

## 2025-01-23 DIAGNOSIS — R73.01 IMPAIRED FASTING GLUCOSE: ICD-10-CM

## 2025-01-23 DIAGNOSIS — E03.9 ACQUIRED HYPOTHYROIDISM: ICD-10-CM

## 2025-01-23 LAB
25(OH)D3 SERPL-MCNC: 32.8 NG/ML (ref 30–100)
ALBUMIN SERPL BCG-MCNC: 3.8 G/DL (ref 3.5–5)
ALP SERPL-CCNC: 80 U/L (ref 34–104)
ALT SERPL W P-5'-P-CCNC: 11 U/L (ref 7–52)
ANION GAP SERPL CALCULATED.3IONS-SCNC: 7 MMOL/L (ref 4–13)
AST SERPL W P-5'-P-CCNC: 11 U/L (ref 13–39)
BASOPHILS # BLD AUTO: 0.03 THOUSANDS/ΜL (ref 0–0.1)
BASOPHILS NFR BLD AUTO: 1 % (ref 0–1)
BILIRUB SERPL-MCNC: 0.42 MG/DL (ref 0.2–1)
BUN SERPL-MCNC: 22 MG/DL (ref 5–25)
CALCIUM SERPL-MCNC: 9.1 MG/DL (ref 8.4–10.2)
CHLORIDE SERPL-SCNC: 107 MMOL/L (ref 96–108)
CHOLEST SERPL-MCNC: 240 MG/DL (ref ?–200)
CO2 SERPL-SCNC: 27 MMOL/L (ref 21–32)
CREAT SERPL-MCNC: 0.86 MG/DL (ref 0.6–1.3)
EOSINOPHIL # BLD AUTO: 0.23 THOUSAND/ΜL (ref 0–0.61)
EOSINOPHIL NFR BLD AUTO: 5 % (ref 0–6)
ERYTHROCYTE [DISTWIDTH] IN BLOOD BY AUTOMATED COUNT: 14.2 % (ref 11.6–15.1)
EST. AVERAGE GLUCOSE BLD GHB EST-MCNC: 117 MG/DL
GFR SERPL CREATININE-BSD FRML MDRD: 68 ML/MIN/1.73SQ M
GLUCOSE P FAST SERPL-MCNC: 107 MG/DL (ref 65–99)
HBA1C MFR BLD: 5.7 %
HCT VFR BLD AUTO: 40.6 % (ref 34.8–46.1)
HDLC SERPL-MCNC: 42 MG/DL
HGB BLD-MCNC: 12.7 G/DL (ref 11.5–15.4)
IMM GRANULOCYTES # BLD AUTO: 0.03 THOUSAND/UL (ref 0–0.2)
IMM GRANULOCYTES NFR BLD AUTO: 1 % (ref 0–2)
LDLC SERPL CALC-MCNC: 165 MG/DL (ref 0–100)
LYMPHOCYTES # BLD AUTO: 1.2 THOUSANDS/ΜL (ref 0.6–4.47)
LYMPHOCYTES NFR BLD AUTO: 25 % (ref 14–44)
MCH RBC QN AUTO: 27.9 PG (ref 26.8–34.3)
MCHC RBC AUTO-ENTMCNC: 31.3 G/DL (ref 31.4–37.4)
MCV RBC AUTO: 89 FL (ref 82–98)
MONOCYTES # BLD AUTO: 0.53 THOUSAND/ΜL (ref 0.17–1.22)
MONOCYTES NFR BLD AUTO: 11 % (ref 4–12)
NEUTROPHILS # BLD AUTO: 2.76 THOUSANDS/ΜL (ref 1.85–7.62)
NEUTS SEG NFR BLD AUTO: 57 % (ref 43–75)
NRBC BLD AUTO-RTO: 0 /100 WBCS
PLATELET # BLD AUTO: 263 THOUSANDS/UL (ref 149–390)
PMV BLD AUTO: 10.3 FL (ref 8.9–12.7)
POTASSIUM SERPL-SCNC: 3.8 MMOL/L (ref 3.5–5.3)
PROT SERPL-MCNC: 6.3 G/DL (ref 6.4–8.4)
RBC # BLD AUTO: 4.56 MILLION/UL (ref 3.81–5.12)
SODIUM SERPL-SCNC: 141 MMOL/L (ref 135–147)
TRIGL SERPL-MCNC: 164 MG/DL (ref ?–150)
TSH SERPL DL<=0.05 MIU/L-ACNC: 3.56 UIU/ML (ref 0.45–4.5)
WBC # BLD AUTO: 4.78 THOUSAND/UL (ref 4.31–10.16)

## 2025-01-23 PROCEDURE — 80061 LIPID PANEL: CPT

## 2025-01-23 PROCEDURE — 36415 COLL VENOUS BLD VENIPUNCTURE: CPT

## 2025-01-23 PROCEDURE — 82306 VITAMIN D 25 HYDROXY: CPT

## 2025-01-23 PROCEDURE — 84443 ASSAY THYROID STIM HORMONE: CPT

## 2025-01-23 PROCEDURE — 83036 HEMOGLOBIN GLYCOSYLATED A1C: CPT

## 2025-01-23 PROCEDURE — 80053 COMPREHEN METABOLIC PANEL: CPT

## 2025-01-23 PROCEDURE — 85025 COMPLETE CBC W/AUTO DIFF WBC: CPT

## 2025-01-24 ENCOUNTER — RESULTS FOLLOW-UP (OUTPATIENT)
Dept: INTERNAL MEDICINE CLINIC | Facility: CLINIC | Age: 70
End: 2025-01-24

## 2025-01-29 ENCOUNTER — PROCEDURE VISIT (OUTPATIENT)
Age: 70
End: 2025-01-29
Payer: MEDICARE

## 2025-01-29 VITALS
HEART RATE: 68 BPM | DIASTOLIC BLOOD PRESSURE: 100 MMHG | HEIGHT: 66 IN | SYSTOLIC BLOOD PRESSURE: 203 MMHG | BODY MASS INDEX: 41.3 KG/M2 | WEIGHT: 257 LBS

## 2025-01-29 DIAGNOSIS — M99.05 SEGMENTAL DYSFUNCTION OF PELVIC REGION: Primary | ICD-10-CM

## 2025-01-29 DIAGNOSIS — M99.04 SEGMENTAL DYSFUNCTION OF SACRAL REGION: ICD-10-CM

## 2025-01-29 DIAGNOSIS — M99.01 SEGMENTAL DYSFUNCTION OF CERVICAL REGION: ICD-10-CM

## 2025-01-29 DIAGNOSIS — M99.02 SEGMENTAL DYSFUNCTION OF THORACIC REGION: ICD-10-CM

## 2025-01-29 DIAGNOSIS — M54.50 ACUTE BILATERAL LOW BACK PAIN WITHOUT SCIATICA: ICD-10-CM

## 2025-01-29 DIAGNOSIS — M99.03 SEGMENTAL DYSFUNCTION OF LUMBAR REGION: ICD-10-CM

## 2025-01-29 DIAGNOSIS — M54.16 LUMBAR RADICULOPATHY: ICD-10-CM

## 2025-01-29 DIAGNOSIS — M54.2 NECK PAIN: ICD-10-CM

## 2025-01-29 DIAGNOSIS — M54.6 ACUTE BILATERAL THORACIC BACK PAIN: ICD-10-CM

## 2025-01-29 DIAGNOSIS — M79.18 MYOFASCIAL PAIN: ICD-10-CM

## 2025-01-29 PROCEDURE — 98942 CHIROPRACTIC MANJ 5 REGIONS: CPT | Performed by: CHIROPRACTOR

## 2025-01-29 NOTE — PROGRESS NOTES
Date of first visit: 2/21/2023      HPI:  Lizzie returns to the office for treatment today of neck pain upper back pain lower back and left hip pain. No other medical history changes.      VPAS  4      The following portions of the patient's history were reviewed and updated as appropriate: allergies, current medications, past family history, past medical history, past social history, past surgical history, and problem list.    Review of Systems    Physical Exam:  Lizzie is in some minor distress today she moves slowly about the exam room her transfer is slowed range of motion reduced secondary to muscle tightness.    Pelvic obliquity noted elevated left versus right innominate misalignment of the left innominate on sacrum biomechanically joint dysfunction present left SI and L5-S1 motion unit active triggers in the left gluteus medius and TFL musculature.    Parathoracic hypertonicity noted with a T4-T5 segmental dysfunction.    Cervical range of motion reduced in left lateral bending right rotation joint dysfunction C5-C6 C1-C2    Assessment:   Diagnosis ICD-10-CM Associated Orders   1. Segmental dysfunction of pelvic region  M99.05       2. Segmental dysfunction of lumbar region  M99.03       3. Segmental dysfunction of sacral region  M99.04       4. Acute bilateral low back pain without sciatica  M54.50       5. Segmental dysfunction of thoracic region  M99.02       6. Acute bilateral thoracic back pain  M54.6       7. Neck pain  M54.2       8. Segmental dysfunction of cervical region  M99.01       9. Myofascial pain  M79.18       10. Lumbar radiculopathy  M54.16             Treatment: 16914  Manipulation to the left innominate, sacrum, L5 via Walker drop maneuver.  Manipulation T4 producing good joint release well-tolerated.  Manipulation C5 C1 well-tolerated via stairstepping maneuver.  No HVLA..    Discussion:  Continue her home stretching program icing to the left low back and hip we will see her back for  follow-up.  She will continue icing and stretching.

## 2025-02-18 ENCOUNTER — RA CDI HCC (OUTPATIENT)
Dept: OTHER | Facility: HOSPITAL | Age: 70
End: 2025-02-18

## 2025-02-18 ENCOUNTER — OFFICE VISIT (OUTPATIENT)
Dept: NEUROLOGY | Facility: CLINIC | Age: 70
End: 2025-02-18
Payer: MEDICARE

## 2025-02-18 VITALS
OXYGEN SATURATION: 96 % | WEIGHT: 260 LBS | TEMPERATURE: 98 F | HEART RATE: 75 BPM | SYSTOLIC BLOOD PRESSURE: 152 MMHG | DIASTOLIC BLOOD PRESSURE: 88 MMHG | BODY MASS INDEX: 43.32 KG/M2 | HEIGHT: 65 IN

## 2025-02-18 DIAGNOSIS — R25.1 TREMOR: Primary | ICD-10-CM

## 2025-02-18 DIAGNOSIS — G43.109 ACEPHALGIC MIGRAINE: ICD-10-CM

## 2025-02-18 PROCEDURE — G2211 COMPLEX E/M VISIT ADD ON: HCPCS | Performed by: PSYCHIATRY & NEUROLOGY

## 2025-02-18 PROCEDURE — 99212 OFFICE O/P EST SF 10 MIN: CPT | Performed by: PSYCHIATRY & NEUROLOGY

## 2025-02-18 NOTE — PROGRESS NOTES
Name: Rhianna Gerardo      : 1955      MRN: [id number]  Encounter Provider: Irish Barba MD  Encounter Date: 2025   Encounter department: [facility] [place]  :  Assessment & Plan  Tremor  Tremor triggered by Wellbutrin. She stopped this and they resolved. Tremor now only present with strenous activity. Likely medication induced tremor. Will continue to monitor.        Acephalgic migraine  Patient with visual disturbances with absence of head pain  She describes them as jagged lines mostly out of her left eye but can happen in both eyes. Sx usually occur in the am. Patient compliant with CPAP for her MIHAELA. Patient cleared by ophthalmology. MRI brain with expected changes due to aging and headaches. Patient today reports that these have not occurred anymore. She continues to have her regular chronic daily headaches. I encourage CPAP use and weight loss. Will continue to monitor. Follow up in 6 months.              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     Initial visit (2025):  Tremor  Patient reports having an occasional tremor of the left upper extremity that is most prominent when she picks up a heavy object or when she extends her arm such as when she is grabbing the steering wheel.  This occurs at least twice a month.  Last for 2 minutes.  She denies having a tremor at rest.  She endorses having neck pain.  Patient reports having weakness of the arm when she uses it for a long time.  Patient follows with chiropractor who does high speed neck manipulation.  Patient has been on metoprolol for at least 10 years.  She has been on  Wellbutrin over the past 3 to 4 months however of tremor was present prior to that.  Exam normal without any evidence of tremor.     At this time, it is possible that patient may have an essential tremor that is being masked by her use of metoprolol.  I will do an MRI of the brain to evaluate for any structural causes of tremor. Patient advised on keeping a log to try and identify a pattern. I discussed with her red flags. Will continue to monitor. Follow up in 4 months     Ocular migraine  Patient with visual disturbances without headaches throughout the first half of this year.  She reports seeing jagged lines mostly out of her left eye but can happen from both eyes.  Symptoms usually occurred when she woke up in the morning.  She denies any associated pain.  These stopped affecting patient in July.  Patient reports that she is compliant with her CPAP.  She follows up with sleep medicine every year around January or February.  Patient was seen by ophthalmology and cleared from their standpoint.     At this point, it is possible that patient may be having acephalgic migraines.  I would like to do an MRI of her head given that she has multiple vascular risk factors that are not under control including an LDL of 150.  Blood pressure today was 140/80.  I want to make sure that symptoms are not secondary to stroke.  MRI brain without contrast ordered    Workup:     MRI brain without contrast:   1. Trace, chronic microangiopathy.  2. No acute infarction, intracranial hemorrhage or mass effect.  3. Left mastoid air cell effusion.    Interval history:  Stopped the welbutrin and tremor improved.   Now only present if she has been doing strenous stuff like shoveling snow     No ocular migraines. Regular headaches can occur almost everyday   Still compliant with CPAP           Review of Systems   Constitutional:  Negative for appetite change, fatigue and fever.   HENT: Negative.  Negative for hearing loss, tinnitus, trouble  "swallowing and voice change.    Eyes: Negative.  Negative for photophobia, pain and visual disturbance.   Respiratory: Negative.  Negative for shortness of breath.    Cardiovascular: Negative.  Negative for palpitations.   Gastrointestinal: Negative.  Negative for nausea and vomiting.   Endocrine: Negative.  Negative for cold intolerance.   Genitourinary: Negative.  Negative for dysuria, frequency and urgency.   Musculoskeletal:  Negative for back pain, gait problem, myalgias, neck pain and neck stiffness.   Skin: Negative.  Negative for rash.   Allergic/Immunologic: Negative.    Neurological: Negative.  Negative for dizziness, tremors, seizures, syncope, facial asymmetry, speech difficulty, weakness, light-headedness, numbness and headaches.        Pt states her tremors has improved since the last visit. Also states she hasn't had any ocular Migraines since her last visit either. Would like to discuss MRI results in person.   Hematological: Negative.  Does not bruise/bleed easily.   Psychiatric/Behavioral: Negative.  Negative for confusion, hallucinations and sleep disturbance.    All other systems reviewed and are negative.   I have personally reviewed the MA's review of systems and made changes as necessary.         Objective   /88 (BP Location: Left arm, Patient Position: Sitting, Cuff Size: Standard)   Pulse 75   Temp 98 °F (36.7 °C) (Temporal)   Ht 5' 5\" (1.651 m)   Wt 118 kg (260 lb)   SpO2 96%   BMI 43.27 kg/m²     Physical Exam  Neurological Exam          "

## 2025-02-25 ENCOUNTER — OFFICE VISIT (OUTPATIENT)
Dept: INTERNAL MEDICINE CLINIC | Facility: CLINIC | Age: 70
End: 2025-02-25
Payer: MEDICARE

## 2025-02-25 VITALS
TEMPERATURE: 96.8 F | SYSTOLIC BLOOD PRESSURE: 138 MMHG | DIASTOLIC BLOOD PRESSURE: 88 MMHG | WEIGHT: 265 LBS | OXYGEN SATURATION: 98 % | BODY MASS INDEX: 44.1 KG/M2 | HEART RATE: 65 BPM

## 2025-02-25 DIAGNOSIS — E03.9 ACQUIRED HYPOTHYROIDISM: ICD-10-CM

## 2025-02-25 DIAGNOSIS — R73.01 IMPAIRED FASTING GLUCOSE: ICD-10-CM

## 2025-02-25 DIAGNOSIS — E66.01 MORBID OBESITY WITH BMI OF 40.0-44.9, ADULT (HCC): ICD-10-CM

## 2025-02-25 DIAGNOSIS — E78.00 PURE HYPERCHOLESTEROLEMIA: ICD-10-CM

## 2025-02-25 DIAGNOSIS — G47.33 OSA (OBSTRUCTIVE SLEEP APNEA): Primary | ICD-10-CM

## 2025-02-25 PROBLEM — N18.30 HYPERTENSIVE KIDNEY DISEASE WITH CHRONIC KIDNEY DISEASE STAGE III (HCC): Status: RESOLVED | Noted: 2024-10-15 | Resolved: 2025-02-25

## 2025-02-25 PROBLEM — E66.812 CLASS 2 SEVERE OBESITY DUE TO EXCESS CALORIES WITH SERIOUS COMORBIDITY AND BODY MASS INDEX (BMI) OF 38.0 TO 38.9 IN ADULT (HCC): Status: RESOLVED | Noted: 2023-04-26 | Resolved: 2025-02-25

## 2025-02-25 PROBLEM — I12.9 HYPERTENSIVE KIDNEY DISEASE WITH CHRONIC KIDNEY DISEASE STAGE III (HCC): Status: RESOLVED | Noted: 2024-10-15 | Resolved: 2025-02-25

## 2025-02-25 PROBLEM — N18.31 STAGE 3A CHRONIC KIDNEY DISEASE (HCC): Status: RESOLVED | Noted: 2023-10-17 | Resolved: 2025-02-25

## 2025-02-25 PROCEDURE — G2211 COMPLEX E/M VISIT ADD ON: HCPCS | Performed by: INTERNAL MEDICINE

## 2025-02-25 PROCEDURE — 99214 OFFICE O/P EST MOD 30 MIN: CPT | Performed by: INTERNAL MEDICINE

## 2025-02-25 NOTE — PROGRESS NOTES
Name: Rhianna Gerardo      : 1955      MRN: [id number]  Encounter Provider: Leandro Moreno MD  Encounter Date: 2025   [facility]: [facility]  :  Assessment & Plan  MIHAELA (obstructive sleep apnea)  With CPAP       Impaired fasting glucose  A1c continuing to improve, continue with healthy diet and exercise       Acquired hypothyroidism  Most recent thyroid function tests were normal, continue levothyroxine along with monitoring       Pure hypercholesterolemia  Continue with healthy diet and exercise       Morbid obesity with BMI of 40.0-44.9, adult (HCC)    Continue with healthy diet and exercise             Depression Screening and Follow-up Plan: Patient was screened for depression during today's encounter. They screened negative with a PHQ-2 score of 0.        History of Present Illness   Patient here for regular follow-up      Review of Systems   Constitutional:  Negative for chills, fatigue and fever.   HENT:  Negative for congestion, nosebleeds, postnasal drip, sore throat and trouble swallowing.    Eyes:  Negative for pain.   Respiratory:  Negative for cough, chest tightness, shortness of breath and wheezing.    Cardiovascular:  Negative for chest pain, palpitations and leg swelling.   Gastrointestinal:  Negative for abdominal pain, constipation, diarrhea, nausea and vomiting.   Endocrine: Negative for polydipsia and polyuria.   Genitourinary:  Negative for dysuria, flank pain and hematuria.   Musculoskeletal:  Negative for arthralgias, back pain and myalgias.   Skin:  Negative for rash.   Neurological:  Negative for dizziness, tremors, light-headedness and headaches.   Hematological:  Does not bruise/bleed easily.   Psychiatric/Behavioral:  Negative for confusion and dysphoric mood. The patient is not nervous/anxious.        Objective   /88   Pulse 65   Temp (!) 96.8 °F (36 °C) (Tympanic)   Wt 120 kg (265 lb)   SpO2 98%   BMI 44.10 kg/m²      Physical  Exam  Vitals reviewed.   Constitutional:       Appearance: Normal appearance. She is well-developed.   HENT:      Head: Normocephalic and atraumatic.      Right Ear: External ear normal.      Left Ear: External ear normal.      Nose:      Comments: Nasal mucosa a little swollen  Eyes:      General: No scleral icterus.     Conjunctiva/sclera: Conjunctivae normal.   Neck:      Thyroid: No thyromegaly.      Trachea: No tracheal deviation.   Cardiovascular:      Rate and Rhythm: Normal rate and regular rhythm.      Heart sounds: Normal heart sounds. No murmur heard.  Pulmonary:      Effort: No respiratory distress.      Breath sounds: Normal breath sounds. No wheezing or rales.   Abdominal:      General: Bowel sounds are normal.      Palpations: Abdomen is soft. There is no mass.      Tenderness: There is no abdominal tenderness. There is no guarding.   Musculoskeletal:      Cervical back: Normal range of motion and neck supple.      Right lower leg: No edema.      Left lower leg: No edema.   Lymphadenopathy:      Cervical: No cervical adenopathy.   Skin:     Coloration: Skin is not jaundiced or pale.   Neurological:      General: No focal deficit present.      Mental Status: She is alert and oriented to person, place, and time.   Psychiatric:         Mood and Affect: Mood normal.         Behavior: Behavior normal.         Thought Content: Thought content normal.         Judgment: Judgment normal.

## 2025-02-25 NOTE — PATIENT INSTRUCTIONS
Problem List Items Addressed This Visit          Respiratory    MIHAELA (obstructive sleep apnea) - Primary    With CPAP            Endocrine    Impaired fasting glucose    A1c continuing to improve, continue with healthy diet and exercise         Acquired hypothyroidism    Most recent thyroid function tests were normal, continue levothyroxine along with monitoring            Other    Pure hypercholesterolemia    Continue with healthy diet and exercise         Morbid obesity with BMI of 40.0-44.9, adult (HCC)      Continue with healthy diet and exercise

## 2025-02-27 ENCOUNTER — OFFICE VISIT (OUTPATIENT)
Dept: PODIATRY | Facility: CLINIC | Age: 70
End: 2025-02-27
Payer: MEDICARE

## 2025-02-27 VITALS — WEIGHT: 266.2 LBS | BODY MASS INDEX: 42.78 KG/M2 | HEIGHT: 66 IN

## 2025-02-27 DIAGNOSIS — M79.672 LEFT FOOT PAIN: Primary | ICD-10-CM

## 2025-02-27 DIAGNOSIS — M76.822 POSTERIOR TIBIAL TENDONITIS, LEFT: ICD-10-CM

## 2025-02-27 DIAGNOSIS — S90.219A SUBUNGUAL HEMATOMA OF GREAT TOE: ICD-10-CM

## 2025-02-27 DIAGNOSIS — M25.572 CHRONIC PAIN OF LEFT ANKLE: ICD-10-CM

## 2025-02-27 DIAGNOSIS — G89.29 CHRONIC PAIN OF LEFT ANKLE: ICD-10-CM

## 2025-02-27 DIAGNOSIS — M19.072 DJD (DEGENERATIVE JOINT DISEASE), ANKLE AND FOOT, LEFT: ICD-10-CM

## 2025-02-27 PROCEDURE — 99213 OFFICE O/P EST LOW 20 MIN: CPT | Performed by: PODIATRIST

## 2025-02-27 PROCEDURE — 20605 DRAIN/INJ JOINT/BURSA W/O US: CPT | Performed by: PODIATRIST

## 2025-02-27 RX ORDER — DEXAMETHASONE SODIUM PHOSPHATE 4 MG/ML
4 INJECTION, SOLUTION INTRA-ARTICULAR; INTRALESIONAL; INTRAMUSCULAR; INTRAVENOUS; SOFT TISSUE ONCE
Status: COMPLETED | OUTPATIENT
Start: 2025-02-27 | End: 2025-02-27

## 2025-02-27 RX ORDER — LIDOCAINE HYDROCHLORIDE 10 MG/ML
1 INJECTION, SOLUTION EPIDURAL; INFILTRATION; INTRACAUDAL; PERINEURAL ONCE
Status: COMPLETED | OUTPATIENT
Start: 2025-02-27 | End: 2025-02-27

## 2025-02-27 RX ADMIN — LIDOCAINE HYDROCHLORIDE 1 ML: 10 INJECTION, SOLUTION EPIDURAL; INFILTRATION; INTRACAUDAL; PERINEURAL at 10:59

## 2025-02-27 RX ADMIN — DEXAMETHASONE SODIUM PHOSPHATE 4 MG: 4 INJECTION, SOLUTION INTRA-ARTICULAR; INTRALESIONAL; INTRAMUSCULAR; INTRAVENOUS; SOFT TISSUE at 10:58

## 2025-02-27 NOTE — PROGRESS NOTES
Name: Rhianna Gerardo      : 1955      MRN: [id number]  Encounter Provider: Josafat Abraham DPM  Encounter Date: 2025   Encounter department: [facility] [place]    Assessment & Plan     1. Left foot pain  2. Chronic pain of left ankle  3. DJD (degenerative joint disease), ankle and foot, left  -     dexamethasone (DECADRON) injection 4 mg  -     lidocaine (PF) (XYLOCAINE-MPF) 1 % injection 1 mL  -     Medium joint arthrocentesis: L ankle  4. Posterior tibial tendonitis, left  5. Subungual hematoma of great toe      Continue conservative treatment noted to the left foot pain.    Will give her an injection today left ankle see how she responds to this.    I personally discussed with patient at the visit today:     The diagnosis of this encounter  2.   Possible etiologies of the diagnosis  3.   Treatment options including advantages and disadvantages of treatment with potential risks and complications associated with these treatments  4.   Prevention strategies and ways to decrease pain     I answered all of the patients questions.    Medium joint arthrocentesis: L ankle  Universal Protocol:  procedure performed by consultantConsent: Verbal consent obtained.  Risks and benefits: risks, benefits and alternatives were discussed  Consent given by: patient  Patient understanding: patient states understanding of the procedure being performed  Patient identity confirmed: verbally with patient  Supporting Documentation  Indications: pain, joint swelling and diagnostic evaluation   Procedure Details  Location: ankle - L ankle  Needle size: 25 G  Ultrasound guidance: no    Patient tolerance: patient tolerated the procedure well with no immediate complications  Dressing:  Sterile dressing applied            Return in about 3 months (around 2025).    Subjective     Patient returns for follow-up she still has pain and discomfort to the left ankle.  Denies any new acute  "changes.        Constitutional:  Negative for chills and fever.   Respiratory:  Negative for chest tightness and shortness of breath.    Gastrointestinal:  Negative for nausea and vomiting.     Current Outpatient Medications on File Prior to Visit   Medication Sig   • amLODIPine (NORVASC) 5 mg tablet TAKE 1 TABLET DAILY   • BEE POLLEN PO Take by mouth   • Calcium Carb-Cholecalciferol (CALCIUM + D3 PO) Take by mouth   • co-enzyme Q-10 30 MG capsule Take 100 mg by mouth 2 (two) times a day    • diphenhydrAMINE (BENADRYL) 25 mg capsule Take 25 mg by mouth as needed Takes as needed   • ibuprofen (MOTRIN) 800 mg tablet if needed Takes as needed.   • levothyroxine 50 mcg tablet TAKE 1 TABLET DAILY   • losartan (COZAAR) 100 MG tablet TAKE 1 TABLET DAILY   • Lutein 20 MG TABS Take by mouth   • metoprolol succinate (TOPROL-XL) 100 mg 24 hr tablet TAKE 1 TABLET DAILY   • methocarbamol (ROBAXIN) 500 mg tablet Take 500 mg by mouth 4 (four) times a day as needed       Objective     Ht 5' 6\" (1.676 m)   Wt 121 kg (266 lb 3.2 oz)   BMI 42.97 kg/m²     Left ankle tenderness on palpation there is no open ulcerations or lesions.  Intact pedal pulses.  Neurological sensation is grossly intact distally.  No other acute changes noted currently.  There is some tenderness noted on palpation along the posterior tibial tendon noted in the medial ankle.  Subungual hematoma noted to the great toe appears to be stable.  "

## 2025-03-05 ENCOUNTER — PROCEDURE VISIT (OUTPATIENT)
Age: 70
End: 2025-03-05
Payer: MEDICARE

## 2025-03-05 VITALS — HEIGHT: 66 IN | BODY MASS INDEX: 42.75 KG/M2 | WEIGHT: 266 LBS

## 2025-03-05 DIAGNOSIS — M54.16 LUMBAR RADICULOPATHY: ICD-10-CM

## 2025-03-05 DIAGNOSIS — M79.18 MYOFASCIAL PAIN: ICD-10-CM

## 2025-03-05 DIAGNOSIS — M99.05 SEGMENTAL DYSFUNCTION OF PELVIC REGION: Primary | ICD-10-CM

## 2025-03-05 DIAGNOSIS — M99.02 SEGMENTAL DYSFUNCTION OF THORACIC REGION: ICD-10-CM

## 2025-03-05 DIAGNOSIS — M99.03 SEGMENTAL DYSFUNCTION OF LUMBAR REGION: ICD-10-CM

## 2025-03-05 DIAGNOSIS — M54.6 ACUTE BILATERAL THORACIC BACK PAIN: ICD-10-CM

## 2025-03-05 DIAGNOSIS — M99.01 SEGMENTAL DYSFUNCTION OF CERVICAL REGION: ICD-10-CM

## 2025-03-05 DIAGNOSIS — M99.04 SEGMENTAL DYSFUNCTION OF SACRAL REGION: ICD-10-CM

## 2025-03-05 DIAGNOSIS — M54.50 ACUTE BILATERAL LOW BACK PAIN WITHOUT SCIATICA: ICD-10-CM

## 2025-03-05 DIAGNOSIS — M54.2 NECK PAIN: ICD-10-CM

## 2025-03-05 PROCEDURE — 98942 CHIROPRACTIC MANJ 5 REGIONS: CPT | Performed by: CHIROPRACTOR

## 2025-03-05 NOTE — PROGRESS NOTES
Date of first visit: 2/21/2023      HPI:  Lizzie returns to the office for treatment today of neck pain upper back pain lower back and left hip pain. No other medical history changes.      VPAS  3-4      The following portions of the patient's history were reviewed and updated as appropriate: allergies, current medications, past family history, past medical history, past social history, past surgical history, and problem list.    Review of Systems    Physical Exam:  Lizzie is in some minor distress today she moves slowly about the exam room her transfer is slowed range of motion reduced secondary to muscle tightness.    Pelvic obliquity noted elevated left versus right innominate misalignment of the left innominate on sacrum biomechanically joint dysfunction present left SI and L5-S1 motion unit active triggers in the left gluteus medius and TFL musculature.    Parathoracic hypertonicity noted with a T4-T5 segmental dysfunction.    Cervical range of motion reduced in left lateral bending right rotation joint dysfunction C5-C6 C1-C2    Assessment:   Diagnosis ICD-10-CM Associated Orders   1. Segmental dysfunction of pelvic region  M99.05       2. Segmental dysfunction of lumbar region  M99.03       3. Segmental dysfunction of sacral region  M99.04       4. Acute bilateral low back pain without sciatica  M54.50       5. Segmental dysfunction of thoracic region  M99.02       6. Acute bilateral thoracic back pain  M54.6       7. Neck pain  M54.2       8. Myofascial pain  M79.18       9. Segmental dysfunction of cervical region  M99.01       10. Lumbar radiculopathy  M54.16             Treatment: 67748  Manipulation to the left innominate, sacrum, L5 via Walker drop maneuver.  Manipulation T4 producing good joint release well-tolerated.  Manipulation C5 C1 well-tolerated via stairstepping maneuver.  No HVLA..    Discussion:  Continue her home stretching program icing to the left low back and hip we will see her back for  follow-up.  She will continue icing and stretching.

## 2025-03-11 ENCOUNTER — OFFICE VISIT (OUTPATIENT)
Dept: BARIATRICS | Facility: CLINIC | Age: 70
End: 2025-03-11
Payer: MEDICARE

## 2025-03-11 VITALS
BODY MASS INDEX: 42.33 KG/M2 | RESPIRATION RATE: 16 BRPM | HEIGHT: 66 IN | WEIGHT: 263.4 LBS | SYSTOLIC BLOOD PRESSURE: 122 MMHG | HEART RATE: 74 BPM | TEMPERATURE: 97.6 F | DIASTOLIC BLOOD PRESSURE: 79 MMHG

## 2025-03-11 DIAGNOSIS — G47.33 OSA (OBSTRUCTIVE SLEEP APNEA): ICD-10-CM

## 2025-03-11 DIAGNOSIS — I10 BENIGN ESSENTIAL HYPERTENSION: ICD-10-CM

## 2025-03-11 DIAGNOSIS — R73.03 PREDIABETES: ICD-10-CM

## 2025-03-11 DIAGNOSIS — E66.01 MORBID OBESITY WITH BMI OF 40.0-44.9, ADULT (HCC): Primary | ICD-10-CM

## 2025-03-11 DIAGNOSIS — E78.00 PURE HYPERCHOLESTEROLEMIA: ICD-10-CM

## 2025-03-11 PROCEDURE — G2211 COMPLEX E/M VISIT ADD ON: HCPCS | Performed by: PHYSICIAN ASSISTANT

## 2025-03-11 PROCEDURE — 99214 OFFICE O/P EST MOD 30 MIN: CPT | Performed by: PHYSICIAN ASSISTANT

## 2025-03-11 RX ORDER — TIRZEPATIDE 2.5 MG/.5ML
2.5 INJECTION, SOLUTION SUBCUTANEOUS WEEKLY
Qty: 2 ML | Refills: 0 | Status: SHIPPED | OUTPATIENT
Start: 2025-03-11 | End: 2025-04-08

## 2025-03-11 NOTE — ASSESSMENT & PLAN NOTE
On CPAP.  Home sleep apnea testing demonstrates a respiratory event index of 19.3.   To start on zepbound

## 2025-03-11 NOTE — ASSESSMENT & PLAN NOTE
-Patient is pursuing conservative plan . Doing meal replacements currently  -Initial weight loss goal of 5-10% weight loss for improved health    Initial:268.5 lbs   Current:263.4  Change:-5.1  Goal:180    Currently on metformin but not helpful for weight loss.  To stop and start on zepbound due to MIHAELA treated with CPAP.  To start on initial dose of medication and then to titrate as tolerated.  They have tried more than 6 months of lifestyle modifications including diet and activity changes and has had insignificant weight loss of less than 1 lb a week. Patient denies personal and family history of MCT and MEN2 tumors. Patient denies personal history of pancreatitis. Side effects discussed but not limited to diarrhea, bloating, constipation, GI upset, heartburn, increased heart rate, headache, low blood sugar, fatigue and dizziness. Titration and medication administration discussed.    To continue meal replacments and 400-500 calorie lunch.    Continue water intake and exercise

## 2025-03-11 NOTE — PROGRESS NOTES
Assessment/Plan:    Morbid obesity with BMI of 40.0-44.9, adult (HCC)  -Patient is pursuing conservative plan . Doing meal replacements currently  -Initial weight loss goal of 5-10% weight loss for improved health    Initial:268.5 lbs   Current:263.4  Change:-5.1  Goal:180    Currently on metformin but not helpful for weight loss.  To stop and start on zepbound due to MIHAELA treated with CPAP.  To start on initial dose of medication and then to titrate as tolerated.  They have tried more than 6 months of lifestyle modifications including diet and activity changes and has had insignificant weight loss of less than 1 lb a week. Patient denies personal and family history of MCT and MEN2 tumors. Patient denies personal history of pancreatitis. Side effects discussed but not limited to diarrhea, bloating, constipation, GI upset, heartburn, increased heart rate, headache, low blood sugar, fatigue and dizziness. Titration and medication administration discussed.    To continue meal replacments and 400-500 calorie lunch.    Continue water intake and exercise    MIHAELA (obstructive sleep apnea)  On CPAP.  Home sleep apnea testing demonstrates a respiratory event index of 19.3.   To start on zepbound    Benign essential hypertension  On norvasc, metoprolol and losartan  -should improve with weight loss, dietary, and lifestyle changes          Return in about 4 months (around 7/11/2025).       Diagnoses and all orders for this visit:    Morbid obesity with BMI of 40.0-44.9, adult (HCC)  -     tirzepatide (Zepbound) 2.5 mg/0.5 mL auto-injector; Inject 0.5 mL (2.5 mg total) under the skin once a week for 28 days  -     CBC and differential; Future  -     Comprehensive metabolic panel; Future  -     Hemoglobin A1C; Future  -     Lipid panel; Future    Pure hypercholesterolemia  -     tirzepatide (Zepbound) 2.5 mg/0.5 mL auto-injector; Inject 0.5 mL (2.5 mg total) under the skin once a week for 28 days  -     CBC and differential;  Future  -     Comprehensive metabolic panel; Future  -     Hemoglobin A1C; Future  -     Lipid panel; Future    MIHAELA (obstructive sleep apnea)  -     tirzepatide (Zepbound) 2.5 mg/0.5 mL auto-injector; Inject 0.5 mL (2.5 mg total) under the skin once a week for 28 days  -     CBC and differential; Future  -     Comprehensive metabolic panel; Future  -     Hemoglobin A1C; Future  -     Lipid panel; Future    Benign essential hypertension  -     tirzepatide (Zepbound) 2.5 mg/0.5 mL auto-injector; Inject 0.5 mL (2.5 mg total) under the skin once a week for 28 days  -     CBC and differential; Future  -     Comprehensive metabolic panel; Future  -     Hemoglobin A1C; Future  -     Lipid panel; Future    Prediabetes  -     tirzepatide (Zepbound) 2.5 mg/0.5 mL auto-injector; Inject 0.5 mL (2.5 mg total) under the skin once a week for 28 days  -     CBC and differential; Future  -     Comprehensive metabolic panel; Future  -     Hemoglobin A1C; Future  -     Lipid panel; Future          Subjective:   Chief Complaint   Patient presents with    Follow-up     MWM-6M F/u; Waist-47.5in        Patient ID: Rhianna Gerardo  is a 69 y.o. female with excess weight/obesity here to pursue weight managment.  Patient is pursuing Conservative Program.     HPI  On metformin and not feeling a change in appetite. Doing 2 meal replacements a day and lunch w/ snack.  Limiting dining out  Wt Readings from Last 10 Encounters:   03/11/25 119 kg (263 lb 6.4 oz)   03/05/25 121 kg (266 lb)   02/27/25 121 kg (266 lb 3.2 oz)   02/25/25 120 kg (265 lb)   02/18/25 118 kg (260 lb)   01/29/25 117 kg (257 lb)   01/08/25 117 kg (257 lb 12.8 oz)   12/19/24 116 kg (256 lb)   11/27/24 116 kg (256 lb)   11/20/24 112 kg (248 lb)       Food logging:  Increased appetite/cravings:  Exercise:3 x week gym, was bowling  Hydration:68 oz water, rare herbal tea      Meal replacement  Tovalo meals   Celery w/ carrot  Meal replacement  The following portions of the  patient's history were reviewed and updated as appropriate: She  has a past medical history of Anemia (A child on), Arthritis, Disease of thyroid gland (2 years ago), Environmental allergies, Fibroid, HL (hearing loss) (2021), Hyperlipidemia, Hypertension, Hyperthyroidism (4years), Hypothyroidism, Obesity (10 years), Plantar fasciitis, Scoliosis (2022), Varicella, and Visual impairment (2021).  She   Patient Active Problem List    Diagnosis Date Noted    Tremor 06/25/2024    Acephalgic migraine 06/25/2024    Preop examination 11/21/2023    Sensorineural hearing loss (SNHL) of both ears 06/13/2022    Pulsatile tinnitus 06/13/2022    Exostosis of left external auditory canal 06/13/2022    MIHAELA (obstructive sleep apnea)     Morbid obesity with BMI of 40.0-44.9, adult (HCC) 08/20/2021    Plantar fasciitis 02/06/2020    Acquired hypothyroidism 11/28/2018    Impaired fasting glucose 12/12/2016    Benign essential hypertension 08/09/2016    Pure hypercholesterolemia 08/09/2016     She  has a past surgical history that includes Knee surgery and Tubal ligation (1979).  Her family history includes Alcohol abuse in her brother; Arthritis in her mother; Bone cancer (age of onset: 69) in her maternal uncle; Coronary artery disease in her father and maternal uncle; Dementia in her mother; Endometrial cancer (age of onset: 50) in her maternal aunt; Hearing loss in her mother; Heart failure in her father; Hypertension in her mother; Lung cancer (age of onset: 60) in her maternal aunt; No Known Problems in her daughter, maternal aunt, maternal grandfather, maternal grandmother, paternal aunt, paternal grandfather, paternal grandmother, and son; Throat cancer (age of onset: 60) in her brother; Thyroid disease in her mother.  She  reports that she quit smoking about 21 years ago. Her smoking use included cigarettes. She started smoking about 55 years ago. She has a 8.5 pack-year smoking history. She has never used smokeless tobacco.  She reports that she does not currently use alcohol. She reports that she does not use drugs.  Current Outpatient Medications   Medication Sig Dispense Refill    amLODIPine (NORVASC) 5 mg tablet TAKE 1 TABLET DAILY 90 tablet 1    BEE POLLEN PO Take by mouth      Calcium Carb-Cholecalciferol (CALCIUM + D3 PO) Take by mouth      co-enzyme Q-10 30 MG capsule Take 100 mg by mouth 2 (two) times a day       diphenhydrAMINE (BENADRYL) 25 mg capsule Take 25 mg by mouth as needed Takes as needed      ibuprofen (MOTRIN) 800 mg tablet if needed Takes as needed.      levothyroxine 50 mcg tablet TAKE 1 TABLET DAILY 90 tablet 1    losartan (COZAAR) 100 MG tablet TAKE 1 TABLET DAILY 90 tablet 1    Lutein 20 MG TABS Take by mouth      metoprolol succinate (TOPROL-XL) 100 mg 24 hr tablet TAKE 1 TABLET DAILY 90 tablet 1    tirzepatide (Zepbound) 2.5 mg/0.5 mL auto-injector Inject 0.5 mL (2.5 mg total) under the skin once a week for 28 days 2 mL 0    methocarbamol (ROBAXIN) 500 mg tablet Take 500 mg by mouth 4 (four) times a day as needed       No current facility-administered medications for this visit.     Current Outpatient Medications on File Prior to Visit   Medication Sig    amLODIPine (NORVASC) 5 mg tablet TAKE 1 TABLET DAILY    BEE POLLEN PO Take by mouth    Calcium Carb-Cholecalciferol (CALCIUM + D3 PO) Take by mouth    co-enzyme Q-10 30 MG capsule Take 100 mg by mouth 2 (two) times a day     diphenhydrAMINE (BENADRYL) 25 mg capsule Take 25 mg by mouth as needed Takes as needed    ibuprofen (MOTRIN) 800 mg tablet if needed Takes as needed.    levothyroxine 50 mcg tablet TAKE 1 TABLET DAILY    losartan (COZAAR) 100 MG tablet TAKE 1 TABLET DAILY    Lutein 20 MG TABS Take by mouth    metoprolol succinate (TOPROL-XL) 100 mg 24 hr tablet TAKE 1 TABLET DAILY    methocarbamol (ROBAXIN) 500 mg tablet Take 500 mg by mouth 4 (four) times a day as needed     No current facility-administered medications on file prior to visit.     She is  "allergic to diclofenac and atorvastatin..    Review of Systems   Constitutional:  Negative for fever.   Respiratory:  Negative for shortness of breath.    Cardiovascular:  Negative for chest pain and palpitations.   Gastrointestinal:  Negative for abdominal pain, constipation, diarrhea and vomiting.   Genitourinary:  Negative for difficulty urinating.   Musculoskeletal:  Positive for arthralgias.   Skin:  Negative for rash.   Neurological:  Negative for headaches.   Psychiatric/Behavioral:  Negative for dysphoric mood. The patient is not nervous/anxious.        Objective:    /79   Pulse 74   Temp 97.6 °F (36.4 °C)   Resp 16   Ht 5' 6\" (1.676 m)   Wt 119 kg (263 lb 6.4 oz)   BMI 42.51 kg/m²      Physical Exam  Vitals and nursing note reviewed.   Constitutional:       General: She is not in acute distress.     Appearance: She is well-developed. She is obese.   HENT:      Head: Normocephalic and atraumatic.   Eyes:      Conjunctiva/sclera: Conjunctivae normal.   Neck:      Thyroid: No thyromegaly.   Pulmonary:      Effort: Pulmonary effort is normal. No respiratory distress.   Skin:     Findings: No rash (visible).   Neurological:      Mental Status: She is alert and oriented to person, place, and time.   Psychiatric:         Behavior: Behavior normal.         "

## 2025-03-14 ENCOUNTER — TELEPHONE (OUTPATIENT)
Dept: BARIATRICS | Facility: CLINIC | Age: 70
End: 2025-03-14

## 2025-03-14 NOTE — TELEPHONE ENCOUNTER
PA for Zepbound 2.5mg APPROVED     Date(s) approved 2/12/2025 - 3/14/2026    Case #    Patient advised by          []MyChart Message  [x]Phone call   []LMOM  []L/M to call office as no active Communication consent on file  []Unable to leave detailed message as VM not approved on Communication consent       Pharmacy advised by    [x]Fax  []Phone call  []Secure Chat    Specialty Pharmacy    []     Approval letter scanned into Media Yes

## 2025-03-14 NOTE — TELEPHONE ENCOUNTER
PA for Zepbound 2.5mg SUBMITTED to [facility]     via    [x]CMM-KEY: [id number]  []Surescripts-Case ID #   []Availity-Auth ID # NDC #   []Faxed to plan   []Other website   []Phone call Case ID #     []PA sent as URGENT    All office notes, labs and other pertaining documents and studies sent. Clinical questions answered. Awaiting determination from insurance company.     Turnaround time for your insurance to make a decision on your Prior Authorization can take 7-21 business days.

## 2025-03-31 NOTE — PROGRESS NOTES
"Weight Management Medical Nutrition Assessment  Rhianna is here with her  for 4 of 3 bundle. Current wt: 254.6   lbs. She has lost 3.2   lbs   x 3  months with overall loss of  13.6  lbs since May 2022. She started zepbound just under 1 month ago (~8 lb loss since then). Not as much hunger as prior but still there in the afternoon at times. No side effects other than burping depending on what she eats. Water intake is good. She has been going to the gym 3x/wk! Keep up the great work! She will f/u in 2 months.     Patient seen by Medical Provider in past 6 months:  yes  Requested to schedule appointment with Medical Provider: No    Anthropometric Measurements  Start Weight (#): 268.2 lbs 5/4/2022    Current Weight (#): 254.6  TBW % Change from start weight: 5.1%  Ideal Body Weight (#): 125 65\" (159.3 lbs BMI 25)  Goal Weight (#):  lbs     Weight Loss History  Previous weight loss attempts: Commercial Programs (Weight Watchers, [employer], etc.)  Counseling with  MD  Exercise  Meal Replacements (Medifast, Slim Fast, etc.)  Self Created Diets (Portion Control, Healthy Food Choices, etc.)    Food and Nutrition Related History  Wake up: 8-9   Bed Time: 11    Food Recall  Breakfast:  10:00  replacement    Snack:    Lunch: 12:00:  tovata meal (40 gm carb, 500-550 aprminder)   Snack 4:00: bar   Dinner: 6-7:00  replacement  Snack: skip    Beverages: water  Volume of beverage intake:  74 oz     Weekends: Same  Cravings: no specific identified   Trouble area of day: not identified     Frequency of Eating out: every 2 wks  Food restrictions:  n/a  Cooking: self   Food Shopping: self    Physical Activity Intake  Activity: gym: 20 minutes on bike, strength training, bowling  Frequency: 3x/wk gym, 1x/wk bowling      Physical limitations/barriers to exercise: back pain, foot     Estimated Needs   Energy  [address] [place] Energy Needs: BMR :  1699  1-2# loss weekly sedentary: 1038 - 1538      1-2# loss weekly lightly active: "  [id number]  Maintenance calories for sedentary activity level:  1938  Protein: 72-90 gm      (1.2-1.5g/kg IBW)  Total Fluid: 115 oz    (Kasia Segar method)  Free Fluid: 92 oz (Kasia Segar - 20%)    Nutrition Diagnosis  Yes;    Overweight/obesity  related to Excess energy intake as evidenced by  BMI more than normative standard for age and sex (obesity-grade III 40+)       Nutrition Intervention    Nutrition Prescription  Calories: 1,100 calories, <75g net CHO  Protein: 85-120g  Fluid: 80 oz    Meal Plan (Adrien/Pro/Carb)  Breakfast: 200, 27, 10  Snack:  Lunch: <500, 30-40, 10-30 (encouraged 300-400kcal)  Snack:  Dinner: 200, 27, 10  Snack: 160, 15, 13  If meal 300-400kcal can add in additional snack of 100-150kcal.    Nutrition Education:    Hydration  Protein intake     Nutrition Counseling:  Strategies: as above      Monitoring and Evaluation:  Evaluation criteria:  Energy Intake  Meet protein needs  Maintain adequate hydration  Monitor weekly weight  Food logging/measuring  Physical activity   Meal planning    Barriers to learning:none  Readiness to change: Action:  (Changing behavior)     Comprehension: very good  Expected Compliance: good

## 2025-04-02 DIAGNOSIS — E66.01 MORBID OBESITY WITH BMI OF 40.0-44.9, ADULT (HCC): Primary | ICD-10-CM

## 2025-04-02 DIAGNOSIS — E78.00 PURE HYPERCHOLESTEROLEMIA: ICD-10-CM

## 2025-04-02 DIAGNOSIS — G47.33 OSA (OBSTRUCTIVE SLEEP APNEA): ICD-10-CM

## 2025-04-02 RX ORDER — TIRZEPATIDE 5 MG/.5ML
5 INJECTION, SOLUTION SUBCUTANEOUS WEEKLY
Qty: 2 ML | Refills: 0 | Status: SHIPPED | OUTPATIENT
Start: 2025-04-02 | End: 2025-05-28

## 2025-04-09 ENCOUNTER — CLINICAL SUPPORT (OUTPATIENT)
Dept: BARIATRICS | Facility: CLINIC | Age: 70
End: 2025-04-09

## 2025-04-09 VITALS — WEIGHT: 254.6 LBS | BODY MASS INDEX: 39.96 KG/M2 | HEIGHT: 67 IN

## 2025-04-09 DIAGNOSIS — R63.5 ABNORMAL WEIGHT GAIN: Primary | ICD-10-CM

## 2025-04-09 DIAGNOSIS — I10 BENIGN ESSENTIAL HYPERTENSION: ICD-10-CM

## 2025-04-09 DIAGNOSIS — E03.9 ACQUIRED HYPOTHYROIDISM: ICD-10-CM

## 2025-04-09 PROCEDURE — RECHECK

## 2025-04-09 RX ORDER — LOSARTAN POTASSIUM 100 MG/1
100 TABLET ORAL DAILY
Qty: 90 TABLET | Refills: 1 | Status: SHIPPED | OUTPATIENT
Start: 2025-04-09

## 2025-04-09 RX ORDER — LEVOTHYROXINE SODIUM 50 UG/1
50 TABLET ORAL DAILY
Qty: 90 TABLET | Refills: 1 | Status: SHIPPED | OUTPATIENT
Start: 2025-04-09

## 2025-04-10 ENCOUNTER — OFFICE VISIT (OUTPATIENT)
Dept: SLEEP CENTER | Facility: CLINIC | Age: 70
End: 2025-04-10
Payer: MEDICARE

## 2025-04-10 VITALS
DIASTOLIC BLOOD PRESSURE: 78 MMHG | HEIGHT: 66 IN | SYSTOLIC BLOOD PRESSURE: 126 MMHG | WEIGHT: 258.4 LBS | BODY MASS INDEX: 41.53 KG/M2

## 2025-04-10 DIAGNOSIS — E66.813 CLASS 3 SEVERE OBESITY WITH BODY MASS INDEX (BMI) OF 40.0 TO 44.9 IN ADULT, UNSPECIFIED OBESITY TYPE, UNSPECIFIED WHETHER SERIOUS COMORBIDITY PRESENT: ICD-10-CM

## 2025-04-10 DIAGNOSIS — J30.9 ALLERGIC RHINITIS, UNSPECIFIED SEASONALITY, UNSPECIFIED TRIGGER: ICD-10-CM

## 2025-04-10 DIAGNOSIS — G47.33 OSA (OBSTRUCTIVE SLEEP APNEA): Primary | ICD-10-CM

## 2025-04-10 PROCEDURE — G2211 COMPLEX E/M VISIT ADD ON: HCPCS | Performed by: INTERNAL MEDICINE

## 2025-04-10 PROCEDURE — 99213 OFFICE O/P EST LOW 20 MIN: CPT | Performed by: INTERNAL MEDICINE

## 2025-04-10 NOTE — PROGRESS NOTES
Name: Rhianna Gerardo      : 1955      MRN: [id number]  Encounter Provider: Juan Beach MD  Encounter Date: 4/10/2025   Encounter department: [facility] SLEEP MEDICINE [place]  :  Assessment & Plan  MIHAELA (obstructive sleep apnea)  HSAT 2022 ABHILASH 23.4  HSAT 2022 ABHILASH 19.3 - with mandibular advancement device    Tried oral mouthpiece - which was ineffective so went back to CPAP.  Using a DreamStation which was replaced through the recall process  Currently fully compliant with CPAP and deriving symptomatic benefit.  CPAP does help with nasal congestion as well during sleep    Compliance data  100%, 100%>4 hours, 30 days  Average use 9 hours 35 minutes  CPAP 16cm H2O  22 sec large leak  AHI 0.6, YOLANDA 0    Did not tolerate fullface mask so using a nasal cradle  Nasal cradle, supply prescription written, DME adapt  Follow-up in 1 year  It is possible that she may need lower CPAP pressures with Zepbound weight loss.  If she feels that 16 cm H2O fixed pressure becomes too high at some point before I see her she can send us a message and I can lower her pressures remotely  Orders:    PAP DME Resupply/Reorder    Allergic rhinitis, unspecified seasonality, unspecified trigger  Significant nasal congestion worsened by allergy season  Uses Flonase and Vicks as needed  CPAP pressure helps with nasal congestion  Takes loratadine during allergy season       Class 3 severe obesity with body mass index (BMI) of 40.0 to 44.9 in adult, unspecified obesity type, unspecified whether serious comorbidity present (HCC)  Started Zepbound with bariatrics.  On 5 mg with no significant side effects  We will monitor her weight loss over the next year.             History of Present Illness   HPI   69-year-old female with hypothyroidism, hypertension, tremor, obesity on Zepbound who is presenting for follow-up of moderate to severe MIHAELA on CPAP    4/10/25 - Follow up -doing well with CPAP with no significant side effects.  Using  nasal cradles at 16 cm H2O.  She tolerates the pressure pretty well because this pressure helps her nose stay open overnight.  During allergy season she has significant nasal congestion.  She takes Claritin daily and be pollen during allergy season.  Occasionally she may use Flonase and Synex/Vicks if nasal congestion is really bad.  Sleep quality is decent on most nights she only wakes up about once or twice and able to fall back asleep pretty easily.  [place] 5.  Sleeps approximately between 11 PM to 8:30 AM.    Recently started on Zepbound.  Just moved to 5 mg.  No significant side effects.  Already losing 8 to 9 pounds.  Still maintaining a very good diet with calorie counting.    [id number] ([place])-he was to follow-up with Dr. Medina.  Consistent with CPAP and been using it for many years with improvement in sleep quality and excessive daytime sleepiness.  Machine was replaced by Lala through the recall process.            Sitting and reading: (Patient-Rptd) (P) Slight chance of dozing  Watching TV: (Patient-Rptd) (P) Would never doze  Sitting, inactive in a public place (e.g. a theatre or a meeting): (Patient-Rptd) (P) Slight chance of dozing  As a passenger in a car for an hour without a break: (Patient-Rptd) (P) Slight chance of dozing  Lying down to rest in the afternoon when circumstances permit: (Patient-Rptd) (P) Slight chance of dozing  Sitting and talking to someone: (Patient-Rptd) (P) Would never doze  Sitting quietly after a lunch without alcohol: (Patient-Rptd) (P) Slight chance of dozing  In a car, while stopped for a few minutes in traffic: (Patient-Rptd) (P) Would never doze  Total score: (Patient-Rptd) (P) 5     Review of Systems  Pertinent positives/negatives included in HPI and also as noted:     Past Medical History   Past Medical History:   Diagnosis Date    Anemia A child on    Arthritis     Disease of thyroid gland 2 years ago    medication    Environmental allergies     Fibroid      HL (hearing loss)     Hyperlipidemia     Hypertension     Hyperthyroidism 4years    Hypothyroidism     Obesity 10 years    In my fifties    Plantar fasciitis     Scoliosis     Varicella     Visual impairment      Past Surgical History:   Procedure Laterality Date    KNEE SURGERY      TUBAL LIGATION       Family History   Problem Relation Age of Onset    Hypertension Mother     Dementia Mother             Arthritis Mother             Thyroid disease Mother             Hearing loss Mother             Coronary artery disease Father     Heart failure Father             Throat cancer Brother 60    Endometrial cancer Maternal Aunt 50    Lung cancer Maternal Aunt 60    No Known Problems Maternal Aunt     Bone cancer Maternal Uncle 69    Coronary artery disease Maternal Uncle     No Known Problems Paternal Aunt     No Known Problems Maternal Grandmother     No Known Problems Maternal Grandfather     No Known Problems Paternal Grandmother     No Known Problems Paternal Grandfather     No Known Problems Daughter     No Known Problems Son     Alcohol abuse Brother               reports that she quit smoking about 21 years ago. Her smoking use included cigarettes. She started smoking about 55 years ago. She has a 8.5 pack-year smoking history. She has never used smokeless tobacco. She reports that she does not currently use alcohol. She reports that she does not use drugs.  Current Outpatient Medications   Medication Instructions    amLODIPine (NORVASC) 5 mg, Oral, Daily    BEE POLLEN PO Take by mouth    Calcium Carb-Cholecalciferol (CALCIUM + D3 PO) Take by mouth    co-enzyme Q-10 100 mg, 2 times daily    diphenhydrAMINE (BENADRYL) 25 mg, As needed    ibuprofen (MOTRIN) 800 mg tablet As needed    levothyroxine 50 mcg, Oral, Daily    losartan (COZAAR) 100 mg, Oral, Daily    Lutein 20 MG TABS Take by mouth    methocarbamol (ROBAXIN) 500 mg, Oral, 4 times daily PRN  "   metoprolol succinate (TOPROL-XL) 100 mg, Oral, Daily    Zepbound 5 mg, Subcutaneous, Weekly     Allergies   Allergen Reactions    Diclofenac Swelling    Atorvastatin Other (See Comments)     Muscle ache      Objective   /78   Ht 5' 6\" (1.676 m)   Wt 117 kg (258 lb 6.4 oz)   BMI 41.71 kg/m²        Physical Exam  Constitutional:       General: She is not in acute distress.     Appearance: Normal appearance. She is not ill-appearing, toxic-appearing or diaphoretic.   Eyes:      General: No scleral icterus.     Extraocular Movements: Extraocular movements intact.   Cardiovascular:      Rate and Rhythm: Normal rate.   Pulmonary:      Effort: Pulmonary effort is normal. No respiratory distress.      Breath sounds: Normal breath sounds.   Abdominal:      Tenderness: There is no guarding.   Musculoskeletal:         General: Normal range of motion.      Cervical back: Normal range of motion and neck supple. No rigidity.      Right lower leg: No edema.      Left lower leg: No edema.   Neurological:      General: No focal deficit present.      Mental Status: She is alert and oriented to person, place, and time.         Data  Lab Results   Component Value Date    HGB 12.7 01/23/2025    HCT 40.6 01/23/2025    MCV 89 01/23/2025      Lab Results   Component Value Date    GLUCOSE 93 03/02/2015    CALCIUM 9.1 01/23/2025     09/08/2017    K 3.8 01/23/2025    CO2 27 01/23/2025     01/23/2025    BUN 22 01/23/2025    CREATININE 0.86 01/23/2025     No results found for: \"IRON\", \"TIBC\", \"FERRITIN\"  Lab Results   Component Value Date    AST 11 (L) 01/23/2025    ALT 11 01/23/2025         "

## 2025-04-10 NOTE — ASSESSMENT & PLAN NOTE
HSAT 1/2022 ABHILASH 23.4  HSAT 8/2022 ABHILASH 19.3 - with mandibular advancement device    Tried oral mouthpiece - which was ineffective so went back to CPAP.  Using a DreamStation which was replaced through the recall process  Currently fully compliant with CPAP and deriving symptomatic benefit.  CPAP does help with nasal congestion as well during sleep    Compliance data  100%, 100%>4 hours, 30 days  Average use 9 hours 35 minutes  CPAP 16cm H2O  22 sec large leak  AHI 0.6, YOLANDA 0    Did not tolerate fullface mask so using a nasal cradle  Nasal cradle, supply prescription written, DME adapt  Follow-up in 1 year  It is possible that she may need lower CPAP pressures with Zepbound weight loss.  If she feels that 16 cm H2O fixed pressure becomes too high at some point before I see her she can send us a message and I can lower her pressures remotely  Orders:    PAP DME Resupply/Reorder

## 2025-04-11 ENCOUNTER — TELEPHONE (OUTPATIENT)
Dept: SLEEP CENTER | Facility: CLINIC | Age: 70
End: 2025-04-11

## 2025-04-12 NOTE — ASSESSMENT & PLAN NOTE
Tremor triggered by Wellbutrin. She stopped this and they resolved. Tremor now only present with strenous activity. Likely medication induced tremor. Will continue to monitor.

## 2025-04-12 NOTE — ASSESSMENT & PLAN NOTE
Patient with visual disturbances with absence of head pain  She describes them as jagged lines mostly out of her left eye but can happen in both eyes. Sx usually occur in the am. Patient compliant with CPAP for her MIHAELA. Patient cleared by ophthalmology. MRI brain with expected changes due to aging and headaches. Patient today reports that these have not occurred anymore. She continues to have her regular chronic daily headaches. I encourage CPAP use and weight loss. Will continue to monitor. Follow up in 6 months.

## 2025-04-14 LAB

## 2025-04-15 ENCOUNTER — PROCEDURE VISIT (OUTPATIENT)
Age: 70
End: 2025-04-15
Payer: MEDICARE

## 2025-04-15 VITALS — BODY MASS INDEX: 41.46 KG/M2 | WEIGHT: 258 LBS | HEIGHT: 66 IN

## 2025-04-15 DIAGNOSIS — M99.05 SEGMENTAL DYSFUNCTION OF PELVIC REGION: Primary | ICD-10-CM

## 2025-04-15 DIAGNOSIS — M99.04 SEGMENTAL DYSFUNCTION OF SACRAL REGION: ICD-10-CM

## 2025-04-15 DIAGNOSIS — M54.50 ACUTE BILATERAL LOW BACK PAIN WITHOUT SCIATICA: ICD-10-CM

## 2025-04-15 DIAGNOSIS — M99.03 SEGMENTAL DYSFUNCTION OF LUMBAR REGION: ICD-10-CM

## 2025-04-15 DIAGNOSIS — M99.02 SEGMENTAL DYSFUNCTION OF THORACIC REGION: ICD-10-CM

## 2025-04-15 DIAGNOSIS — M79.18 MYOFASCIAL PAIN: ICD-10-CM

## 2025-04-15 DIAGNOSIS — M54.16 LUMBAR RADICULOPATHY: ICD-10-CM

## 2025-04-15 DIAGNOSIS — M99.01 SEGMENTAL DYSFUNCTION OF CERVICAL REGION: ICD-10-CM

## 2025-04-15 DIAGNOSIS — M54.2 NECK PAIN: ICD-10-CM

## 2025-04-15 DIAGNOSIS — M54.6 ACUTE BILATERAL THORACIC BACK PAIN: ICD-10-CM

## 2025-04-15 PROCEDURE — 98942 CHIROPRACTIC MANJ 5 REGIONS: CPT | Performed by: CHIROPRACTOR

## 2025-04-15 NOTE — PROGRESS NOTES
Date of first visit: 2/21/2023      HPI:  Lizzie returns to the office for treatment today of neck pain upper back pain lower back and left hip pain. No other medical history changes.      VPAS  2-4      The following portions of the patient's history were reviewed and updated as appropriate: allergies, current medications, past family history, past medical history, past social history, past surgical history, and problem list.    Review of Systems    Physical Exam:  Lizzie is in some minor distress today she moves slowly about the exam room her transfer is slowed range of motion reduced secondary to muscle tightness.    Pelvic obliquity noted elevated left versus right innominate misalignment of the left innominate on sacrum biomechanically joint dysfunction present left SI and L5-S1 motion unit active triggers in the left gluteus medius and TFL musculature.    Parathoracic hypertonicity noted with a T4-T5 segmental dysfunction.    Cervical range of motion reduced in left lateral bending right rotation joint dysfunction C5-C6 C1-C2    Assessment:   Diagnosis ICD-10-CM Associated Orders   1. Segmental dysfunction of pelvic region  M99.05       2. Segmental dysfunction of lumbar region  M99.03       3. Segmental dysfunction of sacral region  M99.04       4. Acute bilateral low back pain without sciatica  M54.50       5. Segmental dysfunction of thoracic region  M99.02       6. Acute bilateral thoracic back pain  M54.6       7. Neck pain  M54.2       8. Myofascial pain  M79.18       9. Segmental dysfunction of cervical region  M99.01       10. Lumbar radiculopathy  M54.16             Treatment: 42166  Manipulation to the left innominate, sacrum, L5 via Walker drop maneuver.  Manipulation T4 producing good joint release well-tolerated.  Manipulation C5 C1 well-tolerated via stairstepping maneuver.  No HVLA..    Discussion:  Continue her home stretching program icing to the left low back and hip we will see her back for  follow-up.  She will continue icing and stretching.

## 2025-04-30 DIAGNOSIS — E66.01 MORBID OBESITY WITH BMI OF 40.0-44.9, ADULT (HCC): ICD-10-CM

## 2025-04-30 DIAGNOSIS — E78.00 PURE HYPERCHOLESTEROLEMIA: ICD-10-CM

## 2025-04-30 DIAGNOSIS — G47.33 OSA (OBSTRUCTIVE SLEEP APNEA): ICD-10-CM

## 2025-04-30 RX ORDER — TIRZEPATIDE 5 MG/.5ML
5 INJECTION, SOLUTION SUBCUTANEOUS WEEKLY
Qty: 6 ML | Refills: 0 | Status: SHIPPED | OUTPATIENT
Start: 2025-04-30 | End: 2025-06-25

## 2025-05-22 ENCOUNTER — PROCEDURE VISIT (OUTPATIENT)
Age: 70
End: 2025-05-22
Payer: MEDICARE

## 2025-05-22 VITALS — WEIGHT: 240 LBS | HEIGHT: 67 IN | BODY MASS INDEX: 37.67 KG/M2

## 2025-05-22 DIAGNOSIS — M99.04 SEGMENTAL DYSFUNCTION OF SACRAL REGION: ICD-10-CM

## 2025-05-22 DIAGNOSIS — M99.05 SEGMENTAL DYSFUNCTION OF PELVIC REGION: Primary | ICD-10-CM

## 2025-05-22 DIAGNOSIS — M99.01 SEGMENTAL DYSFUNCTION OF CERVICAL REGION: ICD-10-CM

## 2025-05-22 DIAGNOSIS — M99.03 SEGMENTAL DYSFUNCTION OF LUMBAR REGION: ICD-10-CM

## 2025-05-22 DIAGNOSIS — M79.18 MYOFASCIAL PAIN: ICD-10-CM

## 2025-05-22 DIAGNOSIS — M99.02 SEGMENTAL DYSFUNCTION OF THORACIC REGION: ICD-10-CM

## 2025-05-22 DIAGNOSIS — M54.2 NECK PAIN: ICD-10-CM

## 2025-05-22 DIAGNOSIS — M54.50 ACUTE BILATERAL LOW BACK PAIN WITHOUT SCIATICA: ICD-10-CM

## 2025-05-22 DIAGNOSIS — M54.6 ACUTE BILATERAL THORACIC BACK PAIN: ICD-10-CM

## 2025-05-22 DIAGNOSIS — M54.16 LUMBAR RADICULOPATHY: ICD-10-CM

## 2025-05-22 PROCEDURE — 98942 CHIROPRACTIC MANJ 5 REGIONS: CPT | Performed by: CHIROPRACTOR

## 2025-05-22 NOTE — PROGRESS NOTES
Date of first visit: 2/21/2023        Assessment:   Diagnosis ICD-10-CM Associated Orders   1. Segmental dysfunction of pelvic region  M99.05       2. Segmental dysfunction of lumbar region  M99.03       3. Segmental dysfunction of sacral region  M99.04       4. Acute bilateral low back pain without sciatica  M54.50       5. Segmental dysfunction of thoracic region  M99.02       6. Acute bilateral thoracic back pain  M54.6       7. Neck pain  M54.2       8. Myofascial pain  M79.18       9. Segmental dysfunction of cervical region  M99.01       10. Lumbar radiculopathy  M54.16             Treatment: 18189  Manipulation to the left innominate, sacrum, L5 via Walker drop maneuver.  Manipulation T4 producing good joint release well-tolerated.  Manipulation C5 C1 well-tolerated via stairstepping maneuver.  No HVLA..    Discussion:  Continue her home stretching program icing to the left low back and hip we will see her back for follow-up.  She will continue icing and stretching.        HPI:  Lizzie returns to the office for treatment today of neck pain upper back pain lower back and left hip pain. No other medical history changes.      VPAS  3-4      The following portions of the patient's history were reviewed and updated as appropriate: allergies, current medications, past family history, past medical history, past social history, past surgical history, and problem list.    Review of Systems    Physical Exam:  Lizzie is in some minor distress today she moves slowly about the exam room her transfer is slowed range of motion reduced secondary to muscle tightness.    Pelvic obliquity noted elevated left versus right innominate misalignment of the left innominate on sacrum biomechanically joint dysfunction present left SI and L5-S1 motion unit active triggers in the left gluteus medius and TFL musculature.    Parathoracic hypertonicity noted with a T4-T5 segmental dysfunction.    Cervical range of motion reduced in left lateral  bending right rotation joint dysfunction C5-C6 C1-C2

## 2025-05-27 ENCOUNTER — RA CDI HCC (OUTPATIENT)
Dept: OTHER | Facility: HOSPITAL | Age: 70
End: 2025-05-27

## 2025-05-28 ENCOUNTER — OFFICE VISIT (OUTPATIENT)
Dept: PODIATRY | Facility: CLINIC | Age: 70
End: 2025-05-28
Payer: MEDICARE

## 2025-05-28 VITALS — HEIGHT: 66 IN | BODY MASS INDEX: 39.05 KG/M2 | WEIGHT: 243 LBS

## 2025-05-28 DIAGNOSIS — Q74.2 ACCESSORY NAVICULAR BONE OF LEFT FOOT: ICD-10-CM

## 2025-05-28 DIAGNOSIS — M76.822 POSTERIOR TIBIAL TENDONITIS, LEFT: Primary | ICD-10-CM

## 2025-05-28 DIAGNOSIS — M19.072 DJD (DEGENERATIVE JOINT DISEASE), ANKLE AND FOOT, LEFT: ICD-10-CM

## 2025-05-28 DIAGNOSIS — M79.672 LEFT FOOT PAIN: ICD-10-CM

## 2025-05-28 PROCEDURE — 20551 NJX 1 TENDON ORIGIN/INSJ: CPT | Performed by: PODIATRIST

## 2025-05-28 PROCEDURE — 99213 OFFICE O/P EST LOW 20 MIN: CPT | Performed by: PODIATRIST

## 2025-05-28 RX ORDER — DEXAMETHASONE SODIUM PHOSPHATE 4 MG/ML
4 INJECTION, SOLUTION INTRA-ARTICULAR; INTRALESIONAL; INTRAMUSCULAR; INTRAVENOUS; SOFT TISSUE ONCE
Status: COMPLETED | OUTPATIENT
Start: 2025-05-28 | End: 2025-05-28

## 2025-05-28 RX ORDER — LIDOCAINE HYDROCHLORIDE 10 MG/ML
1 INJECTION, SOLUTION EPIDURAL; INFILTRATION; INTRACAUDAL; PERINEURAL ONCE
Status: COMPLETED | OUTPATIENT
Start: 2025-05-28 | End: 2025-05-28

## 2025-05-28 RX ADMIN — LIDOCAINE HYDROCHLORIDE 1 ML: 10 INJECTION, SOLUTION EPIDURAL; INFILTRATION; INTRACAUDAL; PERINEURAL at 11:13

## 2025-05-28 RX ADMIN — DEXAMETHASONE SODIUM PHOSPHATE 4 MG: 4 INJECTION, SOLUTION INTRA-ARTICULAR; INTRALESIONAL; INTRAMUSCULAR; INTRAVENOUS; SOFT TISSUE at 11:13

## 2025-05-28 NOTE — PROGRESS NOTES
Name: Rhianna Gerardo      : 1955      MRN: [id number]  Encounter Provider: Josafat Abraham DPM  Encounter Date: 2025   Encounter department: [facility] [place]  :  Assessment & Plan  Posterior tibial tendonitis, left    Orders:  •  MRI ankle/heel left wo contrast; Future  •  dexamethasone (DECADRON) injection 4 mg  •  lidocaine (PF) (XYLOCAINE-MPF) 1 % injection 1 mL  •  Foot/lower extremity injection    Left foot pain    Orders:  •  MRI ankle/heel left wo contrast; Future  •  dexamethasone (DECADRON) injection 4 mg  •  lidocaine (PF) (XYLOCAINE-MPF) 1 % injection 1 mL  •  Foot/lower extremity injection    DJD (degenerative joint disease), ankle and foot, left    Orders:  •  MRI ankle/heel left wo contrast; Future    Accessory navicular bone of left foot    Injection completed today to the left foot at the level of the accessory navicular.    We can use this to differentiate her pain at the level of the navicular versus the ankle joint as she has pain at both areas.    If she does not have improvement and continues to have recurrence of pain discussed with her options of surgical management for ankle joint arthroscopy and evaluation of accessory navicular with possible modified Kidner procedure if needed.    She has tried orthotics, discussed with her options of formal physical therapy she does not wish to do PT.         Foot/lower extremity injection    Performed by: Josafat Abraham DPM  Authorized by: Josafat Abraham DPM    Procedure:     Other Assisting Provider: No      Verbal consent obtained?: Yes      Risks and benefits: Risks, benefits and alternatives were discussed      Consent given by:  Patient    Time out: Immediately prior to the procedure a time out was called      Patient states understanding of procedure being performed: Yes      Patient identity confirmed:  Verbally with patient    Supporting Documentation:     Indications:  Pain and tendon  "swelling    Procedure Details:    Prep: patient was prepped and draped in usual sterile fashion                Ethyl Chloride was applied      Needle size: 25 G G    Ultrasound Guidance: no      Approach:  Medial    Laterality:  Left    Cyst Aspiration/Injection: No      Location: tendon origin      Tendon Structures: Tibalis Posterior      Injection Information:       Patient tolerance:  Patient tolerated the procedure well with no immediate complications    Dressing: sterile dressing applied          History of Present Illness   HPI  Rhianna Gerardo is a 69 y.o. female who presents for for evaluation of left ankle pain.  Had improvement in pain from the injection to the ankle.   Patient continues to have pain and discomfort noted at the level of the insertion point of the posterior tibial tendon at the level of the navicular tuberosity.  She denies any trauma to the area.  Has been using orthotics.          Review of Systems       Objective   Ht 5' 6\" (1.676 m) Comment: verbal  Wt 110 kg (243 lb)   BMI 39.22 kg/m²      Physical Exam    Vascular: Intact pedal pulses bilateral DP and PT.  Neurological: Gross protective sensation intact bilateral  Musculoskeletal: Muscle strength bilateral intact with dorsiflexion, inversion, eversion and plantarflexion.  Pain on palpation to the accessory navicular at the medial aspect of the navicular tuberosity.  There is also tenderness on palpation at the level of medial ankle joint line.  There is intact muscle strength of the posterior tibial tendon noted on examination. There is some tenderness along the course of the posterior tibial tendon.  Some mild swelling noted at the insertion point of the posterior tibial tendon.  Dermatological: No open lesions or ulcerations noted bilateral.        " Hydroquinone Counseling:  Patient advised that medication may result in skin irritation, lightening (hypopigmentation), dryness, and burning.  In the event of skin irritation, the patient was advised to reduce the amount of the drug applied or use it less frequently.  Rarely, spots that are treated with hydroquinone can become darker (pseudoochronosis).  Should this occur, patient instructed to stop medication and call the office. The patient verbalized understanding of the proper use and possible adverse effects of hydroquinone.  All of the patient's questions and concerns were addressed. Skyrizi Pregnancy And Lactation Text: The risk during pregnancy and breastfeeding is uncertain with this medication. Infliximab Counseling:  I discussed with the patient the risks of infliximab including but not limited to myelosuppression, immunosuppression, autoimmune hepatitis, demyelinating diseases, lymphoma, and serious infections.  The patient understands that monitoring is required including a PPD at baseline and must alert us or the primary physician if symptoms of infection or other concerning signs are noted. Calcipotriene Pregnancy And Lactation Text: This medication has not been proven safe during pregnancy. It is unknown if this medication is excreted in breast milk. Wartpeel Counseling:  I discussed with the patient the risks of Wartpeel including but not limited to erythema, scaling, itching, weeping, crusting, and pain. Hydroxychloroquine Counseling:  I discussed with the patient that a baseline ophthalmologic exam is needed at the start of therapy and every year thereafter while on therapy. A CBC may also be warranted for monitoring.  The side effects of this medication were discussed with the patient, including but not limited to agranulocytosis, aplastic anemia, seizures, rashes, retinopathy, and liver toxicity. Patient instructed to call the office should any adverse effect occur.  The patient verbalized understanding of the proper use and possible adverse effects of Plaquenil.  All the patient's questions and concerns were addressed. Topical Retinoid Pregnancy And Lactation Text: This medication is Pregnancy Category C. It is unknown if this medication is excreted in breast milk. Finasteride Pregnancy And Lactation Text: This medication is absolutely contraindicated during pregnancy. It is unknown if it is excreted in breast milk. Sarecycline Pregnancy And Lactation Text: This medication is Pregnancy Category D and not consider safe during pregnancy. It is also excreted in breast milk. Erivedge Counseling- I discussed with the patient the risks of Erivedge including but not limited to nausea, vomiting, diarrhea, constipation, weight loss, changes in the sense of taste, decreased appetite, muscle spasms, and hair loss.  The patient verbalized understanding of the proper use and possible adverse effects of Erivedge.  All of the patient's questions and concerns were addressed. Metronidazole Counseling:  I discussed with the patient the risks of metronidazole including but not limited to seizures, nausea/vomiting, a metallic taste in the mouth, nausea/vomiting and severe allergy. Ketoconazole Counseling:   Patient counseled regarding improving absorption with orange juice.  Adverse effects include but are not limited to breast enlargement, headache, diarrhea, nausea, upset stomach, liver function test abnormalities, taste disturbance, and stomach pain.  There is a rare possibility of liver failure that can occur when taking ketoconazole. The patient understands that monitoring of LFTs may be required, especially at baseline. The patient verbalized understanding of the proper use and possible adverse effects of ketoconazole.  All of the patient's questions and concerns were addressed. Cyclophosphamide Counseling:  I discussed with the patient the risks of cyclophosphamide including but not limited to hair loss, hormonal abnormalities, decreased fertility, abdominal pain, diarrhea, nausea and vomiting, bone marrow suppression and infection. The patient understands that monitoring is required while taking this medication. Arava Pregnancy And Lactation Text: This medication is Pregnancy Category X and is absolutely contraindicated during pregnancy. It is unknown if it is excreted in breast milk. Sski Pregnancy And Lactation Text: This medication is Pregnancy Category D and isn't considered safe during pregnancy. It is excreted in breast milk. Ilumya Counseling: I discussed with the patient the risks of tildrakizumab including but not limited to immunosuppression, malignancy, posterior leukoencephalopathy syndrome, and serious infections.  The patient understands that monitoring is required including a PPD at baseline and must alert us or the primary physician if symptoms of infection or other concerning signs are noted. Picato Counseling:  I discussed with the patient the risks of Picato including but not limited to erythema, scaling, itching, weeping, crusting, and pain. Hydroxyzine Pregnancy And Lactation Text: This medication is not safe during pregnancy and should not be taken. It is also excreted in breast milk and breast feeding isn't recommended. Cibinqo Pregnancy And Lactation Text: It is unknown if this medication will adversely affect pregnancy or breast feeding.  You should not take this medication if you are currently pregnant or planning a pregnancy or while breastfeeding. Oral Minoxidil Counseling- I discussed with the patient the risks of oral minoxidil including but not limited to shortness of breath, swelling of the feet or ankles, dizziness, lightheadedness, unwanted hair growth and allergic reaction.  The patient verbalized understanding of the proper use and possible adverse effects of oral minoxidil.  All of the patient's questions and concerns were addressed. Cimzia Pregnancy And Lactation Text: This medication crosses the placenta but can be considered safe in certain situations. Cimzia may be excreted in breast milk. Stelara Counseling:  I discussed with the patient the risks of ustekinumab including but not limited to immunosuppression, malignancy, posterior leukoencephalopathy syndrome, and serious infections.  The patient understands that monitoring is required including a PPD at baseline and must alert us or the primary physician if symptoms of infection or other concerning signs are noted. Bactrim Pregnancy And Lactation Text: This medication is Pregnancy Category D and is known to cause fetal risk.  It is also excreted in breast milk. High Dose Vitamin A Pregnancy And Lactation Text: High dose vitamin A therapy is contraindicated during pregnancy and breast feeding. Acitretin Counseling:  I discussed with the patient the risks of acitretin including but not limited to hair loss, dry lips/skin/eyes, liver damage, hyperlipidemia, depression/suicidal ideation, photosensitivity.  Serious rare side effects can include but are not limited to pancreatitis, pseudotumor cerebri, bony changes, clot formation/stroke/heart attack.  Patient understands that alcohol is contraindicated since it can result in liver toxicity and significantly prolong the elimination of the drug by many years. Wartpeel Pregnancy And Lactation Text: This medication is Pregnancy Category X and contraindicated in pregnancy and in women who may become pregnant. It is unknown if this medication is excreted in breast milk. Hydroquinone Pregnancy And Lactation Text: This medication has not been assigned a Pregnancy Risk Category but animal studies failed to show danger with the topical medication. It is unknown if the medication is excreted in breast milk. Hydroxychloroquine Pregnancy And Lactation Text: This medication has been shown to cause fetal harm but it isn't assigned a Pregnancy Risk Category. There are small amounts excreted in breast milk. Tazorac Counseling:  Patient advised that medication is irritating and drying.  Patient may need to apply sparingly and wash off after an hour before eventually leaving it on overnight.  The patient verbalized understanding of the proper use and possible adverse effects of tazorac.  All of the patient's questions and concerns were addressed. 5-Fu Counseling: 5-Fluorouracil Counseling:  I discussed with the patient the risks of 5-fluorouracil including but not limited to erythema, scaling, itching, weeping, crusting, and pain. Birth Control Pills Counseling: Birth Control Pill Counseling: I discussed with the patient the potential side effects of OCPs including but not limited to increased risk of stroke, heart attack, thrombophlebitis, deep venous thrombosis, hepatic adenomas, breast changes, GI upset, headaches, and depression.  The patient verbalized understanding of the proper use and possible adverse effects of OCPs. All of the patient's questions and concerns were addressed. Infliximab Pregnancy And Lactation Text: This medication is Pregnancy Category B and is considered safe during pregnancy. It is unknown if this medication is excreted in breast milk. Tetracycline Counseling: Patient counseled regarding possible photosensitivity and increased risk for sunburn.  Patient instructed to avoid sunlight, if possible.  When exposed to sunlight, patients should wear protective clothing, sunglasses, and sunscreen.  The patient was instructed to call the office immediately if the following severe adverse effects occur:  hearing changes, easy bruising/bleeding, severe headache, or vision changes.  The patient verbalized understanding of the proper use and possible adverse effects of tetracycline.  All of the patient's questions and concerns were addressed. Patient understands to avoid pregnancy while on therapy due to potential birth defects. Ketoconazole Pregnancy And Lactation Text: This medication is Pregnancy Category C and it isn't know if it is safe during pregnancy. It is also excreted in breast milk and breast feeding isn't recommended. Thalidomide Counseling: I discussed with the patient the risks of thalidomide including but not limited to birth defects, anxiety, weakness, chest pain, dizziness, cough and severe allergy. Metronidazole Pregnancy And Lactation Text: This medication is Pregnancy Category B and considered safe during pregnancy.  It is also excreted in breast milk. Olumiant Counseling: I discussed with the patient the risks of Olumiant therapy including but not limited to upper respiratory tract infections, shingles, cold sores, and nausea. Live vaccines should be avoided.  This medication has been linked to serious infections; higher rate of mortality; malignancy and lymphoproliferative disorders; major adverse cardiovascular events; thrombosis; gastrointestinal perforations; neutropenia; lymphopenia; anemia; liver enzyme elevations; and lipid elevations. Cyclophosphamide Pregnancy And Lactation Text: This medication is Pregnancy Category D and it isn't considered safe during pregnancy. This medication is excreted in breast milk. Cosentyx Counseling:  I discussed with the patient the risks of Cosentyx including but not limited to worsening of Crohn's disease, immunosuppression, allergic reactions and infections.  The patient understands that monitoring is required including a PPD at baseline and must alert us or the primary physician if symptoms of infection or other concerning signs are noted. Oral Minoxidil Pregnancy And Lactation Text: This medication should only be used when clearly needed if you are pregnant, attempting to become pregnant or breast feeding. Imiquimod Counseling:  I discussed with the patient the risks of imiquimod including but not limited to erythema, scaling, itching, weeping, crusting, and pain.  Patient understands that the inflammatory response to imiquimod is variable from person to person and was educated regarded proper titration schedule.  If flu-like symptoms develop, patient knows to discontinue the medication and contact us. Clofazimine Counseling:  I discussed with the patient the risks of clofazimine including but not limited to skin and eye pigmentation, liver damage, nausea/vomiting, gastrointestinal bleeding and allergy. Cephalexin Counseling: I counseled the patient regarding use of cephalexin as an antibiotic for prophylactic and/or therapeutic purposes. Cephalexin (commonly prescribed under brand name Keflex) is a cephalosporin antibiotic which is active against numerous classes of bacteria, including most skin bacteria. Side effects may include nausea, diarrhea, gastrointestinal upset, rash, hives, yeast infections, and in rare cases, hepatitis, kidney disease, seizures, fever, confusion, neurologic symptoms, and others. Patients with severe allergies to penicillin medications are cautioned that there is about a 10% incidence of cross-reactivity with cephalosporins. When possible, patients with penicillin allergies should use alternatives to cephalosporins for antibiotic therapy. Winlevi Counseling:  I discussed with the patient the risks of topical clascoterone including but not limited to erythema, scaling, itching, and stinging. Patient voiced their understanding. Low Dose Naltrexone Counseling- I discussed with the patient the potential risks and side effects of low dose naltrexone including but not limited to: more vivid dreams, headaches, nausea, vomiting, abdominal pain, fatigue, dizziness, and anxiety. Acitretin Pregnancy And Lactation Text: This medication is Pregnancy Category X and should not be given to women who are pregnant or may become pregnant in the future. This medication is excreted in breast milk. Libtayo Counseling- I discussed with the patient the risks of Libtayo including but not limited to nausea, vomiting, diarrhea, and bone or muscle pain.  The patient verbalized understanding of the proper use and possible adverse effects of Libtayo.  All of the patient's questions and concerns were addressed. Albendazole Counseling:  I discussed with the patient the risks of albendazole including but not limited to cytopenia, kidney damage, nausea/vomiting and severe allergy.  The patient understands that this medication is being used in an off-label manner. Rituxan Counseling:  I discussed with the patient the risks of Rituxan infusions. Side effects can include infusion reactions, severe drug rashes including mucocutaneous reactions, reactivation of latent hepatitis and other infections and rarely progressive multifocal leukoencephalopathy.  All of the patient's questions and concerns were addressed. Birth Control Pills Pregnancy And Lactation Text: This medication should be avoided if pregnant and for the first 30 days post-partum. Terbinafine Counseling: Patient counseling regarding adverse effects of terbinafine including but not limited to headache, diarrhea, rash, upset stomach, liver function test abnormalities, itching, taste/smell disturbance, nausea, abdominal pain, and flatulence.  There is a rare possibility of liver failure that can occur when taking terbinafine.  The patient understands that a baseline LFT and kidney function test may be required. The patient verbalized understanding of the proper use and possible adverse effects of terbinafine.  All of the patient's questions and concerns were addressed. Azelaic Acid Counseling: Patient counseled that medicine may cause skin irritation and to avoid applying near the eyes.  In the event of skin irritation, the patient was advised to reduce the amount of the drug applied or use it less frequently.   The patient verbalized understanding of the proper use and possible adverse effects of azelaic acid.  All of the patient's questions and concerns were addressed. Tazorac Pregnancy And Lactation Text: This medication is not safe during pregnancy. It is unknown if this medication is excreted in breast milk. Protopic Counseling: Patient may experience a mild burning sensation during topical application. Protopic is not approved in children less than 2 years of age. There have been case reports of hematologic and skin malignancies in patients using topical calcineurin inhibitors although causality is questionable. Minocycline Counseling: Patient advised regarding possible photosensitivity and discoloration of the teeth, skin, lips, tongue and gums.  Patient instructed to avoid sunlight, if possible.  When exposed to sunlight, patients should wear protective clothing, sunglasses, and sunscreen.  The patient was instructed to call the office immediately if the following severe adverse effects occur:  hearing changes, easy bruising/bleeding, severe headache, or vision changes.  The patient verbalized understanding of the proper use and possible adverse effects of minocycline.  All of the patient's questions and concerns were addressed. Cyclosporine Counseling:  I discussed with the patient the risks of cyclosporine including but not limited to hypertension, gingival hyperplasia,myelosuppression, immunosuppression, liver damage, kidney damage, neurotoxicity, lymphoma, and serious infections. The patient understands that monitoring is required including baseline blood pressure, CBC, CMP, lipid panel and uric acid, and then 1-2 times monthly CMP and blood pressure. Cephalexin Pregnancy And Lactation Text: This medication is Pregnancy Category B and considered safe during pregnancy.  It is also excreted in breast milk but can be used safely for shorter doses. Clofazimine Pregnancy And Lactation Text: This medication is Pregnancy Category C and isn't considered safe during pregnancy. It is excreted in breast milk. Bexarotene Counseling:  I discussed with the patient the risks of bexarotene including but not limited to hair loss, dry lips/skin/eyes, liver abnormalities, hyperlipidemia, pancreatitis, depression/suicidal ideation, photosensitivity, drug rash/allergic reactions, hypothyroidism, anemia, leukopenia, infection, cataracts, and teratogenicity.  Patient understands that they will need regular blood tests to check lipid profile, liver function tests, white blood cell count, thyroid function tests and pregnancy test if applicable. Albendazole Pregnancy And Lactation Text: This medication is Pregnancy Category C and it isn't known if it is safe during pregnancy. It is also excreted in breast milk. Taltz Counseling: I discussed with the patient the risks of ixekizumab including but not limited to immunosuppression, serious infections, worsening of inflammatory bowel disease and drug reactions.  The patient understands that monitoring is required including a PPD at baseline and must alert us or the primary physician if symptoms of infection or other concerning signs are noted. Winlevi Pregnancy And Lactation Text: This medication is considered safe during pregnancy and breastfeeding. Otezla Counseling: The side effects of Otezla were discussed with the patient, including but not limited to worsening or new depression, weight loss, diarrhea, nausea, upper respiratory tract infection, and headache. Patient instructed to call the office should any adverse effect occur.  The patient verbalized understanding of the proper use and possible adverse effects of Otezla.  All the patient's questions and concerns were addressed. Olumiant Pregnancy And Lactation Text: Based on animal studies, Olumiant may cause embryo-fetal harm when administered to pregnant women.  The medication should not be used in pregnancy.  Breastfeeding is not recommended during treatment. Low Dose Naltrexone Pregnancy And Lactation Text: Naltrexone is pregnancy category C.  There have been no adequate and well-controlled studies in pregnant women.  It should be used in pregnancy only if the potential benefit justifies the potential risk to the fetus.   Limited data indicates that naltrexone is minimally excreted into breastmilk. Rituxan Pregnancy And Lactation Text: This medication is Pregnancy Category C and it isn't know if it is safe during pregnancy. It is unknown if this medication is excreted in breast milk but similar antibodies are known to be excreted. Libtayo Pregnancy And Lactation Text: This medication is contraindicated in pregnancy and when breast feeding. Drysol Counseling:  I discussed with the patient the risks of drysol/aluminum chloride including but not limited to skin rash, itching, irritation, burning. Topical Clindamycin Counseling: Patient counseled that this medication may cause skin irritation or allergic reactions.  In the event of skin irritation, the patient was advised to reduce the amount of the drug applied or use it less frequently.   The patient verbalized understanding of the proper use and possible adverse effects of clindamycin.  All of the patient's questions and concerns were addressed. Cyclosporine Pregnancy And Lactation Text: This medication is Pregnancy Category C and it isn't know if it is safe during pregnancy. This medication is excreted in breast milk. Tranexamic Acid Counseling:  Patient advised of the small risk of bleeding problems with tranexamic acid. They were also instructed to call if they developed any nausea, vomiting or diarrhea. All of the patient's questions and concerns were addressed. Fluconazole Counseling:  Patient counseled regarding adverse effects of fluconazole including but not limited to headache, diarrhea, nausea, upset stomach, liver function test abnormalities, taste disturbance, and stomach pain.  There is a rare possibility of liver failure that can occur when taking fluconazole.  The patient understands that monitoring of LFTs and kidney function test may be required, especially at baseline. The patient verbalized understanding of the proper use and possible adverse effects of fluconazole.  All of the patient's questions and concerns were addressed. Spironolactone Counseling: Patient advised regarding risks of diarrhea, abdominal pain, hyperkalemia, birth defects (for female patients), liver toxicity and renal toxicity. The patient may need blood work to monitor liver and kidney function and potassium levels while on therapy. The patient verbalized understanding of the proper use and possible adverse effects of spironolactone.  All of the patient's questions and concerns were addressed. Azelaic Acid Pregnancy And Lactation Text: This medication is considered safe during pregnancy and breast feeding. Colchicine Counseling:  Patient counseled regarding adverse effects including but not limited to stomach upset (nausea, vomiting, stomach pain, or diarrhea).  Patient instructed to limit alcohol consumption while taking this medication.  Colchicine may reduce blood counts especially with prolonged use.  The patient understands that monitoring of kidney function and blood counts may be required, especially at baseline. The patient verbalized understanding of the proper use and possible adverse effects of colchicine.  All of the patient's questions and concerns were addressed. Protopic Pregnancy And Lactation Text: This medication is Pregnancy Category C. It is unknown if this medication is excreted in breast milk when applied topically. Terbinafine Pregnancy And Lactation Text: This medication is Pregnancy Category B and is considered safe during pregnancy. It is also excreted in breast milk and breast feeding isn't recommended. Rinvoq Counseling: I discussed with the patient the risks of Rinvoq therapy including but not limited to upper respiratory tract infections, shingles, cold sores, bronchitis, nausea, cough, fever, acne, and headache. Live vaccines should be avoided.  This medication has been linked to serious infections; higher rate of mortality; malignancy and lymphoproliferative disorders; major adverse cardiovascular events; thrombosis; thrombocytopenia, anemia, and neutropenia; lipid elevations; liver enzyme elevations; and gastrointestinal perforations. Otezla Pregnancy And Lactation Text: This medication is Pregnancy Category C and it isn't known if it is safe during pregnancy. It is unknown if it is excreted in breast milk. Clindamycin Counseling: I counseled the patient regarding use of clindamycin as an antibiotic for prophylactic and/or therapeutic purposes. Clindamycin is active against numerous classes of bacteria, including skin bacteria. Side effects may include nausea, diarrhea, gastrointestinal upset, rash, hives, yeast infections, and in rare cases, colitis. Bexarotene Pregnancy And Lactation Text: This medication is Pregnancy Category X and should not be given to women who are pregnant or may become pregnant. This medication should not be used if you are breast feeding. Dupixent Counseling: I discussed with the patient the risks of dupilumab including but not limited to eye infection and irritation, cold sores, injection site reactions, worsening of asthma, allergic reactions and increased risk of parasitic infection.  Live vaccines should be avoided while taking dupilumab. Dupilumab will also interact with certain medications such as warfarin and cyclosporine. The patient understands that monitoring is required and they must alert us or the primary physician if symptoms of infection or other concerning signs are noted. Minoxidil Counseling: Minoxidil is a topical medication which can increase blood flow where it is applied. It is uncertain how this medication increases hair growth. Side effects are uncommon and include stinging and allergic reactions. Odomzo Counseling- I discussed with the patient the risks of Odomzo including but not limited to nausea, vomiting, diarrhea, constipation, weight loss, changes in the sense of taste, decreased appetite, muscle spasms, and hair loss.  The patient verbalized understanding of the proper use and possible adverse effects of Odomzo.  All of the patient's questions and concerns were addressed. Topical Clindamycin Pregnancy And Lactation Text: This medication is Pregnancy Category B and is considered safe during pregnancy. It is unknown if it is excreted in breast milk. Ivermectin Counseling:  Patient instructed to take medication on an empty stomach with a full glass of water.  Patient informed of potential adverse effects including but not limited to nausea, diarrhea, dizziness, itching, and swelling of the extremities or lymph nodes.  The patient verbalized understanding of the proper use and possible adverse effects of ivermectin.  All of the patient's questions and concerns were addressed. Niacinamide Counseling: I recommended taking niacin or niacinamide, also know as vitamin B3, twice daily. Recent evidence suggests that taking vitamin B3 (500 mg twice daily) can reduce the risk of actinic keratoses and non-melanoma skin cancers. Side effects of vitamin B3 include flushing and headache. Zyclara Counseling:  I discussed with the patient the risks of imiquimod including but not limited to erythema, scaling, itching, weeping, crusting, and pain.  Patient understands that the inflammatory response to imiquimod is variable from person to person and was educated regarded proper titration schedule.  If flu-like symptoms develop, patient knows to discontinue the medication and contact us. Siliq Counseling:  I discussed with the patient the risks of Siliq including but not limited to new or worsening depression, suicidal thoughts and behavior, immunosuppression, malignancy, posterior leukoencephalopathy syndrome, and serious infections.  The patient understands that monitoring is required including a PPD at baseline and must alert us or the primary physician if symptoms of infection or other concerning signs are noted. There is also a special program designed to monitor depression which is required with Siliq. Tranexamic Acid Pregnancy And Lactation Text: It is unknown if this medication is safe during pregnancy or breast feeding. Spironolactone Pregnancy And Lactation Text: This medication can cause feminization of the male fetus and should be avoided during pregnancy. The active metabolite is also found in breast milk. Quinolones Counseling:  I discussed with the patient the risks of fluoroquinolones including but not limited to GI upset, allergic reaction, drug rash, diarrhea, dizziness, photosensitivity, yeast infections, liver function test abnormalities, tendonitis/tendon rupture. Methotrexate Counseling:  Patient counseled regarding adverse effects of methotrexate including but not limited to nausea, vomiting, abnormalities in liver function tests. Patients may develop mouth sores, rash, diarrhea, and abnormalities in blood counts. The patient understands that monitoring is required including LFT's and blood counts.  There is a rare possibility of scarring of the liver and lung problems that can occur when taking methotrexate. Persistent nausea, loss of appetite, pale stools, dark urine, cough, and shortness of breath should be reported immediately. Patient advised to discontinue methotrexate treatment at least three months before attempting to become pregnant.  I discussed the need for folate supplements while taking methotrexate.  These supplements can decrease side effects during methotrexate treatment. The patient verbalized understanding of the proper use and possible adverse effects of methotrexate.  All of the patient's questions and concerns were addressed. Benzoyl Peroxide Counseling: Patient counseled that medicine may cause skin irritation and bleach clothing.  In the event of skin irritation, the patient was advised to reduce the amount of the drug applied or use it less frequently.   The patient verbalized understanding of the proper use and possible adverse effects of benzoyl peroxide.  All of the patient's questions and concerns were addressed. Dupixent Pregnancy And Lactation Text: This medication likely crosses the placenta but the risk for the fetus is uncertain. This medication is excreted in breast milk. Fluconazole Pregnancy And Lactation Text: This medication is Pregnancy Category C and it isn't know if it is safe during pregnancy. It is also excreted in breast milk. Oxybutynin Counseling:  I discussed with the patient the risks of oxybutynin including but not limited to skin rash, drowsiness, dry mouth, difficulty urinating, and blurred vision. Rhofade Counseling: Rhofade is a topical medication which can decrease superficial blood flow where applied. Side effects are uncommon and include stinging, redness and allergic reactions. Rinvoq Pregnancy And Lactation Text: Based on animal studies, Rinvoq may cause embryo-fetal harm when administered to pregnant women.  The medication should not be used in pregnancy.  Breastfeeding is not recommended during treatment and for 6 days after the last dose. Niacinamide Pregnancy And Lactation Text: These medications are considered safe during pregnancy. Clindamycin Pregnancy And Lactation Text: This medication can be used in pregnancy if certain situations. Clindamycin is also present in breast milk. Tremfya Counseling: I discussed with the patient the risks of guselkumab including but not limited to immunosuppression, serious infections, and drug reactions.  The patient understands that monitoring is required including a PPD at baseline and must alert us or the primary physician if symptoms of infection or other concerning signs are noted. Cimetidine Counseling:  I discussed with the patient the risks of Cimetidine including but not limited to gynecomastia, headache, diarrhea, nausea, drowsiness, arrhythmias, pancreatitis, skin rashes, psychosis, bone marrow suppression and kidney toxicity. Isotretinoin Counseling: Patient should get monthly blood tests, not donate blood, not drive at night if vision affected, not share medication, and not undergo elective surgery for 6 months after tx completed. Side effects reviewed, pt to contact office should one occur. Elidel Counseling: Patient may experience a mild burning sensation during topical application. Elidel is not approved in children less than 2 years of age. There have been case reports of hematologic and skin malignancies in patients using topical calcineurin inhibitors although causality is questionable. Topical Ketoconazole Counseling: Patient counseled that this medication may cause skin irritation or allergic reactions.  In the event of skin irritation, the patient was advised to reduce the amount of the drug applied or use it less frequently.   The patient verbalized understanding of the proper use and possible adverse effects of ketoconazole.  All of the patient's questions and concerns were addressed. Methotrexate Pregnancy And Lactation Text: This medication is Pregnancy Category X and is known to cause fetal harm. This medication is excreted in breast milk. Rhofade Pregnancy And Lactation Text: This medication has not been assigned a Pregnancy Risk Category. It is unknown if the medication is excreted in breast milk. Valtrex Counseling: I discussed with the patient the risks of valacyclovir including but not limited to kidney damage, nausea, vomiting and severe allergy.  The patient understands that if the infection seems to be worsening or is not improving, they are to call. Benzoyl Peroxide Pregnancy And Lactation Text: This medication is Pregnancy Category C. It is unknown if benzoyl peroxide is excreted in breast milk. Azathioprine Counseling:  I discussed with the patient the risks of azathioprine including but not limited to myelosuppression, immunosuppression, hepatotoxicity, lymphoma, and infections.  The patient understands that monitoring is required including baseline LFTs, Creatinine, possible TPMP genotyping and weekly CBCs for the first month and then every 2 weeks thereafter.  The patient verbalized understanding of the proper use and possible adverse effects of azathioprine.  All of the patient's questions and concerns were addressed. Enbrel Counseling:  I discussed with the patient the risks of etanercept including but not limited to myelosuppression, immunosuppression, autoimmune hepatitis, demyelinating diseases, lymphoma, and infections.  The patient understands that monitoring is required including a PPD at baseline and must alert us or the primary physician if symptoms of infection or other concerning signs are noted. Sotyktu Counseling:  I discussed the most common side effects of Sotyktu including: common cold, sore throat, sinus infections, cold sores, canker sores, folliculitis, and acne.  I also discussed more serious side effects of Sotyktu including but not limited to: serious allergic reactions; increased risk for infections such as TB; cancers such as lymphomas; rhabdomyolysis and elevated CPK; and elevated triglycerides and liver enzymes.  Mirvaso Counseling: Mirvaso is a topical medication which can decrease superficial blood flow where applied. Side effects are uncommon and include stinging, redness and allergic reactions. Griseofulvin Counseling:  I discussed with the patient the risks of griseofulvin including but not limited to photosensitivity, cytopenia, liver damage, nausea/vomiting and severe allergy.  The patient understands that this medication is best absorbed when taken with a fatty meal (e.g., ice cream or french fries). Dapsone Counseling: I discussed with the patient the risks of dapsone including but not limited to hemolytic anemia, agranulocytosis, rashes, methemoglobinemia, kidney failure, peripheral neuropathy, headaches, GI upset, and liver toxicity.  Patients who start dapsone require monitoring including baseline LFTs and weekly CBCs for the first month, then every month thereafter.  The patient verbalized understanding of the proper use and possible adverse effects of dapsone.  All of the patient's questions and concerns were addressed. Doxycycline Counseling:  Patient counseled regarding possible photosensitivity and increased risk for sunburn.  Patient instructed to avoid sunlight, if possible.  When exposed to sunlight, patients should wear protective clothing, sunglasses, and sunscreen.  The patient was instructed to call the office immediately if the following severe adverse effects occur:  hearing changes, easy bruising/bleeding, severe headache, or vision changes.  The patient verbalized understanding of the proper use and possible adverse effects of doxycycline.  All of the patient's questions and concerns were addressed. Simponi Counseling:  I discussed with the patient the risks of golimumab including but not limited to myelosuppression, immunosuppression, autoimmune hepatitis, demyelinating diseases, lymphoma, and serious infections.  The patient understands that monitoring is required including a PPD at baseline and must alert us or the primary physician if symptoms of infection or other concerning signs are noted. Nsaids Counseling: NSAID Counseling: I discussed with the patient that NSAIDs should be taken with food. Prolonged use of NSAIDs can result in the development of stomach ulcers.  Patient advised to stop taking NSAIDs if abdominal pain occurs.  The patient verbalized understanding of the proper use and possible adverse effects of NSAIDs.  All of the patient's questions and concerns were addressed. Isotretinoin Pregnancy And Lactation Text: This medication is Pregnancy Category X and is considered extremely dangerous during pregnancy. It is unknown if it is excreted in breast milk. Rifampin Counseling: I discussed with the patient the risks of rifampin including but not limited to liver damage, kidney damage, red-orange body fluids, nausea/vomiting and severe allergy. Solaraze Counseling:  I discussed with the patient the risks of Solaraze including but not limited to erythema, scaling, itching, weeping, crusting, and pain. Carac Counseling:  I discussed with the patient the risks of Carac including but not limited to erythema, scaling, itching, weeping, crusting, and pain. Valtrex Pregnancy And Lactation Text: this medication is Pregnancy Category B and is considered safe during pregnancy. This medication is not directly found in breast milk but it's metabolite acyclovir is present. Opioid Counseling: I discussed with the patient the potential side effects of opioids including but not limited to addiction, altered mental status, and depression. I stressed avoiding alcohol, benzodiazepines, muscle relaxants and sleep aids unless specifically okayed by a physician. The patient verbalized understanding of the proper use and possible adverse effects of opioids. All of the patient's questions and concerns were addressed. They were instructed to flush the remaining pills down the toilet if they did not need them for pain. Griseofulvin Pregnancy And Lactation Text: This medication is Pregnancy Category X and is known to cause serious birth defects. It is unknown if this medication is excreted in breast milk but breast feeding should be avoided. Propranolol Counseling:  I discussed with the patient the risks of propranolol including but not limited to low heart rate, low blood pressure, low blood sugar, restlessness and increased cold sensitivity. They should call the office if they experience any of these side effects. Sotyktu Pregnancy And Lactation Text: There is insufficient data to evaluate whether or not Sotyktu is safe to use during pregnancy.   It is not known if Sotyktu passes into breast milk and whether or not it is safe to use when breastfeeding.   Dutasteride Male Counseling: Dustasteride Counseling:  I discussed with the patient the risks of use of dutasteride including but not limited to decreased libido, decreased ejaculate volume, and gynecomastia. Women who can become pregnant should not handle medication.  All of the patient's questions and concerns were addressed. Prednisone Counseling:  I discussed with the patient the risks of prolonged use of prednisone including but not limited to weight gain, insomnia, osteoporosis, mood changes, diabetes, susceptibility to infection, glaucoma and high blood pressure.  In cases where prednisone use is prolonged, patients should be monitored with blood pressure checks, serum glucose levels and an eye exam.  Additionally, the patient may need to be placed on GI prophylaxis, PCP prophylaxis, and calcium and vitamin D supplementation and/or a bisphosphonate.  The patient verbalized understanding of the proper use and the possible adverse effects of prednisone.  All of the patient's questions and concerns were addressed. Doxycycline Pregnancy And Lactation Text: This medication is Pregnancy Category D and not consider safe during pregnancy. It is also excreted in breast milk but is considered safe for shorter treatment courses. Azathioprine Pregnancy And Lactation Text: This medication is Pregnancy Category D and isn't considered safe during pregnancy. It is unknown if this medication is excreted in breast milk. Dapsone Pregnancy And Lactation Text: This medication is Pregnancy Category C and is not considered safe during pregnancy or breast feeding. Doxepin Counseling:  Patient advised that the medication is sedating and not to drive a car after taking this medication. Patient informed of potential adverse effects including but not limited to dry mouth, urinary retention, and blurry vision.  The patient verbalized understanding of the proper use and possible adverse effects of doxepin.  All of the patient's questions and concerns were addressed. Detail Level: Detailed High Dose Vitamin A Counseling: Side effects reviewed, pt to contact office should one occur. Xolair Counseling:  Patient informed of potential adverse effects including but not limited to fever, muscle aches, rash and allergic reactions.  The patient verbalized understanding of the proper use and possible adverse effects of Xolair.  All of the patient's questions and concerns were addressed. Adbry Counseling: I discussed with the patient the risks of tralokinumab including but not limited to eye infection and irritation, cold sores, injection site reactions, worsening of asthma, allergic reactions and increased risk of parasitic infection.  Live vaccines should be avoided while taking tralokinumab. The patient understands that monitoring is required and they must alert us or the primary physician if symptoms of infection or other concerning signs are noted. Nsaids Pregnancy And Lactation Text: These medications are considered safe up to 30 weeks gestation. It is excreted in breast milk. Eucrisa Counseling: Patient may experience a mild burning sensation during topical application. Eucrisa is not approved in children less than 2 years of age. Azithromycin Counseling:  I discussed with the patient the risks of azithromycin including but not limited to GI upset, allergic reaction, drug rash, diarrhea, and yeast infections. Topical Sulfur Applications Counseling: Topical Sulfur Counseling: Patient counseled that this medication may cause skin irritation or allergic reactions.  In the event of skin irritation, the patient was advised to reduce the amount of the drug applied or use it less frequently.   The patient verbalized understanding of the proper use and possible adverse effects of topical sulfur application.  All of the patient's questions and concerns were addressed. Opioid Pregnancy And Lactation Text: These medications can lead to premature delivery and should be avoided during pregnancy. These medications are also present in breast milk in small amounts. Glycopyrrolate Counseling:  I discussed with the patient the risks of glycopyrrolate including but not limited to skin rash, drowsiness, dry mouth, difficulty urinating, and blurred vision. Rifampin Pregnancy And Lactation Text: This medication is Pregnancy Category C and it isn't know if it is safe during pregnancy. It is also excreted in breast milk and should not be used if you are breast feeding. Itraconazole Counseling:  I discussed with the patient the risks of itraconazole including but not limited to liver damage, nausea/vomiting, neuropathy, and severe allergy.  The patient understands that this medication is best absorbed when taken with acidic beverages such as non-diet cola or ginger ale.  The patient understands that monitoring is required including baseline LFTs and repeat LFTs at intervals.  The patient understands that they are to contact us or the primary physician if concerning signs are noted. Gabapentin Counseling: I discussed with the patient the risks of gabapentin including but not limited to dizziness, somnolence, fatigue and ataxia. Dutasteride Pregnancy And Lactation Text: This medication is absolutely contraindicated in women, especially during pregnancy and breast feeding. Feminization of male fetuses is possible if taking while pregnant. Solaraze Pregnancy And Lactation Text: This medication is Pregnancy Category B and is considered safe. There is some data to suggest avoiding during the third trimester. It is unknown if this medication is excreted in breast milk. Xeljanz Counseling: I discussed with the patient the risks of Xeljanz therapy including increased risk of infection, liver issues, headache, diarrhea, or cold symptoms. Live vaccines should be avoided. They were instructed to call if they have any problems. Propranolol Pregnancy And Lactation Text: This medication is Pregnancy Category C and it isn't known if it is safe during pregnancy. It is excreted in breast milk. Opzelura Counseling:  I discussed with the patient the risks of Opzelura including but not limited to nasopharngitis, bronchitis, ear infection, eosinophila, hives, diarrhea, folliculitis, tonsillitis, and rhinorrhea.  Taken orally, this medication has been linked to serious infections; higher rate of mortality; malignancy and lymphoproliferative disorders; major adverse cardiovascular events; thrombosis; thrombocytopenia, anemia, and neutropenia; and lipid elevations. Xolair Pregnancy And Lactation Text: This medication is Pregnancy Category B and is considered safe during pregnancy. This medication is excreted in breast milk. Erythromycin Counseling:  I discussed with the patient the risks of erythromycin including but not limited to GI upset, allergic reaction, drug rash, diarrhea, increase in liver enzymes, and yeast infections. Doxepin Pregnancy And Lactation Text: This medication is Pregnancy Category C and it isn't known if it is safe during pregnancy. It is also excreted in breast milk and breast feeding isn't recommended. Cellcept Counseling:  I discussed with the patient the risks of mycophenolate mofetil including but not limited to infection/immunosuppression, GI upset, hypokalemia, hypercholesterolemia, bone marrow suppression, lymphoproliferative disorders, malignancy, GI ulceration/bleed/perforation, colitis, interstitial lung disease, kidney failure, progressive multifocal leukoencephalopathy, and birth defects.  The patient understands that monitoring is required including a baseline creatinine and regular CBC testing. In addition, patient must alert us immediately if symptoms of infection or other concerning signs are noted. Humira Counseling:  I discussed with the patient the risks of adalimumab including but not limited to myelosuppression, immunosuppression, autoimmune hepatitis, demyelinating diseases, lymphoma, and serious infections.  The patient understands that monitoring is required including a PPD at baseline and must alert us or the primary physician if symptoms of infection or other concerning signs are noted. Use Enhanced Medication Counseling?: No Topical Sulfur Applications Pregnancy And Lactation Text: This medication is considered safe during pregnancy and breast feeding secondary to limited systemic absorption. Olanzapine Counseling- I discussed with the patient the common side effects of olanzapine including but are not limited to: lack of energy, dry mouth, increased appetite, sleepiness, tremor, constipation, dizziness, changes in behavior, or restlessness.  Explained that teenagers are more likely to experience headaches, abdominal pain, pain in the arms or legs, tiredness, and sleepiness.  Serious side effects include but are not limited: increased risk of death in elderly patients who are confused, have memory loss, or dementia-related psychosis; hyperglycemia; increased cholesterol and triglycerides; and weight gain. Glycopyrrolate Pregnancy And Lactation Text: This medication is Pregnancy Category B and is considered safe during pregnancy. It is unknown if it is excreted breast milk. Adbry Pregnancy And Lactation Text: It is unknown if this medication will adversely affect pregnancy or breast feeding. Skyrizi Counseling: I discussed with the patient the risks of risankizumab-rzaa including but not limited to immunosuppression, and serious infections.  The patient understands that monitoring is required including a PPD at baseline and must alert us or the primary physician if symptoms of infection or other concerning signs are noted. Azithromycin Pregnancy And Lactation Text: This medication is considered safe during pregnancy and is also secreted in breast milk. Sarecycline Counseling: Patient advised regarding possible photosensitivity and discoloration of the teeth, skin, lips, tongue and gums.  Patient instructed to avoid sunlight, if possible.  When exposed to sunlight, patients should wear protective clothing, sunglasses, and sunscreen.  The patient was instructed to call the office immediately if the following severe adverse effects occur:  hearing changes, easy bruising/bleeding, severe headache, or vision changes.  The patient verbalized understanding of the proper use and possible adverse effects of sarecycline.  All of the patient's questions and concerns were addressed. Calcipotriene Counseling:  I discussed with the patient the risks of calcipotriene including but not limited to erythema, scaling, itching, and irritation. Finasteride Male Counseling: Finasteride Counseling:  I discussed with the patient the risks of use of finasteride including but not limited to decreased libido, decreased ejaculate volume, gynecomastia, and depression. Women should not handle medication.  All of the patient's questions and concerns were addressed. Opzelura Pregnancy And Lactation Text: There is insufficient data to evaluate drug-associated risk for major birth defects, miscarriage, or other adverse maternal or fetal outcomes.  There is a pregnancy registry that monitors pregnancy outcomes in pregnant persons exposed to the medication during pregnancy.  It is unknown if this medication is excreted in breast milk.  Do not breastfeed during treatment and for about 4 weeks after the last dose. SSKI Counseling:  I discussed with the patient the risks of SSKI including but not limited to thyroid abnormalities, metallic taste, GI upset, fever, headache, acne, arthralgias, paraesthesias, lymphadenopathy, easy bleeding, arrhythmias, and allergic reaction. Topical Retinoid counseling:  Patient advised to apply a pea-sized amount only at bedtime and wait 30 minutes after washing their face before applying.  If too drying, patient may add a non-comedogenic moisturizer. The patient verbalized understanding of the proper use and possible adverse effects of retinoids.  All of the patient's questions and concerns were addressed. Xelkathyz Pregnancy And Lactation Text: This medication is Pregnancy Category D and is not considered safe during pregnancy.  The risk during breast feeding is also uncertain. Erythromycin Pregnancy And Lactation Text: This medication is Pregnancy Category B and is considered safe during pregnancy. It is also excreted in breast milk. Cibinqo Counseling: I discussed with the patient the risks of Cibinqo therapy including but not limited to common cold, nausea, headache, cold sores, increased blood CPK levels, dizziness, UTIs, fatigue, acne, and vomitting. Live vaccines should be avoided.  This medication has been linked to serious infections; higher rate of mortality; malignancy and lymphoproliferative disorders; major adverse cardiovascular events; thrombosis; thrombocytopenia and lymphopenia; lipid elevations; and retinal detachment. Arava Counseling:  Patient counseled regarding adverse effects of Arava including but not limited to nausea, vomiting, abnormalities in liver function tests. Patients may develop mouth sores, rash, diarrhea, and abnormalities in blood counts. The patient understands that monitoring is required including LFTs and blood counts.  There is a rare possibility of scarring of the liver and lung problems that can occur when taking methotrexate. Persistent nausea, loss of appetite, pale stools, dark urine, cough, and shortness of breath should be reported immediately. Patient advised to discontinue Arava treatment and consult with a physician prior to attempting conception. The patient will have to undergo a treatment to eliminate Arava from the body prior to conception. Olanzapine Pregnancy And Lactation Text: This medication is pregnancy category C.   There are no adequate and well controlled trials with olanzapine in pregnant females.  Olanzapine should be used during pregnancy only if the potential benefit justifies the potential risk to the fetus.   In a study in lactating healthy women, olanzapine was excreted in breast milk.  It is recommended that women taking olanzapine should not breast feed. Bactrim Counseling:  I discussed with the patient the risks of sulfa antibiotics including but not limited to GI upset, allergic reaction, drug rash, diarrhea, dizziness, photosensitivity, and yeast infections.  Rarely, more serious reactions can occur including but not limited to aplastic anemia, agranulocytosis, methemoglobinemia, blood dyscrasias, liver or kidney failure, lung infiltrates or desquamative/blistering drug rashes. Hydroxyzine Counseling: Patient advised that the medication is sedating and not to drive a car after taking this medication.  Patient informed of potential adverse effects including but not limited to dry mouth, urinary retention, and blurry vision.  The patient verbalized understanding of the proper use and possible adverse effects of hydroxyzine.  All of the patient's questions and concerns were addressed. Cimzia Counseling:  I discussed with the patient the risks of Cimzia including but not limited to immunosuppression, allergic reactions and infections.  The patient understands that monitoring is required including a PPD at baseline and must alert us or the primary physician if symptoms of infection or other concerning signs are noted.

## 2025-05-29 NOTE — PROGRESS NOTES
Weight Management Medical Nutrition Assessment   Edelmira Sears is here for 4/6 bundle  Current wt:  231 6  lbs  She has gained 5 8 lbs  x ~1 month with overall loss of 36 6 lbs x  8 months  Struggled over the holidays in addition to stress of her son's health  Would like to resume keto diet over the next month  Discussed the foods that she is currently eating which would no longer be possible while in ketosis  Despite this she would like to resume a keto diet  Was using [employer] meals but just learned that this company is closing  She was contemplating nut[employer]ystem or [employer] as she does not like to cook  Discussed possible items at the grocery store that would require minimal cooking and be less costly  Examples shown  Meal plan revised  Continues to bike 2x/wk  She will f/u in 1 month        Patient seen by Medical Provider in past 6 months:  yes  Requested to schedule appointment with Medical Provider: No    Anthropometric Measurements  Start Weight (#): 268 2 lbs 5/4/2022   Current Weight (#):231 6 lbs   TBW % Change from start weight:  13 6%  Ideal Body Weight (#): 153 7 lbs BMI 25 (66 8")  Goal Weight (#):  lbs     Weight Loss History  Previous weight loss attempts: Commercial Programs (Weight Watchers, [employer], etc )  Counseling with  MD  Exercise  Meal Replacements (Medifast, Slim Fast, etc )  Self Created Diets (Portion Control, Healthy Food Choices, etc )    Food and Nutrition Related History  Wake up: 8-9   Bed Time: 11    Food Recall  Breakfast: 8:30-10:00 replacement OR 2 egg, 2 pieces bhatti, english muffin  Snack: skip    Lunch: 12-1:00 Fresh[employer]  </=400 parminder OR out salad w/protein OR 2 egg, 2 pieces bhatti, english muffin  Snack: bar   Dinner: 4-6 replacement  Snack: string cheese, pickle OR peanuts     Beverages: water  Volume of beverage intake: 80 oz    Weekends: Same  Cravings: no specific identified   Trouble area of day: not identified     Frequency of Eating out: none currently  Food restrictions:  n/a  Cooking: self   Food Shopping: self    Physical Activity Intake  Activity: biking 2x/wk for 30 minutes, bowling 1x/wk, walking dog  Frequency:several times per week  Physical limitations/barriers to exercise: n/a    Estimated Needs  Energy  [employer] Needs: BMR : 6499   1-2# loss weekly sedentary: 925-1425         1-2# loss weekly lightly active: 8037-5808  Maintenance calories for sedentary activity level: 1925  Protein: 72-90 gm      (1 2-1 5g/kg IBW)  Fluid: 70 oz    (35mL/kg IBW)    Nutrition Diagnosis  Yes; Overweight/obesity  related to Excess energy intake as evidenced by  BMI more than normative standard for age and sex (obesity-grade II 35-39  9)       Nutrition Intervention    Nutrition Prescription  Calories: 5642-1539  Protein: 100 gm    Meal Plan (Adrien/Pro)  Breakfast: 200, 27  Snack:  Lunch: 350-400, 30  Snack: 160, 15   Dinner: 200, 27  Snack:100-150, 5-10    Nutrition Education:     calorie controlled meal plan  healthy snacks  Food journaling  Physical activity      Nutrition Counseling:  Strategies: as above      Monitoring and Evaluation:  Evaluation criteria:  Energy Intake  Meet protein needs  Maintain adequate hydration  Monitor weekly weight  Food logging/measuring  Physical activity   Meal planning    Barriers to learning:none  Readiness to change: Action:  (Changing behavior)  Comprehension: very good  Expected Compliance: very good 18

## 2025-05-30 DIAGNOSIS — I10 BENIGN ESSENTIAL HYPERTENSION: ICD-10-CM

## 2025-05-30 DIAGNOSIS — I10 ESSENTIAL HYPERTENSION, BENIGN: ICD-10-CM

## 2025-05-31 RX ORDER — AMLODIPINE BESYLATE 5 MG/1
5 TABLET ORAL DAILY
Qty: 90 TABLET | Refills: 3 | Status: SHIPPED | OUTPATIENT
Start: 2025-05-31

## 2025-05-31 RX ORDER — METOPROLOL SUCCINATE 100 MG/1
100 TABLET, EXTENDED RELEASE ORAL DAILY
Qty: 90 TABLET | Refills: 3 | Status: SHIPPED | OUTPATIENT
Start: 2025-05-31

## 2025-06-02 ENCOUNTER — OFFICE VISIT (OUTPATIENT)
Dept: INTERNAL MEDICINE CLINIC | Facility: CLINIC | Age: 70
End: 2025-06-02
Payer: MEDICARE

## 2025-06-02 VITALS
BODY MASS INDEX: 38.9 KG/M2 | TEMPERATURE: 97 F | DIASTOLIC BLOOD PRESSURE: 82 MMHG | WEIGHT: 241 LBS | OXYGEN SATURATION: 98 % | HEART RATE: 65 BPM | SYSTOLIC BLOOD PRESSURE: 118 MMHG

## 2025-06-02 DIAGNOSIS — E03.9 ACQUIRED HYPOTHYROIDISM: ICD-10-CM

## 2025-06-02 DIAGNOSIS — E78.00 PURE HYPERCHOLESTEROLEMIA: ICD-10-CM

## 2025-06-02 DIAGNOSIS — R73.01 IMPAIRED FASTING GLUCOSE: ICD-10-CM

## 2025-06-02 DIAGNOSIS — G47.33 OSA (OBSTRUCTIVE SLEEP APNEA): ICD-10-CM

## 2025-06-02 DIAGNOSIS — E66.01 MORBID OBESITY WITH BMI OF 40.0-44.9, ADULT (HCC): ICD-10-CM

## 2025-06-02 DIAGNOSIS — I10 BENIGN ESSENTIAL HYPERTENSION: ICD-10-CM

## 2025-06-02 DIAGNOSIS — Z23 NEED FOR COVID-19 VACCINE: Primary | ICD-10-CM

## 2025-06-02 PROCEDURE — 99214 OFFICE O/P EST MOD 30 MIN: CPT | Performed by: INTERNAL MEDICINE

## 2025-06-02 PROCEDURE — G2211 COMPLEX E/M VISIT ADD ON: HCPCS | Performed by: INTERNAL MEDICINE

## 2025-06-02 NOTE — ASSESSMENT & PLAN NOTE
Continue levothyroxine 50 mcg daily along with monitoring, last thyroid function tests were normal.  No fatigue, no constipation, no cold intolerance, no palpitations or tremors

## 2025-06-02 NOTE — PATIENT INSTRUCTIONS
Problem List Items Addressed This Visit          Cardiovascular and Mediastinum    Benign essential hypertension    Blood pressure controlled, continue current meds along with healthy diet and exercise            Respiratory    MIHAELA (obstructive sleep apnea)    Follow-up sleep medicine, patient is also on Zepbound            Endocrine    Impaired fasting glucose    Continue with healthy diet and exercise         Acquired hypothyroidism    Continue levothyroxine 50 mcg daily along with monitoring, last thyroid function tests were normal.  No fatigue, no constipation, no cold intolerance, no palpitations or tremors            Other    Pure hypercholesterolemia    Patient reports having aches on atorvastatin, so she stopped it, and the aches improved. She was also taking coenzyme Q 10 with the atorvastatin          Morbid obesity with BMI of 40.0-44.9, adult (HCC)        Patient doing well on Zepbound          Other Visit Diagnoses         Need for COVID-19 vaccine    -  Primary

## 2025-06-02 NOTE — PROGRESS NOTES
Name: Rhianna Gerardo      : 1955      MRN: [id number]  Encounter Provider: Leandro Moreno MD  Encounter Date: 2025   Enc[facility]: [facility]  :  Assessment & Plan  Need for COVID-19 vaccine         Benign essential hypertension  Blood pressure controlled, continue current meds along with healthy diet and exercise       Acquired hypothyroidism  Continue levothyroxine 50 mcg daily along with monitoring, last thyroid function tests were normal.  No fatigue, no constipation, no cold intolerance, no palpitations or tremors       Pure hypercholesterolemia  Patient reports having aches on atorvastatin, so she stopped it, and the aches improved. She was also taking coenzyme Q 10 with the atorvastatin        Impaired fasting glucose  Continue with healthy diet and exercise       MIHAELA (obstructive sleep apnea)  Follow-up sleep medicine, patient is also on Zepbound       Morbid obesity with BMI of 40.0-44.9, adult (HCC)      Patient doing well on Zepbound              History of Present Illness   Patient here for regular follow-up      Review of Systems   Constitutional:  Negative for chills, fatigue and fever.   HENT:  Negative for congestion, nosebleeds, postnasal drip, sore throat and trouble swallowing.    Eyes:  Negative for pain.   Respiratory:  Negative for cough, chest tightness, shortness of breath and wheezing.    Cardiovascular:  Negative for chest pain, palpitations and leg swelling.   Gastrointestinal:  Negative for abdominal pain, constipation, diarrhea, nausea and vomiting.   Endocrine: Negative for polydipsia and polyuria.   Genitourinary:  Negative for dysuria, flank pain and hematuria.   Musculoskeletal:  Negative for arthralgias.   Skin:  Negative for rash.   Neurological:  Negative for dizziness, tremors, light-headedness and headaches.   Hematological:  Does not bruise/bleed easily.   Psychiatric/Behavioral:  Negative for confusion and dysphoric mood. The  patient is not nervous/anxious.        Objective   /82 (BP Location: Left arm, Patient Position: Sitting, Cuff Size: Standard)   Pulse 65   Temp (!) 97 °F (36.1 °C) (Tympanic)   Wt 109 kg (241 lb)   SpO2 98%   BMI 38.90 kg/m²      Physical Exam  Vitals reviewed.   Constitutional:       Appearance: Normal appearance. She is well-developed.   HENT:      Head: Normocephalic and atraumatic.      Right Ear: External ear normal.      Left Ear: External ear normal.      Nose: Nose normal.     Eyes:      General: No scleral icterus.     Conjunctiva/sclera: Conjunctivae normal.     Neck:      Thyroid: No thyromegaly.      Trachea: No tracheal deviation.     Cardiovascular:      Rate and Rhythm: Normal rate and regular rhythm.      Heart sounds: Normal heart sounds. No murmur heard.  Pulmonary:      Effort: No respiratory distress.      Breath sounds: Normal breath sounds. No wheezing or rales.   Abdominal:      General: Bowel sounds are normal.      Palpations: Abdomen is soft. There is no mass.      Tenderness: There is no abdominal tenderness. There is no guarding.     Musculoskeletal:      Cervical back: Normal range of motion and neck supple.      Right lower leg: No edema.      Left lower leg: No edema.   Lymphadenopathy:      Cervical: No cervical adenopathy.     Skin:     Coloration: Skin is not jaundiced or pale.     Neurological:      Mental Status: She is alert and oriented to person, place, and time.     Psychiatric:         Behavior: Behavior normal.         Thought Content: Thought content normal.         Judgment: Judgment normal.

## 2025-06-02 NOTE — PROGRESS NOTES
"Weight Management Medical Nutrition Assessment  Rhianna is here with her  for 1 of 3 bundle. Current wt: 240.4    lbs. She has  lost 14.2   lbs   x 2  months with overall loss of  27.8   lbs since May 2022. She is currently on 5 mg zepbound. Some hunger the day or so prior to the injection but manageable. They have stopped using [employer] meals and are doing their own cooking. Still aiming for the 400 calorie range but due to using less fat than those meals caloric intake is likely less. Water intake is good. She has been going to the gym 3x/wk when they are not camping! Keep up the great work! She will f/u in 4 months.     Patient seen by Medical Provider in past 6 months:  yes  Requested to schedule appointment with Medical Provider: No    Anthropometric Measurements  Start Weight (#): 268.2 lbs 5/4/2022    Current Weight (#): 240.4 lbs  TBW % Change from start weight: 10.4%  Ideal Body Weight (#): 125 65\" (159.3 lbs BMI 25)  Goal Weight (#):  lbs     Weight Loss History  Previous weight loss attempts: Commercial Programs (Weight Watchers, [employer], etc.)  Counseling with  MD  Exercise  Meal Replacements (Medifast, Slim Fast, etc.)  Self Created Diets (Portion Control, Healthy Food Choices, etc.)    Food and Nutrition Related History  Wake up: 8-9   Bed Time: 11    Food Recall  Breakfast:  10:00  replacement    Snack:    Lunch: 12:00:  ~4 oz pork chops/steak/chicken, veggies or salad   Snack 4:00: bar OR skip   Dinner: 6-7:00  replacement  Snack: skip OR popcorn    Beverages: water  Volume of beverage intake:  74 oz     Weekends: Same  Cravings: no specific identified   Trouble area of day: not identified     Frequency of Eating out: every 2 wks  Food restrictions:  n/a  Cooking: self   Food Shopping: self    Physical Activity Intake  Activity: gym: 20 minutes on bike, strength training, bowling  Frequency:3x/wk gym, 1x/wk bowling     Physical limitations/barriers to exercise: back pain, foot "     Estimated Needs   Energy  Edd Soto Energy Needs: BMR :  1699  1-2# loss weekly sedentary: 961-1461      1-2# loss weekly lightly active:  9131-7156  Maintenance calories for sedentary activity level:  1961  Protein: 72-90 gm      (1.2-1.5g/kg IBW)  Total Fluid: 115 oz    ([place] Segar method)  Free Fluid: 92 oz ([place] Segar - 20%)    Nutrition Diagnosis  Yes;    Overweight/obesity  related to Excess energy intake as evidenced by  BMI more than normative standard for age and sex (obesity-grade II 35-39.9)       Nutrition Intervention    Nutrition Prescription  Calories: 1,100 calories, <75g net CHO  Protein: 85-120g  Fluid: 80 oz    Meal Plan (Adrien/Pro/Carb)  Breakfast: 200, 27, 10  Snack:  Lunch: <500, 30-40, 10-30 (encouraged 300-400kcal)  Snack:  Dinner: 200, 27, 10  Snack: 160, 15, 13  If meal 300-400kcal can add in additional snack of 100-150kcal.    Nutrition Education:    Hydration  Protein intake     Nutrition Counseling:  Strategies: as above      Monitoring and Evaluation:  Evaluation criteria:  Energy Intake  Meet protein needs  Maintain adequate hydration  Monitor weekly weight  Food logging/measuring  Physical activity   Meal planning    Barriers to learning:none  Readiness to change: Action:  (Changing behavior)     Comprehension: very good  Expected Compliance: good

## 2025-06-02 NOTE — ASSESSMENT & PLAN NOTE
Patient reports having aches on atorvastatin, so she stopped it, and the aches improved. She was also taking coenzyme Q 10 with the atorvastatin

## 2025-06-11 ENCOUNTER — CLINICAL SUPPORT (OUTPATIENT)
Dept: BARIATRICS | Facility: CLINIC | Age: 70
End: 2025-06-11

## 2025-06-11 VITALS — WEIGHT: 240.4 LBS | BODY MASS INDEX: 37.73 KG/M2 | HEIGHT: 67 IN

## 2025-06-11 DIAGNOSIS — R63.5 ABNORMAL WEIGHT GAIN: Primary | ICD-10-CM

## 2025-06-11 PROCEDURE — DB3PK

## 2025-06-11 PROCEDURE — RECHECK

## 2025-06-25 ENCOUNTER — PROCEDURE VISIT (OUTPATIENT)
Age: 70
End: 2025-06-25
Payer: MEDICARE

## 2025-06-25 VITALS
DIASTOLIC BLOOD PRESSURE: 84 MMHG | HEIGHT: 66 IN | WEIGHT: 240 LBS | SYSTOLIC BLOOD PRESSURE: 128 MMHG | BODY MASS INDEX: 38.57 KG/M2 | HEART RATE: 84 BPM

## 2025-06-25 DIAGNOSIS — M54.6 ACUTE BILATERAL THORACIC BACK PAIN: ICD-10-CM

## 2025-06-25 DIAGNOSIS — M99.03 SEGMENTAL DYSFUNCTION OF LUMBAR REGION: ICD-10-CM

## 2025-06-25 DIAGNOSIS — M99.04 SEGMENTAL DYSFUNCTION OF SACRAL REGION: ICD-10-CM

## 2025-06-25 DIAGNOSIS — M99.05 SEGMENTAL DYSFUNCTION OF PELVIC REGION: Primary | ICD-10-CM

## 2025-06-25 DIAGNOSIS — M79.18 MYOFASCIAL PAIN: ICD-10-CM

## 2025-06-25 DIAGNOSIS — M99.01 SEGMENTAL DYSFUNCTION OF CERVICAL REGION: ICD-10-CM

## 2025-06-25 DIAGNOSIS — M54.2 NECK PAIN: ICD-10-CM

## 2025-06-25 DIAGNOSIS — M54.50 ACUTE BILATERAL LOW BACK PAIN WITHOUT SCIATICA: ICD-10-CM

## 2025-06-25 DIAGNOSIS — M99.02 SEGMENTAL DYSFUNCTION OF THORACIC REGION: ICD-10-CM

## 2025-06-25 DIAGNOSIS — M54.16 LUMBAR RADICULOPATHY: ICD-10-CM

## 2025-06-25 PROCEDURE — 98942 CHIROPRACTIC MANJ 5 REGIONS: CPT | Performed by: CHIROPRACTOR

## 2025-06-29 NOTE — PROGRESS NOTES
Date of first visit: 2/21/2023        Assessment:   Diagnosis ICD-10-CM Associated Orders   1. Segmental dysfunction of pelvic region  M99.05       2. Segmental dysfunction of lumbar region  M99.03       3. Segmental dysfunction of sacral region  M99.04       4. Acute bilateral low back pain without sciatica  M54.50       5. Segmental dysfunction of thoracic region  M99.02       6. Acute bilateral thoracic back pain  M54.6       7. Neck pain  M54.2       8. Myofascial pain  M79.18       9. Segmental dysfunction of cervical region  M99.01       10. Lumbar radiculopathy  M54.16             Treatment: 14552  Manipulation to the left innominate, sacrum, L5 via Walker drop maneuver.  Manipulation T4 producing good joint release well-tolerated.  Manipulation C5 C1 well-tolerated via stairstepping maneuver.  No HVLA..    Discussion:  Continue her home stretching program icing to the left low back and hip we will see her back for follow-up.  She will continue icing and stretching.        HPI:  Lizzie returns to the office for treatment today of neck pain upper back pain lower back and left hip pain. No other medical history changes.      VPAS  3-4      The following portions of the patient's history were reviewed and updated as appropriate: allergies, current medications, past family history, past medical history, past social history, past surgical history, and problem list.    Review of Systems    Physical Exam:  Lizzie is in some minor distress today she moves slowly about the exam room her transfer is slowed range of motion reduced secondary to muscle tightness.    Pelvic obliquity noted elevated left versus right innominate misalignment of the left innominate on sacrum biomechanically joint dysfunction present left SI and L5-S1 motion unit active triggers in the left gluteus medius and TFL musculature.    Parathoracic hypertonicity noted with a T4-T5 segmental dysfunction.    Cervical range of motion reduced in left lateral  bending right rotation joint dysfunction C5-C6 C1-C2

## 2025-07-10 ENCOUNTER — HOSPITAL ENCOUNTER (OUTPATIENT)
Dept: RADIOLOGY | Age: 70
Discharge: HOME | End: 2025-07-10
Payer: MEDICARE

## 2025-07-10 DIAGNOSIS — G47.33 OSA (OBSTRUCTIVE SLEEP APNEA): ICD-10-CM

## 2025-07-10 DIAGNOSIS — Z00.6 ENCOUNTER FOR EXAMINATION FOR NORMAL COMPARISON OR CONTROL IN CLINICAL RESEARCH PROGRAM: ICD-10-CM

## 2025-07-10 DIAGNOSIS — E66.01 MORBID OBESITY WITH BMI OF 40.0-44.9, ADULT (HCC): Primary | ICD-10-CM

## 2025-07-10 DIAGNOSIS — M76.822 POSTERIOR TIBIAL TENDONITIS, LEFT: ICD-10-CM

## 2025-07-10 DIAGNOSIS — M19.072 DJD (DEGENERATIVE JOINT DISEASE), ANKLE AND FOOT, LEFT: ICD-10-CM

## 2025-07-10 DIAGNOSIS — E66.01 MORBID OBESITY WITH BMI OF 40.0-44.9, ADULT (HCC): ICD-10-CM

## 2025-07-10 DIAGNOSIS — E78.00 PURE HYPERCHOLESTEROLEMIA: ICD-10-CM

## 2025-07-10 DIAGNOSIS — M79.672 LEFT FOOT PAIN: ICD-10-CM

## 2025-07-10 PROCEDURE — 73721 MRI JNT OF LWR EXTRE W/O DYE: CPT

## 2025-07-10 RX ORDER — TIRZEPATIDE 5 MG/.5ML
5 INJECTION, SOLUTION SUBCUTANEOUS WEEKLY
Qty: 6 ML | Refills: 0 | Status: SHIPPED | OUTPATIENT
Start: 2025-07-10

## 2025-07-11 ENCOUNTER — APPOINTMENT (OUTPATIENT)
Dept: LAB | Age: 70
End: 2025-07-11

## 2025-07-11 DIAGNOSIS — Z00.6 ENCOUNTER FOR EXAMINATION FOR NORMAL COMPARISON OR CONTROL IN CLINICAL RESEARCH PROGRAM: ICD-10-CM

## 2025-07-11 PROCEDURE — 36415 COLL VENOUS BLD VENIPUNCTURE: CPT

## 2025-07-11 RX ORDER — TIRZEPATIDE 5 MG/.5ML
INJECTION, SOLUTION SUBCUTANEOUS
Qty: 6 ML | Refills: 3 | OUTPATIENT
Start: 2025-07-11

## 2025-07-21 LAB
APOB+LDLR+PCSK9 GENE MUT ANL BLD/T: NOT DETECTED
BRCA1+BRCA2 DEL+DUP + FULL MUT ANL BLD/T: NOT DETECTED
MLH1+MSH2+MSH6+PMS2 GN DEL+DUP+FUL M: NOT DETECTED

## 2025-07-24 ENCOUNTER — PROCEDURE VISIT (OUTPATIENT)
Age: 70
End: 2025-07-24
Payer: MEDICARE

## 2025-07-24 VITALS
HEART RATE: 86 BPM | WEIGHT: 236 LBS | HEIGHT: 67 IN | DIASTOLIC BLOOD PRESSURE: 83 MMHG | SYSTOLIC BLOOD PRESSURE: 148 MMHG | BODY MASS INDEX: 37.04 KG/M2

## 2025-07-24 DIAGNOSIS — M54.2 NECK PAIN: ICD-10-CM

## 2025-07-24 DIAGNOSIS — M99.05 SEGMENTAL DYSFUNCTION OF PELVIC REGION: Primary | ICD-10-CM

## 2025-07-24 DIAGNOSIS — M99.01 SEGMENTAL DYSFUNCTION OF CERVICAL REGION: ICD-10-CM

## 2025-07-24 DIAGNOSIS — M54.6 ACUTE BILATERAL THORACIC BACK PAIN: ICD-10-CM

## 2025-07-24 DIAGNOSIS — M99.03 SEGMENTAL DYSFUNCTION OF LUMBAR REGION: ICD-10-CM

## 2025-07-24 DIAGNOSIS — M99.02 SEGMENTAL DYSFUNCTION OF THORACIC REGION: ICD-10-CM

## 2025-07-24 DIAGNOSIS — M54.16 LUMBAR RADICULOPATHY: ICD-10-CM

## 2025-07-24 DIAGNOSIS — M79.18 MYOFASCIAL PAIN: ICD-10-CM

## 2025-07-24 DIAGNOSIS — M54.50 ACUTE BILATERAL LOW BACK PAIN WITHOUT SCIATICA: ICD-10-CM

## 2025-07-24 DIAGNOSIS — M99.04 SEGMENTAL DYSFUNCTION OF SACRAL REGION: ICD-10-CM

## 2025-07-24 PROCEDURE — 98942 CHIROPRACTIC MANJ 5 REGIONS: CPT | Performed by: CHIROPRACTOR

## 2025-07-24 NOTE — PROGRESS NOTES
Date of first visit: 2/21/2023        Assessment:   Diagnosis ICD-10-CM Associated Orders   1. Segmental dysfunction of pelvic region  M99.05       2. Segmental dysfunction of lumbar region  M99.03       3. Segmental dysfunction of sacral region  M99.04       4. Acute bilateral low back pain without sciatica  M54.50       5. Segmental dysfunction of thoracic region  M99.02       6. Acute bilateral thoracic back pain  M54.6       7. Neck pain  M54.2       8. Myofascial pain  M79.18       9. Segmental dysfunction of cervical region  M99.01       10. Lumbar radiculopathy  M54.16             Treatment: 61360  Manipulation to the left innominate, sacrum, L5 via Walker drop maneuver.  Manipulation T4 producing good joint release well-tolerated.  Manipulation C5 C1 well-tolerated via stairstepping maneuver.  No HVLA..    Discussion:  Continue her home stretching program icing to the left low back and hip we will see her back for follow-up.  She will continue icing and stretching.        HPI:  Lizzie returns to the office for treatment today of neck pain upper back pain lower back and left hip pain. No other medical history changes.      VPAS  3-4      The following portions of the patient's history were reviewed and updated as appropriate: allergies, current medications, past family history, past medical history, past social history, past surgical history, and problem list.    Review of Systems    Physical Exam:  Lizzie is in some minor distress today she moves slowly about the exam room her transfer is slowed range of motion reduced secondary to muscle tightness.    Pelvic obliquity noted elevated left versus right innominate misalignment of the left innominate on sacrum biomechanically joint dysfunction present left SI and L5-S1 motion unit active triggers in the left gluteus medius and TFL musculature.    Parathoracic hypertonicity noted with a T4-T5 segmental dysfunction.    Cervical range of motion reduced in left lateral  bending right rotation joint dysfunction C5-C6 C1-C2

## 2025-07-30 ENCOUNTER — APPOINTMENT (OUTPATIENT)
Dept: LAB | Age: 70
End: 2025-07-30
Payer: MEDICARE

## 2025-07-30 DIAGNOSIS — E78.00 PURE HYPERCHOLESTEROLEMIA: ICD-10-CM

## 2025-07-30 DIAGNOSIS — G47.33 OSA (OBSTRUCTIVE SLEEP APNEA): ICD-10-CM

## 2025-07-30 DIAGNOSIS — E66.01 MORBID OBESITY WITH BMI OF 40.0-44.9, ADULT (HCC): ICD-10-CM

## 2025-07-30 DIAGNOSIS — R73.03 PREDIABETES: ICD-10-CM

## 2025-07-30 DIAGNOSIS — I10 BENIGN ESSENTIAL HYPERTENSION: ICD-10-CM

## 2025-07-30 LAB
ALBUMIN SERPL BCG-MCNC: 4.2 G/DL (ref 3.5–5)
ALP SERPL-CCNC: 85 U/L (ref 34–104)
ALT SERPL W P-5'-P-CCNC: 9 U/L (ref 7–52)
ANION GAP SERPL CALCULATED.3IONS-SCNC: 8 MMOL/L (ref 4–13)
AST SERPL W P-5'-P-CCNC: 13 U/L (ref 13–39)
BASOPHILS # BLD AUTO: 0.04 THOUSANDS/ÂΜL (ref 0–0.1)
BASOPHILS NFR BLD AUTO: 1 % (ref 0–1)
BILIRUB SERPL-MCNC: 0.48 MG/DL (ref 0.2–1)
BUN SERPL-MCNC: 17 MG/DL (ref 5–25)
CALCIUM SERPL-MCNC: 9.2 MG/DL (ref 8.4–10.2)
CHLORIDE SERPL-SCNC: 105 MMOL/L (ref 96–108)
CHOLEST SERPL-MCNC: 229 MG/DL (ref ?–200)
CO2 SERPL-SCNC: 26 MMOL/L (ref 21–32)
CREAT SERPL-MCNC: 0.93 MG/DL (ref 0.6–1.3)
EOSINOPHIL # BLD AUTO: 0.15 THOUSAND/ÂΜL (ref 0–0.61)
EOSINOPHIL NFR BLD AUTO: 3 % (ref 0–6)
ERYTHROCYTE [DISTWIDTH] IN BLOOD BY AUTOMATED COUNT: 14.1 % (ref 11.6–15.1)
EST. AVERAGE GLUCOSE BLD GHB EST-MCNC: 117 MG/DL
GFR SERPL CREATININE-BSD FRML MDRD: 61 ML/MIN/1.73SQ M
GLUCOSE P FAST SERPL-MCNC: 92 MG/DL (ref 65–99)
HBA1C MFR BLD: 5.7 %
HCT VFR BLD AUTO: 41.8 % (ref 34.8–46.1)
HDLC SERPL-MCNC: 47 MG/DL
HGB BLD-MCNC: 13.6 G/DL (ref 11.5–15.4)
IMM GRANULOCYTES # BLD AUTO: 0.03 THOUSAND/UL (ref 0–0.2)
IMM GRANULOCYTES NFR BLD AUTO: 1 % (ref 0–2)
LDLC SERPL CALC-MCNC: 161 MG/DL (ref 0–100)
LYMPHOCYTES # BLD AUTO: 1 THOUSANDS/ÂΜL (ref 0.6–4.47)
LYMPHOCYTES NFR BLD AUTO: 16 % (ref 14–44)
MCH RBC QN AUTO: 28 PG (ref 26.8–34.3)
MCHC RBC AUTO-ENTMCNC: 32.5 G/DL (ref 31.4–37.4)
MCV RBC AUTO: 86 FL (ref 82–98)
MONOCYTES # BLD AUTO: 0.52 THOUSAND/ÂΜL (ref 0.17–1.22)
MONOCYTES NFR BLD AUTO: 9 % (ref 4–12)
NEUTROPHILS # BLD AUTO: 4.38 THOUSANDS/ÂΜL (ref 1.85–7.62)
NEUTS SEG NFR BLD AUTO: 70 % (ref 43–75)
NONHDLC SERPL-MCNC: 182 MG/DL
NRBC BLD AUTO-RTO: 0 /100 WBCS
PLATELET # BLD AUTO: 292 THOUSANDS/UL (ref 149–390)
PMV BLD AUTO: 9.8 FL (ref 8.9–12.7)
POTASSIUM SERPL-SCNC: 4.2 MMOL/L (ref 3.5–5.3)
PROT SERPL-MCNC: 7 G/DL (ref 6.4–8.4)
RBC # BLD AUTO: 4.86 MILLION/UL (ref 3.81–5.12)
SODIUM SERPL-SCNC: 139 MMOL/L (ref 135–147)
TRIGL SERPL-MCNC: 103 MG/DL (ref ?–150)
WBC # BLD AUTO: 6.12 THOUSAND/UL (ref 4.31–10.16)

## 2025-07-30 PROCEDURE — 80061 LIPID PANEL: CPT

## 2025-07-30 PROCEDURE — 83036 HEMOGLOBIN GLYCOSYLATED A1C: CPT

## 2025-07-30 PROCEDURE — 80053 COMPREHEN METABOLIC PANEL: CPT

## 2025-07-30 PROCEDURE — 36415 COLL VENOUS BLD VENIPUNCTURE: CPT

## 2025-07-30 PROCEDURE — 85025 COMPLETE CBC W/AUTO DIFF WBC: CPT

## 2025-08-06 ENCOUNTER — OFFICE VISIT (OUTPATIENT)
Dept: PODIATRY | Facility: CLINIC | Age: 70
End: 2025-08-06
Payer: MEDICARE

## 2025-08-06 VITALS — HEIGHT: 65 IN | WEIGHT: 238.4 LBS | BODY MASS INDEX: 39.72 KG/M2

## 2025-08-06 DIAGNOSIS — M76.822 POSTERIOR TIBIAL TENDONITIS, LEFT: ICD-10-CM

## 2025-08-06 DIAGNOSIS — Q74.2 ACCESSORY NAVICULAR BONE OF LEFT FOOT: Primary | ICD-10-CM

## 2025-08-06 DIAGNOSIS — M19.072 DJD (DEGENERATIVE JOINT DISEASE), ANKLE AND FOOT, LEFT: ICD-10-CM

## 2025-08-06 PROCEDURE — 99213 OFFICE O/P EST LOW 20 MIN: CPT | Performed by: PODIATRIST
